# Patient Record
Sex: FEMALE | Race: WHITE | NOT HISPANIC OR LATINO | Employment: OTHER | URBAN - METROPOLITAN AREA
[De-identification: names, ages, dates, MRNs, and addresses within clinical notes are randomized per-mention and may not be internally consistent; named-entity substitution may affect disease eponyms.]

---

## 2017-01-06 ENCOUNTER — HOSPITAL ENCOUNTER (OUTPATIENT)
Dept: RADIOLOGY | Facility: CLINIC | Age: 52
Discharge: HOME | End: 2017-01-06
Payer: COMMERCIAL

## 2017-01-06 ENCOUNTER — ALLSCRIPTS OFFICE VISIT (OUTPATIENT)
Dept: OTHER | Facility: OTHER | Age: 52
End: 2017-01-06

## 2017-01-06 DIAGNOSIS — M25.532 PAIN IN LEFT WRIST: ICD-10-CM

## 2017-01-06 PROCEDURE — 73110 X-RAY EXAM OF WRIST: CPT

## 2017-01-27 ENCOUNTER — ALLSCRIPTS OFFICE VISIT (OUTPATIENT)
Dept: OTHER | Facility: OTHER | Age: 52
End: 2017-01-27

## 2017-01-27 ENCOUNTER — HOSPITAL ENCOUNTER (OUTPATIENT)
Dept: RADIOLOGY | Facility: CLINIC | Age: 52
Discharge: HOME | End: 2017-01-27
Payer: COMMERCIAL

## 2017-01-27 DIAGNOSIS — S82.831D OTHER FRACTURE OF UPPER AND LOWER END OF RIGHT FIBULA, SUBSEQUENT ENCOUNTER FOR CLOSED FRACTURE WITH ROUTINE HEALING: ICD-10-CM

## 2017-01-27 DIAGNOSIS — M25.532 PAIN IN LEFT WRIST: ICD-10-CM

## 2017-01-27 DIAGNOSIS — S52.502A CLOSED FRACTURE OF LOWER END OF LEFT RADIUS: ICD-10-CM

## 2017-01-27 DIAGNOSIS — M79.671 PAIN OF RIGHT FOOT: ICD-10-CM

## 2017-01-27 DIAGNOSIS — M25.561 PAIN IN RIGHT KNEE: ICD-10-CM

## 2017-01-27 PROCEDURE — 73610 X-RAY EXAM OF ANKLE: CPT

## 2017-01-30 ENCOUNTER — ALLSCRIPTS OFFICE VISIT (OUTPATIENT)
Dept: OTHER | Facility: OTHER | Age: 52
End: 2017-01-30

## 2017-01-30 ENCOUNTER — HOSPITAL ENCOUNTER (OUTPATIENT)
Dept: RADIOLOGY | Facility: CLINIC | Age: 52
Discharge: HOME | End: 2017-01-30
Payer: COMMERCIAL

## 2017-01-30 DIAGNOSIS — M25.532 PAIN IN LEFT WRIST: ICD-10-CM

## 2017-01-30 DIAGNOSIS — S52.502A CLOSED FRACTURE OF LOWER END OF LEFT RADIUS: ICD-10-CM

## 2017-01-30 PROCEDURE — 73100 X-RAY EXAM OF WRIST: CPT

## 2017-02-07 ENCOUNTER — ALLSCRIPTS OFFICE VISIT (OUTPATIENT)
Dept: OTHER | Facility: OTHER | Age: 52
End: 2017-02-07

## 2017-02-07 ENCOUNTER — HOSPITAL ENCOUNTER (OUTPATIENT)
Dept: RADIOLOGY | Facility: CLINIC | Age: 52
Discharge: HOME | End: 2017-02-07
Payer: COMMERCIAL

## 2017-02-07 DIAGNOSIS — M79.671 PAIN OF RIGHT FOOT: ICD-10-CM

## 2017-02-07 PROCEDURE — 73630 X-RAY EXAM OF FOOT: CPT

## 2017-02-11 ENCOUNTER — ALLSCRIPTS OFFICE VISIT (OUTPATIENT)
Dept: OTHER | Facility: OTHER | Age: 52
End: 2017-02-11

## 2017-02-24 ENCOUNTER — HOSPITAL ENCOUNTER (OUTPATIENT)
Dept: RADIOLOGY | Facility: CLINIC | Age: 52
Discharge: HOME | End: 2017-02-24
Payer: COMMERCIAL

## 2017-02-24 ENCOUNTER — ALLSCRIPTS OFFICE VISIT (OUTPATIENT)
Dept: OTHER | Facility: OTHER | Age: 52
End: 2017-02-24

## 2017-02-24 DIAGNOSIS — M25.561 PAIN IN RIGHT KNEE: ICD-10-CM

## 2017-02-24 PROCEDURE — 73562 X-RAY EXAM OF KNEE 3: CPT

## 2017-02-27 ENCOUNTER — APPOINTMENT (OUTPATIENT)
Dept: LAB | Facility: CLINIC | Age: 52
End: 2017-02-27
Payer: COMMERCIAL

## 2017-02-27 ENCOUNTER — GENERIC CONVERSION - ENCOUNTER (OUTPATIENT)
Dept: OTHER | Facility: OTHER | Age: 52
End: 2017-02-27

## 2017-02-27 ENCOUNTER — TRANSCRIBE ORDERS (OUTPATIENT)
Dept: LAB | Facility: CLINIC | Age: 52
End: 2017-02-27

## 2017-02-27 DIAGNOSIS — G89.29 CHRONIC PAIN OF RIGHT KNEE: Primary | ICD-10-CM

## 2017-02-27 DIAGNOSIS — M25.561 CHRONIC PAIN OF RIGHT KNEE: Primary | ICD-10-CM

## 2017-02-27 LAB — VENIPUNCTURE: NORMAL

## 2017-02-27 PROCEDURE — 36415 COLL VENOUS BLD VENIPUNCTURE: CPT | Performed by: ORTHOPAEDIC SURGERY

## 2017-02-28 LAB
C-REACT.PROTEIN,QUANT (HISTORICAL): 6.5 MG/L (ref 0–4.9)
ERYTHROCYTE SEDIMENTATION RATE (HISTORICAL): 6 MM/HR (ref 0–40)

## 2017-03-03 ENCOUNTER — ALLSCRIPTS OFFICE VISIT (OUTPATIENT)
Dept: OTHER | Facility: OTHER | Age: 52
End: 2017-03-03

## 2017-05-26 ENCOUNTER — ALLSCRIPTS OFFICE VISIT (OUTPATIENT)
Dept: OTHER | Facility: OTHER | Age: 52
End: 2017-05-26

## 2017-05-26 ENCOUNTER — HOSPITAL ENCOUNTER (OUTPATIENT)
Dept: RADIOLOGY | Facility: CLINIC | Age: 52
Discharge: HOME | End: 2017-05-26
Payer: COMMERCIAL

## 2017-05-26 DIAGNOSIS — Z96.651 PRESENCE OF RIGHT ARTIFICIAL KNEE JOINT: ICD-10-CM

## 2017-05-26 PROCEDURE — 73562 X-RAY EXAM OF KNEE 3: CPT

## 2017-08-08 ENCOUNTER — APPOINTMENT (OUTPATIENT)
Dept: RADIOLOGY | Facility: CLINIC | Age: 52
End: 2017-08-08
Payer: COMMERCIAL

## 2017-08-08 ENCOUNTER — ALLSCRIPTS OFFICE VISIT (OUTPATIENT)
Dept: OTHER | Facility: OTHER | Age: 52
End: 2017-08-08

## 2017-08-08 DIAGNOSIS — M25.361 OTHER INSTABILITY, RIGHT KNEE: ICD-10-CM

## 2017-08-08 DIAGNOSIS — Z96.651 PRESENCE OF RIGHT ARTIFICIAL KNEE JOINT: ICD-10-CM

## 2017-08-08 DIAGNOSIS — M25.561 PAIN IN RIGHT KNEE: ICD-10-CM

## 2017-08-08 DIAGNOSIS — S82.831D OTHER FRACTURE OF UPPER AND LOWER END OF RIGHT FIBULA, SUBSEQUENT ENCOUNTER FOR CLOSED FRACTURE WITH ROUTINE HEALING: ICD-10-CM

## 2017-08-08 PROCEDURE — 73562 X-RAY EXAM OF KNEE 3: CPT

## 2017-08-11 ENCOUNTER — HOSPITAL ENCOUNTER (OUTPATIENT)
Dept: RADIOLOGY | Facility: HOSPITAL | Age: 52
Discharge: HOME | End: 2017-08-11
Attending: ORTHOPAEDIC SURGERY
Payer: COMMERCIAL

## 2017-08-11 DIAGNOSIS — M25.561 PAIN IN RIGHT KNEE: ICD-10-CM

## 2017-08-11 DIAGNOSIS — M25.361 OTHER INSTABILITY, RIGHT KNEE: ICD-10-CM

## 2017-08-11 DIAGNOSIS — Z96.651 PRESENCE OF RIGHT ARTIFICIAL KNEE JOINT: ICD-10-CM

## 2017-08-11 PROCEDURE — 78315 BONE IMAGING 3 PHASE: CPT

## 2017-08-11 PROCEDURE — A9503 TC99M MEDRONATE: HCPCS

## 2017-08-18 ENCOUNTER — GENERIC CONVERSION - ENCOUNTER (OUTPATIENT)
Dept: OTHER | Facility: OTHER | Age: 52
End: 2017-08-18

## 2017-09-06 ENCOUNTER — GENERIC CONVERSION - ENCOUNTER (OUTPATIENT)
Dept: OTHER | Facility: OTHER | Age: 52
End: 2017-09-06

## 2017-09-08 ENCOUNTER — TRANSCRIBE ORDERS (OUTPATIENT)
Dept: ADMINISTRATIVE | Facility: HOSPITAL | Age: 52
End: 2017-09-08

## 2017-09-08 ENCOUNTER — OFFICE VISIT (OUTPATIENT)
Dept: LAB | Facility: HOSPITAL | Age: 52
End: 2017-09-08
Attending: ORTHOPAEDIC SURGERY
Payer: COMMERCIAL

## 2017-09-08 ENCOUNTER — HOSPITAL ENCOUNTER (OUTPATIENT)
Dept: RADIOLOGY | Facility: HOSPITAL | Age: 52
Discharge: HOME | End: 2017-09-08
Attending: ORTHOPAEDIC SURGERY
Payer: COMMERCIAL

## 2017-09-08 ENCOUNTER — APPOINTMENT (OUTPATIENT)
Dept: PREADMISSION TESTING | Facility: HOSPITAL | Age: 52
End: 2017-09-08
Payer: COMMERCIAL

## 2017-09-08 VITALS — HEIGHT: 65 IN | BODY MASS INDEX: 27.99 KG/M2 | WEIGHT: 168 LBS

## 2017-09-08 DIAGNOSIS — Z01.818 PRE-OP TESTING: ICD-10-CM

## 2017-09-08 DIAGNOSIS — Z01.818 PRE-OP TESTING: Primary | ICD-10-CM

## 2017-09-08 LAB
ABO GROUP BLD: NORMAL
BLD GP AB SCN SERPL QL: NEGATIVE
CRP SERPL QL: 6 MG/L
ERYTHROCYTE [SEDIMENTATION RATE] IN BLOOD: 8 MM/HOUR (ref 2–25)
EST. AVERAGE GLUCOSE BLD GHB EST-MCNC: 94 MG/DL
HBA1C MFR BLD: 4.9 % (ref 4.2–6.3)
RH BLD: POSITIVE
SPECIMEN EXPIRATION DATE: NORMAL

## 2017-09-08 PROCEDURE — 71020 HB CHEST X-RAY 2VW FRONTAL&LATL: CPT

## 2017-09-08 PROCEDURE — 87081 CULTURE SCREEN ONLY: CPT

## 2017-09-08 PROCEDURE — 86850 RBC ANTIBODY SCREEN: CPT

## 2017-09-08 PROCEDURE — 83036 HEMOGLOBIN GLYCOSYLATED A1C: CPT

## 2017-09-08 PROCEDURE — 93005 ELECTROCARDIOGRAM TRACING: CPT

## 2017-09-08 PROCEDURE — 36415 COLL VENOUS BLD VENIPUNCTURE: CPT

## 2017-09-08 PROCEDURE — 85652 RBC SED RATE AUTOMATED: CPT

## 2017-09-08 PROCEDURE — 86901 BLOOD TYPING SEROLOGIC RH(D): CPT

## 2017-09-08 PROCEDURE — 86900 BLOOD TYPING SEROLOGIC ABO: CPT

## 2017-09-08 PROCEDURE — 86140 C-REACTIVE PROTEIN: CPT

## 2017-09-09 LAB — MRSA NOSE QL CULT: NORMAL

## 2017-09-11 LAB
ATRIAL RATE: 55 BPM
P AXIS: 27 DEGREES
PR INTERVAL: 120 MS
QRS AXIS: 11 DEGREES
QRSD INTERVAL: 78 MS
QT INTERVAL: 446 MS
QTC INTERVAL: 426 MS
T WAVE AXIS: 32 DEGREES
VENTRICULAR RATE: 55 BPM

## 2017-09-12 ENCOUNTER — GENERIC CONVERSION - ENCOUNTER (OUTPATIENT)
Dept: OTHER | Facility: OTHER | Age: 52
End: 2017-09-12

## 2017-09-12 LAB
A/G RATIO (HISTORICAL): 1.4 (ref 1.2–2.2)
ALBUMIN SERPL BCP-MCNC: 4.4 G/DL (ref 3.5–5.5)
ALP SERPL-CCNC: 83 IU/L (ref 39–117)
ALT SERPL W P-5'-P-CCNC: 23 IU/L (ref 0–32)
APTT PPP: 27 SEC (ref 24–33)
AST SERPL W P-5'-P-CCNC: 34 IU/L (ref 0–40)
BASOPHILS # BLD AUTO: 0 %
BASOPHILS # BLD AUTO: 0 X10E3/UL (ref 0–0.2)
BILIRUB SERPL-MCNC: 0.3 MG/DL (ref 0–1.2)
BUN SERPL-MCNC: 6 MG/DL (ref 6–24)
BUN/CREA RATIO (HISTORICAL): 8 (ref 9–23)
CALCIUM SERPL-MCNC: 9.6 MG/DL (ref 8.7–10.2)
CHLORIDE SERPL-SCNC: 100 MMOL/L (ref 96–106)
CREAT SERPL-MCNC: 0.71 MG/DL (ref 0.57–1)
DEPRECATED RDW RBC AUTO: 14.1 % (ref 12.3–15.4)
EGFR AFRICAN AMERICAN (HISTORICAL): 112 ML/MIN/1.73
EGFR-AMERICAN CALC (HISTORICAL): 97 ML/MIN/1.73
EOSINOPHIL # BLD AUTO: 0.1 X10E3/UL (ref 0–0.4)
EOSINOPHIL # BLD AUTO: 1 %
ERYTHROCYTE SEDIMENTATION RATE (HISTORICAL): 20 MM/HR (ref 0–40)
GLUCOSE SERPL-MCNC: 75 MG/DL (ref 65–99)
HCT VFR BLD AUTO: 39.8 % (ref 34–46.6)
HGB BLD-MCNC: 13.2 G/DL (ref 11.1–15.9)
IMM.GRANULOCYTES (CD4/8) (HISTORICAL): 0 %
IMM.GRANULOCYTES (CD4/8) (HISTORICAL): 0 X10E3/UL (ref 0–0.1)
INR PPP: 1 (ref 0.8–1.2)
LYMPHOCYTES # BLD AUTO: 2.4 X10E3/UL (ref 0.7–3.1)
LYMPHOCYTES # BLD AUTO: 30 %
MCH RBC QN AUTO: 32.8 PG (ref 26.6–33)
MCHC RBC AUTO-ENTMCNC: 33.2 G/DL (ref 31.5–35.7)
MCV RBC AUTO: 99 FL (ref 79–97)
MONOCYTES # BLD AUTO: 0.8 X10E3/UL (ref 0.1–0.9)
MONOCYTES (HISTORICAL): 11 %
NEUTROPHILS # BLD AUTO: 4.5 X10E3/UL (ref 1.4–7)
NEUTROPHILS # BLD AUTO: 58 %
PLATELET # BLD AUTO: 274 X10E3/UL (ref 150–379)
POTASSIUM SERPL-SCNC: 4.4 MMOL/L (ref 3.5–5.2)
PROTHROMBIN TIME: 10.2 SEC (ref 9.1–12)
RBC (HISTORICAL): 4.03 X10E6/UL (ref 3.77–5.28)
SODIUM SERPL-SCNC: 139 MMOL/L (ref 134–144)
TOT. GLOBULIN, SERUM (HISTORICAL): 3.2 G/DL (ref 1.5–4.5)
TOTAL PROTEIN (HISTORICAL): 7.6 G/DL (ref 6–8.5)
WBC # BLD AUTO: 7.8 X10E3/UL (ref 3.4–10.8)

## 2017-09-13 LAB
BACTERIA UR QL AUTO: NORMAL
BILIRUB UR QL STRIP: NEGATIVE
C-REACT.PROTEIN,QUANT (HISTORICAL): 1.7 MG/L (ref 0–4.9)
COLOR UR: YELLOW
COMMENT (HISTORICAL): CLEAR
FECAL OCCULT BLOOD DIAGNOSTIC (HISTORICAL): NEGATIVE
GLUCOSE (HISTORICAL): NEGATIVE
KETONES UR STRIP-MCNC: NEGATIVE MG/DL
LEUKOCYTE ESTERASE UR QL STRIP: NEGATIVE
MICROSCOPIC EXAMINATION (HISTORICAL): NORMAL
MICROSCOPIC EXAMINATION (HISTORICAL): NORMAL
NITRITE UR QL STRIP: NEGATIVE
NON-SQ EPI CELLS URNS QL MICRO: NORMAL /HPF
PH UR STRIP.AUTO: 7 [PH] (ref 5–7.5)
PROT UR STRIP-MCNC: NEGATIVE MG/DL
RBC (HISTORICAL): NORMAL /HPF
SP GR UR STRIP.AUTO: 1.01 (ref 1–1.03)
UROBILINOGEN UR QL STRIP.AUTO: 0.2 EU/DL (ref 0.2–1)
WBC # BLD AUTO: NORMAL /HPF

## 2017-09-15 ENCOUNTER — ALLSCRIPTS OFFICE VISIT (OUTPATIENT)
Dept: OTHER | Facility: OTHER | Age: 52
End: 2017-09-15

## 2017-09-24 RX ORDER — ACETAMINOPHEN 325 MG/1
975 TABLET ORAL ONCE
Status: COMPLETED | OUTPATIENT
Start: 2017-09-25 | End: 2017-09-25

## 2017-09-24 RX ORDER — SCOLOPAMINE TRANSDERMAL SYSTEM 1 MG/1
1 PATCH, EXTENDED RELEASE TRANSDERMAL ONCE
Status: DISCONTINUED | OUTPATIENT
Start: 2017-09-25 | End: 2017-09-25 | Stop reason: HOSPADM

## 2017-09-24 RX ORDER — OXYCODONE HCL 10 MG/1
10 TABLET, FILM COATED, EXTENDED RELEASE ORAL ONCE
Status: COMPLETED | OUTPATIENT
Start: 2017-09-25 | End: 2017-09-25

## 2017-09-25 ENCOUNTER — HOSPITAL ENCOUNTER (OUTPATIENT)
Facility: HOSPITAL | Age: 52
Setting detail: SURGERY ADMIT
Discharge: HOME | End: 2017-09-25
Attending: ORTHOPAEDIC SURGERY | Admitting: ORTHOPAEDIC SURGERY
Payer: COMMERCIAL

## 2017-09-25 VITALS
HEART RATE: 65 BPM | TEMPERATURE: 97.1 F | SYSTOLIC BLOOD PRESSURE: 146 MMHG | RESPIRATION RATE: 18 BRPM | OXYGEN SATURATION: 100 % | DIASTOLIC BLOOD PRESSURE: 82 MMHG

## 2017-09-25 LAB
ABO GROUP BLD: NORMAL
BLD GP AB SCN SERPL QL: NEGATIVE
RH BLD: POSITIVE
SPECIMEN EXPIRATION DATE: NORMAL

## 2017-09-25 PROCEDURE — 86901 BLOOD TYPING SEROLOGIC RH(D): CPT | Performed by: ORTHOPAEDIC SURGERY

## 2017-09-25 PROCEDURE — 86850 RBC ANTIBODY SCREEN: CPT | Performed by: ORTHOPAEDIC SURGERY

## 2017-09-25 PROCEDURE — 86900 BLOOD TYPING SEROLOGIC ABO: CPT | Performed by: ORTHOPAEDIC SURGERY

## 2017-09-25 RX ADMIN — OXYCODONE HYDROCHLORIDE 10 MG: 10 TABLET, FILM COATED, EXTENDED RELEASE ORAL at 06:32

## 2017-09-25 RX ADMIN — ACETAMINOPHEN 975 MG: 325 TABLET, FILM COATED ORAL at 06:31

## 2017-09-25 RX ADMIN — SCOPOLAMINE 1 PATCH: 1 PATCH, EXTENDED RELEASE TRANSDERMAL at 06:32

## 2017-09-25 NOTE — PROGRESS NOTES
Patient's case was canceled  Ropivacaine 0 5% 80mg  Morphine 6mg, Epinephrine 0 2mg, Depo-Medrol 40 mg, Sodium Chloride 0 9% 25ml syringe wasted (whole syringe)   Wasted in [facility]  by Christopher Loyola PharmD and Ravi Lopes PharmD

## 2017-09-25 NOTE — H&P
Patient seen examined in preoperative holding and preparation for revision right total knee arthroplasty  The patient states that there have been no changes in her overall health her medical condition since he was last seen by me in the office other than she sustained a pig bite to her right shin 1 5-2 weeks ago  She states the pig is her neighbors pet, and she states that it was healing well and she had scratched the scab off recently  There is a 1 cm x 1 cm area that is nonhealing over the anterior portion of her right shin over the anterior lateral compartment  There is pink skin around the area of the bite without increased tenderness to palpation erythema or drainage  The wound appears to be relatively benign today however there is some concern that it is not completely healed by 2 weeks in a 38-year-old woman without significant medical comorbidities  Furthermore it is unknown what type of lilia is carried within a pegs oral cavity and how this relates to possibility of wound infection versus delayed onset infection after inoculation  In the setting of revision total knee arthroplasty that already carries a elevated risk of infection and other complication due to the nature of the procedure the case was canceled today secondary to the presence of a nonhealing pig bite to the right anterior lateral compartment  Case will be delayed temporarily until the right lower extremity wound demonstrates aseptic healing to completeness  Patient understands concerns of revision knee arthroplasty in the setting of a nonhealing pig bite to her leg  She demonstrates understanding of the increased risk of infection that this would present if surgical intervention was pursued today  Patient was discharged to follow up in the office in 1 week for outpatient monitoring of healing  Keerthi BENITEZ    Division of Adult Reconstruction  Department of [facility]

## 2017-10-03 ENCOUNTER — GENERIC CONVERSION - ENCOUNTER (OUTPATIENT)
Dept: OTHER | Facility: OTHER | Age: 52
End: 2017-10-03

## 2017-10-12 NOTE — PRE-PROCEDURE INSTRUCTIONS
My Surgical Experience    The following information was developed to assist you to prepare for your operation  What do I need to do before coming to the hospital?   Arrange for a responsible person to drive you to and from the hospital    Arrange care for your children at home  Children are not allowed in the recovery areas of the hospital   Plan to wear clothing that is easy to put on and take off  If you are having shoulder surgery, wear a shirt that buttons or zippers in the front  Bathing  o Shower the evening before and the morning of your surgery with an antibacterial soap  Please refer to the Pre Op Showering Instructions for Surgery Patients Sheet   o Remove nail polish and all body piercing jewelry  o Do not shave any body part for at least 24 hours before surgery-this includes face, arms, legs and upper body  Food  o Nothing to eat or drink after midnight the night before your surgery  This includes candy and chewing gum  o Exception: If your surgery is after 12:00pm (noon), you may have clear liquids such as 7-Up®, ginger ale, apple or cranberry juice, Jell-O®, water, or clear broth until 8:00 am  o Do not drink milk or juice with pulp on the morning before surgery  o Do not drink alcohol 24 hours before surgery  Medicine  o Follow instructions you received from your surgeon about which medicines you may take on the day of surgery  o If instructed to take medicine on the morning of surgery, take pills with just a small sip of water  Call your prescribing doctor for specific infroamtion on what to do if you take insulin    What should I bring to the hospital?    Bring:  Luz Maria Bouquet or a walker, if you have them, for foot or knee surgery   A list of the daily medicines, vitamins, minerals, herbals and nutritional supplements you take   Include the dosages of medicines and the time you take them each day   Glasses, dentures or hearing aids   Minimal clothing; you will be wearing hospital sleepwear   Photo ID; required to verify your identity   If you have a Living Will or Power of , bring a copy of the documents   If you have an ostomy, bring an extra pouch and any supplies you use    Do not bring   Medicines or inhalers   Money, valuables or jewelry    What other information should I know about the day of surgery?  Notify your surgeons if you develop a cold, sore throat, cough, fever, rash or any other illness   Report to the Ambulatory Surgical/Same Day Surgery Unit   You will be instructed to stop at Registration only if you have not been pre-registered   Inform your  fi they do not stay that they will be asked by the staff to leave a phone number where they can be reached   Be available to be reached before surgery  In the event the operating room schedule changes, you may be asked to come in earlier or later than expected    *It is important to tell your doctor and others involved in your health care if you are taking or have been taking any non-prescription drugs, vitamins, minerals, herbals or other nutritional supplements  Any of these may interact with some food or medicines and cause a reaction      Pre-Surgery Instructions:   Medication Instructions    escitalopram (LEXAPRO) 10 mg tablet Instructed patient per Anesthesia Guidelines

## 2017-10-18 ENCOUNTER — GENERIC CONVERSION - ENCOUNTER (OUTPATIENT)
Dept: OTHER | Facility: OTHER | Age: 52
End: 2017-10-18

## 2017-10-20 ENCOUNTER — ALLSCRIPTS OFFICE VISIT (OUTPATIENT)
Dept: OTHER | Facility: OTHER | Age: 52
End: 2017-10-20

## 2017-10-21 ENCOUNTER — ANESTHESIA EVENT (OUTPATIENT)
Dept: PERIOP | Facility: HOSPITAL | Age: 52
DRG: 468 | End: 2017-10-21
Payer: COMMERCIAL

## 2017-10-21 NOTE — PROGRESS NOTES
Assessment  1  Pre-op exam (V72 27) (A85 699)    Discussion/Summary  Surgical Clearance: She is at a LOW risk from a cardiovascular standpoint at this time without any additional cardiac testing  Reevaluation needed, if she should present with symptoms prior to surgery/procedure  Surgical clearance faxed to Dr Pepper Paredes   Possible side effects of new medications were reviewed with the patient/guardian today  The treatment plan was reviewed with the patient/guardian  The patient/guardian understands and agrees with the treatment plan      Chief Complaint  pt here for preop for right knee replacement with Dr Pepper Paredes 10/23  tc/cma      History of Present Illness  Pre-Op Visit (Brief): The patient is being seen for a preoperative visit  The procedure is a(n) Revision right total knee replacement scheduled for 10/23/17 with Dr Pepper Paredes  The indication for surgery is right knee instability  Surgical Risk Assessment:   Prior Anesthesia: She had prior anesthesia-- and-- no prior adverse reaction to general anesthesia  Lifestyle Factors: denies alcohol use, denies tobacco use and denies illegal drug use  Symptoms: no symptoms  Living Situation: home is secure and supportive  Review of Systems    Constitutional: No fever, no chills, feels well, no tiredness, no recent weight gain or weight loss  Cardiovascular: no chest pain,-- no palpitations-- and-- no lower extremity edema  Respiratory: No complaints of shortness of breath, no wheezing, no cough, no SOB on exertion, no orthopnea, no PND  Gastrointestinal: No complaints of abdominal pain, no constipation, no nausea or vomiting, no diarrhea, no bloody stools  Musculoskeletal: arthralgias-- and-- myalgias  Integumentary: pig bite sites rle well healed-no sign infection, but-- no rashes  Neurological: No complaints of headache, no confusion, no convulsions, no numbness, no dizziness or fainting, no tingling, no limb weakness, no difficulty walking  Psychiatric: anxiety  Endocrine: No complaints of proptosis, no hot flashes, no muscle weakness, no deepening of the voice, no feelings of weakness  Active Problems  1  Bitten by pig, subsequent encounter (V58 89,879 8) (W55 41XD)   2  Chronic fatigue (780 79) (R53 82)   3  Depression with anxiety (300 4) (F41 8)   4  Hematuria (599 70) (R31 9)   5  History of alcoholism (V11 3) (F10 21)   6  Knee instability, right (718 86) (M25 361)   7  Left wrist pain (719 43) (M25 532)   8  Murmur (785 2) (R01 1)   9  Need for subacute bacterial endocarditis prophylaxis (V07 8) (Z29 8)   10  Pre-op exam (V72 84) (Z01 818)   11  Reported Hx Of Knee Replacement   12  Right foot pain (729 5) (M79 671)   13  Right knee pain (719 46) (M25 561)   14  S/P total knee replacement using cement, right (V43 65) (Z96 651)   15  Status post open reduction with internal fixation (ORIF) of fracture of ankle    (V45 89,V15 51) (Z96 7,Z87 81)   16  Thyromegaly (240 9) (E04 9)   17   Vitamin B-complex deficiency (266 9) (E53 9)    Past Medical History   · History of _   · History of Acute lower UTI (599 0) (N39 0)   · History of Acute nasopharyngitis (common cold) (460) (J00)   · History of Acute upper respiratory infection (465 9) (J06 9)   · History of Ankle pain, right (719 47) (M25 571)   · History of Benign essential hypertension (401 1) (I10)   · History of Contusion of foot, right (924 20) (S90 31XA)   · History of Contusion Of The Toe(S) With Intact Skin Surface (924 3)   · History of Distal radius fracture, left (813 42) (S52 502A)   · History of Encounter for annual physical exam (V70 0) (Z00 00)   · History of Encounter for screening for malignant neoplasm of colon (V76 51) (Z12 11)   · History of Fever of unknown origin (780 60) (R50 9)   · History of anemia (V12 3) (Z86 2)   · History of fever (V13 89) (U60 849)   · History of gastroesophageal reflux (GERD) (V12 79) (Z87 19)   · History of herpes labialis (V12 09) (Z86 19) · History of screening mammography (V15 89) (Z92 89)   · History of sinus bradycardia (V12 59) (Z86 79)   · History of upper respiratory infection (V12 09) (Z87 09)   · History of Immunology Studies Nonspecific Abnormal Findings (795 79)   · History of Infected Nonvenomous Insect Bite (919 5)   · History of Numbness (782 0) (R20 0)   · History of Other closed fracture of distal end of right fibula with routine healing,  subsequent encounter (V54 16) (N81 376P)   · History of Paronychia of toe (681 11) (L03 039)   · History of Skin rash (782 1) (R21)   · History of Tingling (782 0) (R20 2)   · History of Uncomplicated alcohol abuse (305 00) (F10 10)   · History of Visit for pre-operative examination (V72 84) (Z01 818)   · History of Well adult on routine health check (V70 0) (Z00 00)   · History of Wound infection (958 3) (T14 8XXA,L08 9)   · History of Wound of back (876 0) (S21 209A)    Surgical History   · History of Ankle Surgery   · Reported Hx Of Knee Replacement    Family History  Mother    · Family history of Arthritis   · Family history of Osteoporosis  Father    · Family history of Dementia   · Family history of Spinal stenosis  Family History    · Family history of CREST Syndrome    Social History   ·    · Never A Smoker   · No illicit drug use   · Recovering Alcoholic    Current Meds   1  Escitalopram Oxalate 10 MG Oral Tablet; take 1 tablet by mouth once daily; Therapy: 60Uma8165 to (Last Rx:12Fdp1864)  Requested for: 53Rnk3774 Ordered    Allergies  1   No Known Drug Allergies    Vitals   Recorded: 86OLA0009 10:50AM   Temperature 97 3 F, Temporal   Heart Rate 64, R Radial   Pulse Quality Normal, R Radial   Respiration Quality Difficult   Respiration 16   Systolic 551, RUE, Sitting   Diastolic 82, RUE, Sitting   Height 5 ft 5 in   Weight 174 lb    BMI Calculated 28 96   BSA Calculated 1 86     Physical Exam    Constitutional   General appearance: No acute distress, well appearing and well nourished  Eyes   Conjunctiva and lids: No swelling, erythema or discharge  Ears, Nose, Mouth, and Throat   Oropharynx: Normal with no erythema, edema, exudate or lesions  Neck   Neck: Supple, symmetric, trachea midline, no masses  Thyroid: Normal, no thyromegaly  Pulmonary   Respiratory effort: No increased work of breathing or signs of respiratory distress  Cardiovascular RRR no sig m/r/g  Carotid pulses: 2+ bilaterally  Pedal pulses: 2+ bilaterally  Examination of extremities for edema and/or varicosities: Normal     Abdomen   Abdomen: Non-tender, no masses  Lymphatic   Palpation of lymph nodes in neck: No lymphadenopathy  Skin   Skin and subcutaneous tissue: Normal without rashes or lesions  -- right ant shin and calf lesions well healed--no erythema-no open areas, no streaking  Psychiatric   Orientation to person, place, and time: Normal     Mood and affect: Normal        Results/Data  PHQ-2 Adult Depression Screening 20Oct2017 10:53AM User, s     Test Name Result Flag Reference   PHQ-2 Adult Depression Score 0     Over the last two weeks, how often have you been bothered by any of the following problems? Little interest or pleasure in doing things: Not at all - 0  Feeling down, depressed, or hopeless: Not at all - 0   PHQ-2 Adult Depression Screening Negative         End of Encounter Meds  1  Escitalopram Oxalate 10 MG Oral Tablet (Lexapro); take 1 tablet by mouth once daily;    Therapy: 81Oot1249 to (Last Rx:37Hcn2385)  Requested for: 08OZK9821 Ordered    Future Appointments    Date/Time Provider Specialty Site   10/23/2017 07:30 AM Reynaldo Patel  Orthopedic Surgery [facility] OR   11/07/2017 10:00 AM Reynaldo Patel, [address] [place] [address]     Signatures   Electronically signed by : CINTHYA Vazquez ; Oct 20 2017 11:49AM EST                       (Author)

## 2017-10-21 NOTE — ANESTHESIA PREPROCEDURE EVALUATION
Review of Systems/Medical History  Patient summary reviewed  Chart reviewed  No history of anesthetic complications     Cardiovascular   Pulmonary       GI/Hepatic            Endo/Other  Arthritis     GYN       Hematology   Musculoskeletal       Neurology   Psychology   Depression ,            Physical Exam    Airway    Mallampati score: II  TM Distance: >3 FB  Neck ROM: full     Dental   No notable dental hx     Cardiovascular  Cardiovascular exam normal    Pulmonary  Pulmonary exam normal     Other Findings        Anesthesia Plan  ASA Score- 2       Anesthesia Type- general and regional with ASA Monitors  Additional Monitors:   Airway Plan: ETT  Induction- intravenous  Informed Consent- Anesthetic plan and risks discussed with patient  I personally reviewed this patient with the CRNA  Discussed and agreed on the Anesthesia Plan with the CRNA  Sherre Soulier

## 2017-10-23 ENCOUNTER — GENERIC CONVERSION - ENCOUNTER (OUTPATIENT)
Dept: PERIOP | Facility: HOSPITAL | Age: 52
End: 2017-10-23

## 2017-10-23 ENCOUNTER — HOSPITAL ENCOUNTER (INPATIENT)
Facility: HOSPITAL | Age: 52
LOS: 2 days | Discharge: HOME HEALTH | DRG: 468 | End: 2017-10-25
Attending: ORTHOPAEDIC SURGERY | Admitting: ORTHOPAEDIC SURGERY
Payer: COMMERCIAL

## 2017-10-23 ENCOUNTER — APPOINTMENT (OUTPATIENT)
Dept: RADIOLOGY | Facility: HOSPITAL | Age: 52
DRG: 468 | End: 2017-10-23
Payer: COMMERCIAL

## 2017-10-23 ENCOUNTER — ANESTHESIA (OUTPATIENT)
Dept: PERIOP | Facility: HOSPITAL | Age: 52
DRG: 468 | End: 2017-10-23
Payer: COMMERCIAL

## 2017-10-23 DIAGNOSIS — E01.0 THYROMEGALY: Chronic | ICD-10-CM

## 2017-10-23 DIAGNOSIS — Z96.651 STATUS POST REVISION OF TOTAL REPLACEMENT OF RIGHT KNEE: Primary | ICD-10-CM

## 2017-10-23 DIAGNOSIS — E53.9 VITAMIN B-COMPLEX DEFICIENCY: Chronic | ICD-10-CM

## 2017-10-23 DIAGNOSIS — M25.361 OTHER INSTABILITY, RIGHT KNEE: ICD-10-CM

## 2017-10-23 DIAGNOSIS — F41.8 DEPRESSION WITH ANXIETY: Chronic | ICD-10-CM

## 2017-10-23 DIAGNOSIS — Z96.651 PRESENCE OF RIGHT ARTIFICIAL KNEE JOINT: ICD-10-CM

## 2017-10-23 PROBLEM — R31.9 HEMATURIA: Chronic | Status: ACTIVE | Noted: 2017-10-23

## 2017-10-23 PROBLEM — T84.022A INSTABILITY OF INTERNAL RIGHT KNEE PROSTHESIS (HCC): Status: ACTIVE | Noted: 2017-10-23

## 2017-10-23 PROBLEM — T84.032A MECHANICAL LOOSENING OF INTERNAL RIGHT KNEE PROSTHETIC JOINT (HCC): Status: ACTIVE | Noted: 2017-10-23

## 2017-10-23 PROBLEM — F10.21 HISTORY OF ALCOHOLISM (HCC): Chronic | Status: ACTIVE | Noted: 2017-10-23

## 2017-10-23 PROCEDURE — C1776 JOINT DEVICE (IMPLANTABLE): HCPCS | Performed by: ORTHOPAEDIC SURGERY

## 2017-10-23 PROCEDURE — 0SPC0JZ REMOVAL OF SYNTHETIC SUBSTITUTE FROM RIGHT KNEE JOINT, OPEN APPROACH: ICD-10-PCS | Performed by: ORTHOPAEDIC SURGERY

## 2017-10-23 PROCEDURE — 88311 DECALCIFY TISSUE: CPT | Performed by: ORTHOPAEDIC SURGERY

## 2017-10-23 PROCEDURE — 86901 BLOOD TYPING SEROLOGIC RH(D): CPT | Performed by: ORTHOPAEDIC SURGERY

## 2017-10-23 PROCEDURE — G8988 SELF CARE GOAL STATUS: HCPCS

## 2017-10-23 PROCEDURE — 88307 TISSUE EXAM BY PATHOLOGIST: CPT | Performed by: ORTHOPAEDIC SURGERY

## 2017-10-23 PROCEDURE — 73560 X-RAY EXAM OF KNEE 1 OR 2: CPT

## 2017-10-23 PROCEDURE — 97163 PT EVAL HIGH COMPLEX 45 MIN: CPT

## 2017-10-23 PROCEDURE — G8987 SELF CARE CURRENT STATUS: HCPCS

## 2017-10-23 PROCEDURE — G8979 MOBILITY GOAL STATUS: HCPCS

## 2017-10-23 PROCEDURE — 87075 CULTR BACTERIA EXCEPT BLOOD: CPT | Performed by: ORTHOPAEDIC SURGERY

## 2017-10-23 PROCEDURE — 88305 TISSUE EXAM BY PATHOLOGIST: CPT | Performed by: ORTHOPAEDIC SURGERY

## 2017-10-23 PROCEDURE — 86900 BLOOD TYPING SEROLOGIC ABO: CPT | Performed by: ORTHOPAEDIC SURGERY

## 2017-10-23 PROCEDURE — 87205 SMEAR GRAM STAIN: CPT | Performed by: ORTHOPAEDIC SURGERY

## 2017-10-23 PROCEDURE — G8978 MOBILITY CURRENT STATUS: HCPCS

## 2017-10-23 PROCEDURE — 97167 OT EVAL HIGH COMPLEX 60 MIN: CPT

## 2017-10-23 PROCEDURE — 87081 CULTURE SCREEN ONLY: CPT | Performed by: ORTHOPAEDIC SURGERY

## 2017-10-23 PROCEDURE — 0SRC0J9 REPLACEMENT OF RIGHT KNEE JOINT WITH SYNTHETIC SUBSTITUTE, CEMENTED, OPEN APPROACH: ICD-10-PCS | Performed by: ORTHOPAEDIC SURGERY

## 2017-10-23 PROCEDURE — 86850 RBC ANTIBODY SCREEN: CPT | Performed by: ORTHOPAEDIC SURGERY

## 2017-10-23 PROCEDURE — C1713 ANCHOR/SCREW BN/BN,TIS/BN: HCPCS | Performed by: ORTHOPAEDIC SURGERY

## 2017-10-23 PROCEDURE — 87070 CULTURE OTHR SPECIMN AEROBIC: CPT | Performed by: ORTHOPAEDIC SURGERY

## 2017-10-23 DEVICE — P.F.C. SIGMA DISTAL AUGMENT SIZE 3 4MM RIGHT
Type: IMPLANTABLE DEVICE | Site: KNEE | Status: FUNCTIONAL
Brand: P.F.C. SIGMA

## 2017-10-23 DEVICE — P.F.C. SIGMA FEMORAL ADAPTER BOLT +2/-2
Type: IMPLANTABLE DEVICE | Site: KNEE | Status: FUNCTIONAL
Brand: P.F.C. SIGMA

## 2017-10-23 DEVICE — UNIVERSAL STEM FLUTED 75MM X 14MM: Type: IMPLANTABLE DEVICE | Site: KNEE | Status: FUNCTIONAL

## 2017-10-23 DEVICE — M.B.T. REVISION METAPHYSEAL SLEEVE POROUS 37MM: Type: IMPLANTABLE DEVICE | Site: KNEE | Status: FUNCTIONAL

## 2017-10-23 DEVICE — UNIVERSAL FEMORAL SLEEVE FULL POROUS 40MM: Type: IMPLANTABLE DEVICE | Site: KNEE | Status: FUNCTIONAL

## 2017-10-23 DEVICE — TIBIAL TRAY ROTATING PLATFORM M.B.T. REVISION SIZE 2.5 CEMENTED: Type: IMPLANTABLE DEVICE | Site: KNEE | Status: FUNCTIONAL

## 2017-10-23 DEVICE — IMPLANTABLE DEVICE: Type: IMPLANTABLE DEVICE | Site: KNEE | Status: FUNCTIONAL

## 2017-10-23 DEVICE — UNIVERSAL STEM FLUTED 75MM X 16MM: Type: IMPLANTABLE DEVICE | Site: KNEE | Status: FUNCTIONAL

## 2017-10-23 DEVICE — SIGMA TIBIAL INSERT ROTATING PLATFORM STABILIZED AOX SIZE 3 17.5MM
Type: IMPLANTABLE DEVICE | Site: KNEE | Status: FUNCTIONAL
Brand: SIGMA AOX

## 2017-10-23 DEVICE — P.F.C. SIGMA POSTERIOR AUGMENT COMBO CEMENTED SIZE 3 4MM
Type: IMPLANTABLE DEVICE | Site: KNEE | Status: FUNCTIONAL
Brand: P.F.C. SIGMA

## 2017-10-23 DEVICE — P.F.C. SIGMA FEMORAL ADAPTER 5 DEGREE
Type: IMPLANTABLE DEVICE | Site: KNEE | Status: FUNCTIONAL
Brand: P.F.C. SIGMA

## 2017-10-23 DEVICE — SIGMA FEMORAL TC3 CEMENTED 3 RIGHT
Type: IMPLANTABLE DEVICE | Site: KNEE | Status: FUNCTIONAL
Brand: SIGMA

## 2017-10-23 RX ORDER — DIPHENHYDRAMINE HYDROCHLORIDE 50 MG/ML
25 INJECTION INTRAMUSCULAR; INTRAVENOUS EVERY 6 HOURS PRN
Status: DISCONTINUED | OUTPATIENT
Start: 2017-10-23 | End: 2017-10-25 | Stop reason: HOSPADM

## 2017-10-23 RX ORDER — CELECOXIB 100 MG/1
100 CAPSULE ORAL 2 TIMES DAILY
Status: DISCONTINUED | OUTPATIENT
Start: 2017-10-23 | End: 2017-10-25 | Stop reason: HOSPADM

## 2017-10-23 RX ORDER — ESMOLOL HYDROCHLORIDE 10 MG/ML
INJECTION INTRAVENOUS AS NEEDED
Status: DISCONTINUED | OUTPATIENT
Start: 2017-10-23 | End: 2017-10-23 | Stop reason: SURG

## 2017-10-23 RX ORDER — EPHEDRINE SULFATE 50 MG/ML
INJECTION, SOLUTION INTRAVENOUS AS NEEDED
Status: DISCONTINUED | OUTPATIENT
Start: 2017-10-23 | End: 2017-10-23 | Stop reason: SURG

## 2017-10-23 RX ORDER — OXYCODONE HYDROCHLORIDE 10 MG/1
10 TABLET ORAL EVERY 4 HOURS PRN
Status: DISCONTINUED | OUTPATIENT
Start: 2017-10-23 | End: 2017-10-25 | Stop reason: HOSPADM

## 2017-10-23 RX ORDER — GABAPENTIN 100 MG/1
200 CAPSULE ORAL 2 TIMES DAILY
Status: DISCONTINUED | OUTPATIENT
Start: 2017-10-23 | End: 2017-10-25 | Stop reason: HOSPADM

## 2017-10-23 RX ORDER — FENTANYL CITRATE 50 UG/ML
INJECTION, SOLUTION INTRAMUSCULAR; INTRAVENOUS AS NEEDED
Status: DISCONTINUED | OUTPATIENT
Start: 2017-10-23 | End: 2017-10-23 | Stop reason: SURG

## 2017-10-23 RX ORDER — MAGNESIUM HYDROXIDE 1200 MG/15ML
LIQUID ORAL AS NEEDED
Status: DISCONTINUED | OUTPATIENT
Start: 2017-10-23 | End: 2017-10-23 | Stop reason: HOSPADM

## 2017-10-23 RX ORDER — OXYCODONE HYDROCHLORIDE 5 MG/1
5 TABLET ORAL EVERY 4 HOURS PRN
Status: DISCONTINUED | OUTPATIENT
Start: 2017-10-23 | End: 2017-10-25 | Stop reason: HOSPADM

## 2017-10-23 RX ORDER — HYDROMORPHONE HCL 110MG/55ML
1 PATIENT CONTROLLED ANALGESIA SYRINGE INTRAVENOUS
Status: DISCONTINUED | OUTPATIENT
Start: 2017-10-23 | End: 2017-10-25 | Stop reason: HOSPADM

## 2017-10-23 RX ORDER — DEXAMETHASONE SODIUM PHOSPHATE 10 MG/ML
10 INJECTION, SOLUTION INTRAMUSCULAR; INTRAVENOUS ONCE
Status: COMPLETED | OUTPATIENT
Start: 2017-10-24 | End: 2017-10-24

## 2017-10-23 RX ORDER — ACETAMINOPHEN 325 MG/1
975 TABLET ORAL ONCE
Status: COMPLETED | OUTPATIENT
Start: 2017-10-23 | End: 2017-10-23

## 2017-10-23 RX ORDER — ESCITALOPRAM OXALATE 10 MG/1
10 TABLET ORAL DAILY
Status: DISCONTINUED | OUTPATIENT
Start: 2017-10-24 | End: 2017-10-25 | Stop reason: HOSPADM

## 2017-10-23 RX ORDER — PROPOFOL 10 MG/ML
INJECTION, EMULSION INTRAVENOUS AS NEEDED
Status: DISCONTINUED | OUTPATIENT
Start: 2017-10-23 | End: 2017-10-23 | Stop reason: SURG

## 2017-10-23 RX ORDER — HYDROMORPHONE HCL 110MG/55ML
0.5 PATIENT CONTROLLED ANALGESIA SYRINGE INTRAVENOUS
Status: DISCONTINUED | OUTPATIENT
Start: 2017-10-23 | End: 2017-10-23 | Stop reason: HOSPADM

## 2017-10-23 RX ORDER — HYDROMORPHONE HCL 110MG/55ML
0.5 PATIENT CONTROLLED ANALGESIA SYRINGE INTRAVENOUS
Status: DISCONTINUED | OUTPATIENT
Start: 2017-10-23 | End: 2017-10-25 | Stop reason: HOSPADM

## 2017-10-23 RX ORDER — BUPIVACAINE HYDROCHLORIDE 2.5 MG/ML
INJECTION, SOLUTION EPIDURAL; INFILTRATION; INTRACAUDAL AS NEEDED
Status: DISCONTINUED | OUTPATIENT
Start: 2017-10-23 | End: 2017-10-23 | Stop reason: HOSPADM

## 2017-10-23 RX ORDER — METOCLOPRAMIDE HYDROCHLORIDE 5 MG/ML
INJECTION INTRAMUSCULAR; INTRAVENOUS AS NEEDED
Status: DISCONTINUED | OUTPATIENT
Start: 2017-10-23 | End: 2017-10-23 | Stop reason: SURG

## 2017-10-23 RX ORDER — BISACODYL 10 MG
10 SUPPOSITORY, RECTAL RECTAL DAILY PRN
Status: DISCONTINUED | OUTPATIENT
Start: 2017-10-23 | End: 2017-10-25 | Stop reason: HOSPADM

## 2017-10-23 RX ORDER — ACETAMINOPHEN 325 MG/1
650 TABLET ORAL EVERY 4 HOURS PRN
Status: DISCONTINUED | OUTPATIENT
Start: 2017-10-23 | End: 2017-10-25 | Stop reason: HOSPADM

## 2017-10-23 RX ORDER — KETAMINE HYDROCHLORIDE 50 MG/ML
INJECTION, SOLUTION, CONCENTRATE INTRAMUSCULAR; INTRAVENOUS AS NEEDED
Status: DISCONTINUED | OUTPATIENT
Start: 2017-10-23 | End: 2017-10-23 | Stop reason: SURG

## 2017-10-23 RX ORDER — ACETAMINOPHEN 325 MG/1
650 TABLET ORAL EVERY 6 HOURS PRN
Status: DISCONTINUED | OUTPATIENT
Start: 2017-10-23 | End: 2017-10-25 | Stop reason: HOSPADM

## 2017-10-23 RX ORDER — ONDANSETRON 2 MG/ML
INJECTION INTRAMUSCULAR; INTRAVENOUS AS NEEDED
Status: DISCONTINUED | OUTPATIENT
Start: 2017-10-23 | End: 2017-10-23 | Stop reason: SURG

## 2017-10-23 RX ORDER — ASPIRIN 325 MG
325 TABLET ORAL 2 TIMES DAILY
Status: DISCONTINUED | OUTPATIENT
Start: 2017-10-23 | End: 2017-10-25 | Stop reason: HOSPADM

## 2017-10-23 RX ORDER — MIDAZOLAM HYDROCHLORIDE 1 MG/ML
INJECTION INTRAMUSCULAR; INTRAVENOUS AS NEEDED
Status: DISCONTINUED | OUTPATIENT
Start: 2017-10-23 | End: 2017-10-23 | Stop reason: SURG

## 2017-10-23 RX ORDER — OXYCODONE HCL 10 MG/1
10 TABLET, FILM COATED, EXTENDED RELEASE ORAL ONCE
Status: COMPLETED | OUTPATIENT
Start: 2017-10-23 | End: 2017-10-23

## 2017-10-23 RX ORDER — SODIUM CHLORIDE, SODIUM LACTATE, POTASSIUM CHLORIDE, CALCIUM CHLORIDE 600; 310; 30; 20 MG/100ML; MG/100ML; MG/100ML; MG/100ML
125 INJECTION, SOLUTION INTRAVENOUS CONTINUOUS
Status: DISCONTINUED | OUTPATIENT
Start: 2017-10-23 | End: 2017-10-23

## 2017-10-23 RX ORDER — CEFAZOLIN SODIUM 1 G/3ML
INJECTION, POWDER, FOR SOLUTION INTRAMUSCULAR; INTRAVENOUS AS NEEDED
Status: DISCONTINUED | OUTPATIENT
Start: 2017-10-23 | End: 2017-10-23 | Stop reason: SURG

## 2017-10-23 RX ORDER — PANTOPRAZOLE SODIUM 40 MG/1
40 TABLET, DELAYED RELEASE ORAL DAILY
Status: DISCONTINUED | OUTPATIENT
Start: 2017-10-23 | End: 2017-10-25 | Stop reason: HOSPADM

## 2017-10-23 RX ORDER — FENTANYL CITRATE/PF 50 MCG/ML
50 SYRINGE (ML) INJECTION
Status: DISCONTINUED | OUTPATIENT
Start: 2017-10-23 | End: 2017-10-23 | Stop reason: HOSPADM

## 2017-10-23 RX ORDER — DOCUSATE SODIUM 100 MG/1
100 CAPSULE, LIQUID FILLED ORAL 2 TIMES DAILY
Status: DISCONTINUED | OUTPATIENT
Start: 2017-10-23 | End: 2017-10-25 | Stop reason: HOSPADM

## 2017-10-23 RX ORDER — GLYCOPYRROLATE 0.2 MG/ML
INJECTION INTRAMUSCULAR; INTRAVENOUS AS NEEDED
Status: DISCONTINUED | OUTPATIENT
Start: 2017-10-23 | End: 2017-10-23 | Stop reason: SURG

## 2017-10-23 RX ORDER — ONDANSETRON 2 MG/ML
4 INJECTION INTRAMUSCULAR; INTRAVENOUS EVERY 6 HOURS PRN
Status: DISCONTINUED | OUTPATIENT
Start: 2017-10-23 | End: 2017-10-25 | Stop reason: HOSPADM

## 2017-10-23 RX ORDER — ONDANSETRON 2 MG/ML
4 INJECTION INTRAMUSCULAR; INTRAVENOUS ONCE AS NEEDED
Status: DISCONTINUED | OUTPATIENT
Start: 2017-10-23 | End: 2017-10-23 | Stop reason: HOSPADM

## 2017-10-23 RX ORDER — MAGNESIUM HYDROXIDE/ALUMINUM HYDROXICE/SIMETHICONE 120; 1200; 1200 MG/30ML; MG/30ML; MG/30ML
30 SUSPENSION ORAL EVERY 6 HOURS PRN
Status: DISCONTINUED | OUTPATIENT
Start: 2017-10-23 | End: 2017-10-25 | Stop reason: HOSPADM

## 2017-10-23 RX ORDER — SODIUM CHLORIDE 9 MG/ML
75 INJECTION, SOLUTION INTRAVENOUS CONTINUOUS
Status: DISCONTINUED | OUTPATIENT
Start: 2017-10-23 | End: 2017-10-24

## 2017-10-23 RX ORDER — ROCURONIUM BROMIDE 10 MG/ML
INJECTION, SOLUTION INTRAVENOUS AS NEEDED
Status: DISCONTINUED | OUTPATIENT
Start: 2017-10-23 | End: 2017-10-23 | Stop reason: SURG

## 2017-10-23 RX ORDER — HYDROMORPHONE HYDROCHLORIDE 2 MG/ML
INJECTION, SOLUTION INTRAMUSCULAR; INTRAVENOUS; SUBCUTANEOUS AS NEEDED
Status: DISCONTINUED | OUTPATIENT
Start: 2017-10-23 | End: 2017-10-23 | Stop reason: SURG

## 2017-10-23 RX ORDER — SCOLOPAMINE TRANSDERMAL SYSTEM 1 MG/1
1 PATCH, EXTENDED RELEASE TRANSDERMAL ONCE
Status: COMPLETED | OUTPATIENT
Start: 2017-10-23 | End: 2017-10-23

## 2017-10-23 RX ORDER — LIDOCAINE HYDROCHLORIDE 10 MG/ML
INJECTION, SOLUTION INFILTRATION; PERINEURAL AS NEEDED
Status: DISCONTINUED | OUTPATIENT
Start: 2017-10-23 | End: 2017-10-23 | Stop reason: SURG

## 2017-10-23 RX ADMIN — ACETAMINOPHEN 975 MG: 325 TABLET, FILM COATED ORAL at 06:23

## 2017-10-23 RX ADMIN — MIDAZOLAM HYDROCHLORIDE 2 MG: 1 INJECTION, SOLUTION INTRAMUSCULAR; INTRAVENOUS at 07:43

## 2017-10-23 RX ADMIN — ESMOLOL HYDROCHLORIDE 30 MG: 10 INJECTION, SOLUTION INTRAVENOUS at 12:24

## 2017-10-23 RX ADMIN — FENTANYL CITRATE 50 MCG: 50 INJECTION INTRAMUSCULAR; INTRAVENOUS at 13:55

## 2017-10-23 RX ADMIN — DOCUSATE SODIUM 100 MG: 100 CAPSULE, LIQUID FILLED ORAL at 16:23

## 2017-10-23 RX ADMIN — CEFAZOLIN 1000 MG: 1 INJECTION, POWDER, FOR SOLUTION INTRAVENOUS at 11:52

## 2017-10-23 RX ADMIN — FENTANYL CITRATE 50 MCG: 50 INJECTION, SOLUTION INTRAMUSCULAR; INTRAVENOUS at 11:58

## 2017-10-23 RX ADMIN — ONDANSETRON 4 MG: 2 INJECTION INTRAMUSCULAR; INTRAVENOUS at 10:49

## 2017-10-23 RX ADMIN — SODIUM CHLORIDE 75 ML/HR: 0.9 INJECTION, SOLUTION INTRAVENOUS at 16:17

## 2017-10-23 RX ADMIN — SODIUM CHLORIDE, SODIUM LACTATE, POTASSIUM CHLORIDE, AND CALCIUM CHLORIDE: .6; .31; .03; .02 INJECTION, SOLUTION INTRAVENOUS at 07:43

## 2017-10-23 RX ADMIN — DEXAMETHASONE SODIUM PHOSPHATE 8 MG: 10 INJECTION INTRAMUSCULAR; INTRAVENOUS at 07:52

## 2017-10-23 RX ADMIN — ASPIRIN 325 MG: 325 TABLET ORAL at 17:37

## 2017-10-23 RX ADMIN — PROPOFOL 200 MG: 10 INJECTION, EMULSION INTRAVENOUS at 07:46

## 2017-10-23 RX ADMIN — CEFAZOLIN SODIUM 2000 MG: 2 SOLUTION INTRAVENOUS at 19:57

## 2017-10-23 RX ADMIN — KETAMINE HYDROCHLORIDE 100 MG: 50 INJECTION INTRAMUSCULAR; INTRAVENOUS at 08:25

## 2017-10-23 RX ADMIN — FENTANYL CITRATE 50 MCG: 50 INJECTION, SOLUTION INTRAMUSCULAR; INTRAVENOUS at 08:48

## 2017-10-23 RX ADMIN — FENTANYL CITRATE 25 MCG: 50 INJECTION, SOLUTION INTRAMUSCULAR; INTRAVENOUS at 09:23

## 2017-10-23 RX ADMIN — CELECOXIB 100 MG: 100 CAPSULE ORAL at 16:22

## 2017-10-23 RX ADMIN — OXYCODONE HYDROCHLORIDE 10 MG: 10 TABLET ORAL at 16:23

## 2017-10-23 RX ADMIN — FAMOTIDINE 20 MG: 10 INJECTION, SOLUTION INTRAVENOUS at 08:15

## 2017-10-23 RX ADMIN — HYDROMORPHONE HYDROCHLORIDE 1 MG: 2 INJECTION, SOLUTION INTRAMUSCULAR; INTRAVENOUS; SUBCUTANEOUS at 08:36

## 2017-10-23 RX ADMIN — HYDROMORPHONE HYDROCHLORIDE 0.5 MG: 2 INJECTION, SOLUTION INTRAMUSCULAR; INTRAVENOUS; SUBCUTANEOUS at 08:33

## 2017-10-23 RX ADMIN — FENTANYL CITRATE 25 MCG: 50 INJECTION, SOLUTION INTRAMUSCULAR; INTRAVENOUS at 08:39

## 2017-10-23 RX ADMIN — GABAPENTIN 200 MG: 100 CAPSULE ORAL at 16:23

## 2017-10-23 RX ADMIN — EPHEDRINE SULFATE 5 MG: 50 INJECTION, SOLUTION INTRAMUSCULAR; INTRAVENOUS; SUBCUTANEOUS at 11:01

## 2017-10-23 RX ADMIN — GLYCOPYRROLATE 0.5 MG: 0.2 INJECTION, SOLUTION INTRAMUSCULAR; INTRAVENOUS at 11:57

## 2017-10-23 RX ADMIN — EPHEDRINE SULFATE 5 MG: 50 INJECTION, SOLUTION INTRAMUSCULAR; INTRAVENOUS; SUBCUTANEOUS at 10:59

## 2017-10-23 RX ADMIN — NEOSTIGMINE METHYLSULFATE 3 MG: 1 INJECTION, SOLUTION INTRAMUSCULAR; INTRAVENOUS; SUBCUTANEOUS at 11:57

## 2017-10-23 RX ADMIN — TRANEXAMIC ACID 50 ML: 100 INJECTION, SOLUTION INTRAVENOUS at 08:32

## 2017-10-23 RX ADMIN — FENTANYL CITRATE 100 MCG: 50 INJECTION, SOLUTION INTRAMUSCULAR; INTRAVENOUS at 07:46

## 2017-10-23 RX ADMIN — SCOPOLAMINE 1 PATCH: 1 PATCH, EXTENDED RELEASE TRANSDERMAL at 07:43

## 2017-10-23 RX ADMIN — SODIUM CHLORIDE, SODIUM LACTATE, POTASSIUM CHLORIDE, AND CALCIUM CHLORIDE: .6; .31; .03; .02 INJECTION, SOLUTION INTRAVENOUS at 09:50

## 2017-10-23 RX ADMIN — OXYCODONE HYDROCHLORIDE 10 MG: 10 TABLET, FILM COATED, EXTENDED RELEASE ORAL at 06:22

## 2017-10-23 RX ADMIN — KETAMINE HYDROCHLORIDE 50 MG: 50 INJECTION INTRAMUSCULAR; INTRAVENOUS at 08:59

## 2017-10-23 RX ADMIN — LIDOCAINE HYDROCHLORIDE 50 MG: 10 INJECTION, SOLUTION INFILTRATION; PERINEURAL at 07:46

## 2017-10-23 RX ADMIN — FENTANYL CITRATE 25 MCG: 50 INJECTION, SOLUTION INTRAMUSCULAR; INTRAVENOUS at 10:23

## 2017-10-23 RX ADMIN — CEFAZOLIN SODIUM 2000 MG: 2 SOLUTION INTRAVENOUS at 07:52

## 2017-10-23 RX ADMIN — SCOPOLAMINE 1 PATCH: 1 PATCH, EXTENDED RELEASE TRANSDERMAL at 06:23

## 2017-10-23 RX ADMIN — METOCLOPRAMIDE HYDROCHLORIDE 10 MG: 5 INJECTION INTRAMUSCULAR; INTRAVENOUS at 07:52

## 2017-10-23 RX ADMIN — ROCURONIUM BROMIDE 50 MG: 10 INJECTION, SOLUTION INTRAVENOUS at 07:46

## 2017-10-23 RX ADMIN — FENTANYL CITRATE 25 MCG: 50 INJECTION, SOLUTION INTRAMUSCULAR; INTRAVENOUS at 09:52

## 2017-10-23 RX ADMIN — HYDROMORPHONE HYDROCHLORIDE 0.5 MG: 2 INJECTION, SOLUTION INTRAMUSCULAR; INTRAVENOUS; SUBCUTANEOUS at 08:34

## 2017-10-23 RX ADMIN — PANTOPRAZOLE SODIUM 40 MG: 40 TABLET, DELAYED RELEASE ORAL at 16:22

## 2017-10-23 NOTE — CONSULTS
Hospitalist Medicine Consult Note    Patient ID:  Name: Gay Ramon  MRN: [id number]  : 1965    Reason for Consultation: Co-medical management  Consult requested by: Nuha Tenorio DO  Consult requested date: 10/23/2017  Admission date: 10/23/2017    CHIEF COMPLAINT: right knee surgery    HISTORY OF PRESENT ILLNESS:  The history is provided by the patient  45year old female, with a history of Depression with anxiety, s/p right TKR using cement, who underwent revision right total knee arthroplasty with two component revision on 10/23/2017 (Dr Stanley Feldman)  Medical consult is summoned for medical co-management  I saw and interviewed the patient at bedside  The patient reports post-op right knee pain but denied chest pain, dyspnea, pleuritic pain, dizziness, abd pain, dysuria, cough  Past Medical History:  Past Medical History:   Diagnosis Date    Arthritis     Depression        Past Surgical History:  Past Surgical History:   Procedure Laterality Date    BACK SURGERY      lumbar laminectomy L4-L5    BLADDER SUSPENSION      2017    COLONOSCOPY      JOINT REPLACEMENT      TKR  on right    ORIF TIBIA & FIBULA FRACTURES Right 12/15/2016    Procedure: SURGICAL FIXATION OF RIGHT DISTAL FIBULA FRACTURE;  Surgeon: Arcadio Mims MD;  Location: [facility] MAIN OR;  Service:    [employer] WISDOM TOOTH EXTRACTION          Social History:  Social History     Social History    Marital status: /Civil Union     Spouse name: N/A    Number of children: N/A    Years of education: N/A     Occupational History    Not on file  Social History Main Topics    Smoking status: Never Smoker    Smokeless tobacco: Never Used    Alcohol use No    Drug use: No    Sexual activity: Not on file     Other Topics Concern    Not on file     Social History Narrative    No narrative on file        Family History:  History reviewed  No pertinent family history       REVIEW OF SYSTEMS:  10 point remainder of review of systems negative, see HPI  Constitutional: Negative for fatigue, fever, chills, appetite change and unexpected weight change  HEENT: Negative for hearing loss, ear pain, congestion, rhinorrhea, neck pain, neck stiffness, postnasal drip, sinus pressure and ear discharge  Eyes: Negative for photophobia and visual disturbance  Respiratory: Negative for cough and shortness of breath  Cardiovascular: Negative for chest pain  Gastrointestinal: Negative for nausea, abdominal pain, diarrhea, constipation and blood in stool  Genitourinary: Negative for dysuria and difficulty urinating  Skin: Negative for rash  Neurological: Negative for dizziness, numbness and headaches  Negative for syncope and weakness  Psychiatric/Behavioral: Negative for sleep disturbance  Negative for suicidal ideas, self-injury and dysphoric mood       Allergies:   No Known Allergies    Medications Prior to Admission:  Prescriptions Prior to Admission   Medication Sig Dispense Refill Last Dose    escitalopram (LEXAPRO) 10 mg tablet Take 10 mg by mouth daily   10/23/2017 at [facility]:  Current Facility-Administered Medications   Medication Dose Route Frequency Provider Last Rate Last Dose    acetaminophen (TYLENOL) tablet 650 mg  650 mg Oral Q4H PRN Idelia Muse, DO        acetaminophen (TYLENOL) tablet 650 mg  650 mg Oral Q6H PRN Idelia Muse, DO        aluminum-magnesium hydroxide-simethicone (MYLANTA) 200-200-20 mg/5 mL oral suspension 30 mL  30 mL Oral Q6H PRN Idelia Muse, DO        aspirin tablet 325 mg  325 mg Oral BID Idelia Muse, DO   325 mg at 10/23/17 1737    bisacodyl (DULCOLAX) rectal suppository 10 mg  10 mg Rectal Daily PRN Idelia Muse, DO        ceFAZolin (ANCEF) IVPB (premix) 2,000 mg  2,000 mg Intravenous Q8H Idelia Muse, DO        celecoxib (CeleBREX) capsule 100 mg  100 mg Oral BID Idelia Muse, DO   100 mg at 10/23/17 1622    [START ON 10/24/2017] dexamethasone (PF) (DECADRON) injection 10 mg  10 mg Intravenous Once Artelia Mehul, DO        diphenhydrAMINE (BENADRYL) injection 25 mg  25 mg Intravenous Q6H PRN Artelia Mehul, DO        docusate sodium (COLACE) capsule 100 mg  100 mg Oral BID Artelia Mehul, DO   100 mg at 10/23/17 1623    [START ON 10/24/2017] escitalopram (LEXAPRO) tablet 10 mg  10 mg Oral Daily Artelia Mehul, DO        gabapentin (NEURONTIN) capsule 200 mg  200 mg Oral BID Artelia Mehul, DO   200 mg at 10/23/17 1623    HYDROmorphone (DILAUDID) 2 mg/mL injection 0 5 mg  0 5 mg Intravenous Q3H PRN Artelia Mehul, DO        HYDROmorphone (DILAUDID) 2 mg/mL injection 1 mg  1 mg Intravenous Q3H PRN Artelia Mehul, DO        [START ON 10/24/2017] multivitamin-minerals (CENTRUM) tablet 1 tablet  1 tablet Oral Daily Artelia Mehul, DO        ondansetron [facility]) injection 4 mg  4 mg Intravenous Q6H PRN Artelia Mehul, DO        oxyCODONE (ROXICODONE) IR tablet 10 mg  10 mg Oral Q4H PRN Artelia Mehul, DO   10 mg at 10/23/17 1623    oxyCODONE (ROXICODONE) IR tablet 5 mg  5 mg Oral Q4H PRN Artelia Mehul, DO        pantoprazole (PROTONIX) EC tablet 40 mg  40 mg Oral Daily Artelia Mehul, DO   40 mg at 10/23/17 1622    sodium chloride 0 9 % infusion  75 mL/hr Intravenous Continuous Artelia Mehul, DO 75 mL/hr at 10/23/17 1617 75 mL/hr at 10/23/17 1617       Immunizations: There is no immunization history on file for this patient      PHYSICAL EXAM:  General: NAD  HEENT: EOMI, anicteric, oral moist, no oral thrush or mucosal lesion, neck supple, no mass or JVD  Chest: CTAB, no wheeze/rales  Cardiac: RRR, S1/S2, No murmur  Abd: S/ND/NT/BS+  MSK: Right knee dressing applied, no LE pitting edema, pulses intact  Neuro: AAOx3, moving all extremities  Psychiatric: Mood with normal affect    LABS/IMAGING:  Recent Results (from the past 24 hour(s))   Type and screen    Collection Time: 10/23/17 12:00 AM   Result Value Ref Range    ABO Grouping O     Rh Factor Positive     Antibody Screen Negative     Specimen Expiration Date [id number]    Body fluid culture and Gram stain    Collection Time: 10/23/17  8:38 AM   Result Value Ref Range    Gram Stain Result Rare Polys     Gram Stain Result No bacteria seen    Tissue Exam    Collection Time: 10/23/17  8:49 AM   Result Value Ref Range    Case Report       Surgical Pathology Report                         Case: [id number]                                   Authorizing Provider:  Roxane Espinoza DO          Collected:           10/23/2017 2103              Ordering Location:     [facility] Received:            10/23/2017 1141                                     Operating Room                                                               Pathologist:           Edmundo Severino MD                                                         Intraop:               Edmundo Severino MD                                                         Specimens:   A) - Bone, right tibia                                                                              B) - Bone, right knee femur                                                                         C) - Soft Tissue, Other, right posterior knee soft tissue                                  Intraoperative Consultation       A  FSDx A:  Zero neutrophils per high power field  B  FSDx B:  Zero neturophils per high power field  C  FSDx C:  At most one (1) neutrophil per high power field  FSDx A + FSDx B + FSDx C called by   [facility] to Dr Mary Seo team at 09:31 AM, 10/23/2017  *Electronic SignatureJolanorm Bender  [facility], MD         Xr Knee 1 Or 2 Vw Right    Result Date: 10/23/2017  Narrative: RIGHT KNEE INDICATION:  Right knee pain  post op eval   Include end of stems in XRs  History taken directly from the electronic ordering system  COMPARISON: 8/8/2017 VIEWS:  AP and lateral IMAGES:  4 FINDINGS: Interval revision of total right knee arthroplasty  Replacement hardware appears unremarkable  Normal alignment  Postoperative changes within the soft tissues  No lytic or blastic lesions are seen  Soft tissues are unremarkable  Impression: Unremarkable postop right knee arthroplasty  Workstation performed: [id number]       ASSESSMENT AND PLAN:  Principal Problem:    Status post revision of total replacement of right knee  Active Problems:    Instability of internal right knee prosthesis (HCC)    Mechanical loosening of internal right knee prosthetic joint ([address] )    45year old female, with a history of Depression with anxiety, s/p right TKR using cement, who underwent revision right total knee arthroplasty with two component revision on 10/23/2017 (Dr Cassi Hoffman)  Medical consult is summoned for medical co-management  We'd recommend:  Optimal pain control: dilaudid prn, oxycodone prn, tylenol prn  Constipation prophylaxis: docusate, dulcolax prn  DVT Prophylaxis per Orthopedics service: ASA 325mg bid  Continue home Lexapro  Fall prevention measures  Avoid of volume overload post-op   CBC, lytes, creatinine am   Diet: Regular    I spoke to the patient and his family about the plan  The patient verbalized understanding and expressed appreciation  Medicine will continue to follow the patient as needed  Thank you for the opportunity to participate in the care of Ms Fitzgerald      Signed by:  Farzana Swanson MD  10/23/2017 7:19 PM

## 2017-10-23 NOTE — PHYSICAL THERAPY NOTE
PT EVALUATION     10/23/17 1525   Pain Assessment   Pain Assessment 0-10   Pain Score 4  (R knee with activity, 9/10 prior to moving/walking)   Home Living   Type of [address] Two level;Stairs to enter with rails  (2 stairs to enter)   [employer]; Shower chair   Home Equipment Walker;Cane;Wheelchair-manual;Crutches   Additional Comments patient independent prior to admission without equipment   Prior Function   Level of [place] Independent with ADLs and functional mobility   Lives With Spouse   ADL Assistance Independent   IADLs Independent   Comments independent prior to admission   Restrictions/Precautions   RLE Weight Bearing Per Order WBAT   Other Precautions Chair Alarm; Bed Alarm; Fall Risk   General   Additional Pertinent History chart reviewed,patient admitted for revision of R TKA, s/p surgery on 10-23-17   Family/Caregiver Present Yes  ( present)   Cognition   Overall Cognitive Status WFL   Arousal/Participation Cooperative   Attention Within functional limits   Following Commands Follows all commands and directions without difficulty   RLE Assessment   RLE Assessment (ROM knee 0-90, stength 3+/5)   LLE Assessment   LLE Assessment WFL   Bed Mobility   Supine to Sit 4  Minimal assistance   Additional items Assist x 1   Sit to Supine 5  Supervision   Transfers   Sit to Stand 5  Supervision   Stand to Sit 5  Supervision   Ambulation/Elevation   Gait Assistance 5  Supervision   Additional items Verbal cues   Assistive Device Rolling walker   Distance 80 feet with change in direction   Balance   Static Sitting Good   Dynamic Sitting Fair +   Static Standing Fair   Dynamic Standing Fair   Ambulatory Fair   Activity Tolerance   Activity Tolerance Patient tolerated treatment well   Nurse Made Aware yes   Assessment   Prognosis Good   Problem List Decreased strength;Decreased range of motion;Decreased endurance; Impaired balance;Decreased mobility; Decreased coordination;Pain   Assessment Patient seen for Physical Therapy evaluation  Patient admitted with Status post revision of total replacement of right knee  Comorbidities affecting patient's physical performance include: R TKA, Rknee pain/dysfunction, ORIF R ankle, arthritis, depression  Personal factors affecting patient at time of initial evaluation include: stairs to enter home, inability to navigate community distances, inability to perform dynamic tasks in community, limited home support, inability to perform current job functions, inability to perform physical activity and inability to perform IADLS   Prior to admission, patient was independent with functional mobility without assistive device, independent with ADLS, independent with IADLS, living in a multi-level home, ambulating household distance, ambulating community distances, works part time and home with family assist   Please find objective findings from Physical Therapy assessment regarding body systems outlined above with impairments and limitations including weakness, decreased ROM, impaired balance, decreased endurance, impaired coordination, gait deviations, pain, decreased activity tolerance, decreased functional mobility tolerance and fall risk  The Barthel Index was used as a functional outcome tool presenting with a score of 45 today indicating marked limitations of functional mobility and ADLS  Patient's clinical presentation is currently unstable/unpredictable as seen in patient's presentation of vital sign response, changing level of pain, increased fall risk, new onset of impairment of functional mobility, decreased endurance and new onset of weakness  Pt would benefit from continued Physical Therapy treatment to address deficits as defined above and maximize level of functional mobility  As demonstrated by objective findings, the assigned level of complexity for this evaluation is high     Goals   Patient Goals go home   STG Expiration Date (1 to 7 days)   Short Term Goal #1 transfers and gait with roller walker independently   Short Term Goal #2 gait endurance to 150 feet, strength RLE 3+/4-   LTG Expiration Date (8 to 14 days)   Long Term Goal #1 transfers and gait with crutches independently   Long Term Goal #2 gait endurance to unlimited functional household distances, ROM Rknee 0-100   Plan   Treatment/Interventions ADL retraining;Functional transfer training;LE strengthening/ROM; Elevations; Therapeutic exercise; Endurance training;Patient/family training;Equipment eval/education; Bed mobility;Gait training   PT Frequency Twice a day   Recommendation   Recommendation (home with services)   Barthel Index   Feeding 10   Bathing 0   Grooming Score 5   Dressing Score 5   Bladder Score 0   Bowels Score 10   Toilet Use Score 5   Transfers (Bed/Chair) Score 10   Mobility (Level Surface) Score 0   Stairs Score 0   Barthel Index Score 45

## 2017-10-23 NOTE — ANESTHESIA PROCEDURE NOTES
Peripheral Block    Patient location during procedure: post-op  Start time: 10/23/2017 1:20 PM  Reason for block: at surgeon's request and post-op pain management  Staffing  Anesthesiologist: MYLES Presley  Preanesthetic Checklist  Completed: patient identified, site marked, surgical consent, pre-op evaluation, timeout performed, IV checked, risks and benefits discussed and monitors and equipment checked  Peripheral Block  Patient position: supine  Prep: ChloraPrep  Patient monitoring: continuous pulse ox, heart rate and frequent blood pressure checks  Block type: adductor canal block  Laterality: right  Injection technique: single-shot  Procedures: ultrasound guided  ultrasound permanent image saved    Local infiltration: ropivacaine  Infiltration strength: 0 25 %  Dose: 15 mL  Needle  Needle type: Stimuplex   Needle gauge: 21 G  Needle length: 10 cm  Needle localization: ultrasound guidance  Assessment  Injection assessment: local visualized surrounding nerve on ultrasound, incremental injection, negative aspiration for heme and no paresthesia on injection  Paresthesia pain: none  Heart rate change: no  Slow fractionated injection: no  Post-procedure:  site cleaned  patient tolerated the procedure well with no immediate complications

## 2017-10-23 NOTE — OCCUPATIONAL THERAPY NOTE
OT EVALUATION     10/23/17 1530   Restrictions/Precautions   RLE Weight Bearing Per Order WBAT   Other Precautions Fall Risk; Chair Alarm; Bed Alarm;Pain   Pain Assessment   Pain Assessment 0-10   Pain Score 4   Pain Type Surgical pain   Pain Location Knee  (thigh; was 8/10 to start inproved to 4/10 with activity )   Pain Orientation Right   Home Living   Type of Home House   Home Layout Two level  (2-3 TOBIAS)   Bathroom Equipment Shower chair;Commode  (pt has elderly family who cane provide all equipment )   Home Equipment Walker;Crutches;Cane   Additional Comments spouse present for session    Prior Function   Level of [place] Independent with ADLs and functional mobility   Lives With Spouse   ADL Assistance Independent   IADLs Independent   Comments pt was independent without assistive devices prior to admission,    ADL   Eating Assistance 7  Independent   Grooming Assistance 7  Independent   UB Bathing Assistance 7  Independent   LB Bathing Assistance 4  Minimal Assistance   UB Dressing Assistance 7  Independent   LB Dressing Assistance 5  Supervision/Setup   Toileting Assistance  5  Supervision/Setup   Bed Mobility   Supine to Sit 4  Minimal assistance   Sit to Supine 5  Supervision   Transfers   Sit to Stand 5  Supervision   Stand to Sit 5  Supervision   Functional Mobility   Functional Mobility 5  Supervision   Additional Comments 80 feet   Additional items Rolling walker   Balance   Static Sitting Good   Dynamic Sitting Fair +   Static Standing Fair   Dynamic Standing Fair   Activity Tolerance   Activity Tolerance Patient tolerated treatment well   RUE Assessment   RUE Assessment WNL   LUE Assessment   LUE Assessment WNL   Cognition   Overall Cognitive Status WFL   Arousal/Participation Cooperative   Attention Within functional limits   Orientation Level Oriented X4   Following Commands Follows all commands and directions without difficulty   Assessment   Limitation Decreased ADL status; Decreased endurance;Decreased self-care trans;Decreased high-level ADLs  (decreased balance and mobility )   Prognosis Good   Assessment Patient evaluated by Occupational Therapy  Patient admitted with Status post revision of total replacement of right knee  Patient is POD#0, WBAT  The patients occupational profile, medical and therapy history includes a extensive additional review of physical, cognitive, or psychosocial history related to current functional performance  Comorbidities affecting functional mobility and ADLS include: arthritis, back surgery, depression, R TKA, right distal fibula ORIF  Prior to admission, patient was independent with functional mobility without assistive device, independent with ADLS and independent with IADLS  The evaluation identifies the following performance deficits: orthopedic restrictions, weakness, impaired balance, decreased endurance, increased fall risk, new onset of impairment of functional mobility, decreased ADLS, decreased IADLS, pain, decreased activity tolerance, decreased safety awareness and decreased strength, that result in activity limitations and/or participation restrictions  This evaluation requires clinical decision making of high complexity, because the patient presents with comorbidites that affect occupational performance and required significant modification of tasks or assistance with consideration of multiple treatment options  The Barthel Index was used as a functional outcome tool presenting with a score of 45, indicating marked limitations of functional mobility and ADLS  Patient will benefit from skilled Occupational Therapy services to address above deficits and facilitate a safe return to prior level of function  Goals   Patient Goals go home, no pain    STG Time Frame (1-7days)   Short Term Goal  Patient will increase standing tolerance to 5 minutes during functional activity; Patient will increase bed mobility to supervision;  Patient will increase functional mobility to and from bathroom with rolling walker independently to increase performance with ADLS  LTG Time Frame (8-14 days)   Long Term Goal Patient will increase standing tolerance to 10 minutes during functional activity; pt will increase bed mobility to independent  Functional Transfer Goals   Pt Will Perform All Functional Transfers (STG independent )   ADL Goals   Pt Will Perform Bathing (STG supervision LTG independent )   Pt Will Perform LE Dressing (STG independent )   Pt Will Perform Toileting (STG independent )   Plan   Treatment Interventions ADL retraining;Functional transfer training;UE strengthening/ROM; Endurance training;Patient/family training;Equipment evaluation/education; Activityengagement   OT Frequency 2-3x/wk   Recommendation   OT Discharge Recommendation Home with family support  (home PT)   Barthel Index   Feeding 10   Bathing 0   Grooming Score 5   Dressing Score 5   Bladder Score 0   Bowels Score 10   Toilet Use Score 5   Transfers (Bed/Chair) Score 10   Mobility (Level Surface) Score 0   Stairs Score 0   Barthel Index Score 45

## 2017-10-23 NOTE — H&P
H&P updated in the chart  Since last cancellation the patient's lower extremity's wounds have healed well  No other changes in the patient's health since last eval   Medical clearance re-obtained  Pt with  at bedside  Questions addressed  RLE identified and marked  Consents in chart  Proceed to OR for Rev R TKA

## 2017-10-23 NOTE — PROGRESS NOTES
Progress Note - Orthopedics   Shayna Backmel 45 y o  female MRN: [id number]  Unit/Bed#: [facility] Encounter: [id number]    Assessment:  POD#0 s/p revision right total knee arthroplasty- two component revision    Plan:  Ancef 2g IV x 2 for 24 hours postop  DVT prophylaxis: ASA 325mg PO BID/SCD's/Ambulation  WBAT  PT/OT- WBAT  Analgesia PRN  Follow up AM labs  D/c bagley in AM POD #1 after OOB with PT  Consult to hospitalist - post op medical management  Dressing- monitor for drainage  Discharge planning - d/c planning for home with home PT POD#1 vs POD#2    Weight bearing: WBAT    VTE Pharmacologic Prophylaxis: ASA 325mg PO BID  VTE Mechanical Prophylaxis: foot pump applied    Subjective:  Patient seen examined in PACU  Pain controlled  Patient resting comfortably  Events of surgery discussed with patient and patient's  postoperatively  Vitals: Blood pressure 136/93, pulse (!) 114, temperature 98 8 °F (37 1 °C), resp  rate 16, SpO2 99 %  ,There is no height or weight on file to calculate BMI  Intake/Output Summary (Last 24 hours) at 10/23/17 1259  Last data filed at 10/23/17 1207   Gross per 24 hour   Intake             1700 ml   Output              500 ml   Net             1200 ml       Invasive Devices     Peripheral Intravenous Line            Peripheral IV 09/25/17 Left Wrist 28 days    Peripheral IV 10/23/17 Left Wrist less than 1 day          Drain            Urethral Catheter Latex 14 Fr  less than 1 day                Physical Exam: NAD  Ortho Exam:  RLE: Dsg c/d/i, compartments soft, calf non-tender, +PF/DF/EHL, +DP/SP/Saph/Sural SILT, DP 2+, foot warm      Lab, Imaging and other studies: PO XR R Knee:  Postop x-ray thoroughly reviewed  Appropriate position of revision total knee prosthesis  No fracture no alfie implant lucency    There is a very evident nutrient artery and the proximal 3rd of the tibial diaphysis proximal to the tip of the stem which when compared to the preoperative images is also visible  Impression:  Satisfactory position and alignment of revision right total knee arthroplasty  Aj BENITEZ    Division of Adult Reconstruction  Department of [facility]

## 2017-10-23 NOTE — PLAN OF CARE
DISCHARGE PLANNING     Discharge to home or other facility with appropriate resources Progressing        INFECTION - ADULT     Absence or prevention of progression during hospitalization Progressing        Knowledge Deficit     Patient/family/caregiver demonstrates understanding of disease process, treatment plan, medications, and discharge instructions Progressing        MUSCULOSKELETAL - ADULT     Maintain or return mobility to safest level of function Progressing     Maintain proper alignment of affected body part Progressing        PAIN - ADULT     Verbalizes/displays adequate comfort level or baseline comfort level Progressing        Potential for Falls     Patient will remain free of falls Progressing        Prexisting or High Potential for Compromised Skin Integrity     Skin integrity is maintained or improved Progressing        SAFETY ADULT     Maintain or return to baseline ADL function Progressing     Maintain or return mobility status to optimal level Progressing

## 2017-10-23 NOTE — OP NOTE
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  3) status post right total knee arthroplasty    Post-Op Diagnosis Codes:  1) 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  2) 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  3) Status post right total knee arthroplasty    Procedure(s) (LRB):  REVISION TOTAL KNEE REPLACEMENT WITH FROZEN SECTIONS (Right)    Specimen(s):  Synovial fluid cultures obtained, aerobic and anaerobic /3 stat frozen sections sent for neutrophil count per high-powered field:  Femur-no neutrophils/tibia-no neutrophils/posterior knee-2 neutrophils per high-power field  No acute infection appreciated on intraoperative frozen section analysis  ID Type Source Tests Collected by Time Destination   1 : right tibia  Tissue Bone TISSUE EXAM Bebe Moise,  10/23/2017 2864    2 : right knee femur Tissue Bone TISSUE EXAM Bebe Moise, DO 10/23/2017 8610    3 : right posterior knee soft tissue Tissue Soft Tissue, Other TISSUE EXAM Bebe Moise,  10/23/2017 1214    A :  Body Fluid Joint, Right Knee ANAEROBIC CULTURE AND GRAM STAIN, BODY FLUID CULTURE AND GRAM STAIN Bebe Moise,  10/23/2017 7706        Estimated Blood Loss:   150 mL    Drains:  Urethral Catheter Latex 14 Fr   (Active)   Site Assessment Clean;Skin intact 10/23/2017 12:34 PM   Collection Container Standard drainage bag 10/23/2017 12:34 PM   Securement Method Securing device (Describe) 10/23/2017 12:34 PM   Number of days: 0       Anesthesia Type:   General anesthesia, postoperative right adductor canal block    Antibiotics:  Ancef 2 g x 2 doses    Intravenous fluids:  1700 cc    Urine output: Fam:  350 cc    Implants: Depuy Sigma TC3/MBT revision:  Sigma TC3 size 3 femur with 4 mm distal augments and posterior medial 4 mm posterior augment/2+ adapter bolt/5 degree adapter/40 mm fully porous femoral sleeve/75 mm x 16 mm fluted stem, Rotating platform MBT revision tibial tray size 2 5/MBT metaphyseal porous sleeve size 37 mm/75 mm x 14 mm fluted stem, Sigma rotating PS 17 5 mm polyethylene insert, 2 bags of gentamicin laden cement    Tourniquet time:  120 minutes at 250 mmHg, deflated for greater than 30 minutes, inflated for cementation for 26 minutes at 250 mmHg    Operative Indications:  1) 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  2) 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  Patient is a very pleasant 43-year-old female that underwent primary right total knee arthroplasty for osteoarthritis approximately 3 and half years ago  Patient states that soon after that procedure her pain in her knee started to return and she started to feel complaints of instability associated with daily activities and activities of enjoyment surrounding the knee  She was seen in follow-up at the outside facility and was told there was nothing wrong with her knee and ultimately she sought a 2nd opinion at [facility] and she was ultimately referred to my practice  After thorough history, clinical exam, we diagnostic x-rays, serology, and bone scan evaluation she was found to have global instability of her right total knee prosthesis with underlying aseptic loosening of her tibial component  This was discussed with the patient at length   The patient had already suffered multiple falls some of which resulted in fractures of her other extremities and the patient was advised to undergo revision surgery for both the global instability of her right total knee prosthesis in addition to her underlying aseptic loosening of her tibial component  Multiple surgical options were discussed with the patient including isolated tibial revision with polyethylene change, 2 component revision, staged revision knee surgery for PJI if infection was discovered on intraoperative sampling  The risks and benefits of undergoing revision right total knee arthroplasty were discussed at length with the patient  Her preoperative serology did not yield significant concern for infection however she did have intermittent elevated CRP ease however this was not associated with other criteria to increase concern for periprosthetic joint infection  She was advised that she would undergo intraoperative frozen section sampling for definitive answer if infection was present  Patient was aware of all surgical possibilities  The patient was medically optimized for the intended procedure and her right lower extremity pig bite had adequately healed in time for surgery after multiple follow-up visits  These wounds healed aseptically and was now possible to pursue revision knee arthroplasty  Consents were discussed please see the office notes for full detail, consents were placed into the patient's chart  Operative Findings:  Preoperative range of motion examination revealed approximately 5° of recurvatum to 130° of flexion  At full extension there was 1+ laxity to valgus stress 2+ laxity to varus stress, at 30° of flexion there was 2+ laxity to varus and valgus stress, and at 90° of flexion there was 2+ varus and valgus laxity and the knee could be distracted  After the arthrotomy was made there was evidence of brownish fluid to polyethylene wear that underwent oxidation  This was sent for Gram stain and culture    Polyethylene was removed and demonstrated irregular medial compartment wear with evidence of wear and multiple vectors and the post did seem mildly distorted without evidence of definitive fracture or cast off failure  Three intraoperative soft tissue specimens were obtained and sent for stat frozen section analysis for neutrophils per high-power field  Two out of the 3 returned without evidence of neutrophils per high-power field and 1 out of the 3 returned with 2 neutrophils per high-power field  This combination of results does not signify that periprosthetic joint infection is present and 2 component revision procedure was pursued  The patella was stable and without significant wear on examination  There is no signs of patellar loosening, and the patella was retained for the procedure  Ultimately hybrid construct utilizing porous cones and Press-Fit stems with cemented implant bone interface 2 component revision total knee prosthesis was implanted without complication  Patient demonstrated excellent stability with a PS polyethylene insert  Range of motion was from 0-130 degrees after trialing was complete and also after final implants were cemented  The knee was stable at 0° 30° and 90° of flexion and the post could not be translocated anteriorly  The patella remained in appropriate height and the joint line was not manipulated  After closure of the arthrotomy of the knee demonstrated unchanged range of motion and patellar tracking was midline and flat  Complications:   None    Procedure and Technique:  Position was seen and examined in the preoperative holding area and the right lower extremity was identified marked by the orthopedic staff  The 2 previous areas of prior pig bites have been well healed and healed aseptically without signs of underlying infection  Patient's questions were addressed  H&P was updated and consents were visualized in the chart  The patient was taken back to the operating room where there were administered general anesthesia and positioned on the operating room table   A tourniquet was applied scar was also released to aid in exposure  The polyethylene insert was easily removed and examined  There is evidence of asymmetric wear in the medial compartment of the polyethylene insert  The PS post appear to be undergoing plastic deformation and bending without catastrophic failure  The pattern of wear on the medial compartment of the polyethylene were multiple vectors suggesting mechanical imbalance and increased wear  Next a alfie femoral soft tissue, alfie tibial soft tissue and posterior compartments soft tissue sample was obtained and sent for stat analysis and pathology for frozen section and examination for neutrophils per high-power field  After the staples were removed and feels that pathology laminar debulking of the medial lateral gutters and the posterior compartment were performed to aid in exposure and eliminate the brownish-yellow stained soft tissue from the oxidative wear  Attention was turned to the tibia 1st retractors were placed in the tibia was subluxed anteriorly for its removal   On gross inspection there did appear to be some fluid extravasating from the bone-cement interface which coincides with the bone scan findings of tibial loosening  Thin flexible osteotomes were used to disrupt the bone-cement interface for the tibial implant removal   The tibial implant was removed with little difficulty and without significant bone loss  The cement mantle that remained was carefully debrided removed from the proximal tibia  There was a sizable defect that remained as residual of keel placement and the cement mantle that was removed despite meticulous removal to retain bone stock  This defect would necessitate porous cone placement in the tibia  Next attention was turned to the femur and the thin flexible osteotomes were again used to disrupt the cement prosthesis interface around its perimeter    The femoral component was then removed with little difficulty to revealed that there was some residual bone loss in the lateral femoral condyle that extended into the metaphysis of the femur  Femoral prosthesis was removed without significant bone loss associated with it  On gross inspection of the end of the femur there was a metaphysis heel deficit that would accommodate cone placement to facilitate a Press-Fit prosthesis  The residual cement in the box area and around the condyles was removed carefully to retain as much host bone stock as possible  The patella was examined and debrided from its alfie implant scar tissue  Interface was examined and demonstrated to be well fixed and without signs of loosening or micro motion  It was at this point it was decided that the patellar component would be retained for the final construct  The decision for 2 component revision this based in that a increased tibial implants local from the index procedure could not be overcome unless a flat CCK revision component would be used necessitating the use of a CCK polyethylene insert  This stress on a primary femoral prosthesis without stems to dissipate the force would lead to early mechanical loosening and failure if implanted thus the decision for femoral and tibial component revision was made in order to provide her with a construct that would provide the longevity she needs in the setting of being fairly active for 57-year-old woman  After the implants were removed  Prior to proceeding on with preparation for revision implant the stat frozen section pathology results were available for review  To other the 3 tissue samples sent demonstrated no evidence of neutrophils per high-power field, 1 sample from the posterior portion of the knee demonstrated 2 neutrophils per high-power field  This combination of findings did not signify the presence of periprosthetic joint infection it was decided that a full revision procedure could be pursued    After the residual cement was removed from the femur and the tibia preparation for reaming were made  The tibia and femur were reamed in sequence moving up from a 9 mm Reamer sequentially  Ultimately a 14 mm Reamer provided excellent stability in the tibia and a 16 mm Reamer provided excellent stability in the femur  Reaming was carried out slowly to ensure no fractures were in countered and reaming was carried out without difficulty  Attention was turned 1st to the tibia and a MBT cone starting broach was inserted in the proximal tibia on a 14 mm stem after proximal reaming was complete  Broaching was carried up to a size 37 mm broach which demonstrated excellent rotational stability  With the broach construct in place of a proximal tibia cut was made to freshen up the top part of the proximal tibia  The proximal tibia was sized and ultimately the 2 5 tibial base plate was found to be appropriate and did not produce overhang medially as the size 3 did  The broach and the sizing guide were removed and the trial stem cone base plate construct of a 14 mm stem 37 mm cone and a 2 5 tibial base plate was assembled and then inserted into the proximal tibia without difficulty  This provided excellent approximation upon the proximal tibia  Attention was then turned to preparation of the femur  On close evaluation of the femur there was some lateral femoral condylar bone loss that was contained that led into a mild to moderate-sized metaphyseal area of bone loss that would facilitate use of a ports fitted cone in addition to Press-Fit fluted stem  Broaching for the femoral cones was carried up and sequence carefully to ensure no alfie broach fracture occurred    Finally a 40 mm cone broach provided excellent stability at the appropriate depth The depth the broach was carried to accommodate a TC3 femoral implant as this could accommodate both TC3 polyethylene insert and also a PS polyethylene insert as well as the patient is very active and the least amount of constrained within the prosthesis while obtaining a stable construct would benefit her  The femur was then sized with the broach in place in preparation for distal femoral preparation  Distal femoral cutting I was inserted over the broach and a maury and a 1 mm freshen up cut in 5° of valgus was performed  This did take away a very minimal amount of bone from both the medial lateral condyles  Next the ML and AP dimension of the femur was sized and was found that a size 3 would be an adequate component based on the patient anatomic size in need to balance the flexion gap  The size 3, 4 in 1 cutting block was posteriorized 2 mm to decrease the flexion gap and this provided an appropriate fit on the anterior for cortex of the femur  The external rotation of the distal femoral cutting guide was matched to the epicondylar axis and also to the flexion gap with the tibial trial construct in place  The 4 in 1 cutting block was then pinned in place in this position and the distal femur was prepared in sequence  There is no bone taken from the anterior femur  There was minimal bone obtained from the posterior medial portion of the femoral condyle as well  Next the box cutting guide was then assembled to the broach Jig construct and the box for the TC3 femoral implant was prepared  Next the trial construct of a size 3 TC3 femur 40 mm cone on a 16 mm stem with 2 4 mm distal femoral augments and 1 posterior the medial femoral 4 mm augment was constructed and inserted into the femur dialing in rotation of the cone  Trialing was initiated with a 15 in this found to produce medial lateral laxity and also Recurvatum item in extension  Trialing was increased to 17 5 mm PS insert and this demonstrated excellent range of motion from 0-130° of flexion with good mL stability and balance in the sagittal plane at 0° 30° and 90° of flexion   The PS 17 5 mm trial was removed and a TC3 trial was inserted and the range of motion was unchanged and the sagittal stability was also relatively unchanged  After evaluating this it was decided that the patient would benefit from the least amount of mobile bearing constraint as possible as she is fairly young for revision the so was decided that a PS insert would be used for the final construct  The tourniquet was released after 120 minutes at 250 mm of mercury  The trial implants were removed and hemostasis was achieved  The knee was injected with a periarticular analgesic cocktail  The knee was then copiously irrigated with normal saline solution and all bony and cement debris was removed from the intra-articular space in the subcutaneous tissues with irrigation  The implants were opened and assembled on the back table by me ensuring that the orientation and rotation was matched that of the trial components of both the femur and tibia  After the implants were assembled the knee was then irrigated again in preparation for cementation  After approximately 42 minutes of a deflated tourniquet the leg was then again and exsanguinated and the tourniquet was inflated to 250 mm of mercury  The bone ends were dried and 2 bags of gentamicin laden [place] cement were mixed on the back table in preparation for implantation  Cement was applied to the ends of the distal femur and proximal tibia ensuring not to encounter the areas for the Press-Fit stems and cones  Cement was applied to the undersurface of the tibia and femur and the implants were cemented in sequence starting with the tibia followed by the femur  Both were implanted without complication or difficulty  Both implants seated well in the respective positions  A trial insert was inserted as the cement cured with the knee in in full extension at 0°  Excess cement was removed from the alfie implant area and the knee was again copiously irrigated with normal saline, normal saline with bacitracin, and normal saline   After the cement had cured the knee was again trialed with the 17 5 mm PS polyethylene insert and demonstrated stability that was unchanged and range of motion is unchanged  The polyethylene insert was removed and the knee was inspected in all compartments for overhanging cement or loose cement or bony debris  The knee was free from cement debris or bony debris and the final PS 17 5 mm rotating polyethylene insert was inserted without difficulty  With the final construct in place the knee demonstrated excellent range of motion from 0-130 degrees of flexion with excellent stability in the coronal plane at 0° 30° and 90° of flexion  The joint line was appropriate and was not elevated or depressed during the procedure  The patella tracked midline and flat on the femoral implant and no release was needed  The tourniquet was released after 26 minutes and hemostasis was achieved  The surgical wound was relatively dry after hemostasis was achieved and no drain was needed  The knee was again irrigated in preparation for closure  Prior to conclusion of the procedure a 2nd dose of intraoperative Ancef was administered by anesthesia  Closure began using a #2 barbed bidirectional suture for the arthrotomy  This was oversewn with 4 #2 Ethibond sutures proximally, after closure of the arthrotomy range of motion to gravity was tested and was found to be 0-130° of flexion  Quarter percent Marcaine plain was injected in the subcutaneous soft tissues   The deep subcutaneous tissues were reapproximated with undyed 0 Vicryl, the superficial subcutaneous tissues were reapproximated with undyed 2-0 Vicryl, the skin edges reapproximated with a running 3-0 bidirectional barbed Monocryl suture, and the skin edges were sealed with Histoacryl   After the Histoacryl had dried a silver impregnated Mepilex dressing was placed over the incision   The drapes were removed and GABI stockings were placed on the bilateral lower extremities   Patient was then awakened from anesthesia without difficulty, and transferred to PACU in stable condition     I was present for the entire procedure    Patient Disposition:  PACU     SIGNATURE: Cecelia Ross DO  DATE: October 23, 2017  TIME: 12:59 PM

## 2017-10-24 LAB
ANION GAP SERPL CALCULATED.3IONS-SCNC: 6 MMOL/L (ref 4–13)
BUN SERPL-MCNC: 9 MG/DL (ref 5–25)
CALCIUM SERPL-MCNC: 8.6 MG/DL (ref 8.3–10.1)
CHLORIDE SERPL-SCNC: 105 MMOL/L (ref 100–108)
CO2 SERPL-SCNC: 28 MMOL/L (ref 21–32)
CREAT SERPL-MCNC: 0.91 MG/DL (ref 0.6–1.3)
ERYTHROCYTE [DISTWIDTH] IN BLOOD BY AUTOMATED COUNT: 13.5 % (ref 11.6–15.1)
GFR SERPL CREATININE-BSD FRML MDRD: 72 ML/MIN/1.73SQ M
GLUCOSE SERPL-MCNC: 122 MG/DL (ref 65–140)
HCT VFR BLD AUTO: 28.8 % (ref 37–47)
HGB BLD-MCNC: 9.4 G/DL (ref 12–16)
MCH RBC QN AUTO: 33.5 PG (ref 27–31)
MCHC RBC AUTO-ENTMCNC: 32.6 G/DL (ref 31.4–37.4)
MCV RBC AUTO: 103 FL (ref 82–98)
PLATELET # BLD AUTO: 188 THOUSANDS/UL (ref 130–400)
PMV BLD AUTO: 8.2 FL (ref 8.9–12.7)
POTASSIUM SERPL-SCNC: 4 MMOL/L (ref 3.5–5.3)
RBC # BLD AUTO: 2.8 MILLION/UL (ref 4.2–5.4)
SODIUM SERPL-SCNC: 139 MMOL/L (ref 136–145)
WBC # BLD AUTO: 14.4 THOUSAND/UL (ref 4.8–10.8)

## 2017-10-24 PROCEDURE — 97535 SELF CARE MNGMENT TRAINING: CPT

## 2017-10-24 PROCEDURE — 97110 THERAPEUTIC EXERCISES: CPT

## 2017-10-24 PROCEDURE — 80048 BASIC METABOLIC PNL TOTAL CA: CPT | Performed by: ORTHOPAEDIC SURGERY

## 2017-10-24 PROCEDURE — 85027 COMPLETE CBC AUTOMATED: CPT | Performed by: ORTHOPAEDIC SURGERY

## 2017-10-24 RX ADMIN — DOCUSATE SODIUM 100 MG: 100 CAPSULE, LIQUID FILLED ORAL at 08:01

## 2017-10-24 RX ADMIN — Medication 1 TABLET: at 08:02

## 2017-10-24 RX ADMIN — DOCUSATE SODIUM 100 MG: 100 CAPSULE, LIQUID FILLED ORAL at 18:03

## 2017-10-24 RX ADMIN — SODIUM CHLORIDE 75 ML/HR: 0.9 INJECTION, SOLUTION INTRAVENOUS at 05:45

## 2017-10-24 RX ADMIN — PANTOPRAZOLE SODIUM 40 MG: 40 TABLET, DELAYED RELEASE ORAL at 08:02

## 2017-10-24 RX ADMIN — ESCITALOPRAM OXALATE 10 MG: 10 TABLET ORAL at 08:02

## 2017-10-24 RX ADMIN — ASPIRIN 325 MG: 325 TABLET ORAL at 08:01

## 2017-10-24 RX ADMIN — ASPIRIN 325 MG: 325 TABLET ORAL at 18:03

## 2017-10-24 RX ADMIN — GABAPENTIN 200 MG: 100 CAPSULE ORAL at 08:01

## 2017-10-24 RX ADMIN — DEXAMETHASONE SODIUM PHOSPHATE 10 MG: 10 INJECTION, SOLUTION INTRAMUSCULAR; INTRAVENOUS at 07:57

## 2017-10-24 RX ADMIN — OXYCODONE HYDROCHLORIDE 10 MG: 10 TABLET ORAL at 07:56

## 2017-10-24 RX ADMIN — OXYCODONE HYDROCHLORIDE 10 MG: 10 TABLET ORAL at 18:03

## 2017-10-24 RX ADMIN — CEFAZOLIN SODIUM 2000 MG: 2 SOLUTION INTRAVENOUS at 04:09

## 2017-10-24 RX ADMIN — GABAPENTIN 200 MG: 100 CAPSULE ORAL at 18:03

## 2017-10-24 RX ADMIN — CELECOXIB 100 MG: 100 CAPSULE ORAL at 08:01

## 2017-10-24 RX ADMIN — CELECOXIB 100 MG: 100 CAPSULE ORAL at 18:03

## 2017-10-24 NOTE — OCCUPATIONAL THERAPY NOTE
OT TREATMENT         10/24/17 1100   Restrictions/Precautions   RLE Weight Bearing Per Order WBAT   Other Precautions Fall Risk   Pain Assessment   Pain Assessment 0-10   Pain Score 3   Pain Type Surgical pain   Pain Location Knee   Pain Orientation Right   ADL   Grooming Assistance 5  Supervision/Setup   LB Dressing Assistance 5  Supervision/Setup   Toileting Assistance  5  Supervision/Setup   Bed Mobility   Supine to Sit 7  Independent   Sit to Supine 7  Independent   Transfers   Sit to Stand 5  Supervision   Stand to Sit 5  Supervision   Stand pivot 5  Supervision   Toilet Transfers   Toilet Transfer From Bed   Toilet Transfer Type To and from   Toilet Transfer to Raised toilet seat with rails   Toilet Transfer Technique Ambulating   Toilet Transfers Supervision   Toilet Transfers Comments RW   Cognition   Overall Cognitive Status WFL   Activity Tolerance   Activity Tolerance Patient limited by fatigue;Patient limited by pain   Medical Staff Made Aware yes   Assessment   Assessment Education provided regarding adaptive equipment options that may increase independence and increase safety during ADL and mobility, especially since pt will be home alone at times as  works  Pt understood all information and will pursue obtaining AD at a later time, if necessary  Good to discharge home once medically clear with family support  Plan   Treatment Interventions ADL retraining;Functional transfer training;UE strengthening/ROM; Endurance training;Patient/family training;Equipment evaluation/education; Activityengagement   OT Frequency 2-3x/wk   Recommendation   OT Discharge Recommendation Home with family support  (Home PT)

## 2017-10-24 NOTE — PROGRESS NOTES
Progress Note - Orthopedics   Yojana Stokes 45 y o  female MRN: [id number]  Unit/Bed#: 2 [place] [address] Encounter: [id number]    Assessment:  POD#1 s/p revision right total knee arthroplasty- two component revision    Plan:  Ancef 2g IV x 2 for 24 hours postop  DVT prophylaxis: ASA 325mg PO BID/SCD's/Ambulation  WBAT  PT/OT- WBAT- progressed well  Analgesia PRN  Follow up AM labs- hgb 9 6, pt asymptomatic  Intraop blood loss does not warrant this significant of a drop  Pt with IVF running POD#0 into POD#1 until 10am and may have caused hemodilution effect  Will recheck cbc in AM     Intra op fluid cultures x2:  No growth to date  Consult to hospitalist - post op medical management- appreciate consult  Dressing- monitor for drainage, dressing clean dry and intact  Discharge planning - d/c planning for home with home  POD#2  Pt feeling well and progressed well with PT and could be discharged however will continue inpt admission to follow trend in Hgb as could have been hemodilutional today  Likely d/c home with home PT POD#2    Weight bearing: WBAT    VTE Pharmacologic Prophylaxis: ASA 325mg PO BID  VTE Mechanical Prophylaxis: foot pump applied    Subjective:  Patient seen examined at bedside  Patient's pain well controlled today  Patient progressed well with physical therapy  Patient denies any symptoms of chest pain, trouble breathing, dyspnea on exertion, lightheadedness or dizziness today while working with physical therapy  Patient eager to be discharged to home however agrees with continued inpatient stay to monitor decreased hemoglobin postop  Vitals: Blood pressure 119/64, pulse 79, temperature 99 1 °F (37 3 °C), temperature source Tympanic, resp  rate 18, height 5' 5" (1 651 m), weight 85 1 kg (187 lb 9 8 oz), SpO2 93 %  ,Body mass index is 31 22 kg/m²        Intake/Output Summary (Last 24 hours) at 10/24/17 1858  Last data filed at 10/24/17 1801   Gross per 24 hour   Intake          1982 25 ml Output             4800 ml   Net         -2817 75 ml       Invasive Devices     Peripheral Intravenous Line            Peripheral IV 10/23/17 Left Wrist 1 day                Physical Exam: NAD  Ortho Exam:  RLE: Dsg c/d/i, compartments soft, calf non-tender, +PF/DF/EHL, +DP/SP/Saph/Sural SILT, DP 2+, foot warm      Mena BENITEZ    Division of Adult Reconstruction  Department of [facility]

## 2017-10-24 NOTE — PHYSICAL THERAPY NOTE
PT TREATMENT     10/24/17 7870   Pain Assessment   Pain Assessment 0-10   Pain Score 5   Pain Type Surgical pain   Pain Location Knee   Pain Orientation Right   Restrictions/Precautions   RLE Weight Bearing Per Order WBAT   Other Precautions Fall Risk   General   Chart Reviewed Yes   Family/Caregiver Present No   Cognition   Overall Cognitive Status WFL   Arousal/Participation Cooperative   Following Commands Follows all commands and directions without difficulty   Comments mildly impulsive   Subjective   Subjective Hoping to go home today   Bed Mobility   Supine to Sit 7  Independent   Sit to Supine 7  Independent   Transfers   Sit to Stand 5  Supervision   Stand to Sit 5  Supervision   Additional items Verbal cues  (for use of axillary crutches to stand)   Ambulation/Elevation   Gait pattern (step too pattern with axillary crutches)   Gait Assistance 6  Modified independent   Additional items Verbal cues   Assistive Device Axillary crutches   Distance 150 feet and 200 feet    Stair Management Assistance 6  Modified independent   Additional items Verbal cues   Stair Management Technique One rail R  (and one crutch)   Number of Stairs 4   Balance   Ambulatory Good   Activity Tolerance   Activity Tolerance Patient tolerated treatment well   Exercises   Quad Sets Supine;10 reps;Right   Heelslides Supine;10 reps;Right;Bilateral;AAROM   Knee AROM Short Arc Quad Supine;10 reps;Right   Knee PROM Flexion Supine  (0 - 95 degrees)   Ankle Pumps Supine;20 reps;Bilateral   Assessment   Prognosis Good   Problem List Decreased strength;Decreased range of motion; Impaired balance;Decreased mobility; Decreased coordination   Assessment Pt  tolerated session well  Demonstrated use of axillary crutches with supervision/ modified independence  Pt  is able to navigate stairs with one crutch and one railing    Will benefit from continued PT until d/c and home PT upon discharge   Plan   Treatment/Interventions Functional transfer training;LE strengthening/ROM; Therapeutic exercise; Endurance training;Patient/family training;Equipment eval/education;Gait training;Spoke to case management   PT Frequency Twice a day   Recommendation   Recommendation (home with services as needed)   Equipment Recommended (pt states that she needs a RW for home; has crutches)   Additional Comments Pt  has a RW but it is "Too wide"   Pt in bed with lunch tray in front at end of session  All needs in reach

## 2017-10-24 NOTE — PLAN OF CARE
Problem: DISCHARGE PLANNING - CARE MANAGEMENT  Goal: Discharge to post-acute care or home with appropriate resources  INTERVENTIONS:  - Conduct assessment to determine patient/family and health care team treatment goals, and need for post-acute services based on payer coverage, community resources, and patient preferences, and barriers to discharge  - Address psychosocial, clinical, and financial barriers to discharge as identified in assessment in conjunction with the patient/family and health care team  - Arrange appropriate level of post-acute services according to patient's   needs and preference and payer coverage in collaboration with the physician and health care team  - Communicate with and update the patient/family, physician, and health care team regarding progress on the discharge plan  - Arrange appropriate transportation to post-acute venues  DCP [facility]  Outcome: Progressing

## 2017-10-24 NOTE — PLAN OF CARE
Problem: PHYSICAL THERAPY ADULT  Goal: Performs mobility at highest level of function for planned discharge setting  See evaluation for individualized goals  Outcome: Progressing  Prognosis: Good  Problem List: Decreased strength, Decreased range of motion, Impaired balance, Decreased mobility, Decreased coordination  Assessment: Pt  tolerated session well  Demonstrated use of axillary crutches with supervision/ modified independence  Pt  is able to navigate stairs with one crutch and one railing  Will benefit from continued PT until d/c and home PT upon discharge        Recommendation:  (home with services as needed)          See flowsheet documentation for full assessment

## 2017-10-24 NOTE — SOCIAL WORK
DASH discussion completed  Discussed goals of making sure pt's  needs are met upon discharge, pt's preferences are taken into account, pt understands health condition, medications and symptoms to watch for after returning home and pt is aware of any follow up appointments recommended by hospital physician  PT LIVES INDEPENDENTLY WITH HER SPOUSE, HAS DME, USES [facility]  DCP IS TO HOME WITH HOME [facility]ERAPY [facility]  THEY WILL SEE HER UPON D/C TO HOME

## 2017-10-24 NOTE — CASE MANAGEMENT
Initial Clinical Review    Age/Sex: 45 y o  female    Surgery Date: 10/23/17    Procedure: Procedure(s) (LRB):  REVISION TOTAL KNEE REPLACEMENT WITH FROZEN SECTIONS (Right)    Anesthesia: General anesthesia, postoperative right adductor canal block    Admission Orders: Date/Time/Statement: 10/23/17 @ 1310     Orders Placed This Encounter   Procedures    Inpatient Admission     Standing Status:   Standing     Number of Occurrences:   1     Order Specific Question:   Admitting Physician     Answer:   Melanie Mccann     Order Specific Question:   Level of Care     Answer:   Med Surg [16]     Order Specific Question:   Bed request comments     Answer:   2 [place] 68     Order Specific Question:   Estimated length of stay     Answer:   More than 2 Midnights     Order Specific Question:   Certification     Answer:   I certify that inpatient services are medically necessary for this patient for a duration of greater than two midnights  See H&P and MD Progress Notes for additional information about the patient's course of treatment  Vital Signs: /64   Pulse 79   Temp 99 1 °F (37 3 °C) (Tympanic)   Resp 18   Ht 5' 5" (1 651 m)   Wt 85 1 kg (187 lb 9 8 oz)   SpO2 93%   BMI 31 22 kg/m²     Diet:        Diet Orders            Start     Ordered    10/23/17 1434  Room Service  Once     Question:  Type of Service  Answer:  Room Service-Appropriate    10/23/17 1434    10/23/17 1306  Diet Regular; Regular House  Diet effective now     Question Answer Comment   Diet Type Regular    Regular Regular House    RD to adjust diet per protocol?  Yes        10/23/17 1310          Mobility: PT OT EVAL TX    DVT Prophylaxis:  BID    Pain Control:   OXYCODONE X1  Scheduled Meds:  aspirin 325 mg Oral BID   celecoxib 100 mg Oral BID   docusate sodium 100 mg Oral BID   escitalopram 10 mg Oral Daily   gabapentin 200 mg Oral BID   multivitamin-minerals 1 tablet Oral Daily   pantoprazole 40 mg Oral Daily     Continuous Infusions:  sodium chloride 75 mL/hr Last Rate: 75 mL/hr (10/24/17 0545)     PRN Meds:   acetaminophen    acetaminophen    aluminum-magnesium hydroxide-simethicone    bisacodyl    diphenhydrAMINE    HYDROmorphone    HYDROmorphone    ondansetron    oxyCODONE    oxyCODONE    Pain Medications             escitalopram (LEXAPRO) 10 mg tablet Take 10 mg by mouth daily

## 2017-10-24 NOTE — PROGRESS NOTES
Progress Note - General Surgery   Gay Ramon 45 y o  female MRN: [id number]  Unit/Bed#: 2 [place] [address] Encounter: [id number]    Assessment:  POD #1 s/p revision of right total knee arthroplasty - stable, hgb 9 6, pain well controlled at rest, ambulating with assistance    Plan:  POD #1 s/p revision of right total knee arthroplasty   - Antibiotics:  Perioperative antibiotics completed  - Anticoagulation:   mg p o  b i d , SCDs, ambulation  - Activity:  Weightbearing as tolerated, PT/OT, ambulation with assistance   - AM lab draw:  Nonapplicable, patient is likely to go home today, if patient stays repeat CBC   - Analgesia: Continue p r n  medication  - Dressing: keep clean, dry, and in tact, monitor for drainage, no drainage noted today, mepilex does not need to be removed until postoperative day 7  - Disposition:  Patient from orthopedic perspective is doing very well, likely to be discharged in afternoon pending PT progress and patient confidence    Subjective/Objective   Subjective:  Patient was seen examined at bedside  Patient denies any acute events overnight  Patient reports she was walking yesterday and had minimal pain  Patient reports that last time she took pain medication was yesterday afternoon  Patient has not taken any pain medication since then and reports that her pain is very well controlled at rest   Patient reports she can feel her toes and wiggle her toes as well as lift her leg  Patient denies any numbness, tingling, lack of motor movement, lack of sensation, change in color of distal lower right extremity  Patient also reports that she has a good home with a bedroom on 1st floor, and only 4 steps in order to navigate to kitchen, bathroom, living room  Objective:     Blood pressure 107/62, pulse 75, temperature (!) 97 °F (36 1 °C), temperature source Tympanic, resp  rate 18, height 5' 5" (1 651 m), weight 85 1 kg (187 lb 9 8 oz), SpO2 95 %  ,Body mass index is 31 22 kg/m²  Intake/Output Summary (Last 24 hours) at 10/24/17 0735  Last data filed at 10/24/17 0709   Gross per 24 hour   Intake          3781 25 ml   Output             3550 ml   Net           231 25 ml       Invasive Devices     Peripheral Intravenous Line            Peripheral IV 10/23/17 Left Wrist 1 day                Physical Exam: /64   Pulse 79   Temp 99 1 °F (37 3 °C) (Tympanic)   Resp 18   Ht 5' 5" (1 651 m)   Wt 85 1 kg (187 lb 9 8 oz)   SpO2 93%   BMI 31 22 kg/m²   General appearance: alert, appears stated age and cooperative  Head: Normocephalic, without obvious abnormality, atraumatic  Skin: Skin color, texture, turgor normal  No rashes or lesions  Incision Site:  healing well, no significant drainage, no significant erythema  Ortho: right Lower Extremity: Thigh and calf compartments are soft and nontender to palpation  + L3-S1 SILT  + DF/PF + EHL  No pain with passive stretch/extension of toes  DP 2+  Incision is c/d/i    Lab, Imaging and other studies:  I have personally reviewed pertinent lab results    , CBC:   Lab Results   Component Value Date    WBC 14 40 (H) 10/24/2017    HGB 9 4 (L) 10/24/2017    HCT 28 8 (L) 10/24/2017     (H) 10/24/2017     10/24/2017    MCH 33 5 (H) 10/24/2017    MCHC 32 6 10/24/2017    RDW 13 5 10/24/2017    MPV 8 2 (L) 10/24/2017   , CMP:   Lab Results   Component Value Date     10/24/2017    K 4 0 10/24/2017     10/24/2017    CO2 28 10/24/2017    ANIONGAP 6 10/24/2017    BUN 9 10/24/2017    CREATININE 0 91 10/24/2017    GLUCOSE 122 10/24/2017    CALCIUM 8 6 10/24/2017    EGFR 72 10/24/2017     VTE Pharmacologic Prophylaxis: ASA 325mg PO BID  VTE Mechanical Prophylaxis: sequential compression device

## 2017-10-24 NOTE — PLAN OF CARE
Problem: OCCUPATIONAL THERAPY ADULT  Goal: Performs self-care activities at highest level of function for planned discharge setting  See evaluation for individualized goals  Outcome: Progressing  Limitation: Decreased ADL status, Decreased endurance, Decreased self-care trans, Decreased high-level ADLs (decreased balance and mobility )  Prognosis: Good  Assessment: Education provided regarding adaptive equipment options that may increase independence and increase safety during ADL and mobility, especially since pt will be home alone at times as  works  Pt understood all information and will pursue obtaining AD at a later time, if necessary  Good to discharge home once medically clear with family support       OT Discharge Recommendation: Home with family support (Home PT)

## 2017-10-24 NOTE — PHYSICAL THERAPY NOTE
PT TREATMENT     10/24/17 1025   Pain Assessment   Pain Assessment 0-10   Pain Score 3  (R knee area)   Restrictions/Precautions   RLE Weight Bearing Per Order WBAT   Other Precautions Fall Risk  (patient impulsive at times)   General   Chart Reviewed Yes   Family/Caregiver Present Yes  ( present)   Cognition   Arousal/Participation Cooperative   Subjective   Subjective patient reports feeling good and possibly going home today   Bed Mobility   Supine to Sit 7  Independent   Sit to Supine 7  Independent   Transfers   Sit to Stand 5  Supervision   Stand to Sit 5  Supervision   Additional items Verbal cues  (education in safety and slowing gait speed)   Ambulation/Elevation   Gait Assistance (supervision to independent)   Additional items Verbal cues   Assistive Device Rolling walker   Distance 200 feet with change in direction, discussed possible use of crutches with training in PM session   Stair Management Assistance 5  Supervision   Additional items Verbal cues   Stair Management Technique One rail R;Step to pattern; With cane   Number of Stairs 4   Exercises   Hip Flexion Sitting;15 reps;Bilateral   Knee PROM Flexion Sitting  (0-95 degrees R knee, also completed AAROM knee flexion)   Knee AROM Long Arc Quad Sitting;15 reps;Bilateral   Ankle Pumps Sitting;15 reps;Bilateral   Assessment   Assessment patient tolerating gait well on level and unlevel surfaces  Patient demonstrating good balance and improving ROM of knee  Patient tolerated stair climbing well and will be able funcitionally to climb to second floor of home with  supervising  Patient will benefit from continued PT with progression to independent function   Plan   Treatment/Interventions ADL retraining;Functional transfer training;LE strengthening/ROM; Elevations; Therapeutic exercise; Endurance training;Patient/family training;Equipment eval/education;Gait training   PT Frequency Twice a day   Recommendation   Recommendation (home with services)

## 2017-10-24 NOTE — CASE MANAGEMENT
Continued Stay Review    Date/POD#: 10/24/17 POD 1    Vital Signs: /64   Pulse 79   Temp 99 1 °F (37 3 °C) (Tympanic)   Resp 18   Ht 5' 5" (1 651 m)   Wt 85 1 kg (187 lb 9 8 oz)   SpO2 93%   BMI 31 22 kg/m²     Medication:   Scheduled Meds:   aspirin 325 mg Oral BID   celecoxib 100 mg Oral BID   docusate sodium 100 mg Oral BID   escitalopram 10 mg Oral Daily   gabapentin 200 mg Oral BID   multivitamin-minerals 1 tablet Oral Daily   pantoprazole 40 mg Oral Daily     Continuous Infusions:   sodium chloride 75 mL/hr Last Rate: 75 mL/hr (10/24/17 0545)     PRN Meds:   acetaminophen    acetaminophen    aluminum-magnesium hydroxide-simethicone    bisacodyl    diphenhydrAMINE    HYDROmorphone    HYDROmorphone    ondansetron    oxyCODONE    oxyCODONE    Abnormal Labs/Diagnostic Results: WBC 14 40 H/H 9 4/28 8     Age/Sex: 45 y o  female     PER SURGEON  Assessment:  POD #1 s/p revision of right total knee arthroplasty - stable, hgb 9 6, pain well controlled at rest, ambulating with assistance  Plan:  POD #1 s/p revision of right total knee arthroplasty   - Antibiotics:  Perioperative antibiotics completed  - Anticoagulation:   mg p o  b i d , SCDs, ambulation  - Activity:  Weightbearing as tolerated, PT/OT, ambulation with assistance   - AM lab draw:  Nonapplicable, patient is likely to go home today, if patient stays repeat CBC   - Analgesia: Continue p r n  medication  - Dressing: keep clean, dry, and in tact, monitor for drainage, no drainage noted today, mepilex does not need to be removed until postoperative day 7  - Disposition:

## 2017-10-25 VITALS
BODY MASS INDEX: 31.26 KG/M2 | WEIGHT: 187.61 LBS | HEART RATE: 90 BPM | RESPIRATION RATE: 18 BRPM | HEIGHT: 65 IN | TEMPERATURE: 100 F | DIASTOLIC BLOOD PRESSURE: 57 MMHG | SYSTOLIC BLOOD PRESSURE: 108 MMHG | OXYGEN SATURATION: 97 %

## 2017-10-25 LAB
ANION GAP SERPL CALCULATED.3IONS-SCNC: 8 MMOL/L (ref 4–13)
BUN SERPL-MCNC: 10 MG/DL (ref 5–25)
CALCIUM SERPL-MCNC: 8.6 MG/DL (ref 8.3–10.1)
CHLORIDE SERPL-SCNC: 106 MMOL/L (ref 100–108)
CO2 SERPL-SCNC: 27 MMOL/L (ref 21–32)
CREAT SERPL-MCNC: 0.85 MG/DL (ref 0.6–1.3)
ERYTHROCYTE [DISTWIDTH] IN BLOOD BY AUTOMATED COUNT: 13.8 % (ref 11.6–15.1)
GFR SERPL CREATININE-BSD FRML MDRD: 78 ML/MIN/1.73SQ M
GLUCOSE SERPL-MCNC: 90 MG/DL (ref 65–140)
HCT VFR BLD AUTO: 27 % (ref 37–47)
HGB BLD-MCNC: 8.9 G/DL (ref 12–16)
MCH RBC QN AUTO: 33.5 PG (ref 27–31)
MCHC RBC AUTO-ENTMCNC: 32.8 G/DL (ref 31.4–37.4)
MCV RBC AUTO: 102 FL (ref 82–98)
MRSA NOSE QL CULT: NORMAL
PLATELET # BLD AUTO: 174 THOUSANDS/UL (ref 130–400)
PMV BLD AUTO: 8.5 FL (ref 8.9–12.7)
POTASSIUM SERPL-SCNC: 3.7 MMOL/L (ref 3.5–5.3)
RBC # BLD AUTO: 2.64 MILLION/UL (ref 4.2–5.4)
SODIUM SERPL-SCNC: 141 MMOL/L (ref 136–145)
WBC # BLD AUTO: 12.7 THOUSAND/UL (ref 4.8–10.8)

## 2017-10-25 PROCEDURE — 80048 BASIC METABOLIC PNL TOTAL CA: CPT | Performed by: ORTHOPAEDIC SURGERY

## 2017-10-25 PROCEDURE — 97110 THERAPEUTIC EXERCISES: CPT

## 2017-10-25 PROCEDURE — 85027 COMPLETE CBC AUTOMATED: CPT | Performed by: ORTHOPAEDIC SURGERY

## 2017-10-25 RX ORDER — ASPIRIN 325 MG
325 TABLET, DELAYED RELEASE (ENTERIC COATED) ORAL 2 TIMES DAILY
Qty: 84 TABLET | Refills: 0 | Status: SHIPPED | OUTPATIENT
Start: 2017-10-25 | End: 2018-07-19

## 2017-10-25 RX ORDER — OXYCODONE HYDROCHLORIDE 5 MG/1
TABLET ORAL
Qty: 60 TABLET | Refills: 0 | Status: SHIPPED | OUTPATIENT
Start: 2017-10-25 | End: 2018-07-19

## 2017-10-25 RX ORDER — CELECOXIB 100 MG/1
100 CAPSULE ORAL 2 TIMES DAILY
Qty: 28 CAPSULE | Refills: 0 | Status: SHIPPED | OUTPATIENT
Start: 2017-10-25 | End: 2018-07-19

## 2017-10-25 RX ORDER — DOCUSATE SODIUM 100 MG/1
100 CAPSULE, LIQUID FILLED ORAL 2 TIMES DAILY PRN
Qty: 60 CAPSULE | Refills: 0 | Status: SHIPPED | OUTPATIENT
Start: 2017-10-25 | End: 2018-07-19

## 2017-10-25 RX ADMIN — ASPIRIN 325 MG: 325 TABLET ORAL at 08:02

## 2017-10-25 RX ADMIN — GABAPENTIN 200 MG: 100 CAPSULE ORAL at 08:02

## 2017-10-25 RX ADMIN — OXYCODONE HYDROCHLORIDE 10 MG: 10 TABLET ORAL at 03:10

## 2017-10-25 RX ADMIN — OXYCODONE HYDROCHLORIDE 10 MG: 10 TABLET ORAL at 12:35

## 2017-10-25 RX ADMIN — ESCITALOPRAM OXALATE 10 MG: 10 TABLET ORAL at 08:02

## 2017-10-25 RX ADMIN — CELECOXIB 100 MG: 100 CAPSULE ORAL at 08:02

## 2017-10-25 RX ADMIN — OXYCODONE HYDROCHLORIDE 10 MG: 10 TABLET ORAL at 07:33

## 2017-10-25 RX ADMIN — PANTOPRAZOLE SODIUM 40 MG: 40 TABLET, DELAYED RELEASE ORAL at 08:02

## 2017-10-25 RX ADMIN — DOCUSATE SODIUM 100 MG: 100 CAPSULE, LIQUID FILLED ORAL at 08:02

## 2017-10-25 RX ADMIN — Medication 1 TABLET: at 08:02

## 2017-10-25 NOTE — PROGRESS NOTES
Progress Note - Orthopedics   Shan Beebe 45 y o  female MRN: [id number]  Unit/Bed#: 2 [place] [address] Encounter: [id number]    Assessment:  POD#2 s/p revision right total knee arthroplasty- two component revision    Plan:  Ancef 2g IV x 2 for 24 hours postop-completed  DVT prophylaxis: ASA 325mg PO BID/SCD's/Ambulation  WBAT  PT/OT- WBAT- progressed well  Analgesia PRN  Follow up AM labs- hgb 8 9, pt asymptomatic, and asymptomatic while working with PT  Intra op fluid cultures x2:  No growth to date 10/25/17  Consult to hospitalist - post op medical management- appreciate consult  Dressing- monitor for drainage, dressing clean dry and intact:  Keep dressing in place for 7 days postop and patient may removed and replaced with clean sterile dressing to avoid irritation with clothing wear  Discharge planning - d/c planning for home with home  POD#2  Pt feeling well and progressed well with PT and could be discharged however will continue inpt admission to follow trend in Hgb as could have been hemodilutional today  Likely d/c home with home PT POD#2    Weight bearing: WBAT    VTE Pharmacologic Prophylaxis: ASA 325mg PO BID  VTE Mechanical Prophylaxis: foot pump applied    Subjective:  Patient seen examined at bedside  The patient's pain well controlled today  Patient states that she did well with physical therapy and is ready to go home  Patient denies any shortness of breath, dizzy, lightheadedness, trouble breathing or dyspnea on exertion      Vitals: Blood pressure 163/67, pulse 84, temperature 99 1 °F (37 3 °C), temperature source Oral, resp  rate 18, height 5' 5" (1 651 m), weight 85 1 kg (187 lb 9 8 oz), SpO2 97 %  ,Body mass index is 31 22 kg/m²        Intake/Output Summary (Last 24 hours) at 10/25/17 1212  Last data filed at 10/25/17 1101   Gross per 24 hour   Intake             1380 ml   Output             2400 ml   Net            -1020 ml       Invasive Devices     Peripheral Intravenous Line Peripheral IV 10/23/17 Left Wrist 2 days                Physical Exam: NAD  Ortho Exam:  RLE: Dsg c/d/i, compartments soft, calf non-tender, +PF/DF/EHL, +DP/SP/Saph/Sural SILT, DP 2+, foot warm      Glenna BENITEZ    Division of Adult Reconstruction  Department of [facility]

## 2017-10-25 NOTE — PLAN OF CARE
DISCHARGE PLANNING     Discharge to home or other facility with appropriate resources Progressing        DISCHARGE PLANNING - CARE MANAGEMENT     Discharge to post-acute care or home with appropriate resources Progressing        INFECTION - ADULT     Absence or prevention of progression during hospitalization Progressing        Knowledge Deficit     Patient/family/caregiver demonstrates understanding of disease process, treatment plan, medications, and discharge instructions Progressing        MUSCULOSKELETAL - ADULT     Maintain or return mobility to safest level of function Progressing     Maintain proper alignment of affected body part Progressing        PAIN - ADULT     Verbalizes/displays adequate comfort level or baseline comfort level Progressing        Potential for Falls     Patient will remain free of falls Progressing        Prexisting or High Potential for Compromised Skin Integrity     Skin integrity is maintained or improved Progressing        SAFETY ADULT     Maintain or return to baseline ADL function Progressing     Maintain or return mobility status to optimal level Progressing

## 2017-10-25 NOTE — PHYSICAL THERAPY NOTE
PT TREATMENT     10/25/17 3510   Pain Assessment   Pain Assessment 0-10   Pain Score 6  (R knee area)   General   Chart Reviewed Yes   Family/Caregiver Present No   Cognition   Arousal/Participation Cooperative   Subjective   Subjective patient reports going home today   Bed Mobility   Supine to Sit 7  Independent   Sit to Supine 7  Independent   Transfers   Sit to Stand 7  Independent   Stand to Sit 7  Independent   Ambulation/Elevation   Gait Assistance 7  Independent   Assistive Device Rolling walker   Distance 200 feet with change in direction   Stair Management Assistance 7  Independent   Stair Management Technique One rail R;Step to pattern; With cane   Number of Stairs 4   Exercises   Hip Flexion Sitting;10 reps;Bilateral   Knee AROM Long Arc Quad Sitting;10 reps;Bilateral   Balance training  standing knee flexion stretch on stair, 5 reps with foot on bottom stair   Assessment   Assessment patient cooperative and demonstrating independent gait with roller walker or crutches   Plan   Treatment/Interventions ADL retraining;Functional transfer training;LE strengthening/ROM; Elevations; Therapeutic exercise; Endurance training;Patient/family training;Equipment eval/education;Gait training   PT Frequency Twice a day   Recommendation   Recommendation (home with services)

## 2017-10-25 NOTE — DISCHARGE SUMMARY
Discharge Summary - Alyx Granados 45 y o  female MRN: [id number]    Unit/Bed#: 2 [place] [address] Encounter: [id number]    Admission Date: 10/23/2017     Admitting Diagnosis: Presence of right artificial knee joint [Z96 651]  Other instability, right knee [M25 [id number]]    HPI:  The patient is a very pleasant 17-year-old female that is known to my outpatient orthopedic practice secondary to having a painful unstable right total knee arthroplasty that was performed 3 years ago at an outside institution  This instability in her right knee has resulted in multiple falls and decreasing activity for fear of further self injury and pain and discomfort associated with her instability  After with an extensive workup was performed her tibial component was also found to be showing signs of loosening  She was found to be without signs of periprosthetic joint infection preoperatively  After thorough orthopedic evaluation and workup she was recommended to undergo a revision right total knee arthroplasty for her instability and loosening of her total knee prosthesis from 3 years ago  She was medically optimized by her PCP and the risks and benefits of undergoing revision right total knee arthroplasty were discussed at length  Consents were signed and placed in the chart preparation for upcoming revision knee arthroplasty surgery  Procedures Performed:  Status post revision right total knee arthroplasty    Hospital Course: The patient was admitted on 10/23/2017 for revision right total knee arthroplasty procedure  She underwent an uncomplicated 2 component revision right total knee arthroplasty  She was transferred to the floor for her postoperative course  Patient was up and ambulating on postoperative day 0 in the halls with physical therapy and her pain was well controlled    On postoperative day 1 her laboratory values were monitored and a consult to Internal Medicine service was placed for medical comanagement postoperatively  Her pain was well controlled and she progressed well with physical therapy on postoperative day 1 so much so that she was recommended to be discharged home with home physical therapy services  On postoperative day 1 her hemoglobin returned at 9 4 and due to the size of her revision surgery was recommended that the patient stay overnight 1 more night to monitor her hemoglobin closely to make sure she remains asymptomatic while working with physical therapy the following day  On postoperative day 2 her pain was again well controlled and her hemoglobin stabilized and she was asymptomatic with physical therapy and was deemed stable and medically appropriate to be discharged home with home PT services arranged  Patient was discharged in stable condition on postoperative day 2  Significant Findings, Care, Treatment and Services Provided:  Status post revision right total knee arthroplasty    Complications:  None    Discharge Diagnosis:  Status post revision right total knee arthroplasty    Condition at Discharge: stable     Discharge instructions/Information to patient and family:   See after visit summary for information provided to patient and family  Provisions for Follow-Up Care:  See after visit summary for information related to follow-up care and any pertinent home health orders  Disposition: Home with home PT    Planned Readmission: No    Discharge Statement   I spent 25 minutes discharging the patient  This time was spent on the day of discharge  I had direct contact with the patient on the day of discharge  Additional documentation is required if more than 30 minutes were spent on discharge  Discharge Medications:  See after visit summary for reconciled discharge medications provided to patient and family

## 2017-10-25 NOTE — SOCIAL WORK
PT D/C TO HOME WITH [facility], [place] [facility] [place]

## 2017-10-25 NOTE — CASE MANAGEMENT
Notification of Discharge  This is a Notification of Discharge from our facility [address]  Please be advised that this patient has been discharge from our facility  Below you will find the admission and discharge date and time including the patients disposition  PRESENTATION DATE: 10/23/2017  6:06 AM  IP ADMISSION DATE: 10/23/17 1310  DISCHARGE DATE: 10/25/2017  2:31 PM  DISPOSITION: Home with [address] [place] [address] in the [facility] by Romero Montano for 2017  Network Utilization Review Department  Phone: [phone]; Fax [phone]  ATTENTION: The Network Utilization Review Department is now centralized for our 7 Facilities  Make a note that we have a new phone and fax numbers for our Department  Please call with any questions or concerns to [phone] and carefully follow the prompts so that you are directed to the right person  All voicemails are confidential  Fax any determinations, approvals, denials, and requests for initial or continue stay review clinical to [phone]  Due to HIGH CALL volume, it would be easier if you could please send faxed requests to expedite your requests and in part, help us provide discharge notifications faster

## 2017-10-25 NOTE — PHYSICAL THERAPY NOTE
PT TREATMENT     10/25/17 0540   Pain Assessment   Pain Assessment 0-10   Pain Score 8  (R knee area decreased to 6/10 with gait and movement)   Restrictions/Precautions   RLE Weight Bearing Per Order WBAT   General   Chart Reviewed Yes   Family/Caregiver Present No   Cognition   Arousal/Participation Cooperative   Subjective   Subjective patient reports improving pain with movement (from 9 to 6 after PT session)   Bed Mobility   Supine to Sit 7  Independent   Sit to Supine 7  Independent   Transfers   Sit to Stand 7  Independent   Stand to Sit 7  Independent   Ambulation/Elevation   Gait Assistance (supervision to independent)   Assistive Device Rolling walker   Distance 200 feet with change in direction, cuing for improved gait patterning with heel strike and knee flexion with gait    Stair Management Assistance 7  Independent   Stair Management Technique One rail R;Step to pattern; With cane   Number of Stairs 4   Exercises   Heelslides Sitting;10 reps;Bilateral   Hip Flexion Sitting;10 reps;Bilateral   Knee PROM Flexion Sitting  (ROM 0-90 degrees)   Knee AROM Long Arc Quad Sitting;10 reps;Bilateral   Ankle Pumps Sitting;10 reps;Bilateral   Balance training  standing marching and gait training with walker and cane on unlevel surfaces   Assessment   Assessment patient tolerated gait well with roller walker and has cane/crutches also at home  Patient independent with gait and will benefit from continued PT    Plan   Treatment/Interventions ADL retraining;Functional transfer training;LE strengthening/ROM; Elevations; Therapeutic exercise; Endurance training;Patient/family training;Equipment eval/education;Gait training   PT Frequency Twice a day   Recommendation   Recommendation (home with services/PT)

## 2017-10-25 NOTE — DISCHARGE INSTRUCTIONS
Revision Total Knee Arthroplasty   WHAT YOU NEED TO KNOW:   Revision total joint arthroplasty is surgery to fix or replace an artificial joint  You may need a revision arthroplasty if your artificial joint becomes loose, moves out of place, or breaks  You may need this surgery if the bone around your artificial joint gets weak or damaged over time  You may also need this surgery if you have severe pain or an infection in your joint  DISCHARGE INSTRUCTIONS:   Medicines:   · Pain medicine: You may be given medicine to take away or decrease pain  Do not wait until the pain is severe before you take your medicine  · Antibiotics: This medicine is given to fight or prevent an infection caused by bacteria  Always take your antibiotics exactly as ordered by your healthcare provider  Do not stop taking your medicine unless directed by your healthcare provider  Never save antibiotics or take leftover antibiotics that were given to you for another illness  · Blood thinners:  Blood thinners help prevent blood clots from forming  Clots can cause strokes and heart attacks  Blood thinners make it more likely for you to bleed or bruise  Do the following if you are taking a blood thinner:    ¨ Watch for bleeding from your gums and nose  Watch for blood in your urine and bowel movements  Use a soft washcloth and a soft toothbrush  This will help keep your skin and gums from bleeding  If you shave, use an electric shaver  ¨ Know what medicines you take  Many medicines cannot be used when you take blood thinners  Tell your dentist and other healthcare providers that you take blood-thinning medicine  Wear or carry medical alert information that says you are taking this medicine  ¨ Take this medicine exactly as your healthcare provider tells you  Tell your healthcare provider right away if you forget to take the medicine or if you take too much  You may need to have regular blood tests while you take this medicine  ¨ Talk to your healthcare provider about your diet  This medicine works best when you eat about the same amount of vitamin K every day  Vitamin K is found in green leafy vegetables and other foods, such as peas and kiwifruit  · Take your medicine as directed  Contact your healthcare provider if you think your medicine is not helping or if you have side effects  Tell him or her if you are allergic to any medicine  Keep a list of the medicines, vitamins, and herbs you take  Include the amounts, and when and why you take them  Bring the list or the pill bottles to follow-up visits  Carry your medicine list with you in case of an emergency  Follow up with your healthcare provider or orthopedist as directed: You may need to return to have your wound checked and stitches removed  Write down your questions so you remember to ask them during your visits  Wound care:  Do not let your wound get wet  Cover it when you shower  When you are allowed, carefully wash your wound with soap and water  Dry the area and put on new, clean bandages as directed  Change your bandages when they get wet or dirty  Physical therapy:  A physical therapist teaches you exercises to help improve movement and strength, and to decrease pain  Use crutches, a cane, or a walker as directed: These devices help you move around easier and may prevent a fall  Contact your healthcare provider or orthopedist if:   · You have a fever greater than 101 0 degrees or chills  · You have a cough, or you feel weak and achy  · You have more pain and swelling, even after treatment  · Your skin is itchy and swollen or you have a rash  · You have questions or concerns about your condition or care  Seek care immediately or call 911 if:   · Blood soaks through your bandage  · Your stitches come apart  · Your wound is red, swollen, and draining pus  · Your leg feels warm, tender, and painful   It may look swollen and red     · You suddenly feel lightheaded and short of breath  · You have chest pain when you take a deep breath or cough  You may cough up blood  © 2017 [employer] Information is for End User's use only and may not be sold, redistributed or otherwise used for commercial purposes  All illustrations and images included in CareNotes® are the copyrighted property of A [employer] M [employer]  or Romero Montano  The above information is an  only  It is not intended as medical advice for individual conditions or treatments  Talk to your doctor, nurse or pharmacist before following any medical regimen to see if it is safe and effective for you

## 2017-10-26 ENCOUNTER — GENERIC CONVERSION - ENCOUNTER (OUTPATIENT)
Dept: OTHER | Facility: OTHER | Age: 52
End: 2017-10-26

## 2017-10-26 LAB
BACTERIA SPEC ANAEROBE CULT: NO GROWTH
BACTERIA SPEC BFLD CULT: NO GROWTH
GRAM STN SPEC: NORMAL
GRAM STN SPEC: NORMAL

## 2017-11-07 ENCOUNTER — GENERIC CONVERSION - ENCOUNTER (OUTPATIENT)
Dept: OTHER | Facility: OTHER | Age: 52
End: 2017-11-07

## 2017-11-07 ENCOUNTER — APPOINTMENT (OUTPATIENT)
Dept: RADIOLOGY | Facility: CLINIC | Age: 52
End: 2017-11-07
Payer: COMMERCIAL

## 2017-11-07 DIAGNOSIS — F41.8 OTHER SPECIFIED ANXIETY DISORDERS: ICD-10-CM

## 2017-11-07 DIAGNOSIS — E04.9 NONTOXIC GOITER: ICD-10-CM

## 2017-11-07 DIAGNOSIS — Z00.00 ENCOUNTER FOR GENERAL ADULT MEDICAL EXAMINATION WITHOUT ABNORMAL FINDINGS: ICD-10-CM

## 2017-11-07 DIAGNOSIS — R31.9 HEMATURIA: ICD-10-CM

## 2017-11-07 DIAGNOSIS — Z96.651 PRESENCE OF RIGHT ARTIFICIAL KNEE JOINT: ICD-10-CM

## 2017-11-07 DIAGNOSIS — M25.561 PAIN IN RIGHT KNEE: ICD-10-CM

## 2017-11-07 DIAGNOSIS — E53.9 VITAMIN B DEFICIENCY: ICD-10-CM

## 2017-11-07 DIAGNOSIS — R53.82 CHRONIC FATIGUE: ICD-10-CM

## 2017-11-07 PROCEDURE — 73562 X-RAY EXAM OF KNEE 3: CPT

## 2017-11-13 ENCOUNTER — APPOINTMENT (OUTPATIENT)
Dept: PHYSICAL THERAPY | Facility: CLINIC | Age: 52
End: 2017-11-13
Payer: COMMERCIAL

## 2017-11-13 PROCEDURE — G8979 MOBILITY GOAL STATUS: HCPCS

## 2017-11-13 PROCEDURE — 97161 PT EVAL LOW COMPLEX 20 MIN: CPT

## 2017-11-13 PROCEDURE — G8978 MOBILITY CURRENT STATUS: HCPCS

## 2017-11-16 ENCOUNTER — APPOINTMENT (OUTPATIENT)
Dept: PHYSICAL THERAPY | Facility: CLINIC | Age: 52
End: 2017-11-16
Payer: COMMERCIAL

## 2017-11-16 ENCOUNTER — GENERIC CONVERSION - ENCOUNTER (OUTPATIENT)
Dept: OTHER | Facility: OTHER | Age: 52
End: 2017-11-16

## 2017-11-16 PROCEDURE — 97110 THERAPEUTIC EXERCISES: CPT

## 2017-11-16 PROCEDURE — 97112 NEUROMUSCULAR REEDUCATION: CPT

## 2017-11-17 ENCOUNTER — ALLSCRIPTS OFFICE VISIT (OUTPATIENT)
Dept: OTHER | Facility: OTHER | Age: 52
End: 2017-11-17

## 2017-11-20 ENCOUNTER — APPOINTMENT (OUTPATIENT)
Dept: PHYSICAL THERAPY | Facility: CLINIC | Age: 52
End: 2017-11-20
Payer: COMMERCIAL

## 2017-11-20 PROCEDURE — 97110 THERAPEUTIC EXERCISES: CPT

## 2017-11-20 PROCEDURE — 97140 MANUAL THERAPY 1/> REGIONS: CPT

## 2017-11-21 ENCOUNTER — GENERIC CONVERSION - ENCOUNTER (OUTPATIENT)
Dept: OTHER | Facility: OTHER | Age: 52
End: 2017-11-21

## 2017-11-24 ENCOUNTER — APPOINTMENT (OUTPATIENT)
Dept: PHYSICAL THERAPY | Facility: CLINIC | Age: 52
End: 2017-11-24
Payer: COMMERCIAL

## 2017-11-27 ENCOUNTER — APPOINTMENT (OUTPATIENT)
Dept: PHYSICAL THERAPY | Facility: CLINIC | Age: 52
End: 2017-11-27
Payer: COMMERCIAL

## 2017-11-29 ENCOUNTER — GENERIC CONVERSION - ENCOUNTER (OUTPATIENT)
Dept: OTHER | Facility: OTHER | Age: 52
End: 2017-11-29

## 2017-11-29 LAB
A/G RATIO (HISTORICAL): 1.4 (ref 1.2–2.2)
ALBUMIN SERPL BCP-MCNC: 4.6 G/DL (ref 3.5–5.5)
ALP SERPL-CCNC: 96 IU/L (ref 39–117)
ALT SERPL W P-5'-P-CCNC: 12 IU/L (ref 0–32)
AMYLASE (HISTORICAL): 113 U/L (ref 31–124)
AST SERPL W P-5'-P-CCNC: 24 IU/L (ref 0–40)
BASOPHILS # BLD AUTO: 0 %
BASOPHILS # BLD AUTO: 0 X10E3/UL (ref 0–0.2)
BILIRUB SERPL-MCNC: 0.7 MG/DL (ref 0–1.2)
BUN SERPL-MCNC: 10 MG/DL (ref 6–24)
BUN/CREA RATIO (HISTORICAL): 10 (ref 9–23)
CALCIUM SERPL-MCNC: 10.1 MG/DL (ref 8.7–10.2)
CHLORIDE SERPL-SCNC: 102 MMOL/L (ref 96–106)
CHOLEST SERPL-MCNC: 185 MG/DL (ref 100–199)
CHOLEST/HDLC SERPL: 2.8 RATIO UNITS (ref 0–4.4)
CO2 SERPL-SCNC: 22 MMOL/L (ref 18–29)
CREAT SERPL-MCNC: 0.98 MG/DL (ref 0.57–1)
DEPRECATED RDW RBC AUTO: 13.2 % (ref 12.3–15.4)
EGFR AFRICAN AMERICAN (HISTORICAL): 76 ML/MIN/1.73
EGFR-AMERICAN CALC (HISTORICAL): 66 ML/MIN/1.73
EOSINOPHIL # BLD AUTO: 0.2 X10E3/UL (ref 0–0.4)
EOSINOPHIL # BLD AUTO: 2 %
FOLATE SERPL-MCNC: 17.1 NG/ML
GLUCOSE SERPL-MCNC: 88 MG/DL (ref 65–99)
HCT VFR BLD AUTO: 38.5 % (ref 34–46.6)
HDLC SERPL-MCNC: 65 MG/DL
HGB BLD-MCNC: 12.7 G/DL (ref 11.1–15.9)
IMM.GRANULOCYTES (CD4/8) (HISTORICAL): 0 %
IMM.GRANULOCYTES (CD4/8) (HISTORICAL): 0 X10E3/UL (ref 0–0.1)
LDLC SERPL CALC-MCNC: 101 MG/DL (ref 0–99)
LIPASE SERPL-CCNC: 42 U/L (ref 14–72)
LYMPHOCYTES # BLD AUTO: 2.5 X10E3/UL (ref 0.7–3.1)
LYMPHOCYTES # BLD AUTO: 37 %
MAGNESIUM SERPL-MCNC: 1.8 MG/DL (ref 1.6–2.3)
MCH RBC QN AUTO: 33.2 PG (ref 26.6–33)
MCHC RBC AUTO-ENTMCNC: 33 G/DL (ref 31.5–35.7)
MCV RBC AUTO: 101 FL (ref 79–97)
MONOCYTES # BLD AUTO: 0.8 X10E3/UL (ref 0.1–0.9)
MONOCYTES (HISTORICAL): 11 %
NEUTROPHILS # BLD AUTO: 3.3 X10E3/UL (ref 1.4–7)
NEUTROPHILS # BLD AUTO: 50 %
PLATELET # BLD AUTO: 315 X10E3/UL (ref 150–379)
POTASSIUM SERPL-SCNC: 4.6 MMOL/L (ref 3.5–5.2)
RBC (HISTORICAL): 3.82 X10E6/UL (ref 3.77–5.28)
RETICULOCYTE COUNT (HISTORICAL): 1.4 % (ref 0.6–2.6)
SODIUM SERPL-SCNC: 142 MMOL/L (ref 134–144)
TOT. GLOBULIN, SERUM (HISTORICAL): 3.3 G/DL (ref 1.5–4.5)
TOTAL PROTEIN (HISTORICAL): 7.9 G/DL (ref 6–8.5)
TRIGL SERPL-MCNC: 96 MG/DL (ref 0–149)
VIT B12 SERPL-MCNC: 338 PG/ML (ref 211–946)
VLDLC SERPL CALC-MCNC: 19 MG/DL (ref 5–40)
WBC # BLD AUTO: 6.8 X10E3/UL (ref 3.4–10.8)

## 2017-11-30 ENCOUNTER — APPOINTMENT (OUTPATIENT)
Dept: PHYSICAL THERAPY | Facility: CLINIC | Age: 52
End: 2017-11-30
Payer: COMMERCIAL

## 2017-11-30 LAB
HEPATITIS C ANTIBODY (HISTORICAL): 0.1 S/CO RATIO (ref 0–0.9)
TSH SERPL DL<=0.05 MIU/L-ACNC: 1.71 UIU/ML (ref 0.45–4.5)

## 2017-12-01 LAB
BACTERIA UR QL AUTO: ABNORMAL
BILIRUB UR QL STRIP: NEGATIVE
COLOR UR: YELLOW
COMMENT (HISTORICAL): ABNORMAL
CRYSTAL TYPE (HISTORICAL): ABNORMAL
CRYSTALS URNS QL MICRO: PRESENT
CULTURE RESULT (HISTORICAL): ABNORMAL
FECAL OCCULT BLOOD DIAGNOSTIC (HISTORICAL): NEGATIVE
GLUCOSE (HISTORICAL): NEGATIVE
INTERPRETATION (HISTORICAL): NORMAL
KETONES UR STRIP-MCNC: NEGATIVE MG/DL
LEUKOCYTE ESTERASE UR QL STRIP: ABNORMAL
MICROSCOPIC EXAMINATION (HISTORICAL): ABNORMAL
MISCELLANEOUS LAB TEST RESULT (HISTORICAL): ABNORMAL
NITRITE UR QL STRIP: NEGATIVE
NON-SQ EPI CELLS URNS QL MICRO: ABNORMAL /HPF
PH UR STRIP.AUTO: 7 [PH] (ref 5–7.5)
PROT UR STRIP-MCNC: NEGATIVE MG/DL
RBC (HISTORICAL): ABNORMAL /HPF
SP GR UR STRIP.AUTO: 1.02 (ref 1–1.03)
URINALYSIS (UA) (HISTORICAL): ABNORMAL
UROBILINOGEN UR QL STRIP.AUTO: 0.2 EU/DL (ref 0.2–1)
WBC # BLD AUTO: ABNORMAL /HPF

## 2017-12-03 ENCOUNTER — GENERIC CONVERSION - ENCOUNTER (OUTPATIENT)
Dept: OTHER | Facility: OTHER | Age: 52
End: 2017-12-03

## 2017-12-06 ENCOUNTER — GENERIC CONVERSION - ENCOUNTER (OUTPATIENT)
Dept: OTHER | Facility: OTHER | Age: 52
End: 2017-12-06

## 2017-12-06 ENCOUNTER — GENERIC CONVERSION - ENCOUNTER (OUTPATIENT)
Dept: FAMILY MEDICINE CLINIC | Facility: CLINIC | Age: 52
End: 2017-12-06

## 2017-12-07 ENCOUNTER — GENERIC CONVERSION - ENCOUNTER (OUTPATIENT)
Dept: OTHER | Facility: OTHER | Age: 52
End: 2017-12-07

## 2017-12-28 ENCOUNTER — GENERIC CONVERSION - ENCOUNTER (OUTPATIENT)
Dept: FAMILY MEDICINE CLINIC | Facility: CLINIC | Age: 52
End: 2017-12-28

## 2017-12-29 ENCOUNTER — APPOINTMENT (OUTPATIENT)
Dept: RADIOLOGY | Facility: CLINIC | Age: 52
End: 2017-12-29
Payer: COMMERCIAL

## 2017-12-29 ENCOUNTER — ALLSCRIPTS OFFICE VISIT (OUTPATIENT)
Dept: OTHER | Facility: OTHER | Age: 52
End: 2017-12-29

## 2017-12-29 DIAGNOSIS — Z96.651 PRESENCE OF RIGHT ARTIFICIAL KNEE JOINT: ICD-10-CM

## 2017-12-29 DIAGNOSIS — M25.561 PAIN IN RIGHT KNEE: ICD-10-CM

## 2017-12-29 PROCEDURE — 73562 X-RAY EXAM OF KNEE 3: CPT

## 2017-12-30 NOTE — PROGRESS NOTES
Assessment   1  Status post revision of total replacement of right knee (V43 65) (Z96 651)    Plan   Right knee pain, Status post revision of total replacement of right knee    · *1 - [facility]  *EVAL AND TREAT    S/P REVISION RIGHT TKA    MODALITIES PRN    STRENGTHENING ONLY! 2-3 X PER WEEK FOR 4-6 WEEKS  Status: Active - Retrospective By Protocol    Authorization  Requested for: [id number]  Care Summary provided  : Yes  S/P total knee replacement using cement, right    · * XR KNEE 3 VW RIGHT NON INJURY; Status:Active; Requested for:75Tnv2205;     Michael Salazar is here for continued follow-up regarding her revision right total knee arthroplasty  She is here 9 weeks postop and her concerns were addressed today  She does have pain at the end of the day however after thorough history and evaluation I feel that this is benign in nature and part of her postoperative recovery  She has been doing quite a bit in her daily activities and this in combination with being 9 weeks postop status post large revision surgery I feel this is within normal postoperative recovery and we discussed this  She is regaining her strength in her right lower extremity as physical therapy has been on hold as she was developing some laxity compared to her immediate postoperative exam and this has remained stable since her last evaluation with me  She has been Decompensating on this right lower extremity for approximately 4 years due to gross instability prior to her initiating care with me and I feel that she has some gains in terms of strength to be made  I recommended that she reinitiate physical therapy to work on strengthening only as her range of motion is excellent especially in the setting of a revision surgery  We also readdressed expectations after undergoing revision total knee arthroplasty surgery and being less than 3 months postop  She demonstrates understanding of these expectations   She is aware that these are difficult to except especially in the setting of a younger more active patient  She was reassured today that there is nothing wrong with her knee and that this is continued postop recovery process that she is experiencing  Her x-rays were reviewed and demonstrate a well-positioned well-aligned revision prosthesis  I gave her prescription for physical therapy and I would like to see her back in approximately 6 weeks time to follow-up on her progress with therapy  No new x-rays are needed at that time unless there is a significant change in her complaints  The patient may call the office at anytime if any questions or concerns should arise  The patient has the current Goals: Decreased pain increased function  The treatment plan was reviewed with the patient/guardian  The patient/guardian understands and agrees with the treatment plan      Chief Complaint   1  Knee Pain  9 week s/p revision R TKA      Post-Op   HPI: Larry Son is here for continued follow-up regarding her revision right total knee arthroplasty that she underwent 9 weeks ago  She recently went to this still is game as previously discussed and did so without issue and felt comfortable doing so and had a good time at the game  She states also that her father passed away approximately 2 weeks ago and has been busy with wrapping up life issues associated with that  She reports today with what appears to be somewhat a frustrated tone as she feels that she should be doing better and walking more normal and having less pain 9 weeks after revision surgery  She does admit that she has been doing more than usual lately  Her pain is mostly at the end of the day and is not on a daily basis  Pain is diffuse and nonspecific in the knee  She classifies this as an ache  She denies any instability events  She states that going up and down steps is still hard for her and she has to hold on on both railings   She denies pain with every step that she takes or other specific weight-bearing pain  Review of Systems        Constitutional: No fever, no chills, feels well, no tiredness, no recent weight gain or loss  Eyes: No complaints of eyesight problems, no red eyes  ENT: no loss of hearing, no nosebleeds, no sore throat  Cardiovascular: No complaints of chest pain, no palpitations, no leg claudication or lower extremity edema  Respiratory: no compliants of shortness of breath, no wheezing, no cough  Gastrointestinal: no complaints of abdominal pain, no constipation, no nausea or diarrhea, no vomiting, no bloody stools  Genitourinary: no complaints of dysuria, no incontinence  Musculoskeletal: no complaints of arthralgia, no myalgia, no joint swelling or stiffness, no limb pain or swelling  Integumentary: no complaints of skin rash or lesion, no itching or dry skin, no skin wounds  Neurological: no complaints of headache, no confusion, no numbness or tingling, no dizziness  Endocrine: No complaints of muscle weakness, no feelings of weakness, no frequent urination, no excessive thirst       Psychiatric: depression, but-- not suicidal-- and-- no anxiety  ROS reviewed  Active Problems   1  Adult BMI 28 0-28 9 kg/sq m (V85 24) (Z68 28)   2  Alcohol abuse, episodic (305 02) (F10 10)   3  Bitten by pig, subsequent encounter (V58 89,879 8) (W55 41XD)   4  Borderline hyperlipidemia (272 4) (E78 5)   5  Chronic fatigue (780 79) (R53 82)   6  Depression with anxiety (300 4) (F41 8)   7  Encounter for screening mammogram for malignant neoplasm of breast (V76 12)     (Z12 31)   8  Hematuria (599 70) (R31 9)   9  History of alcoholism (V11 3) (F10 21)   10  Knee instability, right (718 86) (M25 361)   11  Left wrist pain (719 43) (M25 532)   12  Murmur (785 2) (R01 1)   13  Need for subacute bacterial endocarditis prophylaxis (V07 8) (Z29 8)   14  Nontoxic goiter, unspecified (241 9) (E04 9)   15   Pre-op exam (V72 84) (Z01 818)   16  Reported Hx Of Knee Replacement   17  Right foot pain (729 5) (M79 671)   18  Right knee pain (719 46) (M25 561)   19  S/P total knee replacement using cement, right (V43 65) (Z96 651)   20  Status post open reduction with internal fixation (ORIF) of fracture of ankle      (V45 89,V15 51) (Z96 7,Z87 81)   21  Status post revision of total replacement of right knee (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 65) (Z96 651)   22  Vitamin B-complex deficiency (266 9) (E53 9)    Social History    ·    · Never A Smoker   · No illicit drug use   · Recovering Alcoholic  The social history was reviewed and updated today  Current Meds    1  BuPROPion HCl - 75 MG Oral Tablet; TAKE 1 TABLET TWICE DAILY; Therapy: 35VNT8943 to (Evaluate:16Jan2018)  Requested for: 15PZC5739; Last     Rx:17Nov2017 Ordered     The medication list was reviewed and updated today  Allergies   1  No Known Drug Allergies    Vitals    Recorded: 53XAA0259 10:18AM   Heart Rate 753   Systolic 744   Diastolic 99   Height 5 ft 5 in   Weight 173 lb    BMI Calculated 28 79   BSA Calculated 1 86     Physical Exam   General:  Awake alert and oriented x3 no acute distress right knee: Anterior knee incision well healed no erythema  No tenderness palpation over the anterior knee incision, range of motion is from 0 degrees to 130Â° of flexion and range of motion is painless  There is some crepitance in the joint which could represent scar tissue  No effusion Calf soft nontender, minimal tenderness to palpation medial lateral joint line knee is stable to varus valgus anterior posterior stability testing at [facility] and 90Â°, there is very mild 1+ laxity at mid flexion to varus and valgus stress  Constitutional - General appearance: Normal       Musculoskeletal - Gait and station: Abnormal  Gait evaluation demonstrated antalgia on the right-- and-- Swaying gait using a cane  -- Lower extremity compartments: Normal       Cardiovascular - Pulses: Normal -- Examination of extremities for edema and/or varicosities: Normal       Skin - Skin and subcutaneous tissue: Normal       Neurologic - Sensation: Normal -- Lower extremity peripheral neuro exam: Normal       Psychiatric - Orientation to person, place, and time: Normal -- Mood and affect: Normal       Eyes      Conjunctiva and lids: Normal        Pupils and irises: Normal        Results/Data   I personally reviewed the films/images/results in the office today  My interpretation follows  X-ray Review X-rays obtained here in the office today demonstrate stable appearance of a stemmed revision total knee arthroplasty  There has been no interval change of position or appearance of the implant interfaces  No sign of infection swelling osteolytic lesions  Implant is well-positioned well-aligned  Future Appointments      Date/Time Provider Specialty Site   02/09/2018 10:00 AM Loretta Chilel DO Orthopedic Surgery [facility]     Signatures    Electronically signed by :  Matthew Aceves DO; Dec 29 2017 11:36AM EST                       (Author)

## 2018-01-04 ENCOUNTER — APPOINTMENT (OUTPATIENT)
Dept: PHYSICAL THERAPY | Facility: CLINIC | Age: 53
End: 2018-01-04
Payer: COMMERCIAL

## 2018-01-08 ENCOUNTER — APPOINTMENT (OUTPATIENT)
Dept: PHYSICAL THERAPY | Facility: CLINIC | Age: 53
End: 2018-01-08
Payer: COMMERCIAL

## 2018-01-08 PROCEDURE — 97110 THERAPEUTIC EXERCISES: CPT

## 2018-01-09 NOTE — RESULT NOTES
Verified Results  ECHO COMPLETE WITH CONTRAST IF INDICATED, TTE / TRANSTHORACIC [id number] 09:29AM Abhishek Veras     Test Name Result Flag Reference   ECHO COMPLETE WITH CONTRAST IF INDICATED (Report)     [address]   [address] [facility]   [place] [address]   [phone]     Transthoracic Echocardiogram   2D, M-mode, Doppler, and Color Doppler     Study date: 2016     Patient: Fernanda Side   MR number: [id number]   Account number: [de-identified]   : 1965   Age: 46 years   Gender: Female   Status: Routine   Location: Echo lab   Height: 65 in   Weight: 154 7 lb   BP: 120/ 75 mmHg     Indications: Bradycardia     Diagnoses: R00 1 - Bradycardia, unspecified     Sonographer: Robin Schaffer   Primary Physician: Aixa Mahajan MD   Referring Physician: Aixa Mahajan MD   Group: Mayte Velasquez   Interpreting Physician: Charan Blas DO     SUMMARY     LEFT VENTRICLE:   Size was normal    Systolic function was normal  Ejection fraction was estimated in the range of   55 % to 65 %  There were no regional wall motion abnormalities  Wall thickness was normal    Left ventricular diastolic function parameters were normal      MITRAL VALVE:   There was mild regurgitation  TRICUSPID VALVE:   There was mild regurgitation  Pulmonary artery systolic pressure was within the normal range  HISTORY: PRIOR HISTORY: Patient has no history of cardiovascular disease  PROCEDURE: The procedure was performed in the echo lab  This was a routine   study  The transthoracic approach was used  The study included complete 2D   imaging, M-mode, complete spectral Doppler, and color Doppler  The heart rate   was 52 bpm, at the start of the study  Image quality was adequate  LEFT VENTRICLE: Size was normal  Systolic function was normal  Ejection   fraction was estimated in the range of 55 % to 65 %  There were no regional   wall motion abnormalities   Wall thickness was normal  No evidence of apical   thrombus  DOPPLER: Left ventricular diastolic function parameters were normal      RIGHT VENTRICLE: The size was normal  Systolic function was normal  Wall   thickness was normal      LEFT ATRIUM: Size was normal      RIGHT ATRIUM: Size was normal      MITRAL VALVE: Valve structure was normal  There was normal leaflet separation  There was systolic bowing of the anterior leaflet, but without diagnostic   evidence for prolapse  DOPPLER: The transmitral velocity was within the normal   range  There was no evidence for stenosis  There was mild regurgitation  AORTIC VALVE: The valve was trileaflet  Leaflets exhibited normal thickness and   normal cuspal separation  DOPPLER: Transaortic velocity was within the normal   range  There was no evidence for stenosis  There was no significant   regurgitation  TRICUSPID VALVE: The valve structure was normal  There was normal leaflet   separation  DOPPLER: The transtricuspid velocity was within the normal range  There was no evidence for stenosis  There was mild regurgitation  Pulmonary   artery systolic pressure was within the normal range  Estimated peak PA   pressure was 27 mmHg  PULMONIC VALVE: Leaflets exhibited normal thickness, no calcification, and   normal cuspal separation  DOPPLER: The transpulmonic velocity was within the   normal range  There was no significant regurgitation  PERICARDIUM: There was no pericardial effusion  The pericardium was normal in   appearance  AORTA: The root exhibited normal size  SYSTEMIC VEINS: IVC: The inferior vena cava was normal in size       SYSTEM MEASUREMENT TABLES     2D mode   AoR Diam 2D: 2 9 cm   LA Diam (2D): 3 5 cm   LA/Ao (2D): 1 21   FS (2D Teich): 30 4 %   IVSd (2D): 0 71 cm   LVDEV: 95 9 cm³   LVESV: 40 3 cm³   LVIDd(2D): 4 57 cm   LVISd (2D): 3 18 cm   LVPWd (2D): 0 98 cm   SV (Teich): 55 6 cm³     Apical four chamber   LVEF A4C: 61 %     Unspecified Scan Mode   MV Peak A Dejan: 370 mm/s   MV Peak E Dejan   Mean: 672 mm/s   MVA (PHT): 3 73 cm squared   PHT: 59 ms   Max P mm[Hg]   V Max: 2340 mm/s   Vmax: 2400 mm/s   RA Area: 11 8 cm squared   RA Volume: 25 5 cm³   TAPSE: 2 1 cm     [facility] Accredited Echocardiography Laboratory     Prepared and electronically signed by     Jennifer Britt DO   Signed 2016 15:24:35

## 2018-01-11 ENCOUNTER — APPOINTMENT (OUTPATIENT)
Dept: PHYSICAL THERAPY | Facility: CLINIC | Age: 53
End: 2018-01-11
Payer: COMMERCIAL

## 2018-01-11 NOTE — CONSULTS
Chief Complaint  pt here for preop for right knee replacement with Dr Jamal Parsons 10/23  tc/cma      History of Present Illness  Pre-Op Visit (Brief): The patient is being seen for a preoperative visit  The procedure is a(n) Revision right total knee replacement scheduled for 10/23/17 with Dr Jamal Parsons  The indication for surgery is right knee instability  Surgical Risk Assessment:   Prior Anesthesia: She had prior anesthesia and no prior adverse reaction to general anesthesia  Lifestyle Factors: denies alcohol use, denies tobacco use and denies illegal drug use  Symptoms: no symptoms  Living Situation: home is secure and supportive  Review of Systems    Constitutional: No fever, no chills, feels well, no tiredness, no recent weight gain or weight loss  Cardiovascular: no chest pain, no palpitations and no lower extremity edema  Respiratory: No complaints of shortness of breath, no wheezing, no cough, no SOB on exertion, no orthopnea, no PND  Gastrointestinal: No complaints of abdominal pain, no constipation, no nausea or vomiting, no diarrhea, no bloody stools  Musculoskeletal: arthralgias and myalgias  Integumentary: pig bite sites rle well healed-no sign infection, but no rashes  Neurological: No complaints of headache, no confusion, no convulsions, no numbness, no dizziness or fainting, no tingling, no limb weakness, no difficulty walking  Psychiatric: anxiety  Endocrine: No complaints of proptosis, no hot flashes, no muscle weakness, no deepening of the voice, no feelings of weakness  Active Problems    1  Bitten by pig, subsequent encounter (V58 89,879 8) (W55 41XD)   2  Chronic fatigue (780 79) (R53 82)   3  Depression with anxiety (300 4) (F41 8)   4  Hematuria (599 70) (R31 9)   5  History of alcoholism (V11 3) (F10 21)   6  Knee instability, right (718 86) (M25 361)   7  Left wrist pain (719 43) (M25 532)   8  Murmur (785 2) (R01 1)   9   Need for subacute bacterial endocarditis prophylaxis (V07 8) (Z29 8)   10  Pre-op exam (V72 84) (Z01 818)   11  Reported Hx Of Knee Replacement   12  Right foot pain (729 5) (M79 671)   13  Right knee pain (719 46) (M25 561)   14  S/P total knee replacement using cement, right (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 65) (Z96 651)   15  Status post open reduction with internal fixation (ORIF) of fracture of ankle    (V45 89,V15 51) (Z96 7,Z87 81)   16  Thyromegaly (240 9) (E04 9)   17   Vitamin B-complex deficiency (266 9) (E53 9)    Past Medical History    · History of _   · History of Acute lower UTI (599 0) (N39 0)   · History of Acute nasopharyngitis (common cold) (460) (J00)   · History of Acute upper respiratory infection (465 9) (J06 9)   · History of Ankle pain, right (719 47) (M25 571)   · History of Benign essential hypertension (401 1) (I10)   · History of Contusion of foot, right (924 20) (S90 31XA)   · History of Contusion Of The Toe(S) With Intact Skin Surface (924 3)   · History of Distal radius fracture, left (813 42) (S52 502A)   · History of Encounter for annual physical exam (V70 0) (Z00 00)   · History of Encounter for screening for malignant neoplasm of colon (V76 51) (Z12 11)   · History of Fever of unknown origin (780 60) (R50 9)   · History of anemia (V12 3) (Z86 2)   · History of fever (V13 89) (M29 222)   · History of gastroesophageal reflux (GERD) (V12 79) (Z87 19)   · History of herpes labialis (V12 09) (Z86 19)   · History of screening mammography (V15 89) (Z92 89)   · History of sinus bradycardia (V12 59) (Z86 79)   · History of upper respiratory infection (V12 09) (Z87 09)   · History of Immunology Studies Nonspecific Abnormal Findings (795 79)   · History of Infected Nonvenomous Insect Bite (919 5)   · History of Numbness (782 0) (R20 0)   · History of Other closed fracture of distal end of right fibula with routine healing,  subsequent encounter (V54 16) (D46 236F)   · History of Paronychia of toe (681 11) (L03 039)   · History of Skin rash (782 1) (R21)   · History of Tingling (782 0) (R20 2)   · History of Uncomplicated alcohol abuse (305 00) (F10 10)   · History of Visit for pre-operative examination (V72 84) (Z01 818)   · History of Well adult on routine health check (V70 0) (Z00 00)   · History of Wound infection (958 3) (T14 8XXA,L08 9)   · History of Wound of back (876 0) (S21 209A)    Surgical History    · History of Ankle Surgery   · Reported Hx Of Knee Replacement    Family History    · Family history of Arthritis   · Family history of Osteoporosis    · Family history of Dementia   · Family history of Spinal stenosis    · Family history of CREST Syndrome    Social History    ·    · Never A Smoker   · No illicit drug use   · Recovering Alcoholic    Current Meds   1  Escitalopram Oxalate 10 MG Oral Tablet; take 1 tablet by mouth once daily; Therapy: 60Hgd1149 to (Last Rx:46Gef2046)  Requested for: 24Cbb0570 Ordered    Allergies    1  No Known Drug Allergies    Vitals  Signs    Temperature: 97 3 F, Temporal  Heart Rate: 64, R Radial  Pulse Quality: Normal, R Radial  Respiration Quality: Difficult  Respiration: 16  Systolic: 827, RUE, Sitting  Diastolic: 82, RUE, Sitting  Height: 5 ft 5 in  Weight: 174 lb   BMI Calculated: 28 96  BSA Calculated: 1 86    Physical Exam    Constitutional   General appearance: No acute distress, well appearing and well nourished  Eyes   Conjunctiva and lids: No swelling, erythema or discharge  Ears, Nose, Mouth, and Throat   Oropharynx: Normal with no erythema, edema, exudate or lesions  Neck   Neck: Supple, symmetric, trachea midline, no masses  Thyroid: Normal, no thyromegaly  Pulmonary   Respiratory effort: No increased work of breathing or signs of respiratory distress  Cardiovascular RRR no sig m/r/g  Carotid pulses: 2+ bilaterally  Pedal pulses: 2+ bilaterally  Examination of extremities for edema and/or varicosities: Normal     Abdomen   Abdomen: Non-tender, no masses      Lymphatic   Palpation of lymph nodes in neck: No lymphadenopathy  Skin   Skin and subcutaneous tissue: Normal without rashes or lesions  right ant shin and calf lesions well healed--no erythema-no open areas, no streaking  Psychiatric   Orientation to person, place, and time: Normal     Mood and affect: Normal        Results/Data  PHQ-2 Adult Depression Screening 20Oct2017 10:53AM User, Ahs     Test Name Result Flag Reference   PHQ-2 Adult Depression Score 0     Over the last two weeks, how often have you been bothered by any of the following problems? Little interest or pleasure in doing things: Not at all - 0  Feeling down, depressed, or hopeless: Not at all - 0   PHQ-2 Adult Depression Screening Negative         Assessment    1  Pre-op exam (V72 84) (M09 744)    Discussion/Summary  Surgical Clearance: She is at a LOW risk from a cardiovascular standpoint at this time without any additional cardiac testing  Reevaluation needed, if she should present with symptoms prior to surgery/procedure  Surgical clearance faxed to Dr Velma Fragoso   Possible side effects of new medications were reviewed with the patient/guardian today  The treatment plan was reviewed with the patient/guardian  The patient/guardian understands and agrees with the treatment plan      End of Encounter Meds    1  Escitalopram Oxalate 10 MG Oral Tablet (Lexapro); take 1 tablet by mouth once daily;    Therapy: 17Drn4462 to (Last Rx:76Zhl5276)  Requested for: 59PZY8291 Ordered    Signatures   Electronically signed by : CINTHYA Ludwig ; Oct 20 2017 11:49AM EST                       (Author)

## 2018-01-12 ENCOUNTER — APPOINTMENT (OUTPATIENT)
Dept: PHYSICAL THERAPY | Facility: CLINIC | Age: 53
End: 2018-01-12
Payer: COMMERCIAL

## 2018-01-12 VITALS
DIASTOLIC BLOOD PRESSURE: 82 MMHG | HEIGHT: 65 IN | WEIGHT: 174 LBS | BODY MASS INDEX: 28.99 KG/M2 | HEART RATE: 64 BPM | RESPIRATION RATE: 16 BRPM | SYSTOLIC BLOOD PRESSURE: 138 MMHG | TEMPERATURE: 97.3 F

## 2018-01-12 NOTE — MISCELLANEOUS
Julio Lomas was called today regarding the results of her 3-phase bone scan that was done in the workup for her upcoming revision knee surgery  She was notified that there was abnormal and asymmetric uptake surrounding the tibia when compared to the femur suggesting that the tibial component is loosening  We discussed this over the phone she damages understanding of this  With this new information it is unlikely that an isolated polyethylene liner exchange and balancing of the knee will be appropriate and we will likely need to do a tibial revision for loosening  She demonstrates this and where this falls or treatment algorithm for her revision surgery that upcoming at the end of September  She will continue her preoperative testing and clearance process in preparation for her upcoming revision surgery  She will call me with any questions or concerns if they should arise in the meantime  Signatures   Electronically signed by :  Lacy Peguero DO; Aug 18 2017 10:12AM EST                       (Author)

## 2018-01-13 VITALS
TEMPERATURE: 95.9 F | HEIGHT: 65 IN | BODY MASS INDEX: 27.82 KG/M2 | WEIGHT: 167 LBS | HEART RATE: 78 BPM | SYSTOLIC BLOOD PRESSURE: 128 MMHG | RESPIRATION RATE: 16 BRPM | DIASTOLIC BLOOD PRESSURE: 84 MMHG

## 2018-01-13 VITALS
BODY MASS INDEX: 29.24 KG/M2 | WEIGHT: 175.5 LBS | HEIGHT: 65 IN | HEART RATE: 71 BPM | DIASTOLIC BLOOD PRESSURE: 83 MMHG | SYSTOLIC BLOOD PRESSURE: 132 MMHG

## 2018-01-13 VITALS
WEIGHT: 178.25 LBS | HEIGHT: 65 IN | SYSTOLIC BLOOD PRESSURE: 151 MMHG | BODY MASS INDEX: 29.7 KG/M2 | HEART RATE: 79 BPM | DIASTOLIC BLOOD PRESSURE: 87 MMHG

## 2018-01-14 VITALS
HEIGHT: 65 IN | WEIGHT: 168.13 LBS | DIASTOLIC BLOOD PRESSURE: 88 MMHG | BODY MASS INDEX: 28.01 KG/M2 | SYSTOLIC BLOOD PRESSURE: 136 MMHG | HEART RATE: 100 BPM

## 2018-01-14 VITALS
TEMPERATURE: 96.8 F | HEIGHT: 65 IN | HEART RATE: 80 BPM | DIASTOLIC BLOOD PRESSURE: 84 MMHG | RESPIRATION RATE: 18 BRPM | OXYGEN SATURATION: 98 % | SYSTOLIC BLOOD PRESSURE: 124 MMHG | WEIGHT: 174 LBS | BODY MASS INDEX: 28.99 KG/M2

## 2018-01-14 VITALS
TEMPERATURE: 97.5 F | SYSTOLIC BLOOD PRESSURE: 132 MMHG | RESPIRATION RATE: 14 BRPM | DIASTOLIC BLOOD PRESSURE: 90 MMHG | WEIGHT: 173 LBS | BODY MASS INDEX: 28.82 KG/M2 | HEIGHT: 65 IN | HEART RATE: 64 BPM

## 2018-01-14 VITALS
HEART RATE: 65 BPM | WEIGHT: 179.25 LBS | HEIGHT: 65 IN | SYSTOLIC BLOOD PRESSURE: 158 MMHG | DIASTOLIC BLOOD PRESSURE: 97 MMHG | BODY MASS INDEX: 29.87 KG/M2

## 2018-01-14 VITALS
DIASTOLIC BLOOD PRESSURE: 78 MMHG | WEIGHT: 158 LBS | BODY MASS INDEX: 26.98 KG/M2 | SYSTOLIC BLOOD PRESSURE: 120 MMHG | HEIGHT: 64 IN

## 2018-01-14 VITALS — WEIGHT: 160 LBS | BODY MASS INDEX: 26.66 KG/M2 | HEIGHT: 65 IN

## 2018-01-15 ENCOUNTER — APPOINTMENT (OUTPATIENT)
Dept: PHYSICAL THERAPY | Facility: CLINIC | Age: 53
End: 2018-01-15
Payer: COMMERCIAL

## 2018-01-15 PROCEDURE — 97110 THERAPEUTIC EXERCISES: CPT

## 2018-01-15 PROCEDURE — 97112 NEUROMUSCULAR REEDUCATION: CPT

## 2018-01-17 NOTE — MISCELLANEOUS
History of Present Illness  TCM Communication [facility]: The patient is being contacted for follow-up after hospitalization and JUDITH Gordillo LPN 69/47/5639  She was hospitalized at and [facility]  The date of admission: 10/23/17, date of discharge: 10/25/17  Diagnosis: Right Knee Replacement  She was discharged to home  Medications reviewed and updated today  She scheduled a follow up appointment  Symptoms: weakness, dizziness, fatigue, leg pain right side, constipation, swelling and swelling location Rt leg, but no fever, no headache, no cough, no shortness of breath, no chest pain, no back pain on left side, no back pain on right side, no arm pain left side, no arm pain on right side, no leg pain on left side, no upper abdominal pain, no middle abdominal pain, no lower abdominal pain, no rash:, no anorexia, no nausea, no vomiting, no loose stools, no pain with urinating, no incisional pain and no wound drainage  Counseling was provided to the patient  Communication performed and completed by JUDITH Gordillo LPN 43/80/97   HPI: MARIELA appt cx--pt resched to 10/17/17      Active Problems   1  Bitten by pig, subsequent encounter (V58 89,879 8) (W55 41XD)  2  Chronic fatigue (780 79) (R53 82)  3  Depression with anxiety (300 4) (F41 8)  4  Hematuria (599 70) (R31 9)  5  History of alcoholism (V11 3) (F10 21)  6  Knee instability, right (718 86) (M25 361)  7  Left wrist pain (719 43) (M25 532)  8  Murmur (785 2) (R01 1)  9  Need for subacute bacterial endocarditis prophylaxis (V07 8) (Z29 8)  10  Pre-op exam (V72 84) (Z01 818)  11  Reported Hx Of Knee Replacement  12  Right foot pain (729 5) (M79 671)  13  Right knee pain (719 46) (M25 561)  14  S/P total knee replacement using cement, right (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 65) (Z96 651)  15  Status post open reduction with internal fixation (ORIF) of fracture of ankle    (V45 89,V15 51) (Z96 7,Z87 81)  16  Thyromegaly (240 9) (E04 9)  17   Vitamin B-complex deficiency (024 9) (E53 9)    Past Medical History   1  History of _  2  History of Acute lower UTI (599 0) (N39 0)  3  History of Acute nasopharyngitis (common cold) (460) (J00)  4  History of Acute upper respiratory infection (465 9) (J06 9)  5  History of Ankle pain, right (719 47) (M25 571)  6  History of Benign essential hypertension (401 1) (I10)  7  History of Contusion of foot, right (924 20) (S90 31XA)  8  History of Contusion Of The Toe(S) With Intact Skin Surface (924 3)  9  History of Distal radius fracture, left (813 42) (S52 502A)  10  History of Encounter for annual physical exam (V70 0) (Z00 00)  11  History of Encounter for screening for malignant neoplasm of colon (V76 51) (Z12 11)  12  History of Fever of unknown origin (780 60) (R50 9)  13  History of anemia (V12 3) (Z86 2)  14  History of fever (V13 89) (Z87 898)  15  History of gastroesophageal reflux (GERD) (V12 79) (Z87 19)  16  History of herpes labialis (V12 09) (Z86 19)  17  History of screening mammography (V15 89) (Z92 89)  18  History of sinus bradycardia (V12 59) (Z86 79)  19  History of upper respiratory infection (V12 09) (Z87 09)  20  History of Immunology Studies Nonspecific Abnormal Findings (795 79)  21  History of Infected Nonvenomous Insect Bite (919 5)  22  History of Numbness (782 0) (R20 0)  23  History of Other closed fracture of distal end of right fibula with routine healing,    subsequent encounter (V54 16) (S82 831D)  24  History of Paronychia of toe (681 11) (L03 039)  25  History of Skin rash (782 1) (R21)  26  History of Tingling (782 0) (R20 2)  27  History of Uncomplicated alcohol abuse (305 00) (F10 10)  28  History of Visit for pre-operative examination (V72 84) (Z01 818)  29  History of Well adult on routine health check (V70 0) (Z00 00)  30  History of Wound infection (958 3) (T14 8XXA,L08 9)  31  History of Wound of back (876 0) (S21 497A)    Surgical History   1  History of Ankle Surgery  2   Reported Hx Of Knee Replacement    Family History  Mother   1  Family history of Arthritis  2  Family history of Osteoporosis  Father   3  Family history of Dementia  4  Family history of Spinal stenosis  Family History   5  Family history of CREST Syndrome    Social History    ·    · Never A Smoker   · No illicit drug use   · Recovering Alcoholic    Current Meds  1  Escitalopram Oxalate 10 MG Oral Tablet; take 1 tablet by mouth once daily; Therapy: 91Vsu9037 to (Last Rx:37Uil6841)  Requested for: 05Pqb9046 Ordered    Allergies   1  No Known Drug Allergies    Health Management  History of Encounter for screening for malignant neoplasm of colon   COLONOSCOPY; every 5 years; Last 39AVY2905; Next Due: 21Bhs6590; Active  History of screening mammography   * MAMMO SCREENING BILATERAL W CAD; every 1 year; Last 28QUZ7294; Next Due: 33ACU7035; Overdue  Digital Bilateral Screening Mammogram With CAD; every 1 year; Last 40Lnc1254; Next Due:  86PEW7607; Overdue    Future Appointments    Date/Time Provider Specialty Site   11/17/2017 01:30 PM CINTHYA Oshea   [address] [place] [address]   11/21/2017 10:00 AM Ramesh Ramirez DO Orthopedic Surgery [facility]     Signatures   Electronically signed by : CINTHYA Gramajo ; Nov 10 2017  3:11PM EST                       (Author)    Electronically signed by : CINTHYA Gramajo ; Nov 10 2017  3:12PM EST                       (Author)

## 2018-01-19 ENCOUNTER — APPOINTMENT (OUTPATIENT)
Dept: PHYSICAL THERAPY | Facility: CLINIC | Age: 53
End: 2018-01-19
Payer: COMMERCIAL

## 2018-01-19 PROCEDURE — 97110 THERAPEUTIC EXERCISES: CPT

## 2018-01-19 PROCEDURE — 97140 MANUAL THERAPY 1/> REGIONS: CPT

## 2018-01-19 PROCEDURE — 97112 NEUROMUSCULAR REEDUCATION: CPT

## 2018-01-22 VITALS
BODY MASS INDEX: 28.99 KG/M2 | HEIGHT: 65 IN | DIASTOLIC BLOOD PRESSURE: 85 MMHG | HEART RATE: 86 BPM | WEIGHT: 174 LBS | SYSTOLIC BLOOD PRESSURE: 124 MMHG

## 2018-01-22 VITALS
BODY MASS INDEX: 28.82 KG/M2 | HEART RATE: 109 BPM | HEIGHT: 65 IN | DIASTOLIC BLOOD PRESSURE: 99 MMHG | WEIGHT: 173 LBS | SYSTOLIC BLOOD PRESSURE: 152 MMHG

## 2018-01-22 VITALS
BODY MASS INDEX: 28.7 KG/M2 | SYSTOLIC BLOOD PRESSURE: 153 MMHG | WEIGHT: 172.25 LBS | HEIGHT: 65 IN | DIASTOLIC BLOOD PRESSURE: 92 MMHG | HEART RATE: 76 BPM

## 2018-01-22 VITALS
SYSTOLIC BLOOD PRESSURE: 131 MMHG | HEIGHT: 65 IN | WEIGHT: 168.5 LBS | HEART RATE: 94 BPM | DIASTOLIC BLOOD PRESSURE: 84 MMHG | BODY MASS INDEX: 28.07 KG/M2

## 2018-01-22 VITALS
HEIGHT: 65 IN | WEIGHT: 173 LBS | DIASTOLIC BLOOD PRESSURE: 91 MMHG | BODY MASS INDEX: 28.82 KG/M2 | HEART RATE: 84 BPM | SYSTOLIC BLOOD PRESSURE: 136 MMHG

## 2018-01-22 VITALS
HEIGHT: 65 IN | BODY MASS INDEX: 29.38 KG/M2 | WEIGHT: 176.38 LBS | SYSTOLIC BLOOD PRESSURE: 138 MMHG | DIASTOLIC BLOOD PRESSURE: 82 MMHG | HEART RATE: 76 BPM

## 2018-01-23 ENCOUNTER — APPOINTMENT (OUTPATIENT)
Dept: PHYSICAL THERAPY | Facility: CLINIC | Age: 53
End: 2018-01-23
Payer: COMMERCIAL

## 2018-01-23 NOTE — RESULT NOTES
Discussion/Summary   labs overall good-please call with further questions/concerns-Best Regards-Dr Sandra Zhang     Verified Results  (1) AMYLASE 11HYW0698 09:29AM [facility]     Test Name Result Flag Reference   Amylase, Serum 113 U/L       (1) CBC/PLT/DIFF 84OTW0168 09:29AM [facility]     Test Name Result Flag Reference   WBC 6 8 x10E3/uL  3 4-10 8   RBC 3 82 x10E6/uL  3 77-5 28   Hemoglobin 12 7 g/dL  11 1-15 9   **Effective December 4, 2017 the reference interval**                   for Hemoglobin MALES only will be changing to: Males 13-15 years: 12 6 - 17 7                                         Males   >15 years: 13 0 - 17 7   Hematocrit 38 5 %  34 0-46  6    fL H 79-97   MCH 33 2 pg H 26 6-33 0   MCHC 33 0 g/dL  31 5-35 7   RDW 13 2 %  12 3-15 4   Platelets 012 L05P4/LM  150-379   Neutrophils 50 %  Not Estab  Lymphs 37 %  Not Estab  Monocytes 11 %  Not Estab  Eos 2 %  Not Estab  Basos 0 %  Not Estab  Neutrophils (Absolute) 3 3 x10E3/uL  1 4-7 0   Lymphs (Absolute) 2 5 x10E3/uL  0 7-3 1   Monocytes(Absolute) 0 8 x10E3/uL  0 1-0 9   Eos (Absolute) 0 2 x10E3/uL  0 0-0 4   Baso (Absolute) 0 0 x10E3/uL  0 0-0 2   Immature Granulocytes 0 %  Not Estab     Immature Grans (Abs) 0 0 x10E3/uL  0 0-0 1     (1) COMPREHENSIVE METABOLIC PANEL 50EDV0797 52:99MD [facility]     Test Name Result Flag Reference   Glucose, Serum 88 mg/dL  65-99   BUN 10 mg/dL  6-24   Creatinine, Serum 0 98 mg/dL  0 57-1 00   BUN/Creatinine Ratio 10  9-23   Sodium, Serum 142 mmol/L  134-144   Potassium, Serum 4 6 mmol/L  3 5-5 2   Chloride, Serum 102 mmol/L     Carbon Dioxide, Total 22 mmol/L  18-29   Calcium, Serum 10 1 mg/dL  8 7-10 2   Protein, Total, Serum 7 9 g/dL  6 0-8 5   Albumin, Serum 4 6 g/dL  3 5-5 5   Globulin, Total 3 3 g/dL  1 5-4 5   A/G Ratio 1 4  1 2-2 2   Bilirubin, Total 0 7 mg/dL  0 0-1 2   Alkaline Phosphatase, S 96 IU/L     AST (SGOT) 24 IU/L  0-40   ALT (SGPT) 12 IU/L  0-32   eGFR If NonAfricn Am 66 mL/min/1 73  >59   eGFR If Africn Am 76 mL/min/1 73  >59     (1) MAGNESIUM 76FLH7870 09:29AM "[facility]"     Test Name Result Flag Reference   Magnesium, Serum 1 8 mg/dL  1 6-2 3     (1) LIPASE 23AXB8104 09:29AM "[facility]"     Test Name Result Flag Reference   Lipase, Serum 42 U/L  14-72     (1) LIPID PANEL, FASTING 75WYQ9569 09:29AM "[facility]"     Test Name Result Flag Reference   Cholesterol, Total 185 mg/dL  100-199   Triglycerides 96 mg/dL  0-149   HDL Cholesterol 65 mg/dL  >39   VLDL Cholesterol Naif 19 mg/dL  5-40   LDL Cholesterol Calc 101 mg/dL H 0-99   T  Chol/HDL Ratio 2 8 ratio units  0 0-4 4   T  Chol/HDL Ratio                                                             Men  Women                                               1/2 Avg  Risk  3 4    3 3                                                   Avg Risk  5 0    4 4                                                2X Avg  Risk  9 6    7 1                                                3X Avg  Risk 23 4   11 0     (LC) Vitamin B12 and Folate 70BKB8829 09:29AM "[facility]"     Test Name Result Flag Reference   Vitamin B12 338 pg/mL  211-946   **Effective December 4, 2017 the reference interval**                   for Vitamin B12 will be changing to: 232-1245 pg/mL  Folate (Folic Acid), Serum 42 6 ng/mL  >3 0   A serum folate concentration of less than 3 1 ng/mL is  considered to represent clinical deficiency       (1) RETICULOCYTE COUNT 67QFI9300 09:29AM "[facility]"     Test Name Result Flag Reference   Reticulocyte Count 1 4 %  0 6-2 6     (LC) HCV Antibody 63ZMP6024 09:29AM "[facility]"     Test Name Result Flag Reference   Hep C Virus Ab 0 1 s/co ratio  0 0-0 9   Negative:     < 0 8                                              Indeterminate: 0 8 - 0 9                                                   Positive:     > 0 9                  The CDC recommends that a positive HCV antibody result                  be followed up with a HCV Nucleic Acid Amplification                  test (130720)  () TSH Rfx on Abnormal to Free T4 [id number] 09:29AM Key[facility]     Test Name Result Flag Reference   TSH 1 710 uIU/mL  0 450-4 500     [facility]) UA/M w/rflx Culture, Routine 07MJB3086 09:29AM Keyanna Elliott     Test Name Result Flag Reference   Specific Gravity 1 019  1 005-1 030   pH 7 0  5 0-7 5   Urine-Color Yellow  Yellow   Appearance Cloudy A Clear   WBC Esterase 1+ A Negative   Protein Negative  Negative/Trace   Glucose Negative  Negative   Ketones Negative  Negative   Occult Blood Negative  Negative   Bilirubin Negative  Negative   Urobilinogen,Semi-Qn 0 2 EU/dL  0 2-1 0   Nitrite, Urine Negative  Negative   Microscopic Examination See below:     Urinalysis Reflex      This specimen has reflexed to a Urine Culture  This specimen has reflexed to a Urine Culture  WBC 0-5 /hpf  0 -  5   RBC 0-2 /hpf  0 -  2   Epithelial Cells (non renal) 0-10 /hpf  0 - 10   Crystals Present A N/A   Crystal Type Amorphous Sediment  N/A   Bacteria Few  None seen/Few   Urine Culture, Routine Final report     Result 1      Culture shows less than 10,000 colony forming units of bacteria per  milliliter of urine  This colony count is not generally considered  to be clinically significant  Culture shows less than 10,000 colony forming units of bacteria per  milliliter of urine  This colony count is not generally considered  to be clinically significant

## 2018-01-23 NOTE — MISCELLANEOUS
Provider Comments  Provider Comments:   CALLED PT FOR NOT SHOW, L/M TO RESCHEDULED          Signatures   Electronically signed by : CINTHYA Mathew ; Dec  7 2017  2:44PM EST                       (Author)

## 2018-01-25 ENCOUNTER — OFFICE VISIT (OUTPATIENT)
Dept: PHYSICAL THERAPY | Facility: CLINIC | Age: 53
End: 2018-01-25
Payer: COMMERCIAL

## 2018-01-25 DIAGNOSIS — Z96.651 S/P TKR (TOTAL KNEE REPLACEMENT), RIGHT: Primary | ICD-10-CM

## 2018-01-25 PROCEDURE — 97110 THERAPEUTIC EXERCISES: CPT

## 2018-01-25 PROCEDURE — 97112 NEUROMUSCULAR REEDUCATION: CPT

## 2018-01-25 PROCEDURE — 97140 MANUAL THERAPY 1/> REGIONS: CPT

## 2018-01-25 NOTE — PROGRESS NOTES
Daily Note     Today's date: 2018  Patient name: Georgiana Olivarez  : 1965  MRN: [id number]  Referring provider: Tanesha Zhang DO  Dx: No diagnosis found  Subjective: Experienced sharp R shin area pain yesterday  I get a lot of pain when I step on uneven ground, ie: door wedge  Objective: See treatment diary below  Precautions As per Dr Ricky Luna ? Loose prosthesis ( R TKR)    Specialty Daily Treatment Diary     Manual         PROM R knee        STM  HS/ quad/ gastroc                                   Exercise Diary         nustep 10 min L1       Leg press  4 plates 3 x 10       4 way walk outs  2 plates 4 x 10        BIODEX static  LOS, Random & Maze Control       SLR 2# AK 3 x 10       Hip abd 2# AK 3 x 10                                                                                                                            Modalities                                          Assessment: Tolerated treatment fair  Patient could benefit from continued PT      Plan: Progress treatment as tolerated

## 2018-01-26 ENCOUNTER — OFFICE VISIT (OUTPATIENT)
Dept: PHYSICAL THERAPY | Facility: CLINIC | Age: 53
End: 2018-01-26
Payer: COMMERCIAL

## 2018-01-26 DIAGNOSIS — Z96.651 PRESENCE OF RIGHT ARTIFICIAL KNEE JOINT: Primary | ICD-10-CM

## 2018-01-26 PROCEDURE — 97112 NEUROMUSCULAR REEDUCATION: CPT

## 2018-01-26 PROCEDURE — 97110 THERAPEUTIC EXERCISES: CPT

## 2018-01-29 ENCOUNTER — OFFICE VISIT (OUTPATIENT)
Dept: PHYSICAL THERAPY | Facility: CLINIC | Age: 53
End: 2018-01-29
Payer: COMMERCIAL

## 2018-01-29 DIAGNOSIS — Z96.651 PRESENCE OF RIGHT ARTIFICIAL KNEE JOINT: Primary | ICD-10-CM

## 2018-01-29 PROCEDURE — 97110 THERAPEUTIC EXERCISES: CPT

## 2018-01-29 PROCEDURE — 97112 NEUROMUSCULAR REEDUCATION: CPT

## 2018-01-29 NOTE — PROGRESS NOTES
Daily Note     Today's date: 2018  Patient name: María Jiang  : 1965  MRN: [id number]  Referring provider: Cheo Bullock DO  Dx: No diagnosis found  Subjective: No real changes  Objective: See treatment diary below  Precautions As per Dr Nannette Vaz ? Loose prosthesis ( R TKR)     Specialty Daily Treatment Diary      Manual         PROM R knee     R knee       STM  HS/ quad/ gastroc    HS/quad/gastroc                                                       Exercise Diary         nustep 10 min L1  *  10 min L3       Leg press  4 plates 3 x 10  *  4 plates x 30       4 way walk outs  2 plates 4 x 10   *  2 plates 4 x 10       BIODEX static  LOS, Random & Maze Control  *  performed       SLR 2# AK 3 x 10  3# AK 3 x 10  3#       Hip abd 2# AK 3 x 10   3# AK 3 x 10  3#            bridging 3 x 10   bridging x 30              clamshells 3# x 30 L S/L                                                                                                                                                                                     Modalities                                                                Assessment: Tolerated treatment fair  Patient could benefit from continued PT      Plan: Progress treatment as tolerated

## 2018-02-01 ENCOUNTER — APPOINTMENT (OUTPATIENT)
Dept: PHYSICAL THERAPY | Facility: CLINIC | Age: 53
End: 2018-02-01
Payer: COMMERCIAL

## 2018-02-05 ENCOUNTER — APPOINTMENT (OUTPATIENT)
Dept: PHYSICAL THERAPY | Facility: CLINIC | Age: 53
End: 2018-02-05
Payer: COMMERCIAL

## 2018-02-08 ENCOUNTER — OFFICE VISIT (OUTPATIENT)
Dept: PHYSICAL THERAPY | Facility: CLINIC | Age: 53
End: 2018-02-08
Payer: COMMERCIAL

## 2018-02-08 DIAGNOSIS — Z96.651 PRESENCE OF RIGHT ARTIFICIAL KNEE JOINT: Primary | ICD-10-CM

## 2018-02-08 PROCEDURE — 97110 THERAPEUTIC EXERCISES: CPT

## 2018-02-08 PROCEDURE — 97112 NEUROMUSCULAR REEDUCATION: CPT

## 2018-02-08 PROCEDURE — G8980 MOBILITY D/C STATUS: HCPCS | Performed by: PHYSICAL THERAPIST

## 2018-02-08 PROCEDURE — G8979 MOBILITY GOAL STATUS: HCPCS | Performed by: PHYSICAL THERAPIST

## 2018-02-08 NOTE — PROGRESS NOTES
Daily Note     Today's date: 2018  Patient name: Breanna Flowers  : 1965  MRN: [id number]  Referring provider: Artur Lilly DO  Dx: No diagnosis found  Subjective: On my feet already for 7 hours today  Objective: See treatment diary below  Precautions As per Dr Mendoza Esters ? Loose prosthesis ( R TKR)     Specialty Daily Treatment Diary      Manual    2     PROM R knee     R knee  R knee     STM  HS/ quad/ gastroc    HS/quad/gastroc  done                                                     Exercise Diary       nustep 10 min L1  *  10 min L3  10min L3     Leg press  4 plates 3 x 10  *  4 plates x 30  4WYMPVI x 30     4 way walk outs  2 plates 4 x 10   *  2 plates 4 x 10  --     BIODEX static  LOS, Random & Maze Control  *  performed  performed     SLR 2# AK 3 x 10  3# AK 3 x 10  3#  3# 3x10     Hip abd 2# AK 3 x 10   3# AK 3 x 10  3#  3# 3x10          bridging 3 x 10   bridging x 30  bridging x 30            clamshells 3# x 30 L S/L  clamshells 3# x 30                                                                                                                                                                                   Modalities                                               Assessment: Tolerated treatment well  Patient would benefit from continued PT      Plan: D/C with [facility] &  follow up

## 2018-02-08 NOTE — PROGRESS NOTES
PT Discharge    Today's date: 2018  Patient name: Tavo Fowler  : 1965  MRN: [id number]  Referring provider: Rubia Voss DO  Dx:   Encounter Diagnosis   Name Primary?  Presence of right artificial knee joint Yes       Start Time: 1450  Stop Time: 1530  Total time in clinic (min): 40 minutes    Assessment    Assessment details: Patient has met PT goals and is independent with a HEP  Plan  Plan details: Discontinue PT        Subjective Evaluation    History of Present Illness  Mechanism of injury: My knee feels better and I joined a gym      Recurrent probem  Quality of life: good    Pain  Current pain ratin  At best pain ratin  At worst pain ratin    Treatments  Current treatment: physical therapy        Objective     Strength/Myotome Testing     Right Knee   Flexion: 4+  Extension: 4      Precautions: None    Daily Treatment Diary

## 2018-02-09 ENCOUNTER — OFFICE VISIT (OUTPATIENT)
Dept: OBGYN CLINIC | Facility: CLINIC | Age: 53
End: 2018-02-09
Payer: COMMERCIAL

## 2018-02-09 ENCOUNTER — TELEPHONE (OUTPATIENT)
Dept: FAMILY MEDICINE CLINIC | Facility: CLINIC | Age: 53
End: 2018-02-09

## 2018-02-09 VITALS
WEIGHT: 167.4 LBS | HEIGHT: 65 IN | HEART RATE: 76 BPM | BODY MASS INDEX: 27.89 KG/M2 | SYSTOLIC BLOOD PRESSURE: 148 MMHG | DIASTOLIC BLOOD PRESSURE: 96 MMHG

## 2018-02-09 DIAGNOSIS — Z96.651 STATUS POST REVISION OF TOTAL REPLACEMENT OF RIGHT KNEE: Primary | ICD-10-CM

## 2018-02-09 DIAGNOSIS — F34.1 DYSTHYMIA: Primary | ICD-10-CM

## 2018-02-09 PROCEDURE — 99213 OFFICE O/P EST LOW 20 MIN: CPT | Performed by: ORTHOPAEDIC SURGERY

## 2018-02-09 NOTE — PROGRESS NOTES
Assessment/Plan:  1  Status post revision of total replacement of right knee       Analisa Amato is here for her 15 week follow-up status post revision right total knee arthroplasty  She states overall she is markedly improved since her last evaluation with me  She denies daily pain and states that she only has intermittent self-limiting pain on occasion  Overall she seems pleased with her progress thus far feels that she continues to make improvements  The initial laxity appreciated postoperatively is not appreciated here today and her knee feels extremely stable  We discussed appropriate expectations of activity and pain status post revision knee arthroplasty  She may continue her activities to her tolerance  I advised her to keep up with a home exercise program   She was instructed to take antibiotics prior to any dental or colonoscopy procedure and she demonstrates understanding of this  She is doing extremely well postoperatively that look for to seeing her back in approximately 9 months time at which point it will be the anniversary of her revision knee surgery  We will get new x-rays of her right knee at that time  The patient may call the office at anytime if any questions or concerns should arise  Subjective: 15 week follow up status post revision right total knee arthroplasty  Patient ID: Nir Boyer is a 45 y o  female  HPI  Analisa Amato is here for follow-up regarding her right revision total knee arthroplasty that was performed approximately 15 weeks ago  She denies any daily activity related pain or weight-bearing pain  She states she has intermittent self-limiting mild pain occasionally at the end of a long day of activity however nothing is constant or progressive  She denies any swelling or pain  She states she feels good getting back to her activities and work  She is pleased with the progress of her postoperative outcome          Review of Systems   Constitutional: Negative for chills, fever and unexpected weight change  HENT: Negative for hearing loss, nosebleeds and sore throat  Eyes: Negative for pain, redness and visual disturbance  Respiratory: Negative for cough, shortness of breath and wheezing  Cardiovascular: Negative for chest pain, palpitations and leg swelling  Gastrointestinal: Negative for abdominal distention, nausea and vomiting  Endocrine: Negative for polydipsia and polyuria  Genitourinary: Negative for dysuria and hematuria  Musculoskeletal: Negative  Skin: Negative for rash and wound  Allergic/Immunologic: Negative  Neurological: Negative for dizziness, numbness and headaches  Psychiatric/Behavioral: Negative for decreased concentration and suicidal ideas  The patient is not nervous/anxious  Past Medical History:   Diagnosis Date    Arthritis     Depression        Past Surgical History:   Procedure Laterality Date    BACK SURGERY      lumbar laminectomy L4-L5    BLADDER SUSPENSION      7/2017    COLONOSCOPY      JOINT REPLACEMENT      TKR  on right    ORIF TIBIA & FIBULA FRACTURES Right 12/15/2016    Procedure: SURGICAL FIXATION OF RIGHT DISTAL FIBULA FRACTURE;  Surgeon: Kaye Galindo MD;  Location[facility] MAIN OR;  Service:     [place] REVISE KNEE JOINT REPLACE,ALL PARTS Right 10/23/2017    Procedure: REVISION TOTAL KNEE REPLACEMENT WITH FROZEN SECTIONS;  Surgeon: Shabnam Moulton DO;  Location: [facility] MAIN OR;  Service: Orthopedics    WISDOM TOOTH EXTRACTION         No family history on file  Social History     Occupational History    Not on file       Social History Main Topics    Smoking status: Never Smoker    Smokeless tobacco: Never Used    Alcohol use No    Drug use: No    Sexual activity: Not on file         Current Outpatient Prescriptions:     aspirin (ECOTRIN) 325 mg EC tablet, Take 1 tablet by mouth 2 (two) times a day, Disp: 84 tablet, Rfl: 0    celecoxib (CeleBREX) 100 mg capsule, Take 1 capsule by mouth 2 (two) times a day, Disp: 28 capsule, Rfl: 0    docusate sodium (COLACE) 100 mg capsule, Take 1 capsule by mouth 2 (two) times a day as needed for constipation, Disp: 60 capsule, Rfl: 0    escitalopram (LEXAPRO) 10 mg tablet, Take 10 mg by mouth daily, Disp: , Rfl:     oxyCODONE (ROXICODONE) 5 mg immediate release tablet, 1-2 tab by mouth every four to six hours as needed for pain, Disp: 60 tablet, Rfl: 0    No Known Allergies    Objective:  Vitals:    02/09/18 1009   BP: 148/96   Pulse: 76       Body mass index is 27 86 kg/m²  Right Knee Exam     Tenderness   The patient is experiencing no tenderness  Range of Motion   Extension: 0   Flexion: 130     Tests   Jasiel:  Medial - negative Lateral - negative  Drawer:       Anterior - negative      Varus: negative  Valgus: negative  Patellar Apprehension: negative    Other   Scars: present  Sensation: normal  Pulse: present  Swelling: none  Other tests: no effusion present    Comments:  Anterior knee revision incision well healed without signs of delayed closure or tenderness palpation  No effusion erythema or warmth associated knee  Knee is stable today on ligamentous exam at 0° 30° 90°  Prior laxity in mid flexion not appreciated today  Compartments soft, neurovascular intact, range of motion as above  Patella stable through active and passive range of motion  No tenderness to palpation around the knee  Muscle strength 5/5            Physical Exam   Constitutional: She is oriented to person, place, and time  She appears well-developed  HENT:   Head: Atraumatic  Eyes: EOM are normal    Neck: Neck supple  Cardiovascular: Normal rate  Pulmonary/Chest: Effort normal    Musculoskeletal:        Right knee: She exhibits no effusion  See orthopedic exam   Neurological: She is alert and oriented to person, place, and time  Skin: Skin is warm and dry  Psychiatric: She has a normal mood and affect  Nursing note and vitals reviewed        Hamida Bunch D O   Division of Adult Reconstruction  Department of [facility]

## 2018-02-09 NOTE — TELEPHONE ENCOUNTER
Dr Doe Clay pt would like to talk to you about the med wellbutrin pt has had high and lows , heart palp   Pt would like to go back on the med effxoer [phone]

## 2018-02-10 RX ORDER — VENLAFAXINE 75 MG/1
75 TABLET ORAL 2 TIMES DAILY
Qty: 60 TABLET | Refills: 3 | Status: SHIPPED | OUTPATIENT
Start: 2018-02-10 | End: 2018-04-18 | Stop reason: DRUGHIGH

## 2018-02-10 NOTE — TELEPHONE ENCOUNTER
New rx sent -pt off tapered off other-pt will call back if any further concerns/questions once on the Effexor for 2-3 wks-sooner prn

## 2018-02-12 ENCOUNTER — TELEPHONE (OUTPATIENT)
Dept: OBGYN CLINIC | Facility: CLINIC | Age: 53
End: 2018-02-12

## 2018-02-12 DIAGNOSIS — Z96.651 STATUS POST REVISION OF TOTAL REPLACEMENT OF RIGHT KNEE: Primary | ICD-10-CM

## 2018-02-12 RX ORDER — AMOXICILLIN 500 MG/1
500 TABLET, FILM COATED ORAL ONCE
Qty: 4 TABLET | Refills: 0 | Status: SHIPPED | OUTPATIENT
Start: 2018-02-12 | End: 2018-02-12

## 2018-02-12 NOTE — TELEPHONE ENCOUNTER
Patient called asking if she would be able to get her ear's re-pierced or if this is not advisable do to her TKA back in October  Patient is also requesting an anti-biotic be sent to her pharmacy as she has a dental appt coming up in the next two weeks

## 2018-02-12 NOTE — TELEPHONE ENCOUNTER
She may get her ears pierced without concern  2 grams of amoxicillin was sent to her pharmacy for dental prophylaxis  She should take it 1 hour prior to the procedure

## 2018-02-15 ENCOUNTER — OFFICE VISIT (OUTPATIENT)
Dept: PHYSICAL THERAPY | Facility: CLINIC | Age: 53
End: 2018-02-15
Payer: COMMERCIAL

## 2018-03-23 ENCOUNTER — TELEPHONE (OUTPATIENT)
Dept: FAMILY MEDICINE CLINIC | Facility: CLINIC | Age: 53
End: 2018-03-23

## 2018-03-24 NOTE — TELEPHONE ENCOUNTER
Pt has not been seen here since 11/2017  Appt w PT in 2/2018 cx     Sent rx for Effexor XR for one month supply to pharmacy  Please try pt again to inform and to sched follow-up (30 min slot)--thanks

## 2018-04-09 NOTE — TELEPHONE ENCOUNTER
Pt inform but pt would like to speak to you    Pt does not want to make appt pt want a phone call instead to discuss the meds

## 2018-04-18 ENCOUNTER — TELEPHONE (OUTPATIENT)
Dept: FAMILY MEDICINE CLINIC | Facility: CLINIC | Age: 53
End: 2018-04-18

## 2018-04-18 DIAGNOSIS — F32.A ANXIETY AND DEPRESSION: Primary | ICD-10-CM

## 2018-04-18 DIAGNOSIS — F41.9 ANXIETY AND DEPRESSION: Primary | ICD-10-CM

## 2018-04-18 RX ORDER — VENLAFAXINE HYDROCHLORIDE 150 MG/1
150 CAPSULE, EXTENDED RELEASE ORAL DAILY
Qty: 30 CAPSULE | Refills: 2 | Status: SHIPPED | OUTPATIENT
Start: 2018-04-18 | End: 2018-07-19

## 2018-04-18 NOTE — TELEPHONE ENCOUNTER
Please inform that I sent the once daily ext release to pharm--ask her to see how this works and call back w update in 2-3 wks--we can increase dose if needed at that time-thanks

## 2018-04-18 NOTE — TELEPHONE ENCOUNTER
Dr Kin Martin    Patient says you had switched her from effexor 1 tab 2 x daily to effexor 1 tab 1 x daily, but new med was never sent to [facility], so kanwal davies has karlee continuing with  2 x daily  Please advise

## 2018-07-19 ENCOUNTER — OFFICE VISIT (OUTPATIENT)
Dept: FAMILY MEDICINE CLINIC | Facility: CLINIC | Age: 53
End: 2018-07-19
Payer: COMMERCIAL

## 2018-07-19 VITALS
HEIGHT: 65 IN | WEIGHT: 174.6 LBS | RESPIRATION RATE: 16 BRPM | TEMPERATURE: 97.8 F | SYSTOLIC BLOOD PRESSURE: 148 MMHG | BODY MASS INDEX: 29.09 KG/M2 | HEART RATE: 72 BPM | DIASTOLIC BLOOD PRESSURE: 88 MMHG

## 2018-07-19 DIAGNOSIS — R10.13 DYSPEPSIA: ICD-10-CM

## 2018-07-19 DIAGNOSIS — I10 ESSENTIAL HYPERTENSION: Primary | ICD-10-CM

## 2018-07-19 DIAGNOSIS — R63.5 WEIGHT GAIN: ICD-10-CM

## 2018-07-19 DIAGNOSIS — Z82.49 FAMILY HISTORY OF CARDIAC DISORDER: ICD-10-CM

## 2018-07-19 DIAGNOSIS — Z83.438 FAMILY HISTORY OF HYPERLIPIDEMIA: ICD-10-CM

## 2018-07-19 DIAGNOSIS — R06.00 DYSPNEA ON EXERTION: ICD-10-CM

## 2018-07-19 PROCEDURE — 99214 OFFICE O/P EST MOD 30 MIN: CPT | Performed by: NURSE PRACTITIONER

## 2018-07-19 RX ORDER — BUPROPION HYDROCHLORIDE 150 MG/1
150 TABLET ORAL DAILY
COMMUNITY
End: 2018-08-02

## 2018-07-19 RX ORDER — IRBESARTAN AND HYDROCHLOROTHIAZIDE 150; 12.5 MG/1; MG/1
1 TABLET, FILM COATED ORAL DAILY
COMMUNITY
End: 2018-08-29 | Stop reason: SDUPTHER

## 2018-07-19 NOTE — PROGRESS NOTES
Assessment/Plan:  1  Essential hypertension  Continue avalide  Monitor bp  Will check bp in office in one week  - Ambulatory referral to Cardiology; Future  - CBC and differential; Future  - Comprehensive metabolic panel; Future    2  Dyspepsia  - Ambulatory referral to Cardiology; Future    3  Dyspnea on exertion  - Ambulatory referral to Cardiology; Future  - CBC and differential; Future  - Comprehensive metabolic panel; Future  - TSH, 3rd generation; Future               Subjective:      Patient ID: Nicol Dong is a 52 y o  female who presents to discuss bp    April, bp was elevated at gyn  Was told to lose weight and diet  No change in weight  One month ago, gyn (Dr Starr Schaffer) started Avapro 150mg  [facility]  No symptoms at the time  No chest pain, no sob, no headache, no dizziness  But noticed that she is sob with any exertion  Avapro changed yesterday by gyn to Avalide 150/12 5  Also notes that she has frequent heartburn  Family history of cardiac disease  The following portions of the patient's history were reviewed and updated as appropriate: allergies, current medications, past family history, past medical history, past social history, past surgical history and problem list     Review of Systems   Constitutional: Negative for fatigue  Respiratory: Positive for shortness of breath  Negative for cough, chest tightness and wheezing  Cardiovascular: Negative for chest pain, palpitations and leg swelling  Gastrointestinal: Negative for diarrhea, nausea and vomiting  Heartburn intermittently   Musculoskeletal: Negative for back pain  Skin: Negative for pallor and rash  Neurological: Negative for dizziness and headaches           Objective:      /88 (BP Location: Left arm, Patient Position: Sitting, Cuff Size: Standard)   Pulse 72   Temp 97 8 °F (36 6 °C)   Resp 16   Ht 5' 5" (1 651 m)   Wt 79 2 kg (174 lb 9 6 oz)   BMI 29 05 kg/m²          Physical Exam   Constitutional: She appears well-developed and well-nourished  No distress  Neck: No JVD present  No audible carotid bruit   Cardiovascular: Normal rate, regular rhythm and normal heart sounds  No murmur heard  Pulmonary/Chest: Effort normal and breath sounds normal  No respiratory distress  She has no wheezes  She has no rales  Lymphadenopathy:     She has no cervical adenopathy  Skin: Skin is warm and dry  No rash noted  No erythema  Psychiatric: She has a normal mood and affect  Her behavior is normal  Judgment and thought content normal    Vitals reviewed

## 2018-07-22 ENCOUNTER — APPOINTMENT (EMERGENCY)
Dept: RADIOLOGY | Facility: HOSPITAL | Age: 53
End: 2018-07-22
Payer: COMMERCIAL

## 2018-07-22 ENCOUNTER — HOSPITAL ENCOUNTER (OUTPATIENT)
Facility: HOSPITAL | Age: 53
Setting detail: OBSERVATION
Discharge: HOME | End: 2018-07-23
Attending: EMERGENCY MEDICINE | Admitting: FAMILY MEDICINE
Payer: COMMERCIAL

## 2018-07-22 DIAGNOSIS — R07.9 CHEST PAIN, UNSPECIFIED TYPE: Primary | ICD-10-CM

## 2018-07-22 DIAGNOSIS — E83.52 HYPERCALCEMIA: ICD-10-CM

## 2018-07-22 DIAGNOSIS — E87.1 HYPONATREMIA: ICD-10-CM

## 2018-07-22 DIAGNOSIS — R07.9 CHEST PAIN: ICD-10-CM

## 2018-07-22 DIAGNOSIS — K21.9 GERD (GASTROESOPHAGEAL REFLUX DISEASE): ICD-10-CM

## 2018-07-22 PROBLEM — R17 SERUM TOTAL BILIRUBIN ELEVATED: Status: ACTIVE | Noted: 2018-07-22

## 2018-07-22 LAB
ALBUMIN SERPL BCP-MCNC: 4.4 G/DL (ref 3.5–5)
ALP SERPL-CCNC: 116 U/L (ref 46–116)
ALT SERPL W P-5'-P-CCNC: 18 U/L (ref 12–78)
ANION GAP SERPL CALCULATED.3IONS-SCNC: 11 MMOL/L (ref 4–13)
APTT PPP: 27 SECONDS (ref 24–33)
AST SERPL W P-5'-P-CCNC: 34 U/L (ref 5–45)
ATRIAL RATE: 70 BPM
BASOPHILS # BLD AUTO: 0.03 THOUSANDS/ΜL (ref 0–0.1)
BASOPHILS NFR BLD AUTO: 0 % (ref 0–1)
BILIRUB SERPL-MCNC: 1.3 MG/DL (ref 0.2–1)
BILIRUB UR QL STRIP: NEGATIVE
BUN SERPL-MCNC: 8 MG/DL (ref 5–25)
CALCIUM SERPL-MCNC: 10.6 MG/DL (ref 8.3–10.1)
CHLORIDE SERPL-SCNC: 89 MMOL/L (ref 100–108)
CLARITY UR: CLEAR
CO2 SERPL-SCNC: 30 MMOL/L (ref 21–32)
COLOR UR: YELLOW
CREAT SERPL-MCNC: 0.97 MG/DL (ref 0.6–1.3)
DEPRECATED D DIMER PPP: 260 NG/ML (FEU) (ref 190–520)
EOSINOPHIL # BLD AUTO: 0.11 THOUSAND/ΜL (ref 0–0.61)
EOSINOPHIL NFR BLD AUTO: 1 % (ref 0–6)
ERYTHROCYTE [DISTWIDTH] IN BLOOD BY AUTOMATED COUNT: 11.7 % (ref 11.6–15.1)
GFR SERPL CREATININE-BSD FRML MDRD: 66 ML/MIN/1.73SQ M
GLUCOSE SERPL-MCNC: 97 MG/DL (ref 65–140)
GLUCOSE UR STRIP-MCNC: NEGATIVE MG/DL
HCT VFR BLD AUTO: 42.7 % (ref 34.8–46.1)
HGB BLD-MCNC: 14.5 G/DL (ref 11.5–15.4)
HGB UR QL STRIP.AUTO: NEGATIVE
IMM GRANULOCYTES # BLD AUTO: 0.06 THOUSAND/UL (ref 0–0.2)
IMM GRANULOCYTES NFR BLD AUTO: 1 % (ref 0–2)
INR PPP: 0.96 (ref 0.86–1.16)
KETONES UR STRIP-MCNC: NEGATIVE MG/DL
LEUKOCYTE ESTERASE UR QL STRIP: NEGATIVE
LYMPHOCYTES # BLD AUTO: 2.31 THOUSANDS/ΜL (ref 0.6–4.47)
LYMPHOCYTES NFR BLD AUTO: 24 % (ref 14–44)
MCH RBC QN AUTO: 32.7 PG (ref 26.8–34.3)
MCHC RBC AUTO-ENTMCNC: 34 G/DL (ref 31.4–37.4)
MCV RBC AUTO: 96 FL (ref 82–98)
MONOCYTES # BLD AUTO: 1.29 THOUSAND/ΜL (ref 0.17–1.22)
MONOCYTES NFR BLD AUTO: 14 % (ref 4–12)
NEUTROPHILS # BLD AUTO: 5.76 THOUSANDS/ΜL (ref 1.85–7.62)
NEUTS SEG NFR BLD AUTO: 60 % (ref 43–75)
NITRITE UR QL STRIP: NEGATIVE
NRBC BLD AUTO-RTO: 0 /100 WBCS
NT-PROBNP SERPL-MCNC: 42 PG/ML
P AXIS: 48 DEGREES
PH UR STRIP.AUTO: 7 [PH] (ref 5–9)
PLATELET # BLD AUTO: 324 THOUSANDS/UL (ref 149–390)
PMV BLD AUTO: 10 FL (ref 8.9–12.7)
POTASSIUM SERPL-SCNC: 4.3 MMOL/L (ref 3.5–5.3)
PR INTERVAL: 130 MS
PROT SERPL-MCNC: 9.2 G/DL (ref 6.4–8.2)
PROT UR STRIP-MCNC: NEGATIVE MG/DL
PROTHROMBIN TIME: 10.1 SECONDS (ref 9.4–11.7)
QRS AXIS: 11 DEGREES
QRSD INTERVAL: 92 MS
QT INTERVAL: 418 MS
QTC INTERVAL: 451 MS
RBC # BLD AUTO: 4.43 MILLION/UL (ref 3.81–5.12)
SODIUM SERPL-SCNC: 130 MMOL/L (ref 136–145)
SP GR UR STRIP.AUTO: <=1.005 (ref 1–1.03)
T WAVE AXIS: 29 DEGREES
TROPONIN I SERPL-MCNC: <0.02 NG/ML
UROBILINOGEN UR QL STRIP.AUTO: 0.2 E.U./DL
VENTRICULAR RATE: 70 BPM
WBC # BLD AUTO: 9.56 THOUSAND/UL (ref 4.31–10.16)

## 2018-07-22 PROCEDURE — 84484 ASSAY OF TROPONIN QUANT: CPT | Performed by: EMERGENCY MEDICINE

## 2018-07-22 PROCEDURE — 96360 HYDRATION IV INFUSION INIT: CPT

## 2018-07-22 PROCEDURE — 84484 ASSAY OF TROPONIN QUANT: CPT | Performed by: FAMILY MEDICINE

## 2018-07-22 PROCEDURE — 36415 COLL VENOUS BLD VENIPUNCTURE: CPT | Performed by: EMERGENCY MEDICINE

## 2018-07-22 PROCEDURE — 71045 X-RAY EXAM CHEST 1 VIEW: CPT

## 2018-07-22 PROCEDURE — 81003 URINALYSIS AUTO W/O SCOPE: CPT | Performed by: EMERGENCY MEDICINE

## 2018-07-22 PROCEDURE — 85730 THROMBOPLASTIN TIME PARTIAL: CPT | Performed by: EMERGENCY MEDICINE

## 2018-07-22 PROCEDURE — 85379 FIBRIN DEGRADATION QUANT: CPT | Performed by: EMERGENCY MEDICINE

## 2018-07-22 PROCEDURE — 93010 ELECTROCARDIOGRAM REPORT: CPT | Performed by: INTERNAL MEDICINE

## 2018-07-22 PROCEDURE — 85025 COMPLETE CBC W/AUTO DIFF WBC: CPT | Performed by: EMERGENCY MEDICINE

## 2018-07-22 PROCEDURE — 93005 ELECTROCARDIOGRAM TRACING: CPT

## 2018-07-22 PROCEDURE — 80053 COMPREHEN METABOLIC PANEL: CPT | Performed by: EMERGENCY MEDICINE

## 2018-07-22 PROCEDURE — 99220 PR INITIAL OBSERVATION CARE/DAY 70 MINUTES: CPT | Performed by: FAMILY MEDICINE

## 2018-07-22 PROCEDURE — 87081 CULTURE SCREEN ONLY: CPT | Performed by: FAMILY MEDICINE

## 2018-07-22 PROCEDURE — 83880 ASSAY OF NATRIURETIC PEPTIDE: CPT | Performed by: EMERGENCY MEDICINE

## 2018-07-22 PROCEDURE — 99285 EMERGENCY DEPT VISIT HI MDM: CPT

## 2018-07-22 PROCEDURE — 85610 PROTHROMBIN TIME: CPT | Performed by: EMERGENCY MEDICINE

## 2018-07-22 RX ORDER — LOSARTAN POTASSIUM 50 MG/1
50 TABLET ORAL DAILY
Status: DISCONTINUED | OUTPATIENT
Start: 2018-07-23 | End: 2018-07-23 | Stop reason: HOSPADM

## 2018-07-22 RX ORDER — NITROGLYCERIN 0.4 MG/1
0.4 TABLET SUBLINGUAL ONCE
Status: COMPLETED | OUTPATIENT
Start: 2018-07-22 | End: 2018-07-22

## 2018-07-22 RX ORDER — BUPROPION HYDROCHLORIDE 150 MG/1
150 TABLET ORAL DAILY
Status: DISCONTINUED | OUTPATIENT
Start: 2018-07-23 | End: 2018-07-23 | Stop reason: HOSPADM

## 2018-07-22 RX ORDER — MAGNESIUM HYDROXIDE/ALUMINUM HYDROXICE/SIMETHICONE 120; 1200; 1200 MG/30ML; MG/30ML; MG/30ML
20 SUSPENSION ORAL ONCE
Status: COMPLETED | OUTPATIENT
Start: 2018-07-22 | End: 2018-07-22

## 2018-07-22 RX ORDER — SUCRALFATE ORAL 1 G/10ML
1000 SUSPENSION ORAL ONCE
Status: COMPLETED | OUTPATIENT
Start: 2018-07-22 | End: 2018-07-22

## 2018-07-22 RX ORDER — MAGNESIUM HYDROXIDE/ALUMINUM HYDROXICE/SIMETHICONE 120; 1200; 1200 MG/30ML; MG/30ML; MG/30ML
15 SUSPENSION ORAL EVERY 4 HOURS PRN
Status: DISCONTINUED | OUTPATIENT
Start: 2018-07-22 | End: 2018-07-23 | Stop reason: HOSPADM

## 2018-07-22 RX ORDER — ATORVASTATIN CALCIUM 40 MG/1
40 TABLET, FILM COATED ORAL EVERY EVENING
Status: DISCONTINUED | OUTPATIENT
Start: 2018-07-22 | End: 2018-07-23 | Stop reason: HOSPADM

## 2018-07-22 RX ORDER — SODIUM CHLORIDE 9 MG/ML
75 INJECTION, SOLUTION INTRAVENOUS CONTINUOUS
Status: DISCONTINUED | OUTPATIENT
Start: 2018-07-22 | End: 2018-07-23 | Stop reason: HOSPADM

## 2018-07-22 RX ORDER — ONDANSETRON 2 MG/ML
4 INJECTION INTRAMUSCULAR; INTRAVENOUS EVERY 6 HOURS PRN
Status: DISCONTINUED | OUTPATIENT
Start: 2018-07-22 | End: 2018-07-23 | Stop reason: HOSPADM

## 2018-07-22 RX ORDER — ACETAMINOPHEN 325 MG/1
650 TABLET ORAL EVERY 4 HOURS PRN
Status: DISCONTINUED | OUTPATIENT
Start: 2018-07-22 | End: 2018-07-23 | Stop reason: HOSPADM

## 2018-07-22 RX ORDER — ASPIRIN 81 MG/1
81 TABLET, CHEWABLE ORAL DAILY
Status: DISCONTINUED | OUTPATIENT
Start: 2018-07-23 | End: 2018-07-23 | Stop reason: HOSPADM

## 2018-07-22 RX ORDER — ASPIRIN 325 MG
325 TABLET ORAL ONCE
Status: COMPLETED | OUTPATIENT
Start: 2018-07-22 | End: 2018-07-22

## 2018-07-22 RX ORDER — PANTOPRAZOLE SODIUM 40 MG/1
40 TABLET, DELAYED RELEASE ORAL
Status: DISCONTINUED | OUTPATIENT
Start: 2018-07-22 | End: 2018-07-23 | Stop reason: HOSPADM

## 2018-07-22 RX ADMIN — NITROGLYCERIN 0.4 MG: 0.4 TABLET SUBLINGUAL at 12:33

## 2018-07-22 RX ADMIN — SODIUM CHLORIDE 500 ML: 0.9 INJECTION, SOLUTION INTRAVENOUS at 10:57

## 2018-07-22 RX ADMIN — ATORVASTATIN CALCIUM 40 MG: 40 TABLET, FILM COATED ORAL at 17:37

## 2018-07-22 RX ADMIN — SODIUM CHLORIDE 75 ML/HR: 0.9 INJECTION, SOLUTION INTRAVENOUS at 17:38

## 2018-07-22 RX ADMIN — PANTOPRAZOLE SODIUM 40 MG: 40 TABLET, DELAYED RELEASE ORAL at 17:37

## 2018-07-22 RX ADMIN — ALUMINUM HYDROXIDE, MAGNESIUM HYDROXIDE, AND SIMETHICONE 15 ML: 200; 200; 20 SUSPENSION ORAL at 21:32

## 2018-07-22 RX ADMIN — SUCRALFATE 1000 MG: 1 SUSPENSION ORAL at 12:58

## 2018-07-22 RX ADMIN — ASPIRIN 325 MG: 325 TABLET ORAL at 17:36

## 2018-07-22 RX ADMIN — NITROGLYCERIN 0.4 MG: 0.4 TABLET SUBLINGUAL at 12:53

## 2018-07-22 RX ADMIN — ALUMINUM HYDROXIDE, MAGNESIUM HYDROXIDE, AND SIMETHICONE 20 ML: 200; 200; 20 SUSPENSION ORAL at 10:54

## 2018-07-22 NOTE — H&P
History and Physical - [address] [place] [address] Internal Medicine    Patient Information: Belkys Nguyen 52 y o  female MRN: [id number]  Unit/Bed#: [address] [address] Encounter: [id number]  Admitting Physician: Khris Ma DO  PCP: Carlota Clemons MD  Date of Admission:  07/22/18        Hospital Problem List:     Principal Problem:    Chest pain  Active Problems:    Hyponatremia    Hypercalcemia    Serum total bilirubin elevated    Depression with anxiety    Essential hypertension      Assessment/Plan:    * Chest pain   Assessment & Plan    Patient reports more for burning sensation  Admit patient for further management  Sounds atypical in nature  Troponin x2 has been negative in the ER  Obtain troponin x1 more  Consult Cardiology  Will do stress test and echocardiogram for further evaluation as patient also reports dyspnea with minimal exertion  Advised patient that if cardiac testing is negative, she needs to follow up with her gastroenterologist after discharge for further evaluation of her symptoms  Place patient on Protonix while here  Check lipid panel, TSH  Place patient on aspirin, statin for now        Hyponatremia   Assessment & Plan    Likely hypovolemic hyponatremia aS patient has had nausea, vomiting  Place patient on IV fluids and get repeat level in the a m  Serum total bilirubin elevated   Assessment & Plan    Likely nonspecific in nature  Abdominal tenderness noted on exam  Repeat lab work in the a m  rest of the LFTs are within normal limits        Hypercalcemia   Assessment & Plan    Place patient on IV fluids and get repeat level in the a m    Check ionized calcium level        Essential hypertension   Assessment & Plan    Will hold off on hydrochlorothiazide and patient will be placed on Cozaar and monitor blood pressures        Depression with anxiety   Assessment & Plan    Continue Wellbutrin XL                VTE Prophylaxis: Enoxaparin (Lovenox)  / sequential compression device   Code Status: Level 1 - Full Code    Anticipated Length of Stay:  Patient will be admitted on an Observation basis with an anticipated length of stay of 1 midnights  Justification for Hospital Stay: Chest pain/burning    Total Time for Visit, including Counseling / Coordination of Care: 45 minutes  Greater than 50% of this total time spent on direct patient counseling and coordination of care  Chief Complaint:     Shortness of Breath (pt c/o SOB, chest pain, severe heartburn & nausea x 5-6 days  states also started new BP meds, & started taking welbutrin & aleve all around same time  )    of Present Illness:    Harrison Mccauley is a 52 y o  female who presents with complaints of severe burning in her chest, shortness of breath  Patient states that her symptoms have been on going for the last 5-6 days  She reports shortness of breath with minimal exertion but states that this has been going on for a few months  She also reports nausea and vomiting  She had 1 episode of nonbilious, nonbloody vomitus last night  She reports palpitations but states that this has been going on for long time  she saw her primary care doctor who set her up with an appointment with Cardiology later this week  Patient states that over the last 4-5 days she was started on Wellbutrin XL, Avalide and has been taking 2 alleve at night for knee pain  she also reports diaphoresis  She states that the burning is substernal and epigastric in nature  Patient reports being more stressed out  Patient denies any change in her diet but states that she does eat mostly junk food  patient states that she had similar symptoms after her father  and she had a EGD done at [facility] which was normal as per her  She was told that her symptoms were stress related  Patient reports family history of heart disease and her dad had stents placed at about 54years of age    In the ER patient received carafate, nitro, Mylanta with no relief of symptoms    Review of Systems:    Review of Systems   Constitutional: Negative for appetite change, chills and fever  HENT: Negative for congestion, sore throat and trouble swallowing  Eyes: Negative for photophobia and visual disturbance  Respiratory: Positive for shortness of breath  Negative for cough  Cardiovascular: Positive for chest pain and palpitations  Negative for leg swelling  Gastrointestinal: Positive for nausea and vomiting  Negative for abdominal pain, blood in stool, constipation and diarrhea  Genitourinary: Negative for dysuria, frequency and hematuria  Musculoskeletal: Negative for back pain  Skin: Negative for wound  Neurological: Negative for syncope, speech difficulty, light-headedness and headaches  Hematological: Does not bruise/bleed easily  Psychiatric/Behavioral: Negative for agitation         Past Medical and Surgical History:     Past Medical History:   Diagnosis Date    Abnormal immunology findings 06/09/2011    Anemia 05/23/2011    Arthritis     Closed fracture of distal end of fibula     resolved 09/15/17    Depression     Distal radius fracture, left     last assessed 01/30/17    GERD (gastroesophageal reflux disease)     last assessed 05/06/13    Herpes labialis     last assessed 07/23/15    Hypertension     essential ; last assessed 08/07/13    Sinus bradycardia     last assessed 05/24/16       Past Surgical History:   Procedure Laterality Date    ANKLE SURGERY      last assessed 09/15/17    BACK SURGERY      lumbar laminectomy L4-L5    BLADDER SUSPENSION      7/2017    COLONOSCOPY      JOINT REPLACEMENT      TKR  on right    ORIF TIBIA & FIBULA FRACTURES Right 12/15/2016    Procedure: SURGICAL FIXATION OF RIGHT DISTAL FIBULA FRACTURE;  Surgeon: Shasha Quevedo MD;  [facility]: [facility] OR;  Service:     [place] REVISE KNEE JOINT REPLACE,ALL PARTS Right 10/23/2017    Procedure: REVISION TOTAL KNEE REPLACEMENT WITH FROZEN SECTIONS;  Surgeon: Edward Ybarra DO; Location: [address] [place] [address];  Service: Orthopedics    TOTAL KNEE ARTHROPLASTY      last assessed; 11/21/17    WISDOM TOOTH EXTRACTION         Meds/Allergies:    PTA meds:   Prior to Admission Medications   Prescriptions Last Dose Informant Patient Reported? Taking? Naproxen Sodium (ALEVE PO)   Yes Yes   Sig: Take by mouth   buPROPion (WELLBUTRIN XL) 150 mg 24 hr tablet   Yes Yes   Sig: Take 150 mg by mouth daily   irbesartan-hydrochlorothiazide (AVALIDE) 150-12 5 MG per tablet  Self Yes Yes   Sig: Take 1 tablet by mouth daily      Facility-Administered Medications: None       Allergies: No Known Allergies  History:     Marital Status: /Civil Union     Substance Use History:   History   Alcohol Use    Yes     Comment: moderately     History   Smoking Status    Never Smoker   Smokeless Tobacco    Never Used     History   Drug Use No       Family History:    Family History   Problem Relation Age of Onset    Arthritis Mother     Osteoporosis Mother     Dementia Father     Other Father         spinal stenosis    Other Family         CREST       Physical Exam:     Vitals:   Blood Pressure: 134/80 (07/22/18 1700)  Pulse: 73 (07/22/18 1700)  Temperature: 98 9 °F (37 2 °C) (07/22/18 1700)  Temp Source: Oral (07/22/18 1700)  Respirations: 18 (07/22/18 1700)  Height: 5' 3" (160 cm) (07/22/18 1700)  Weight - Scale: 79 4 kg (175 lb 1 6 oz) (07/22/18 1700)  SpO2: 100 % (07/22/18 1700)    Physical Exam   Constitutional: She is oriented to person, place, and time  She appears well-developed and well-nourished  No distress  HENT:   Head: Normocephalic and atraumatic  Mouth/Throat: Oropharynx is clear and moist    Eyes: EOM are normal  Right eye exhibits no discharge  Left eye exhibits no discharge  No scleral icterus  Neck: Neck supple  No tracheal deviation present  Cardiovascular: Normal rate and regular rhythm  Pulmonary/Chest: Effort normal and breath sounds normal  No respiratory distress   She has no wheezes  She has no rales  Abdominal: Soft  Bowel sounds are normal  She exhibits no distension  There is no tenderness  Musculoskeletal: She exhibits no edema  Neurological: She is alert and oriented to person, place, and time  No cranial nerve deficit  Skin: Skin is dry  She is not diaphoretic  Psychiatric: Her mood appears anxious  Lab Results: I have personally reviewed pertinent reports  Results from last 7 days  Lab Units 07/22/18  1055   WBC Thousand/uL 9 56   HEMOGLOBIN g/dL 14 5   HEMATOCRIT % 42 7   PLATELETS Thousands/uL 324   NEUTROS PCT % 60   LYMPHS PCT % 24   MONOS PCT % 14*   EOS PCT % 1       Results from last 7 days  Lab Units 07/22/18  1055   SODIUM mmol/L 130*   POTASSIUM mmol/L 4 3   CHLORIDE mmol/L 89*   CO2 mmol/L 30   BUN mg/dL 8   CREATININE mg/dL 0 97   CALCIUM mg/dL 10 6*   TOTAL PROTEIN g/dL 9 2*   BILIRUBIN TOTAL mg/dL 1 30*   ALK PHOS U/L 116   ALT U/L 18   AST U/L 34   GLUCOSE RANDOM mg/dL 97       Results from last 7 days  Lab Units 07/22/18  1055   INR  0 96       Imaging: I have personally reviewed pertinent reports  Xr Chest 1 View Portable    Result Date: 7/22/2018  Narrative: CHEST INDICATION:   cp   Shortness of breath  COMPARISON:  September 8, 2017 EXAM PERFORMED/VIEWS:  XR CHEST PORTABLE FINDINGS: Cardiomediastinal silhouette appears unremarkable  The lungs are clear  No pneumothorax or pleural effusion  Osseous structures appear within normal limits for patient age  Impression: No acute cardiopulmonary disease  Workstation performed: [id number]       XR chest 1 view portable   Final Result      No acute cardiopulmonary disease              Workstation performed: [id number]             EKG, Pathology, and Other Studies Reviewed on Admission:   · EKG showed sinus rhythm with no acute ST elevation    Epic Records Reviewed: Yes     ** Please Note: Dragon 360 Dictation voice to text software may have been used in the creation of this document   **

## 2018-07-22 NOTE — ASSESSMENT & PLAN NOTE
Likely hypovolemic hyponatremia aS patient has had nausea, vomiting and thiazide use  Sodium level did improve with IV fluids   Get repeat level after discharge

## 2018-07-22 NOTE — ASSESSMENT & PLAN NOTE
Likely nonspecific in nature  No Abdominal tenderness noted on exam  Rest of the LFTs were within normal limits    resolved on repeat lab work today

## 2018-07-22 NOTE — ED PROVIDER NOTES
History  Chief Complaint   Patient presents with    Shortness of Breath     pt c/o SOB, chest pain, severe heartburn & nausea x 5-6 days  states also started new BP meds, & started taking welbutrin & aleve all around same time  57-year-old female presents with burning in her chest stating it severe heartburn  No fevers no chills no other complaints patient is awake and alert has been seeing her PCP is scheduled to have cardiac workup later this week  Patient also states that she has shortness of breath when she starts doing anything as simple as making her bed in the morning  Patient states history of hypertension and history of family heart disease with her dad receiving stents at an early age  History provided by:  Patient and spouse   used: No        Prior to Admission Medications   Prescriptions Last Dose Informant Patient Reported? Taking?    Naproxen Sodium (ALEVE PO)   Yes Yes   Sig: Take by mouth   buPROPion (WELLBUTRIN XL) 150 mg 24 hr tablet   Yes Yes   Sig: Take 150 mg by mouth daily   irbesartan-hydrochlorothiazide (AVALIDE) 150-12 5 MG per tablet  Self Yes Yes   Sig: Take 1 tablet by mouth daily      Facility-Administered Medications: None       Past Medical History:   Diagnosis Date    Abnormal immunology findings 06/09/2011    Anemia 05/23/2011    Arthritis     Closed fracture of distal end of fibula     resolved 09/15/17    Depression     Distal radius fracture, left     last assessed 01/30/17    GERD (gastroesophageal reflux disease)     last assessed 05/06/13    Herpes labialis     last assessed 07/23/15    Hypertension     essential ; last assessed 08/07/13    Sinus bradycardia     last assessed 05/24/16       Past Surgical History:   Procedure Laterality Date    ANKLE SURGERY      last assessed 09/15/17    BACK SURGERY      lumbar laminectomy L4-L5    BLADDER SUSPENSION      7/2017    COLONOSCOPY      JOINT REPLACEMENT      TKR  on right    ORIF TIBIA & FIBULA FRACTURES Right 12/15/2016    Procedure: SURGICAL FIXATION OF RIGHT DISTAL FIBULA FRACTURE;  Surgeon: Jennyfer Enriquez MD;  Location: [facility] MAIN OR;  Service:     DC REVISE KNEE JOINT REPLACE,ALL PARTS Right 10/23/2017    Procedure: REVISION TOTAL KNEE REPLACEMENT WITH FROZEN SECTIONS;  Surgeon: Cheng Wilson DO;  Location: [facility];  Service: Orthopedics    TOTAL KNEE ARTHROPLASTY      last assessed; 11/21/17    WISDOM TOOTH EXTRACTION         Family History   Problem Relation Age of Onset    Arthritis Mother     Osteoporosis Mother     Dementia Father     Other Father         spinal stenosis    Other Family         CREST     I have reviewed and agree with the history as documented  Social History   Substance Use Topics    Smoking status: Never Smoker    Smokeless tobacco: Never Used    Alcohol use Yes      Comment: moderately        Review of Systems   Constitutional: Negative for activity change, chills, diaphoresis and fever  HENT: Negative for congestion, ear pain, nosebleeds, sore throat, trouble swallowing and voice change  Eyes: Negative for pain, discharge and redness  Respiratory: Negative for apnea, cough, choking, shortness of breath, wheezing and stridor  Cardiovascular: Positive for chest pain  Negative for palpitations  Gastrointestinal: Negative for abdominal distention, abdominal pain, constipation, diarrhea, nausea and vomiting  Endocrine: Negative for polydipsia  Genitourinary: Negative for difficulty urinating, dysuria, flank pain, frequency, hematuria and urgency  Musculoskeletal: Negative for back pain, gait problem, joint swelling, myalgias, neck pain and neck stiffness  Skin: Negative for pallor and rash  Neurological: Negative for dizziness, tremors, syncope, speech difficulty, weakness, numbness and headaches  Hematological: Negative for adenopathy     Psychiatric/Behavioral: Negative for confusion, hallucinations, self-injury and suicidal ideas  The patient is not nervous/anxious  Physical Exam  Physical Exam   Constitutional: She is oriented to person, place, and time  Vital signs are normal  She appears well-developed and well-nourished  HENT:   Head: Normocephalic and atraumatic  Right Ear: External ear normal    Left Ear: External ear normal    Nose: Nose normal    Mouth/Throat: Oropharynx is clear and moist    Eyes: Conjunctivae and EOM are normal  Pupils are equal, round, and reactive to light  Neck: Normal range of motion  Neck supple  Cardiovascular: Normal rate, regular rhythm, normal heart sounds and intact distal pulses  Pulmonary/Chest: Effort normal and breath sounds normal    Abdominal: Soft  Bowel sounds are normal    Musculoskeletal: Normal range of motion  Neurological: She is alert and oriented to person, place, and time  Skin: Skin is warm  Psychiatric: She has a normal mood and affect  Nursing note and vitals reviewed        Vital Signs  ED Triage Vitals   Temp Pulse Respirations Blood Pressure SpO2   -- 07/22/18 1032 07/22/18 1032 07/22/18 1032 07/22/18 1032    71 18 (!) 152/108 100 %      Temp Source Heart Rate Source Patient Position - Orthostatic VS BP Location FiO2 (%)   07/22/18 1400 07/22/18 1032 07/22/18 1032 07/22/18 1032 --   Tympanic Monitor Lying Left arm       Pain Score       07/22/18 1032       5           Vitals:    07/22/18 1400 07/22/18 1415 07/22/18 1430 07/22/18 1445   BP: 123/94      Pulse: 74 72 76 74   Patient Position - Orthostatic VS: Lying          Visual Acuity      ED Medications  Medications   sodium chloride 0 9 % bolus 500 mL (0 mL Intravenous Stopped 7/22/18 1159)   aluminum-magnesium hydroxide-simethicone (MYLANTA) 200-200-20 mg/5 mL oral suspension 20 mL (20 mL Oral Given 7/22/18 1054)   nitroglycerin (NITROSTAT) SL tablet 0 4 mg (0 4 mg Sublingual Given 7/22/18 1233)   nitroglycerin (NITROSTAT) SL tablet 0 4 mg (0 4 mg Sublingual Given 7/22/18 1253) sucralfate (CARAFATE) oral suspension 1,000 mg (1,000 mg Oral Given 7/22/18 1258)       Diagnostic Studies  Results Reviewed     Procedure Component Value Units Date/Time    Troponin I [[id number]]  (Normal) Collected:  07/22/18 1418    Lab Status:  Final result Specimen:  Blood from Arm, Right Updated:  07/22/18 1449     Troponin I <0 02 ng/mL     B-type natriuretic peptide [[id number]]  (Normal) Collected:  07/22/18 1055    Lab Status:  Final result Specimen:  Blood from Arm, Right Updated:  07/22/18 1133     NT-proBNP 42 pg/mL     Troponin I [[id number]]  (Normal) Collected:  07/22/18 1055    Lab Status:  Final result Specimen:  Blood from Arm, Right Updated:  07/22/18 1130     Troponin I <0 02 ng/mL     D-Dimer [[id number]]  (Normal) Collected:  07/22/18 1055    Lab Status:  Final result Specimen:  Blood from Arm, Right Updated:  07/22/18 1129     D-Dimer, Quant 260 ng/ml (FEU)     Protime-INR [[id number]]  (Normal) Collected:  07/22/18 1055    Lab Status:  Final result Specimen:  Blood from Arm, Right Updated:  07/22/18 1129     Protime 10 1 seconds      INR 0 96    APTT [[id number]]  (Normal) Collected:  07/22/18 1055    Lab Status:  Final result Specimen:  Blood from Arm, Right Updated:  07/22/18 1129     PTT 27 seconds     Comprehensive metabolic panel [[id number]]  (Abnormal) Collected:  07/22/18 1055    Lab Status:  Final result Specimen:  Blood from Arm, Right Updated:  07/22/18 1126     Sodium 130 (L) mmol/L      Potassium 4 3 mmol/L      Chloride 89 (L) mmol/L      CO2 30 mmol/L      Anion Gap 11 mmol/L      BUN 8 mg/dL      Creatinine 0 97 mg/dL      Glucose 97 mg/dL      Calcium 10 6 (H) mg/dL      AST 34 U/L      ALT 18 U/L      Alkaline Phosphatase 116 U/L      Total Protein 9 2 (H) g/dL      Albumin 4 4 g/dL      Total Bilirubin 1 30 (H) mg/dL      eGFR 66 ml/min/1 73sq m     Narrative:         National Kidney Disease Education Program recommendations are as follows:  GFR calculation is accurate only with a steady state creatinine  Chronic Kidney disease less than 60 ml/min/1 73 sq  meters  Kidney failure less than 15 ml/min/1 73 sq  meters  CBC and differential [[id number]]  (Abnormal) Collected:  07/22/18 1055    Lab Status:  Final result Specimen:  Blood from Arm, Right Updated:  07/22/18 1103     WBC 9 56 Thousand/uL      RBC 4 43 Million/uL      Hemoglobin 14 5 g/dL      Hematocrit 42 7 %      MCV 96 fL      MCH 32 7 pg      MCHC 34 0 g/dL      RDW 11 7 %      MPV 10 0 fL      Platelets 955 Thousands/uL      nRBC 0 /100 WBCs      Neutrophils Relative 60 %      Immat GRANS % 1 %      Lymphocytes Relative 24 %      Monocytes Relative 14 (H) %      Eosinophils Relative 1 %      Basophils Relative 0 %      Neutrophils Absolute 5 76 Thousands/µL      Immature Grans Absolute 0 06 Thousand/uL      Lymphocytes Absolute 2 31 Thousands/µL      Monocytes Absolute 1 29 (H) Thousand/µL      Eosinophils Absolute 0 11 Thousand/µL      Basophils Absolute 0 03 Thousands/µL     UA w Reflex to Microscopic [[id number]]     Lab Status:  No result Specimen:  Urine                  XR chest 1 view portable   Final Result by Edilia Jin MD (07/22 1208)      No acute cardiopulmonary disease              Workstation performed: [id number]                    Procedures  Procedures       Phone Contacts  ED Phone Contact    ED Course         HEART Risk Score      Most Recent Value   History  1 Filed at: 07/22/2018 1230   ECG  0 Filed at: 07/22/2018 1230   Age  1 Filed at: 07/22/2018 1230   Risk Factors  2 Filed at: 07/22/2018 1230   Troponin  0 Filed at: 07/22/2018 1230   Heart Score Risk Calculator   History  1 Filed at: 07/22/2018 1230   ECG  0 Filed at: 07/22/2018 1230   Age  1 Filed at: 07/22/2018 1230   Risk Factors  2 Filed at: 07/22/2018 1230   Troponin  0 Filed at: 07/22/2018 1230   HEART Score  4 Filed at: 07/22/2018 1230   HEART Score  4 Filed at: 07/22/2018 1230                            MDM  Number of Diagnoses or Management Options  Diagnosis management comments: Patient states she really does not want to stay in the hospital so would like to get a 2nd troponin and try some Carafate see if that relieves the discomfort  Advise I would try that but there are no guarantees that this is not her heart so she is discussing this with her  at this time  CritCare Time    Disposition  Final diagnoses:   Chest pain, unspecified type     Time reflects when diagnosis was documented in both MDM as applicable and the Disposition within this note     Time User Action Codes Description Comment    7/22/2018  3:02 PM Abel Galloway Add [R07 9] Chest pain, unspecified type       ED Disposition     ED Disposition Condition Comment    Admit  Case was discussed with  Kimberley and the patient's admission status was agreed to be Admission Status: observation status to the service of Dr Sita Parks   Follow-up Information    None         Patient's Medications   Discharge Prescriptions    No medications on file     No discharge procedures on file      ED Provider  Electronically Signed by           Sol Monroe DO  07/22/18 8882

## 2018-07-22 NOTE — ASSESSMENT & PLAN NOTE
Patient reports more of a burning sensation    Likely GI etiology as stress test was normal and presenting complaint sound more like heartburn  Troponins negative  Advised patient that since cardiac testing is negative, she needs to follow up with her gastroenterologist after discharge for further evaluation of her symptoms  Continue Protonix as patient does reports improvement in her symptoms today  TSH within normal limits

## 2018-07-22 NOTE — PLAN OF CARE
CARDIOVASCULAR - ADULT     Maintains optimal cardiac output and hemodynamic stability Progressing     Absence of cardiac dysrhythmias or at baseline rhythm Progressing        DISCHARGE PLANNING     Discharge to home or other facility with appropriate resources Progressing        GASTROINTESTINAL - ADULT     Minimal or absence of nausea and/or vomiting Progressing     Maintains or returns to baseline bowel function Progressing     Maintains adequate nutritional intake Progressing     Establish and maintain optimal ostomy function Progressing        Knowledge Deficit     Patient/family/caregiver demonstrates understanding of disease process, treatment plan, medications, and discharge instructions Progressing        PAIN - ADULT     Verbalizes/displays adequate comfort level or baseline comfort level Progressing

## 2018-07-23 ENCOUNTER — APPOINTMENT (OUTPATIENT)
Dept: RADIOLOGY | Facility: HOSPITAL | Age: 53
End: 2018-07-23
Payer: COMMERCIAL

## 2018-07-23 ENCOUNTER — APPOINTMENT (OUTPATIENT)
Dept: NON INVASIVE DIAGNOSTICS | Facility: HOSPITAL | Age: 53
End: 2018-07-23
Payer: COMMERCIAL

## 2018-07-23 VITALS
OXYGEN SATURATION: 99 % | HEART RATE: 81 BPM | SYSTOLIC BLOOD PRESSURE: 107 MMHG | DIASTOLIC BLOOD PRESSURE: 71 MMHG | BODY MASS INDEX: 31.02 KG/M2 | WEIGHT: 175.1 LBS | HEIGHT: 63 IN | TEMPERATURE: 98.9 F | RESPIRATION RATE: 18 BRPM

## 2018-07-23 PROBLEM — E83.52 HYPERCALCEMIA: Status: RESOLVED | Noted: 2018-07-22 | Resolved: 2018-07-23

## 2018-07-23 PROBLEM — R17 SERUM TOTAL BILIRUBIN ELEVATED: Status: RESOLVED | Noted: 2018-07-22 | Resolved: 2018-07-23

## 2018-07-23 LAB
ALBUMIN SERPL BCP-MCNC: 3.4 G/DL (ref 3.5–5)
ALP SERPL-CCNC: 85 U/L (ref 46–116)
ALT SERPL W P-5'-P-CCNC: 21 U/L (ref 12–78)
ANION GAP SERPL CALCULATED.3IONS-SCNC: 6 MMOL/L (ref 4–13)
AST SERPL W P-5'-P-CCNC: 20 U/L (ref 5–45)
BILIRUB SERPL-MCNC: 1 MG/DL (ref 0.2–1)
BUN SERPL-MCNC: 10 MG/DL (ref 5–25)
CALCIUM SERPL-MCNC: 8.8 MG/DL (ref 8.3–10.1)
CHLORIDE SERPL-SCNC: 100 MMOL/L (ref 100–108)
CHOLEST SERPL-MCNC: 154 MG/DL (ref 50–200)
CO2 SERPL-SCNC: 26 MMOL/L (ref 21–32)
CREAT SERPL-MCNC: 0.93 MG/DL (ref 0.6–1.3)
GFR SERPL CREATININE-BSD FRML MDRD: 69 ML/MIN/1.73SQ M
GLUCOSE SERPL-MCNC: 96 MG/DL (ref 65–140)
HDLC SERPL-MCNC: 55 MG/DL (ref 40–60)
LDLC SERPL CALC-MCNC: 87 MG/DL (ref 0–100)
MAGNESIUM SERPL-MCNC: 1.6 MG/DL (ref 1.6–2.6)
NONHDLC SERPL-MCNC: 99 MG/DL
POTASSIUM SERPL-SCNC: 3.8 MMOL/L (ref 3.5–5.3)
PROT SERPL-MCNC: 7.2 G/DL (ref 6.4–8.2)
SODIUM SERPL-SCNC: 132 MMOL/L (ref 136–145)
TRIGL SERPL-MCNC: 61 MG/DL
TSH SERPL DL<=0.05 MIU/L-ACNC: 2.4 UIU/ML (ref 0.36–3.74)

## 2018-07-23 PROCEDURE — 93017 CV STRESS TEST TRACING ONLY: CPT

## 2018-07-23 PROCEDURE — 80053 COMPREHEN METABOLIC PANEL: CPT | Performed by: FAMILY MEDICINE

## 2018-07-23 PROCEDURE — 78452 HT MUSCLE IMAGE SPECT MULT: CPT

## 2018-07-23 PROCEDURE — 93306 TTE W/DOPPLER COMPLETE: CPT

## 2018-07-23 PROCEDURE — 99244 OFF/OP CNSLTJ NEW/EST MOD 40: CPT | Performed by: INTERNAL MEDICINE

## 2018-07-23 PROCEDURE — 93306 TTE W/DOPPLER COMPLETE: CPT | Performed by: INTERNAL MEDICINE

## 2018-07-23 PROCEDURE — 80061 LIPID PANEL: CPT | Performed by: FAMILY MEDICINE

## 2018-07-23 PROCEDURE — 99217 PR OBSERVATION CARE DISCHARGE MANAGEMENT: CPT | Performed by: FAMILY MEDICINE

## 2018-07-23 PROCEDURE — 84443 ASSAY THYROID STIM HORMONE: CPT | Performed by: FAMILY MEDICINE

## 2018-07-23 PROCEDURE — A9502 TC99M TETROFOSMIN: HCPCS

## 2018-07-23 PROCEDURE — 83735 ASSAY OF MAGNESIUM: CPT | Performed by: FAMILY MEDICINE

## 2018-07-23 RX ORDER — ATORVASTATIN CALCIUM 10 MG/1
10 TABLET, FILM COATED ORAL EVERY EVENING
Qty: 30 TABLET | Refills: 0 | Status: SHIPPED | OUTPATIENT
Start: 2018-07-23 | End: 2018-08-02

## 2018-07-23 RX ORDER — MAGNESIUM SULFATE HEPTAHYDRATE 40 MG/ML
2 INJECTION, SOLUTION INTRAVENOUS ONCE
Status: COMPLETED | OUTPATIENT
Start: 2018-07-23 | End: 2018-07-23

## 2018-07-23 RX ORDER — PANTOPRAZOLE SODIUM 40 MG/1
40 TABLET, DELAYED RELEASE ORAL
Qty: 30 TABLET | Refills: 0 | Status: SHIPPED | OUTPATIENT
Start: 2018-07-24 | End: 2018-08-02

## 2018-07-23 RX ORDER — ASPIRIN 81 MG/1
81 TABLET ORAL DAILY
Qty: 30 TABLET | Refills: 0 | Status: SHIPPED | OUTPATIENT
Start: 2018-07-23 | End: 2018-10-23

## 2018-07-23 RX ADMIN — ASPIRIN 81 MG 81 MG: 81 TABLET ORAL at 11:39

## 2018-07-23 RX ADMIN — MAGNESIUM SULFATE HEPTAHYDRATE 2 G: 40 INJECTION, SOLUTION INTRAVENOUS at 11:42

## 2018-07-23 RX ADMIN — LOSARTAN POTASSIUM 50 MG: 50 TABLET, FILM COATED ORAL at 11:39

## 2018-07-23 RX ADMIN — PANTOPRAZOLE SODIUM 40 MG: 40 TABLET, DELAYED RELEASE ORAL at 05:25

## 2018-07-23 RX ADMIN — ENOXAPARIN SODIUM 40 MG: 40 INJECTION SUBCUTANEOUS at 11:39

## 2018-07-23 RX ADMIN — ATORVASTATIN CALCIUM 40 MG: 40 TABLET, FILM COATED ORAL at 17:51

## 2018-07-23 RX ADMIN — BUPROPION HYDROCHLORIDE 150 MG: 150 TABLET, FILM COATED, EXTENDED RELEASE ORAL at 11:39

## 2018-07-23 RX ADMIN — SODIUM CHLORIDE 75 ML/HR: 0.9 INJECTION, SOLUTION INTRAVENOUS at 05:25

## 2018-07-23 RX ADMIN — REGADENOSON 0.4 MG: 0.08 INJECTION, SOLUTION INTRAVENOUS at 09:41

## 2018-07-23 NOTE — SOCIAL WORK
DASH discussion completed  Discussed goals of making sure pt's needs are met upon discharge, pt's preferences are taken into account, pt understands her health condition, medications and symptoms to watch for after returning home and pt is aware of any follow up appointments recommended by hospital physician  Spoke with the pt at the bedside  Pt lives with her  and is generally independent with her own care  Pt denies any DME or HHC at this time  Pt is employed and drives independently    Pt uses the [facility] in [place], [place]

## 2018-07-23 NOTE — CONSULTS
Consultation - Cardiology   [facility]     Tone Zepeda 52 y o  female MRN: [id number]  : 1965  Unit/Bed#: [address] [place] [address] Encounter: [id number]      Assessment & Plan   1  Atypical chest pain, ACS ruled out, rule out gastritis  2  Essential hypertension  Blood pressure is pretty well controlled with current therapy  3  Anxiety and depression  4   Hyponatremia  Now improved  5  Palpitations and fluttering  Most likely premature atrial contraction due to anxiety  Patient has normal EF in the past   She is already scheduled to have echo Doppler  6   Severe DJD with history of back surgery and right knee surgery and ankle surgery with patient taking pain medication      Summary of Recommendations:        Monitor on tele    Aspirin  Patient already scheduled for nuclear stress test  Cholesterol profile reviewed  Will decrease patient's Lipitor dose on discharge if nuclear is normal  Advised to avoid taking with too much over-the-counter pain medications as they can cause gastritis  Continue Protonix  Will review echo  Further plan as also of these tests become available  Discussed with medical team     Physician Requesting Consult: Jack Dalton DO    Reason for Consult / Principal Problem:  Chest pain    Inpatient consult to Cardiology  Consult performed by: [facility]  Consult ordered by: David Olvera          HPI: Tone Zepeda is a 52y o  year old female who presents with chest pain and severe heartburn  Patient has past medical history significant for severe DJD status post knee replacement surgery twice on the right knee, ankle surgery, anxiety, essential hypertension who was having episodes of severe heartburn and nausea for the last for 5 days  Patient also recently started taking blood pressure medication  She says she is under lot of stress and has been short of breath for the last few weeks with minimal exertion    She also reports some nausea and vomiting  As she was having severe knee pain and was not able to walk much she start taking Aleve 2 tablet every night, so that she can walk better  She noted she was getting epigastric pain with severe burning in her chest area  She got very anxious and came to the hospital   She is also feeling stressed out  She had a similar complaint few months ago about her stomach and had a EGD done which was negative as per patient  She is not active  She mostly junk food  And recently had seen a doctor regarding her essential hypertension  She denies any smoking  But there is a history of heart problem in the family with father having stents at the same age  Occasionally she feels fluttering in her chest when she is very anxious  She did have any episodes since she is here  No fever no chills no no fever  No history of stents in the past   No history of stroke  Does not know her cholesterol  Review of Systems   Constitutional: Negative for activity change, chills, diaphoresis, fever and unexpected weight change  HENT: Negative for congestion  Eyes: Negative for discharge and redness  Respiratory: Positive for shortness of breath  Negative for cough, chest tightness and wheezing  Cardiovascular: Positive for chest pain and palpitations  Negative for leg swelling  Gastrointestinal: Positive for abdominal pain and nausea  Negative for diarrhea  Endocrine: Negative  Genitourinary: Negative for decreased urine volume and urgency  Musculoskeletal: Positive for back pain and gait problem  Negative for arthralgias  Skin: Negative for rash and wound  Allergic/Immunologic: Negative  Neurological: Negative for dizziness, seizures, syncope, weakness, light-headedness and headaches  Hematological: Negative  Psychiatric/Behavioral: Negative for agitation and confusion  The patient is nervous/anxious          Historical Information   Past Medical History:   Diagnosis Date    Abnormal immunology findings 06/09/2011    Anemia 05/23/2011    Arthritis     Closed fracture of distal end of fibula     resolved 09/15/17    Depression     Distal radius fracture, left     last assessed 01/30/17    GERD (gastroesophageal reflux disease)     last assessed 05/06/13    Herpes labialis     last assessed 07/23/15    Hypertension     essential ; last assessed 08/07/13    Sinus bradycardia     last assessed 05/24/16     Past Surgical History:   Procedure Laterality Date    ANKLE SURGERY      last assessed 09/15/17    BACK SURGERY      lumbar laminectomy L4-L5    BLADDER SUSPENSION      7/2017    COLONOSCOPY      JOINT REPLACEMENT      TKR  on right    ORIF TIBIA & FIBULA FRACTURES Right 12/15/2016    Procedure: SURGICAL FIXATION OF RIGHT DISTAL FIBULA FRACTURE;  Surgeon: Perry Carrillo MD;  [facility] MAIN OR;  Service:     LA REVISE KNEE JOINT REPLACE,ALL PARTS Right 10/23/2017    Procedure: REVISION TOTAL KNEE REPLACEMENT WITH FROZEN SECTIONS;  Surgeon: Joseph Morrison DO;  Location: [address] [place] [address];  Service: Orthopedics    TOTAL KNEE ARTHROPLASTY      last assessed; 11/21/17    WISDOM TOOTH EXTRACTION       History   Alcohol Use    Yes     Comment: moderately     History   Drug Use No     History   Smoking Status    Never Smoker   Smokeless Tobacco    Never Used     Family History:   Family History   Problem Relation Age of Onset    Arthritis Mother     Osteoporosis Mother     Dementia Father     Other Father         spinal stenosis    Other Family         [facility]       Meds/Allergies    PTA meds:    Prescriptions Prior to Admission   Medication    buPROPion (WELLBUTRIN XL) 150 mg 24 hr tablet    irbesartan-hydrochlorothiazide (AVALIDE) 150-12 5 MG per tablet    Naproxen Sodium (ALEVE PO)      No Known Allergies    Current Facility-Administered Medications:     acetaminophen (TYLENOL) tablet 650 mg, 650 mg, Oral, Q4H PRN, Mary Chu DO    aluminum-magnesium hydroxide-simethicone (MYLANTA) 200-200-20 mg/5 mL oral suspension 15 mL, 15 mL, Oral, Q4H PRN, Mary Revankar, DO, 15 mL at 07/22/18 2132    aspirin chewable tablet 81 mg, 81 mg, Oral, Daily, Mary Revankar, DO    atorvastatin (LIPITOR) tablet 40 mg, 40 mg, Oral, QPM, Mary Revankar, DO, 40 mg at 07/22/18 1737    buPROPion (WELLBUTRIN XL) 24 hr tablet 150 mg, 150 mg, Oral, Daily, Mary Revankar, DO    enoxaparin (LOVENOX) subcutaneous injection 40 mg, 40 mg, Subcutaneous, Daily, Mary Revankar, DO    losartan (COZAAR) tablet 50 mg, 50 mg, Oral, Daily, Mary Revankar, DO    ondansetron (ZOFRAN) injection 4 mg, 4 mg, Intravenous, Q6H PRN, Mary Revankar, DO    pantoprazole (PROTONIX) EC tablet 40 mg, 40 mg, Oral, Early Morning, Mary Revankar, DO, 40 mg at 07/23/18 0525    sodium chloride 0 9 % infusion, 75 mL/hr, Intravenous, Continuous, Mary Revankar, DO, Last Rate: 75 mL/hr at 07/23/18 0525, 75 mL/hr at 07/23/18 0525    VTE Pharmacologic Prophylaxis:   Lovenox    Objective:   Vitals: Blood pressure 107/69, pulse 69, temperature 98 4 °F (36 9 °C), temperature source Oral, resp  rate 18, height 5' 3" (1 6 m), weight 79 4 kg (175 lb 1 6 oz), SpO2 97 %, not currently breastfeeding  Body mass index is 31 02 kg/m²    BP Readings from Last 3 Encounters:   07/23/18 107/69   07/19/18 148/88   02/09/18 148/96     Orthostatic Blood Pressures      Most Recent Value   Blood Pressure  107/69 filed at 07/23/2018 2823   Patient Position - Orthostatic VS  Lying filed at 07/23/2018 0306          Intake/Output Summary (Last 24 hours) at 07/23/18 0851  Last data filed at 07/22/18 1159   Gross per 24 hour   Intake              500 ml   Output                0 ml   Net              500 ml       Invasive Devices     Peripheral Intravenous Line            Peripheral IV 07/22/18 Right Antecubital less than 1 day                  Physical Exam:   Physical Exam    Neurologic:  Alert & oriented x 3, no new focal deficits, Not in any acute distress,  Constitutional:  Well developed, well nourished, non-toxic appearance   Eyes:  Pupil equal and reacting to light, conjunctiva normal   HENT:  Atraumatic, oropharynx moist, Neck- normal range of motion, no tenderness, supple   Respiratory:  Bilateral air entry, mostly clear to auscultation  Cardiovascular: S1-S2 regular with a 2/6 ejection systolic murmur   GI:  Soft, nondistended, normal bowel sounds, nontender, no hepatosplenomegaly appreciated  Musculoskeletal:  No edema, no tenderness, no deformities     Skin:  Well hydrated, no rash   Lymphatic:  No lymphadenopathy noted   Extremities:  No edema and distal pulses are present    Labs:   Troponins:   Results from last 7 days  Lab Units 07/22/18  1859 07/22/18  1418 07/22/18  1055   TROPONIN I ng/mL <0 02 <0 02 <0 02       CBC with diff:   Results from last 7 days  Lab Units 07/22/18  1055   WBC Thousand/uL 9 56   HEMOGLOBIN g/dL 14 5   HEMATOCRIT % 42 7   MCV fL 96   PLATELETS Thousands/uL 324   MCH pg 32 7   MCHC g/dL 34 0   RDW % 11 7   MPV fL 10 0   NRBC AUTO /100 WBCs 0       CMP:   Results from last 7 days  Lab Units 07/23/18  0604 07/22/18  1055   SODIUM mmol/L 132* 130*   POTASSIUM mmol/L 3 8 4 3   CHLORIDE mmol/L 100 89*   CO2 mmol/L 26 30   ANION GAP mmol/L 6 11   BUN mg/dL 10 8   CREATININE mg/dL 0 93 0 97   GLUCOSE RANDOM mg/dL 96 97   CALCIUM mg/dL 8 8 10 6*   AST U/L 20 34   ALT U/L 21 18   ALK PHOS U/L 85 116   TOTAL PROTEIN g/dL 7 2 9 2*   BILIRUBIN TOTAL mg/dL 1 00 1 30*   EGFR ml/min/1 73sq m 69 66       Magnesium:   Results from last 7 days  Lab Units 07/23/18  0604   MAGNESIUM mg/dL 1 6     Coags:   Results from last 7 days  Lab Units 07/22/18  1055   PTT seconds 27   INR  0 96     TSH:    Results from last 7 days  Lab Units 07/23/18  0604   TSH 3RD GENERATON uIU/mL 2 402     Lipid Profile:   Results from last 7 days  Lab Units 07/23/18  0604   CHOLESTEROL mg/dL 154   TRIGLYCERIDES mg/dL 61   HDL mg/dL 55   LDL CALC mg/dL 87     NT-proBNP:   Recent Labs      18   1055   NTBNP  42        Imaging & Testing   Cardiac testing:   Results for orders placed during the hospital encounter of 16   Echo complete with contrast if indicated    Narrative [address] [place] [address], [place]   [phone]    Transthoracic Echocardiogram  2D, M-mode, Doppler, and Color Doppler    Study date:  2016    Patient: Rachid Galaviz  MR number: [id number]  Account number: [de-identified]  : 1965  Age: 46 years  Gender: Female  Status: Routine  Location: Echo lab  Height: 65 in  Weight: 154 7 lb  BP: 120/ 75 mmHg    Indications: Bradycardia    Diagnoses: R00 1 - Bradycardia, unspecified    Sonographer:  Robin Rubio  Primary Physician:  Henry Valverde MD  Referring Physician:  Henry Valverde MD  Group:  Tiny Hurtado  Interpreting Physician:  Vangie Jernigan DO    SUMMARY    LEFT VENTRICLE:  Size was normal   Systolic function was normal  Ejection fraction was estimated in the range of  55 % to 65 %  There were no regional wall motion abnormalities  Wall thickness was normal   Left ventricular diastolic function parameters were normal     MITRAL VALVE:  There was mild regurgitation  TRICUSPID VALVE:  There was mild regurgitation  Pulmonary artery systolic pressure was within the normal range  HISTORY: PRIOR HISTORY: Patient has no history of cardiovascular disease  PROCEDURE: The procedure was performed in the echo lab  This was a routine  study  The transthoracic approach was used  The study included complete 2D  imaging, M-mode, complete spectral Doppler, and color Doppler  The heart rate  was 52 bpm, at the start of the study  Image quality was adequate  LEFT VENTRICLE: Size was normal  Systolic function was normal  Ejection  fraction was estimated in the range of 55 % to 65 %  There were no regional  wall motion abnormalities   Wall thickness was normal  No evidence of apical  thrombus  DOPPLER: Left ventricular diastolic function parameters were normal     RIGHT VENTRICLE: The size was normal  Systolic function was normal  Wall  thickness was normal     LEFT ATRIUM: Size was normal     RIGHT ATRIUM: Size was normal     MITRAL VALVE: Valve structure was normal  There was normal leaflet separation  There was systolic bowing of the anterior leaflet, but without diagnostic  evidence for prolapse  DOPPLER: The transmitral velocity was within the normal  range  There was no evidence for stenosis  There was mild regurgitation  AORTIC VALVE: The valve was trileaflet  Leaflets exhibited normal thickness and  normal cuspal separation  DOPPLER: Transaortic velocity was within the normal  range  There was no evidence for stenosis  There was no significant  regurgitation  TRICUSPID VALVE: The valve structure was normal  There was normal leaflet  separation  DOPPLER: The transtricuspid velocity was within the normal range  There was no evidence for stenosis  There was mild regurgitation  Pulmonary  artery systolic pressure was within the normal range  Estimated peak PA  pressure was 27 mmHg  PULMONIC VALVE: Leaflets exhibited normal thickness, no calcification, and  normal cuspal separation  DOPPLER: The transpulmonic velocity was within the  normal range  There was no significant regurgitation  PERICARDIUM: There was no pericardial effusion  The pericardium was normal in  appearance  AORTA: The root exhibited normal size  SYSTEMIC VEINS: IVC: The inferior vena cava was normal in size  SYSTEM MEASUREMENT TABLES    2D mode  AoR Diam 2D: 2 9 cm  LA Diam (2D): 3 5 cm  LA/Ao (2D): 1 21  FS (2D Teich): 30 4 %  IVSd (2D): 0 71 cm  LVDEV: 95 9 cm³  LVESV: 40 3 cm³  LVIDd(2D): 4 57 cm  LVISd (2D): 3 18 cm  LVPWd (2D): 0 98 cm  SV (Teich): 55 6 cm³    Apical four chamber  LVEF A4C: 61 %    Unspecified Scan Mode  MV Peak A Dejan: 370 mm/s  MV Peak E Dejan   Mean: 672 mm/s  MVA (PHT): 3 73 cm squared  PHT: 59 ms  Max P mm[Hg]  V Max: 2340 mm/s  Vmax: 2400 mm/s  RA Area: 11 8 cm squared  RA Volume: 25 5 cm³  TAPSE: 2 1 cm    [facility] Accredited Echocardiography Laboratory    Prepared and electronically signed by    Cosme Vallecillo DO  Signed 2016 15:24:35           Imaging: I have personally reviewed pertinent reports  Xr Chest 1 View Portable    Result Date: 2018  Narrative: CHEST INDICATION:   cp   Shortness of breath  COMPARISON:  2017 EXAM PERFORMED/VIEWS:  XR CHEST PORTABLE FINDINGS: Cardiomediastinal silhouette appears unremarkable  The lungs are clear  No pneumothorax or pleural effusion  Osseous structures appear within normal limits for patient age  Impression: No acute cardiopulmonary disease  Workstation performed: [id number]     EKG/ Monitor: Personally reviewed  Normal sinus rhythm heart rate around 65 beats per minute  No other significant ST changes  Code Status: Level 1 - Full Code  Advance Directive and Living Will:      POLST:       Dr Lana Fabian MD [facility]      "This note has been constructed using a voice recognition system  Therefore there may be syntax, spelling, and/or grammatical errors   Please call if you have any questions  "

## 2018-07-23 NOTE — DISCHARGE SUMMARY
Discharge Summary - [address] [facility]    Patient Information: Shayna Haider 52 y o  female MRN: [id number]  Unit/Bed#: [address] [address] Encounter: [id number]    Discharging Physician / Practitioner: Lily Crow DO  PCP: Casi Gamez MD  Admission Date: 7/22/2018  Discharge Date: 07/23/18    Reason for Admission: Shortness of Breath (pt c/o SOB, chest pain, severe heartburn & nausea x 5-6 days  states also started new BP meds, & started taking welbutrin & aleve all around same time  )      Discharge Diagnoses:     Principal Problem:    Chest pain  Active Problems:    Hyponatremia    Depression with anxiety    Essential hypertension  Resolved Problems:    Hypercalcemia    Serum total bilirubin elevated        * Chest pain   Assessment & Plan    Patient reports more of a burning sensation  Likely GI etiology as stress test was normal and presenting complaint sound more like heartburn  Troponins negative  Advised patient that since cardiac testing is negative, she needs to follow up with her gastroenterologist after discharge for further evaluation of her symptoms  Continue Protonix as patient does reports improvement in her symptoms today  TSH within normal limits        Hyponatremia   Assessment & Plan    Likely hypovolemic hyponatremia aS patient has had nausea, vomiting and thiazide use  Sodium level did improve with IV fluids   Get repeat level after discharge        Serum total bilirubin elevatedresolved as of 7/23/2018   Assessment & Plan    Likely nonspecific in nature  No Abdominal tenderness noted on exam  Rest of the LFTs were within normal limits  resolved on repeat lab work today        Hypercalcemiaresolved as of 7/23/2018   Assessment & Plan    Resolved with IV fluid        Essential hypertension   Assessment & Plan    Continue home medication        Depression with anxiety   Assessment & Plan    Continue Wellbutrin XL              Consultations During Hospital Stay:  [place] [place] TO CARDIOLOGY    Procedures Performed:     · Stress test  · Echo    Significant Findings:     · See hospital course and above*    Imaging while in hospital:    Xr Chest 1 View Portable    Result Date: 2018  Narrative: CHEST INDICATION:   cp   Shortness of breath  COMPARISON:  2017 EXAM PERFORMED/VIEWS:  XR CHEST PORTABLE FINDINGS: Cardiomediastinal silhouette appears unremarkable  The lungs are clear  No pneumothorax or pleural effusion  Osseous structures appear within normal limits for patient age  Impression: No acute cardiopulmonary disease  Workstation performed: [id number]       Incidental Findings:   · none    Test Results Pending at Discharge (will require follow up):   · As per After Visit Summary     Outpatient Tests Requested:  · BMP    Complications:  See hospital course and above    Hospital Course:     Belkys Nguyen is a 52 y o  female patient who originally presented to the hospital on 2018 due to severe burning in her chest, shortness of breath  She reported shortness of breath with minimal exertion but stated that this has been going on for a few months  She also reported nausea and 1 episode of nonbilious, nonbloody vomitus  She reported palpitations but stated that this has been going on for long time  she also reported diaphoresis  She stated that the burning is substernal and epigastric in nature  Patient reported being more stressed out  Patient denied any change in her diet but stated that she does eat mostly junk food  patient states that she had similar symptoms after her father  and she had a EGD done at [facility] which was normal as per her  She was told that her symptoms were stress related  Patient reported family history of heart disease and her dad had stents placed at about 54years of age  In the ER patient received carafate, nitro, Mylanta with no relief of symptoms  She was admitted to telemetry and seen by Cardiology    She underwent stress test which was normal   Echo showed EF of 60%  Patient started on baby aspirin and low-dose Lipitor on discharge  Continue Protonix for now  Patient advised to follow up with PCP and her Gastroenterologist after discharge for further evaluation of her symptoms  She was cleared by Cardiology prior to discharge      Please see above list of diagnoses and related plan for additional information  Condition at Discharge: stable     Discharge Day Visit / Exam:     Subjective:  Reports only minimal burning in her chest now    Vitals: Blood Pressure: 107/71 (07/23/18 1525)  Pulse: 81 (07/23/18 1525)  Temperature: 98 9 °F (37 2 °C) (07/23/18 1525)  Temp Source: Oral (07/23/18 1525)  Respirations: 18 (07/23/18 1525)  Height: 5' 3" (160 cm) (07/22/18 1700)  Weight - Scale: 79 4 kg (175 lb 1 6 oz) (07/22/18 1700)  SpO2: 99 % (07/23/18 1525)  Exam:   Physical Exam   Constitutional: She is oriented to person, place, and time  She appears well-developed and well-nourished  No distress  HENT:   Head: Normocephalic and atraumatic  Mouth/Throat: Oropharynx is clear and moist    Eyes: EOM are normal  Right eye exhibits no discharge  Left eye exhibits no discharge  No scleral icterus  Neck: Neck supple  No tracheal deviation present  Cardiovascular: Normal rate and regular rhythm  Pulmonary/Chest: Effort normal and breath sounds normal  No respiratory distress  She has no wheezes  She has no rales  Abdominal: Soft  Bowel sounds are normal  She exhibits no distension  There is no tenderness  Musculoskeletal: She exhibits no edema  Neurological: She is alert and oriented to person, place, and time  No cranial nerve deficit  Skin: Skin is dry  She is not diaphoretic  Psychiatric: Her mood appears anxious  Discharge instructions/Information to patient and family:(Discharge Medications and Follow up):   See after visit summary for information provided to patient and family        Provisions for Follow-Up Care:  See after visit summary for information related to follow-up care and any pertinent home health orders  Disposition: Home    Planned Readmission:  No     Discharge Statement:  I spent 30 minutes discharging the patient  This time was spent on the day of discharge  I had direct contact with the patient on the day of discharge  Greater than 50% of the total time was spent examining patient, answering all patient questions, arranging and discussing plan of care with patient as well as directly providing post-discharge instructions  Additional time then spent on discharge activities  Discharge Medications:  See after visit summary for reconciled discharge medications provided to patient and family  ** Please Note:  Dictation voice to text software may have been used in the creation of this document   **

## 2018-07-23 NOTE — CASE MANAGEMENT
Initial Clinical Review    Admission: Date/Time/Statement: 7/22/18 1504    Orders Placed This Encounter   Procedures    Place in Observation (expected length of stay for this patient is less than two midnights)     Standing Status:   Standing     Number of Occurrences:   1     Order Specific Question:   Admitting Physician     Answer:   Naila Ching [[id number]]     Order Specific Question:   Level of Care     Answer:   Med Surg [16]         ED: Date/Time/Mode of Arrival:   ED Arrival Information     Expected Arrival Acuity Means of Arrival Escorted By Service Admission Type    - 7/22/2018 10:22 Urgent Walk-In Family Member General Medicine Urgent    Arrival Complaint    chest pain           Chief Complaint:   Chief Complaint   Patient presents with    Shortness of Breath     pt c/o SOB, chest pain, severe heartburn & nausea x 5-6 days  states also started new BP meds, & started taking welbutrin & aleve all around same time  History of Illness: Andrei Tierney is a 52 y o  female who presents with complaints of severe burning in her chest, shortness of breath  Patient states that her symptoms have been on going for the last 5-6 days  She reports shortness of breath with minimal exertion but states that this has been going on for a few months  She also reports nausea and vomiting  She had 1 episode of nonbilious, nonbloody vomitus last night  She reports palpitations but states that this has been going on for long time  she saw her primary care doctor who set her up with an appointment with Cardiology later this week  Patient states that over the last 4-5 days she was started on Wellbutrin XL, Avalide and has been taking 2 alleve at night for knee pain  she also reports diaphoresis  She states that the burning is substernal and epigastric in nature  Patient reports being more stressed out  Patient denies any change in her diet but states that she does eat mostly junk food    patient states that she had similar symptoms after her father  and she had a EGD done at [facility] which was normal as per her  She was told that her symptoms were stress related  Patient reports family history of heart disease and her dad had stents placed at about 54years of age  In the ER patient received carafate, nitro, Mylanta with no relief of symptoms    ED Vital Signs:   ED Triage Vitals   Temperature Pulse Respirations Blood Pressure SpO2   18 1519 18 1032 18 1032 18 1032 18 1032   98 1 °F (36 7 °C) 71 18 (!) 152/108 100 %      Temp Source Heart Rate Source Patient Position - Orthostatic VS BP Location FiO2 (%)   18 1400 18 1032 18 1032 18 1032 --   Tympanic Monitor Lying Left arm       Pain Score       18 1032       5        Wt Readings from Last 1 Encounters:   18 79 4 kg (175 lb 1 6 oz)       Abnormal Labs/Diagnostic Test Results: >132 ALB 3 4 TROPONIN <0 02 X3   CXR  No acute cardiopulmonary disease  ED Treatment:   Medication Administration from 2018 1022 to 2018 1653       Date/Time Order Dose Route Action Action by Comments     2018 1159 sodium chloride 0 9 % bolus 500 mL 0 mL Intravenous Stopped Jacqui Muhammad RN      2018 1057 sodium chloride 0 9 % bolus 500 mL 500 mL Intravenous Win 37 Jacqui Muhammad RN      2018 1054 aluminum-magnesium hydroxide-simethicone (MYLANTA) 200-200-20 mg/5 mL oral suspension 20 mL 20 mL Oral Given Jacqui Muhammad RN      2018 1233 nitroglycerin (NITROSTAT) SL tablet 0 4 mg 0 4 mg Sublingual Given Jacqui Muhammad RN 8/10     2018 1253 nitroglycerin (NITROSTAT) SL tablet 0 4 mg 0 4 mg Sublingual Given Silvia Quigley RN      2018 1258 sucralfate (CARAFATE) oral suspension 1,000 mg 1,000 mg Oral Given Silvia Quigley RN           Past Medical/Surgical History:    Active Ambulatory Problems     Diagnosis Date Noted    Instability of internal right knee prosthesis ([facility] ) 10/23/2017    Mechanical loosening of internal right knee prosthetic joint ([facility] ) 10/23/2017    Status post revision of total replacement of right knee 10/23/2017    Depression with anxiety 10/23/2017    Hematuria 10/23/2017    History of alcoholism ([facility] ) 10/23/2017    Thyromegaly 10/23/2017    Vitamin B-complex deficiency 10/23/2017    Essential hypertension 07/19/2018     Resolved Ambulatory Problems     Diagnosis Date Noted    No Resolved Ambulatory Problems     Past Medical History:   Diagnosis Date    Abnormal immunology findings 06/09/2011    Anemia 05/23/2011    Arthritis     Closed fracture of distal end of fibula     Depression     Distal radius fracture, left     GERD (gastroesophageal reflux disease)     Herpes labialis     Hypertension     Sinus bradycardia        Admitting Diagnosis: Shortness of breath [R06 02]  Chest pain, unspecified type [R07 9]    Age/Sex: 52 y o  female    Assessment/Plan:   Chest pain   Assessment & Plan     Patient reports more for burning sensation  Admit patient for further management  Sounds atypical in nature  Troponin x2 has been negative in the ER  Obtain troponin x1 more  Consult Cardiology  Will do stress test and echocardiogram for further evaluation as patient also reports dyspnea with minimal exertion  Advised patient that if cardiac testing is negative, she needs to follow up with her gastroenterologist after discharge for further evaluation of her symptoms  Place patient on Protonix while here  Check lipid panel, TSH  Place patient on aspirin, statin for now   Hyponatremia   Assessment & Plan     Likely hypovolemic hyponatremia aS patient has had nausea, vomiting  Place patient on IV fluids and get repeat level in the a m  Serum total bilirubin elevated   Assessment & Plan     Likely nonspecific in nature    Abdominal tenderness noted on exam  Repeat lab work in the a m  rest of the LFTs are within normal limits   Hypercalcemia Assessment & Plan     Place patient on IV fluids and get repeat level in the a m  Check ionized calcium level   Essential hypertension   Assessment & Plan     Will hold off on hydrochlorothiazide and patient will be placed on Cozaar and monitor blood pressures   Depression with anxiety   Assessment & Plan     Continue Wellbutrin XL     VTE Prophylaxis: Enoxaparin (Lovenox)  / sequential compression device   Code Status: Level 1 - Full Code  Anticipated Length of Stay:  Patient will be admitted on an Observation basis with an anticipated length of stay of 1 midnights     Justification for Hospital Stay: Chest pain/burning    Admission Orders:  OBSERVATION  TELE MON  STRESS TEST  ECHO  CONSULT CARDIO    Scheduled Meds:   Current Facility-Administered Medications:  acetaminophen 650 mg Oral Q4H PRN Mary Revankar, DO    aluminum-magnesium hydroxide-simethicone 15 mL Oral Q4H PRN Mary Revankar, DO    aspirin 81 mg Oral Daily Mary Revankar, DO    atorvastatin 40 mg Oral QPM Mary Revankar, DO    buPROPion 150 mg Oral Daily Mary Revankar, DO    enoxaparin 40 mg Subcutaneous Daily Mayr Revankar, DO    losartan 50 mg Oral Daily Mary Revankar, DO    ondansetron 4 mg Intravenous Q6H PRN Mary Revankar, DO    pantoprazole 40 mg Oral Early Morning Mary Revankar, DO    sodium chloride 75 mL/hr Intravenous Continuous Mary Revankar, DO Last Rate: 75 mL/hr (07/23/18 0525)     Continuous Infusions:   sodium chloride 75 mL/hr Last Rate: 75 mL/hr (07/23/18 0525)     PRN Meds:   acetaminophen    aluminum-magnesium hydroxide-simethicone    ondansetron

## 2018-07-24 ENCOUNTER — TRANSITIONAL CARE MANAGEMENT (OUTPATIENT)
Dept: FAMILY MEDICINE CLINIC | Facility: CLINIC | Age: 53
End: 2018-07-24

## 2018-07-24 LAB
CHEST PAIN STATEMENT: NORMAL
MAX DIASTOLIC BP: 76 MMHG
MAX HEART RATE: 107 BPM
MAX PREDICTED HEART RATE: 165 BPM
MAX. SYSTOLIC BP: 132 MMHG
MRSA NOSE QL CULT: NORMAL
PROTOCOL NAME: NORMAL
REASON FOR TERMINATION: NORMAL
TARGET HR FORMULA: NORMAL
TEST INDICATION: NORMAL
TIME IN EXERCISE PHASE: NORMAL

## 2018-08-02 ENCOUNTER — OFFICE VISIT (OUTPATIENT)
Dept: FAMILY MEDICINE CLINIC | Facility: CLINIC | Age: 53
End: 2018-08-02
Payer: COMMERCIAL

## 2018-08-02 VITALS
SYSTOLIC BLOOD PRESSURE: 120 MMHG | TEMPERATURE: 97.6 F | RESPIRATION RATE: 16 BRPM | WEIGHT: 170 LBS | HEIGHT: 63 IN | BODY MASS INDEX: 30.12 KG/M2 | HEART RATE: 80 BPM | DIASTOLIC BLOOD PRESSURE: 80 MMHG

## 2018-08-02 DIAGNOSIS — R10.13 DYSPEPSIA: ICD-10-CM

## 2018-08-02 DIAGNOSIS — I10 ESSENTIAL HYPERTENSION: Primary | ICD-10-CM

## 2018-08-02 DIAGNOSIS — F41.8 DEPRESSION WITH ANXIETY: Chronic | ICD-10-CM

## 2018-08-02 PROCEDURE — 99214 OFFICE O/P EST MOD 30 MIN: CPT | Performed by: NURSE PRACTITIONER

## 2018-08-02 PROCEDURE — 1111F DSCHRG MED/CURRENT MED MERGE: CPT | Performed by: NURSE PRACTITIONER

## 2018-08-02 RX ORDER — PANTOPRAZOLE SODIUM 40 MG/1
40 TABLET, DELAYED RELEASE ORAL DAILY
Qty: 30 TABLET | Refills: 1 | Status: SHIPPED | OUTPATIENT
Start: 2018-08-02 | End: 2018-09-26 | Stop reason: SDUPTHER

## 2018-08-02 NOTE — PROGRESS NOTES
Assessment/Plan:  1  Essential hypertension  Stable  Continue current meds  Monitor bp  Follow up with cardio as scheduled  2  Dyspepsia  Trial of protonix  Had recent negative EGD  Monitor and rto if symptoms persist or worsen  - pantoprazole (PROTONIX) 40 mg tablet; Take 1 tablet (40 mg total) by mouth daily  Dispense: 30 tablet; Refill: 1    3  Depression with anxiety  Stable  Has stopped wellbutrin  Would like to not be on meds  Discussed coping strategies/mechanism  Anticipatory guidance  rto if issues develop  Subjective:      Patient ID: Junie Hayes is a 52 y o  female who presents for bp check    Here for bp check only  Went to ED 7/22/18 for chest pain  Testing negative  Indigestion  Had EGD in January was was normal    Taking otc prilosec, not helping  Tried pepcid, not helping  Stopped taking wellbutrin, "cold turkey"  Does not want to restart any anxiety meds right now  The following portions of the patient's history were reviewed and updated as appropriate: allergies, current medications, past family history, past medical history, past social history, past surgical history and problem list     Review of Systems   Respiratory: Negative for chest tightness and shortness of breath  Cardiovascular: Negative for chest pain, palpitations and leg swelling  Gastrointestinal: Negative for abdominal pain, diarrhea, nausea and vomiting  Heartburn   Neurological: Negative for dizziness, numbness and headaches           Objective:    Recent Results (from the past 672 hour(s))   ECG 12 lead    Collection Time: 07/22/18 10:28 AM   Result Value Ref Range    Ventricular Rate 70 BPM    Atrial Rate 70 BPM    NC Interval 130 ms    QRSD Interval 92 ms    QT Interval 418 ms    QTC Interval 451 ms    P Axis 48 degrees    QRS Axis 11 degrees    T Wave Axis 29 degrees   CBC and differential    Collection Time: 07/22/18 10:55 AM   Result Value Ref Range    WBC 9 56 4 31 - 10 16 Thousand/uL    RBC 4 43 3 81 - 5 12 Million/uL    Hemoglobin 14 5 11 5 - 15 4 g/dL    Hematocrit 42 7 34 8 - 46 1 %    MCV 96 82 - 98 fL    MCH 32 7 26 8 - 34 3 pg    MCHC 34 0 31 4 - 37 4 g/dL    RDW 11 7 11 6 - 15 1 %    MPV 10 0 8 9 - 12 7 fL    Platelets 329 673 - 283 Thousands/uL    nRBC 0 /100 WBCs    Neutrophils Relative 60 43 - 75 %    Immat GRANS % 1 0 - 2 %    Lymphocytes Relative 24 14 - 44 %    Monocytes Relative 14 (H) 4 - 12 %    Eosinophils Relative 1 0 - 6 %    Basophils Relative 0 0 - 1 %    Neutrophils Absolute 5 76 1 85 - 7 62 Thousands/µL    Immature Grans Absolute 0 06 0 00 - 0 20 Thousand/uL    Lymphocytes Absolute 2 31 0 60 - 4 47 Thousands/µL    Monocytes Absolute 1 29 (H) 0 17 - 1 22 Thousand/µL    Eosinophils Absolute 0 11 0 00 - 0 61 Thousand/µL    Basophils Absolute 0 03 0 00 - 0 10 Thousands/µL   Protime-INR    Collection Time: 07/22/18 10:55 AM   Result Value Ref Range    Protime 10 1 9 4 - 11 7 seconds    INR 0 96 0 86 - 1 16   APTT    Collection Time: 07/22/18 10:55 AM   Result Value Ref Range    PTT 27 24 - 33 seconds   Comprehensive metabolic panel    Collection Time: 07/22/18 10:55 AM   Result Value Ref Range    Sodium 130 (L) 136 - 145 mmol/L    Potassium 4 3 3 5 - 5 3 mmol/L    Chloride 89 (L) 100 - 108 mmol/L    CO2 30 21 - 32 mmol/L    Anion Gap 11 4 - 13 mmol/L    BUN 8 5 - 25 mg/dL    Creatinine 0 97 0 60 - 1 30 mg/dL    Glucose 97 65 - 140 mg/dL    Calcium 10 6 (H) 8 3 - 10 1 mg/dL    AST 34 5 - 45 U/L    ALT 18 12 - 78 U/L    Alkaline Phosphatase 116 46 - 116 U/L    Total Protein 9 2 (H) 6 4 - 8 2 g/dL    Albumin 4 4 3 5 - 5 0 g/dL    Total Bilirubin 1 30 (H) 0 20 - 1 00 mg/dL    eGFR 66 ml/min/1 73sq m   D-Dimer    Collection Time: 07/22/18 10:55 AM   Result Value Ref Range    D-Dimer, Quant 260 190 - 520 ng/ml (FEU)   Troponin I    Collection Time: 07/22/18 10:55 AM   Result Value Ref Range    Troponin I <0 02 <=0 04 ng/mL   B-type natriuretic peptide    Collection Time: 07/22/18 10:55 AM   Result Value Ref Range    NT-proBNP 42 <125 pg/mL   Troponin I    Collection Time: 07/22/18  2:18 PM   Result Value Ref Range    Troponin I <0 02 <=0 04 ng/mL   MRSA culture    Collection Time: 07/22/18  6:00 PM   Result Value Ref Range    MRSA Culture Only       No Methicillin Resistant Staphlyococcus aureus (MRSA) isolated   Troponin I    Collection Time: 07/22/18  6:59 PM   Result Value Ref Range    Troponin I <0 02 <=0 04 ng/mL   UA w Reflex to Microscopic    Collection Time: 07/22/18 11:42 PM   Result Value Ref Range    Color, UA Yellow     Clarity, UA Clear     Specific Gravity, UA <=1 005 1 000 - 1 030    pH, UA 7 0 5 0 - 9 0    Leukocytes, UA Negative Negative    Nitrite, UA Negative Negative    Protein, UA Negative Negative mg/dl    Glucose, UA Negative Negative mg/dl    Ketones, UA Negative Negative mg/dl    Urobilinogen, UA 0 2 0 2, 1 0 E U /dl E U /dl    Bilirubin, UA Negative Negative    Blood, UA Negative Negative   Magnesium    Collection Time: 07/23/18  6:04 AM   Result Value Ref Range    Magnesium 1 6 1 6 - 2 6 mg/dL   Lipid panel    Collection Time: 07/23/18  6:04 AM   Result Value Ref Range    Cholesterol 154 50 - 200 mg/dL    Triglycerides 61 <=150 mg/dL    HDL, Direct 55 40 - 60 mg/dL    LDL Calculated 87 0 - 100 mg/dL    Non-HDL-Chol (CHOL-HDL) 99 mg/dl   TSH, 3rd generation    Collection Time: 07/23/18  6:04 AM   Result Value Ref Range    TSH 3RD GENERATON 2 402 0 358 - 3 740 uIU/mL   Comprehensive metabolic panel    Collection Time: 07/23/18  6:04 AM   Result Value Ref Range    Sodium 132 (L) 136 - 145 mmol/L    Potassium 3 8 3 5 - 5 3 mmol/L    Chloride 100 100 - 108 mmol/L    CO2 26 21 - 32 mmol/L    Anion Gap 6 4 - 13 mmol/L    BUN 10 5 - 25 mg/dL    Creatinine 0 93 0 60 - 1 30 mg/dL    Glucose 96 65 - 140 mg/dL    Calcium 8 8 8 3 - 10 1 mg/dL    AST 20 5 - 45 U/L    ALT 21 12 - 78 U/L    Alkaline Phosphatase 85 46 - 116 U/L    Total Protein 7 2 6 4 - 8 2 g/dL Albumin 3 4 (L) 3 5 - 5 0 g/dL    Total Bilirubin 1 00 0 20 - 1 00 mg/dL    eGFR 69 ml/min/1 73sq m   Stress strip    Collection Time: 07/23/18  9:30 AM   Result Value Ref Range    Protocol Name [address]     Time In Exercise Phase 00:01:00     MAX  SYSTOLIC  mmHg    Max Diastolic Bp 76 mmHg    Max Heart Rate 107 BPM    Max Predicted Heart Rate 165 BPM    Reason for Termination PROTOCOL COMPLETED     Test Indication chest pain, SOB, palpitation     Target Hr Formular (220 - Age)*100%     Arrhy During Ex      ECG Interp Before Ex      ECG Interp during Ex      Ex Summary Comment      Chest Pain Statement none     Overall Hr Response To Exercise      Overall BP Response To Exercise         /80 (BP Location: Left arm, Patient Position: Sitting, Cuff Size: Standard)   Pulse 80   Temp 97 6 °F (36 4 °C)   Resp 16   Ht 5' 3" (1 6 m)   Wt 77 1 kg (170 lb)   BMI 30 11 kg/m²          Physical Exam   Constitutional: She is oriented to person, place, and time  She appears well-developed and well-nourished  No distress  Neck: No JVD present  No audible carotid bruit   Cardiovascular: Normal rate, regular rhythm and normal heart sounds  No murmur heard  Pulmonary/Chest: Effort normal and breath sounds normal  No respiratory distress  She has no wheezes  Neurological: She is alert and oriented to person, place, and time  Skin: Skin is warm and dry  No rash noted  No erythema  Psychiatric: She has a normal mood and affect  Her behavior is normal  Judgment and thought content normal    Vitals reviewed

## 2018-08-29 ENCOUNTER — OFFICE VISIT (OUTPATIENT)
Dept: FAMILY MEDICINE CLINIC | Facility: CLINIC | Age: 53
End: 2018-08-29
Payer: COMMERCIAL

## 2018-08-29 VITALS
HEART RATE: 80 BPM | BODY MASS INDEX: 29.73 KG/M2 | SYSTOLIC BLOOD PRESSURE: 118 MMHG | DIASTOLIC BLOOD PRESSURE: 82 MMHG | HEIGHT: 63 IN | RESPIRATION RATE: 16 BRPM | WEIGHT: 167.8 LBS | TEMPERATURE: 96.7 F

## 2018-08-29 DIAGNOSIS — R79.89 ABNORMAL CBC MEASUREMENT: ICD-10-CM

## 2018-08-29 DIAGNOSIS — F32.A ANXIETY AND DEPRESSION: ICD-10-CM

## 2018-08-29 DIAGNOSIS — I10 ESSENTIAL HYPERTENSION: Primary | ICD-10-CM

## 2018-08-29 DIAGNOSIS — K21.9 GASTROESOPHAGEAL REFLUX DISEASE, ESOPHAGITIS PRESENCE NOT SPECIFIED: ICD-10-CM

## 2018-08-29 DIAGNOSIS — F41.9 ANXIETY AND DEPRESSION: ICD-10-CM

## 2018-08-29 DIAGNOSIS — E87.1 HYPONATREMIA: ICD-10-CM

## 2018-08-29 PROBLEM — F10.10 NONDEPENDENT ALCOHOL ABUSE, EPISODIC DRINKING BEHAVIOR: Status: ACTIVE | Noted: 2017-11-21

## 2018-08-29 PROBLEM — E78.5 BORDERLINE HYPERLIPIDEMIA: Status: ACTIVE | Noted: 2017-11-17

## 2018-08-29 PROBLEM — Z96.659 H/O TOTAL KNEE REPLACEMENT: Status: ACTIVE | Noted: 2017-10-23

## 2018-08-29 LAB
AMBIG ABBREV DEFAULT: NORMAL
BASOPHILS # BLD AUTO: 0 X10E3/UL (ref 0–0.2)
BASOPHILS NFR BLD AUTO: 0 %
BUN SERPL-MCNC: 7 MG/DL (ref 6–24)
BUN/CREAT SERPL: 8 (ref 9–23)
CALCIUM SERPL-MCNC: 9.9 MG/DL (ref 8.7–10.2)
CHLORIDE SERPL-SCNC: 84 MMOL/L (ref 96–106)
CO2 SERPL-SCNC: 23 MMOL/L (ref 20–29)
CREAT SERPL-MCNC: 0.92 MG/DL (ref 0.57–1)
EOSINOPHIL # BLD AUTO: 0 X10E3/UL (ref 0–0.4)
EOSINOPHIL NFR BLD AUTO: 0 %
ERYTHROCYTE [DISTWIDTH] IN BLOOD BY AUTOMATED COUNT: 13.2 % (ref 12.3–15.4)
GLUCOSE SERPL-MCNC: 89 MG/DL (ref 65–99)
HCT VFR BLD AUTO: 35.8 % (ref 34–46.6)
HGB BLD-MCNC: 12.4 G/DL (ref 11.1–15.9)
IMM GRANULOCYTES # BLD: 0 X10E3/UL (ref 0–0.1)
IMM GRANULOCYTES NFR BLD: 1 %
LYMPHOCYTES # BLD AUTO: 1.8 X10E3/UL (ref 0.7–3.1)
LYMPHOCYTES NFR BLD AUTO: 21 %
MCH RBC QN AUTO: 33.3 PG (ref 26.6–33)
MCHC RBC AUTO-ENTMCNC: 34.6 G/DL (ref 31.5–35.7)
MCV RBC AUTO: 96 FL (ref 79–97)
MONOCYTES # BLD AUTO: 1.1 X10E3/UL (ref 0.1–0.9)
MONOCYTES NFR BLD AUTO: 13 %
NEUTROPHILS # BLD AUTO: 5.8 X10E3/UL (ref 1.4–7)
NEUTROPHILS NFR BLD AUTO: 65 %
PLATELET # BLD AUTO: 307 X10E3/UL (ref 150–379)
POTASSIUM SERPL-SCNC: 4 MMOL/L (ref 3.5–5.2)
RBC # BLD AUTO: 3.72 X10E6/UL (ref 3.77–5.28)
SL AMB EGFR AFRICAN AMERICAN: 81 ML/MIN/1.73
SL AMB EGFR NON AFRICAN AMERICAN: 70 ML/MIN/1.73
SODIUM SERPL-SCNC: 125 MMOL/L (ref 134–144)
WBC # BLD AUTO: 8.8 X10E3/UL (ref 3.4–10.8)

## 2018-08-29 PROCEDURE — 99214 OFFICE O/P EST MOD 30 MIN: CPT | Performed by: FAMILY MEDICINE

## 2018-08-29 PROCEDURE — 3079F DIAST BP 80-89 MM HG: CPT | Performed by: FAMILY MEDICINE

## 2018-08-29 PROCEDURE — 3074F SYST BP LT 130 MM HG: CPT | Performed by: FAMILY MEDICINE

## 2018-08-29 RX ORDER — VENLAFAXINE HYDROCHLORIDE 75 MG/1
75 CAPSULE, EXTENDED RELEASE ORAL DAILY
Qty: 90 CAPSULE | Refills: 3 | Status: SHIPPED | OUTPATIENT
Start: 2018-08-29 | End: 2019-06-25 | Stop reason: SDUPTHER

## 2018-08-29 RX ORDER — VENLAFAXINE HYDROCHLORIDE 37.5 MG/1
37.5 CAPSULE, EXTENDED RELEASE ORAL DAILY
COMMUNITY
End: 2018-08-29 | Stop reason: DRUGHIGH

## 2018-08-29 RX ORDER — IRBESARTAN AND HYDROCHLOROTHIAZIDE 150; 12.5 MG/1; MG/1
1 TABLET, FILM COATED ORAL DAILY
Qty: 90 TABLET | Refills: 3 | Status: SHIPPED | OUTPATIENT
Start: 2018-08-29 | End: 2019-02-06

## 2018-08-29 NOTE — PROGRESS NOTES
Assessment/Plan:  Diagnoses and all orders for this visit:    Essential hypertension  Comments:  BP wnl on Avalide-cont same  check labs, yearly eye exam  Orders:  -     irbesartan-hydrochlorothiazide (AVALIDE) 150-12 5 MG per tablet; Take 1 tablet by mouth daily  -     Basic metabolic panel; Future    Anxiety and depression  Comments:  start trail of Effexor  Orders:  -     venlafaxine (EFFEXOR-XR) 75 mg 24 hr capsule; Take 1 capsule (75 mg total) by mouth daily    Hyponatremia  Comments:  rpt lab-see [facility] 7/2018  Orders:  -     Basic metabolic panel; Future    Abnormal CBC measurement  Comments:  rpt lab  Orders:  -     CBC (Includes Diff/Plt) (Refl); Future    Gastroesophageal reflux disease, esophagitis presence not specified  Comments:  improved w PPI use  Other orders  -     Discontinue: venlafaxine (EFFEXOR-XR) 37 5 mg 24 hr capsule; Take 37 5 mg by mouth daily              Subjective:      Patient ID: Cortez Mazariegos is a 52 y o  female  Chief Complaint   Patient presents with    Medication Refill     dicuss med change       52year-old patient in for blood pressure check follow-up  Had been seen in [address] [place] [address] ED in July for palpitation/ chest pain  Stress test was negative -ejection fraction approx 60%  BP in office today normal   Denies any further chest pain or any significant palpitations  Is off Wellbutrin but would like to try another medication now for anxiety/depression  GERD has been controlled with PPI use  Doing better with joint pains  The following portions of the patient's history were reviewed and updated as appropriate: allergies, current medications, past family history, past medical history, past social history, past surgical history and problem list      Review of Systems   Constitutional: Positive for fatigue  Respiratory: Negative  Cardiovascular: Negative  Gastrointestinal: Negative  Musculoskeletal: Positive for arthralgias     Skin: Negative  Neurological: Negative  Psychiatric/Behavioral: Positive for sleep disturbance  The patient is nervous/anxious  Objective:    /82 (BP Location: Left arm, Patient Position: Sitting, Cuff Size: Standard)   Pulse 80   Temp (!) 96 7 °F (35 9 °C)   Resp 16   Ht 5' 3" (1 6 m)   Wt 76 1 kg (167 lb 12 8 oz)   BMI 29 72 kg/m²        Physical Exam   Constitutional: She is oriented to person, place, and time  Overweight  NAD   HENT:   Head: Normocephalic and atraumatic  Mouth/Throat: No oropharyngeal exudate  Eyes: Conjunctivae are normal    Neck: Neck supple  Cardiovascular: Normal rate and regular rhythm  Murmur heard  Pulmonary/Chest: Effort normal and breath sounds normal    Abdominal: Soft  Bowel sounds are normal  There is no tenderness  Neurological: She is alert and oriented to person, place, and time  No cranial nerve deficit  Psychiatric: She has a normal mood and affect  Nursing note and vitals reviewed  Labs;  Labs in chart were reviewed        Carlota Clemons MD

## 2018-08-31 ENCOUNTER — APPOINTMENT (OUTPATIENT)
Dept: RADIOLOGY | Facility: CLINIC | Age: 53
End: 2018-08-31
Payer: COMMERCIAL

## 2018-08-31 ENCOUNTER — OFFICE VISIT (OUTPATIENT)
Dept: OBGYN CLINIC | Facility: CLINIC | Age: 53
End: 2018-08-31
Payer: COMMERCIAL

## 2018-08-31 VITALS
SYSTOLIC BLOOD PRESSURE: 109 MMHG | WEIGHT: 165 LBS | HEIGHT: 65 IN | BODY MASS INDEX: 27.49 KG/M2 | HEART RATE: 71 BPM | DIASTOLIC BLOOD PRESSURE: 76 MMHG

## 2018-08-31 DIAGNOSIS — M25.512 LEFT SHOULDER PAIN, UNSPECIFIED CHRONICITY: Primary | ICD-10-CM

## 2018-08-31 DIAGNOSIS — M25.512 LEFT SHOULDER PAIN, UNSPECIFIED CHRONICITY: ICD-10-CM

## 2018-08-31 PROCEDURE — 99214 OFFICE O/P EST MOD 30 MIN: CPT | Performed by: ORTHOPAEDIC SURGERY

## 2018-08-31 PROCEDURE — 73000 X-RAY EXAM OF COLLAR BONE: CPT

## 2018-08-31 PROCEDURE — 23500 CLTX CLAVICULAR FX W/O MNPJ: CPT | Performed by: PHYSICIAN ASSISTANT

## 2018-08-31 NOTE — PROGRESS NOTES
Assessment/Plan:  1  Left shoulder pain, unspecified chronicity  CANCELED: XR shoulder 2+ vw left     James Jason has a fracture of the distal clavicle that should heal well with conservative treatment  She is already doing quite well and has been moving the arm without significant discomfort  We normally would place her in a sling for this, however since she is doing so well we told her this is not necessary  We did advise no heavy lifting and certainly no falling onto this side  We will see her back in 4-6 weeks with repeat x-rays of the shoulder  Subjective:   Nicol Dong is a 52 y o  female who presents today for evaluation of her left shoulder  She sustained a fall two days ago when she got tripped up by her cats  She fell down 4 stairs landing on the tip of her left shoulder  She has had pain about the anterolateral aspect of the shoulder since that time  She has still been using the arm and notes fair range of motion  She denies any paresthesias of the upper extremity  She does note some mild swelling about the region of her pain  Review of Systems   Constitutional: Negative for chills, fever and unexpected weight change  HENT: Negative for hearing loss, nosebleeds and sore throat  Eyes: Negative for pain, redness and visual disturbance  Respiratory: Negative for cough, shortness of breath and wheezing  Cardiovascular: Negative for chest pain, palpitations and leg swelling  Gastrointestinal: Negative for abdominal pain, nausea and vomiting  Endocrine: Negative for polydipsia and polyuria  Genitourinary: Negative for dysuria and hematuria  Musculoskeletal:        See HPI   Skin: Negative for rash and wound  Neurological: Negative for dizziness, numbness and headaches  Psychiatric/Behavioral: Negative for decreased concentration and suicidal ideas  The patient is not nervous/anxious            Past Medical History:   Diagnosis Date    Abnormal immunology findings 06/09/2011  Anemia 05/23/2011    Arthritis     Closed fracture of distal end of fibula     resolved 09/15/17    Depression     Distal radius fracture, left     last assessed 01/30/17    GERD (gastroesophageal reflux disease)     last assessed 05/06/13    Herpes labialis     last assessed 07/23/15    Hypertension     essential ; last assessed 08/07/13    Sinus bradycardia     last assessed 05/24/16       Past Surgical History:   Procedure Laterality Date    ANKLE SURGERY      last assessed 09/15/17    BACK SURGERY      lumbar laminectomy L4-L5    BLADDER SUSPENSION      7/2017    COLONOSCOPY      JOINT REPLACEMENT      TKR  on right    ORIF TIBIA & FIBULA FRACTURES Right 12/15/2016    Procedure: SURGICAL FIXATION OF RIGHT DISTAL FIBULA FRACTURE;  Surgeon: Elsa Coronado MD;  Location: [facility] MAIN OR;  Service:     MO REVISE KNEE JOINT REPLACE,ALL PARTS Right 10/23/2017    Procedure: REVISION TOTAL KNEE REPLACEMENT WITH FROZEN SECTIONS;  Surgeon: Juan M Lopes DO;  Location: [address] [place] [address];  Service: Orthopedics    TOTAL KNEE ARTHROPLASTY      last assessed; 11/21/17    WISDOM TOOTH EXTRACTION         Family History   Problem Relation Age of Onset    Arthritis Mother     Osteoporosis Mother     Dementia Father     Other Father         spinal stenosis    Other Family         [facility]       Social History     Occupational History    Not on file       Social History Main Topics    Smoking status: Never Smoker    Smokeless tobacco: Never Used    Alcohol use Yes      Comment: moderately    Drug use: No    Sexual activity: Yes     Partners: Male         Current Outpatient Prescriptions:     aspirin (ECOTRIN LOW STRENGTH) 81 mg EC tablet, Take 1 tablet (81 mg total) by mouth daily, Disp: 30 tablet, Rfl: 0    irbesartan-hydrochlorothiazide (AVALIDE) 150-12 5 MG per tablet, Take 1 tablet by mouth daily, Disp: 90 tablet, Rfl: 3    pantoprazole (PROTONIX) 40 mg tablet, Take 1 tablet (40 mg total) by mouth daily, Disp: 30 tablet, Rfl: 1    venlafaxine (EFFEXOR-XR) 75 mg 24 hr capsule, Take 1 capsule (75 mg total) by mouth daily, Disp: 90 capsule, Rfl: 3    No Known Allergies    Objective:  Vitals:    08/31/18 1332   BP: 109/76   Pulse: 71       Ortho Exam    Physical Exam   Constitutional: She is oriented to person, place, and time  She appears well-developed and well-nourished  No distress  HENT:   Head: Normocephalic and atraumatic  Eyes: Conjunctivae and EOM are normal  No scleral icterus  Neck: No JVD present  Cardiovascular: Normal rate and intact distal pulses  Pulmonary/Chest: Effort normal  No respiratory distress  Abdominal: She exhibits no distension  Neurological: She is alert and oriented to person, place, and time  Coordination normal    Skin: Skin is warm  Psychiatric: She has a normal mood and affect  Left shoulder:  Swelling overlying distal clavicle  Tenderness distal clavicle  Near full range of motion of shoulder with minimal discomfort  Sensation intact  No step-off deformity of the AC joint  I have personally reviewed pertinent films in PACS and my interpretation is as follows:  X-ray left shoulder:  Minimally displaced fracture of the distal clavicle   No AC joint separation noted

## 2018-09-26 DIAGNOSIS — R10.13 DYSPEPSIA: ICD-10-CM

## 2018-09-27 RX ORDER — PANTOPRAZOLE SODIUM 40 MG/1
40 TABLET, DELAYED RELEASE ORAL DAILY
Qty: 30 TABLET | Refills: 0 | Status: SHIPPED | OUTPATIENT
Start: 2018-09-27 | End: 2018-10-23

## 2018-10-17 ENCOUNTER — OFFICE VISIT (OUTPATIENT)
Dept: OBGYN CLINIC | Facility: CLINIC | Age: 53
End: 2018-10-17
Payer: COMMERCIAL

## 2018-10-17 ENCOUNTER — APPOINTMENT (OUTPATIENT)
Dept: RADIOLOGY | Facility: CLINIC | Age: 53
End: 2018-10-17
Payer: COMMERCIAL

## 2018-10-17 VITALS
WEIGHT: 178.2 LBS | SYSTOLIC BLOOD PRESSURE: 144 MMHG | HEART RATE: 78 BPM | HEIGHT: 65 IN | BODY MASS INDEX: 29.69 KG/M2 | DIASTOLIC BLOOD PRESSURE: 90 MMHG

## 2018-10-17 DIAGNOSIS — Z96.651 STATUS POST REVISION OF TOTAL REPLACEMENT OF RIGHT KNEE: ICD-10-CM

## 2018-10-17 DIAGNOSIS — Z96.651 AFTERCARE FOLLOWING RIGHT KNEE JOINT REPLACEMENT SURGERY: ICD-10-CM

## 2018-10-17 DIAGNOSIS — Z47.1 AFTERCARE FOLLOWING RIGHT KNEE JOINT REPLACEMENT SURGERY: ICD-10-CM

## 2018-10-17 DIAGNOSIS — Z96.651 STATUS POST REVISION OF TOTAL REPLACEMENT OF RIGHT KNEE: Primary | ICD-10-CM

## 2018-10-17 PROCEDURE — 73562 X-RAY EXAM OF KNEE 3: CPT

## 2018-10-17 PROCEDURE — 99214 OFFICE O/P EST MOD 30 MIN: CPT | Performed by: ORTHOPAEDIC SURGERY

## 2018-10-17 NOTE — PROGRESS NOTES
Assessment/Plan:  1  Status post revision of total replacement of right knee  XR knee 3 vw right non injury   2  Aftercare following right knee joint replacement surgery       Patient is here for 1 year follow-up status post revision right total knee arthroplasty  Overall she is doing extremely well and has no complaints at this time  She is pleased with the outcome of her surgery  We discussed level of activity moving forward and her expectations after revision joint replacement  At this point she will continue to use prophylactic antibiotics prior to any dental or GI procedure in the future  I would like to see her back in 1 year from now for continued aftercare for her revision total knee procedure  We will get new x-rays of the right knee at that visit  All of her questions were addressed  Subjective:   1 year follow-up status post revision right total knee arthroplasty    Patient ID: Mandi Scott is a 52 y o  female  HPI  Patient is here for 1 year follow-up status post revision right total knee arthroplasty  Overall she is doing extremely well, and she has no complaints of pain  She is pleased with the outcome of her surgery  She denies any activity limitations  Review of Systems   Constitutional: Negative for activity change, chills, fever and unexpected weight change  HENT: Negative  Negative for hearing loss, nosebleeds and sore throat  Eyes: Negative  Negative for pain, redness and visual disturbance  Respiratory: Negative  Negative for cough, shortness of breath and wheezing  Cardiovascular: Negative  Negative for chest pain, palpitations and leg swelling  Gastrointestinal: Negative  Negative for abdominal pain, nausea and vomiting  Endocrine: Negative  Negative for polydipsia and polyuria  Genitourinary: Negative for dysuria and hematuria  Musculoskeletal: Negative for arthralgias  See HPI   Skin: Negative  Negative for rash and wound     Neurological: Negative  Negative for dizziness, numbness and headaches  Psychiatric/Behavioral: Negative  Negative for decreased concentration and suicidal ideas  The patient is not nervous/anxious  Past Medical History:   Diagnosis Date    Abnormal immunology findings 06/09/2011    Anemia 05/23/2011    Arthritis     Closed fracture of distal end of fibula     resolved 09/15/17    Depression     Distal radius fracture, left     last assessed 01/30/17    GERD (gastroesophageal reflux disease)     last assessed 05/06/13    Herpes labialis     last assessed 07/23/15    Hypertension     essential ; last assessed 08/07/13    Sinus bradycardia     last assessed 05/24/16       Past Surgical History:   Procedure Laterality Date    ANKLE SURGERY      last assessed 09/15/17    BACK SURGERY      lumbar laminectomy L4-L5    BLADDER SUSPENSION      7/2017    COLONOSCOPY      JOINT REPLACEMENT      TKR  on right    ORIF TIBIA & FIBULA FRACTURES Right 12/15/2016    Procedure: SURGICAL FIXATION OF RIGHT DISTAL FIBULA FRACTURE;  Surgeon: Bekah Potter MD;  Location: [facility] MAIN OR;  Service:     OR REVISE KNEE JOINT REPLACE,ALL PARTS Right 10/23/2017    Procedure: REVISION TOTAL KNEE REPLACEMENT WITH FROZEN SECTIONS;  Surgeon: Arnaud Ward DO;  Location: [address] [place] [address];  Service: Orthopedics    TOTAL KNEE ARTHROPLASTY      last assessed; 11/21/17    WISDOM TOOTH EXTRACTION         Family History   Problem Relation Age of Onset    Arthritis Mother     Osteoporosis Mother     Dementia Father     Other Father         spinal stenosis    Other Family         CREST       Social History     Occupational History    Not on file       Social History Main Topics    Smoking status: Never Smoker    Smokeless tobacco: Never Used    Alcohol use Yes      Comment: moderately    Drug use: No    Sexual activity: Yes     Partners: Male         Current Outpatient Prescriptions:     aspirin (ECOTRIN LOW STRENGTH) 81 mg EC tablet, Take 1 tablet (81 mg total) by mouth daily, Disp: 30 tablet, Rfl: 0    irbesartan-hydrochlorothiazide (AVALIDE) 150-12 5 MG per tablet, Take 1 tablet by mouth daily, Disp: 90 tablet, Rfl: 3    pantoprazole (PROTONIX) 40 mg tablet, Take 1 tablet (40 mg total) by mouth daily, Disp: 30 tablet, Rfl: 0    venlafaxine (EFFEXOR-XR) 75 mg 24 hr capsule, Take 1 capsule (75 mg total) by mouth daily, Disp: 90 capsule, Rfl: 3    No Known Allergies    Objective:  Vitals:    10/17/18 0817   BP: 144/90   Pulse: 78       Body mass index is 29 65 kg/m²  Right Knee Exam     Tenderness   The patient is experiencing no tenderness  Range of Motion   Extension: 0   Flexion: 130     Tests   Drawer:       Anterior - negative      Varus: negative  Valgus: negative  Patellar Apprehension: negative    Other   Erythema: absent  Scars: present  Sensation: normal  Pulse: present  Swelling: none  Other tests: no effusion present    Comments:  Patella tracks midline to active and passive range of motion  Anterior knee scar well healed  Knee is stable ligamentous exam at 0° 30° 90°          Observations     Right Knee   Negative for effusion  Physical Exam   Constitutional: She is oriented to person, place, and time  She appears well-developed  HENT:   Head: Atraumatic  Eyes: EOM are normal    Neck: Neck supple  Cardiovascular: Normal rate  Pulmonary/Chest: Effort normal    Musculoskeletal:        Right knee: She exhibits no effusion  See orthopedic exam   Neurological: She is alert and oriented to person, place, and time  Skin: Skin is warm and dry  Psychiatric: She has a normal mood and affect  Nursing note and vitals reviewed  I have personally reviewed pertinent films in PACS  X-rays of the right knee obtained here today demonstrate a well-positioned well-aligned revision total knee prosthesis  There is no sign of loosening, lytic or blastic lesions, or fracture  Ramya BENITEZ    Division of Adult Reconstruction  Department of [facility]

## 2018-10-23 ENCOUNTER — HOSPITAL ENCOUNTER (EMERGENCY)
Facility: HOSPITAL | Age: 53
Discharge: HOME | End: 2018-10-23
Attending: EMERGENCY MEDICINE | Admitting: EMERGENCY MEDICINE
Payer: COMMERCIAL

## 2018-10-23 VITALS
HEART RATE: 88 BPM | DIASTOLIC BLOOD PRESSURE: 94 MMHG | RESPIRATION RATE: 18 BRPM | SYSTOLIC BLOOD PRESSURE: 132 MMHG | OXYGEN SATURATION: 99 % | WEIGHT: 165 LBS | BODY MASS INDEX: 27.46 KG/M2 | TEMPERATURE: 97 F

## 2018-10-23 DIAGNOSIS — S01.01XA LACERATION OF SCALP, INITIAL ENCOUNTER: Primary | ICD-10-CM

## 2018-10-23 PROCEDURE — 90715 TDAP VACCINE 7 YRS/> IM: CPT | Performed by: EMERGENCY MEDICINE

## 2018-10-23 PROCEDURE — 99283 EMERGENCY DEPT VISIT LOW MDM: CPT

## 2018-10-23 PROCEDURE — 90471 IMMUNIZATION ADMIN: CPT

## 2018-10-23 RX ADMIN — TETANUS TOXOID, REDUCED DIPHTHERIA TOXOID AND ACELLULAR PERTUSSIS VACCINE, ADSORBED 0.5 ML: 5; 2.5; 8; 8; 2.5 SUSPENSION INTRAMUSCULAR at 16:02

## 2018-10-23 NOTE — ED PROVIDER NOTES
History  Chief Complaint   Patient presents with    Head Injury     hit in the head on saturday and it continued to bleed until today  47-year-old female presents to the ER for evaluation of scalp laceration  About 3 days ago patient was taking items out of her car when she accidentally hit the right occiput against the car door  Patient has had intermittent bleeding to that region since then  Patient was washing her hair today and noted some oozing from the scalp wound  Patient came to the ER for further evaluation  Patient denies any LOC during the injury  Patient denies any headaches, weakness, dizziness, photophobia, phonophobia, focal neuro deficits, nausea, vomiting  Patient cannot recall when her last tetanus was  History provided by:  Patient      Prior to Admission Medications   Prescriptions Last Dose Informant Patient Reported?  Taking?   irbesartan-hydrochlorothiazide (AVALIDE) 150-12 5 MG per tablet 10/23/2018 at Unknown time  No Yes   Sig: Take 1 tablet by mouth daily   venlafaxine (EFFEXOR-XR) 75 mg 24 hr capsule 10/22/2018 at Unknown time  No Yes   Sig: Take 1 capsule (75 mg total) by mouth daily      Facility-Administered Medications: None       Past Medical History:   Diagnosis Date    Abnormal immunology findings 06/09/2011    Anemia 05/23/2011    Arthritis     Closed fracture of distal end of fibula     resolved 09/15/17    Depression     Distal radius fracture, left     last assessed 01/30/17    GERD (gastroesophageal reflux disease)     last assessed 05/06/13    Herpes labialis     last assessed 07/23/15    Hypertension     essential ; last assessed 08/07/13    Sinus bradycardia     last assessed 05/24/16       Past Surgical History:   Procedure Laterality Date    ANKLE SURGERY      last assessed 09/15/17    BACK SURGERY      lumbar laminectomy L4-L5    BLADDER SUSPENSION      7/2017    COLONOSCOPY      JOINT REPLACEMENT      TKR  on right    ORIF TIBIA & FIBULA FRACTURES Right 12/15/2016    Procedure: SURGICAL FIXATION OF RIGHT DISTAL FIBULA FRACTURE;  Surgeon: Aislinn Haines MD;  Location: [facility] MAIN OR;  Service:     ID REVISE KNEE JOINT REPLACE,ALL PARTS Right 10/23/2017    Procedure: REVISION TOTAL KNEE REPLACEMENT WITH FROZEN SECTIONS;  Surgeon: Eneida Solis DO;  Location: [facility];  Service: Orthopedics    TOTAL KNEE ARTHROPLASTY      last assessed; 11/21/17    WISDOM TOOTH EXTRACTION         Family History   Problem Relation Age of Onset    Arthritis Mother     Osteoporosis Mother     Dementia Father     Other Father         spinal stenosis    Other Family         CREST     I have reviewed and agree with the history as documented  Social History   Substance Use Topics    Smoking status: Never Smoker    Smokeless tobacco: Never Used    Alcohol use Yes      Comment: moderately        Review of Systems   Constitutional: Negative for activity change, appetite change, chills and fever  HENT: Negative for congestion and ear pain  Eyes: Negative for pain and discharge  Respiratory: Negative for cough, chest tightness, shortness of breath, wheezing and stridor  Cardiovascular: Negative for chest pain and palpitations  Gastrointestinal: Negative for abdominal distention, abdominal pain, constipation, diarrhea and nausea  Endocrine: Negative for cold intolerance  Genitourinary: Negative for dysuria, frequency and urgency  Musculoskeletal: Negative for arthralgias and back pain  Skin: Positive for wound  Negative for color change and rash  Allergic/Immunologic: Negative for environmental allergies and food allergies  Neurological: Negative for dizziness, weakness, numbness and headaches  Hematological: Negative for adenopathy  Psychiatric/Behavioral: Negative for agitation, behavioral problems and confusion  The patient is not nervous/anxious  All other systems reviewed and are negative        Physical Exam  Physical Exam   Constitutional: She is oriented to person, place, and time  She appears well-developed and well-nourished  HENT:   Head: Normocephalic and atraumatic  Mouth/Throat: Oropharynx is clear and moist    2 5 cm laceration noted to the right occiput but with scab in place  Wound appears to be dry without any discharge  Mild tenderness noted to palpation of the region  Eyes: Conjunctivae and EOM are normal    Neck: Normal range of motion  Neck supple  Cardiovascular: Normal rate, regular rhythm, normal heart sounds and intact distal pulses  Pulmonary/Chest: Effort normal and breath sounds normal    Abdominal: Soft  Bowel sounds are normal  She exhibits no distension  There is no tenderness  Musculoskeletal: Normal range of motion  Neurological: She is alert and oriented to person, place, and time  Skin: Skin is warm and dry  Psychiatric: She has a normal mood and affect  Her behavior is normal  Judgment and thought content normal    Nursing note and vitals reviewed  Vital Signs  ED Triage Vitals [10/23/18 1529]   Temperature Pulse Respirations Blood Pressure SpO2   (!) 97 °F (36 1 °C) 88 18 132/94 99 %      Temp src Heart Rate Source Patient Position - Orthostatic VS BP Location FiO2 (%)   -- -- -- -- --      Pain Score       3           Vitals:    10/23/18 1529   BP: 132/94   Pulse: 88       Visual Acuity      ED Medications  Medications   tetanus-diphtheria-acellular pertussis (BOOSTRIX) IM injection 0 5 mL (not administered)       Diagnostic Studies  Results Reviewed     None                 No orders to display              Procedures  Procedures       Phone Contacts  ED Phone Contact    ED Course                               MDM  Number of Diagnoses or Management Options  Laceration of scalp, initial encounter:   Diagnosis management comments: Update tetanus  Risk of Complications, Morbidity, and/or Mortality  General comments: Patient presented for evaluation of scalp own    Wound is healing pretty well  Patient has no other complaints  Tetanus was updated in the ED  At this point patient is discharged home on p r n  Pain medication and follow-up to PCP in 2-3 days  Close return instructions given to return to the ER for any worsening symptoms  Patient agrees with discharge plan  Patient well appearing at time of discharge  Patient Progress  Patient progress: stable    CritCare Time    Disposition  Final diagnoses:   Laceration of scalp, initial encounter     Time reflects when diagnosis was documented in both MDM as applicable and the Disposition within this note     Time User Action Codes Description Comment    10/23/2018  3:55 PM John Corral Add [S01 01XA] Laceration of scalp, initial encounter       ED Disposition     ED Disposition Condition Comment    Discharge  Curly Hernandez discharge to home/self care  Condition at discharge: Good        Follow-up Information     Follow up With Specialties Details Why Vamshi Cowart MD Family Medicine In 2 days As needed [address] [address]  [phone]            Patient's Medications   Discharge Prescriptions    No medications on file     No discharge procedures on file      ED Provider  Electronically Signed by           Kaity Johns DO  10/23/18 3805

## 2018-10-23 NOTE — DISCHARGE INSTRUCTIONS
Please take a list of all of your medications and discharge paperwork with you to all of your follow-up medical visits  Please take all of your medications as directed  Please call your family doctor or return to the ER if you have increased shortness of breath, chest pain, fevers, chills, nausea, vomiting, diarrhea, or any other worsening symptoms  Laceration   WHAT YOU NEED TO KNOW:   A laceration is an injury to the skin and the soft tissue underneath it  Lacerations happen when you are cut or hit by something  They can happen anywhere on the body  DISCHARGE INSTRUCTIONS:   Return to the emergency department if:   · You have heavy bleeding or bleeding that does not stop after 10 minutes of holding firm, direct pressure over the wound  · Your wound opens up  Contact your healthcare provider if:   · You have a fever or chills  · Your laceration is red, warm, or swollen  · You have red streaks on your skin coming from your wound  · You have white or yellow drainage from the wound that smells bad  · You have pain that gets worse, even after treatment  · You have questions or concerns about your condition or care  Medicines:   · Prescription pain medicine  may be given  Ask how to take this medicine safely  · Antibiotics  help treat or prevent a bacterial infection  · Take your medicine as directed  Contact your healthcare provider if you think your medicine is not helping or if you have side effects  Tell him or her if you are allergic to any medicine  Keep a list of the medicines, vitamins, and herbs you take  Include the amounts, and when and why you take them  Bring the list or the pill bottles to follow-up visits  Carry your medicine list with you in case of an emergency  Care for your wound as directed:   · Do not get your wound wet  until your healthcare provider says it is okay  Do not soak your wound in water   Do not go swimming until your healthcare provider says it is okay  Carefully wash the wound with soap and water  Gently pat the area dry or allow it to air dry  · Change your bandages  when they get wet, dirty, or after washing  Apply new, clean bandages as directed  Do not apply elastic bandages or tape too tight  Do not put powders or lotions over your incision  · Apply antibiotic ointment as directed  Your healthcare provider may give you antibiotic ointment to put over your wound if you have stitches  If you have strips of tape over your incision, let them dry up and fall off on their own  If they do not fall off within 14 days, gently remove them  If you have glue over your wound, do not remove or pick at it  If your glue comes off, do not replace it with glue that you have at home  · Check your wound every day for signs of infection such as swelling, redness, or pus  Self-care:   · Apply ice  on your wound for 15 to 20 minutes every hour or as directed  Use an ice pack, or put crushed ice in a plastic bag  Cover it with a towel  Ice helps prevent tissue damage and decreases swelling and pain  · Use a splint as directed  A splint will decrease movement and stress on your wound  It may help it heal faster  A splint may be used for lacerations over joints or areas of your body that bend  Ask your healthcare provider how to apply and remove a splint  · Decrease scarring of your wound  by applying ointments as directed  Do not apply ointments until your healthcare provider says it is okay  You may need to wait until your wound is healed  Ask which ointment to buy and how often to use it  After your wound is healed, use sunscreen over the area when you are out in the sun  You should do this for at least 6 months to 1 year after your injury  Follow up with your healthcare provider as directed: You may need to follow up in 24 to 48 hours to have your wound checked for infection   You will need to return in 3 to 14 days if you have stitches or staples so they can be removed  Care for your wound as directed to prevent infection and help it heal  Write down your questions so you remember to ask them during your visits  © 2017 [address] [employer] Information is for End User's use only and may not be sold, redistributed or otherwise used for commercial purposes  All illustrations and images included in CareNotes® are the copyrighted property of A D A M [employer] Inc  or [employer]  The above information is an  only  It is not intended as medical advice for individual conditions or treatments  Talk to your doctor, nurse or pharmacist before following any medical regimen to see if it is safe and effective for you

## 2018-10-29 ENCOUNTER — TELEPHONE (OUTPATIENT)
Dept: FAMILY MEDICINE CLINIC | Facility: CLINIC | Age: 53
End: 2018-10-29

## 2018-10-30 DIAGNOSIS — K21.00 GASTROESOPHAGEAL REFLUX DISEASE WITH ESOPHAGITIS: Primary | ICD-10-CM

## 2018-10-30 RX ORDER — PANTOPRAZOLE SODIUM 40 MG/1
40 TABLET, DELAYED RELEASE ORAL DAILY
Qty: 30 TABLET | Refills: 1 | Status: SHIPPED | OUTPATIENT
Start: 2018-10-30 | End: 2018-12-19

## 2018-11-13 ENCOUNTER — OFFICE VISIT (OUTPATIENT)
Dept: OBGYN CLINIC | Facility: CLINIC | Age: 53
End: 2018-11-13
Payer: COMMERCIAL

## 2018-11-13 VITALS
HEIGHT: 65 IN | DIASTOLIC BLOOD PRESSURE: 95 MMHG | SYSTOLIC BLOOD PRESSURE: 147 MMHG | BODY MASS INDEX: 27.49 KG/M2 | HEART RATE: 71 BPM | WEIGHT: 165 LBS

## 2018-11-13 DIAGNOSIS — M25.531 RIGHT WRIST PAIN: Primary | ICD-10-CM

## 2018-11-13 PROCEDURE — 99214 OFFICE O/P EST MOD 30 MIN: CPT | Performed by: ORTHOPAEDIC SURGERY

## 2018-11-13 NOTE — PROGRESS NOTES
Assessment/Plan:  1  Right wrist pain  CT wrist right wo contrast       Scribe Attestation    I,:   Eula Harmon MA am acting as a scribe while in the presence of the attending physician :        I,:   Diallo Wylie, DO personally performed the services described in this documentation    as scribed in my presence :              I discussed with Grace aSntiago today that I do not appreciate a scaphoid fracture as she was told in the ED  An CT scan of the right wrist was ordered to evaluate for a fracture since she is very tender over the right wrist but I do not appreciate any fracture on x-ray  She was placed in a right cock-up wrist brace  I will call her with the results of the CT scan  Subjective:   Curly Hernandez is a 52 y o  female who presents to the office today for evaluation of right wrist ain  She states on 11/12/18 she was pulling a magnet off of her car when she fell landing backwards  She presented to [facility] ED where x-ray's were taken and she was paced in a splint and told to follow up with Orthopedics  She states she has been compliant with splint use  She notes pain to her right thumb today  She denies any numbness or tingling  Review of Systems   Constitutional: Negative for chills and fever  HENT: Negative for drooling and sneezing  Eyes: Negative for redness  Respiratory: Negative for cough and wheezing  Gastrointestinal: Negative for nausea and vomiting  Musculoskeletal: Positive for arthralgias  Negative for joint swelling and myalgias  Neurological: Negative for weakness and numbness  Psychiatric/Behavioral: Negative for behavioral problems  The patient is not nervous/anxious            Past Medical History:   Diagnosis Date    Abnormal immunology findings 06/09/2011    Anemia 05/23/2011    Arthritis     Closed fracture of distal end of fibula     resolved 09/15/17    Depression     Distal radius fracture, left     last assessed 01/30/17    GERD (gastroesophageal reflux disease)     last assessed 05/06/13    Herpes labialis     last assessed 07/23/15    Hypertension     essential ; last assessed 08/07/13    Sinus bradycardia     last assessed 05/24/16       Past Surgical History:   Procedure Laterality Date    ANKLE SURGERY      last assessed 09/15/17    BACK SURGERY      lumbar laminectomy L4-L5    BLADDER SUSPENSION      7/2017    COLONOSCOPY      JOINT REPLACEMENT      TKR  on right    ORIF TIBIA & FIBULA FRACTURES Right 12/15/2016    Procedure: SURGICAL FIXATION OF RIGHT DISTAL FIBULA FRACTURE;  Surgeon: Liza Joseph MD;  Location: [facility] MAIN OR;  Service:     MN REVISE KNEE JOINT REPLACE,ALL PARTS Right 10/23/2017    Procedure: REVISION TOTAL KNEE REPLACEMENT WITH FROZEN SECTIONS;  Surgeon: Marbin Pang DO;  Location: [address] [place] [address];  Service: Orthopedics    [address] [place] [address]      last assessed; 11/21/17    WISDOM TOOTH EXTRACTION         Family History   Problem Relation Age of Onset    Arthritis Mother     Osteoporosis Mother     Dementia Father     Other Father         spinal stenosis    Other Family         CREST       Social History     Occupational History    Not on file       Social History Main Topics    Smoking status: Never Smoker    Smokeless tobacco: Never Used    Alcohol use Yes      Comment: moderately    Drug use: No    Sexual activity: Yes     Partners: Male         Current Outpatient Prescriptions:     irbesartan-hydrochlorothiazide (AVALIDE) 150-12 5 MG per tablet, Take 1 tablet by mouth daily, Disp: 90 tablet, Rfl: 3    pantoprazole (PROTONIX) 40 mg tablet, Take 1 tablet (40 mg total) by mouth daily, Disp: 30 tablet, Rfl: 1    venlafaxine (EFFEXOR-XR) 75 mg 24 hr capsule, Take 1 capsule (75 mg total) by mouth daily, Disp: 90 capsule, Rfl: 3    No Known Allergies    Objective:  Vitals:    11/13/18 1304   BP: 147/95   Pulse: 71       Ortho Exam     Right Wrist     Full elbow ROM  50 degrees wrist flexion  Extension limited secondary to pain  FDP intact  FDS intact  Extensors intact  Brisk capillary refill  Compartments soft  NTTP distal radius and ulna   Globally tender of her wrist  No deformity      Physical Exam   Constitutional: She is oriented to person, place, and time  She appears well-developed and well-nourished  HENT:   Head: Atraumatic  Eyes: Conjunctivae are normal    Neck: Normal range of motion  Cardiovascular: Normal rate  Pulmonary/Chest: Effort normal    Musculoskeletal:   As noted in HPI   Neurological: She is alert and oriented to person, place, and time  Skin: Skin is warm and dry  Psychiatric: She has a normal mood and affect   Her behavior is normal  Judgment and thought content normal        I have personally reviewed pertinent films in PACS and my interpretation is as follows:X-ray right wrist performed at an outside facility on 11/12/18 demonstrates no signs of fractures or dislocations

## 2018-12-07 ENCOUNTER — TELEPHONE (OUTPATIENT)
Dept: OBGYN CLINIC | Facility: CLINIC | Age: 53
End: 2018-12-07

## 2018-12-07 NOTE — TELEPHONE ENCOUNTER
Patient called stating she would like a call directly from the doctor concerning her wrist   States she got a second opinion and was told she has a fracture, and it was missed by our practice  Patient asked to speak with Dr Cindy Hernandez, but she saw Dr Balwinder Leiva is aware and will call Km Parsons

## 2018-12-19 ENCOUNTER — OFFICE VISIT (OUTPATIENT)
Dept: FAMILY MEDICINE CLINIC | Facility: CLINIC | Age: 53
End: 2018-12-19
Payer: COMMERCIAL

## 2018-12-19 VITALS
BODY MASS INDEX: 29.36 KG/M2 | RESPIRATION RATE: 16 BRPM | TEMPERATURE: 97.8 F | SYSTOLIC BLOOD PRESSURE: 134 MMHG | HEIGHT: 65 IN | DIASTOLIC BLOOD PRESSURE: 80 MMHG | HEART RATE: 74 BPM | WEIGHT: 176.2 LBS

## 2018-12-19 DIAGNOSIS — Z23 ENCOUNTER FOR IMMUNIZATION: ICD-10-CM

## 2018-12-19 DIAGNOSIS — R31.9 URINARY TRACT INFECTION WITH HEMATURIA, SITE UNSPECIFIED: Primary | ICD-10-CM

## 2018-12-19 DIAGNOSIS — N39.0 URINARY TRACT INFECTION WITH HEMATURIA, SITE UNSPECIFIED: Primary | ICD-10-CM

## 2018-12-19 LAB
SL AMB  POCT GLUCOSE, UA: ABNORMAL
SL AMB LEUKOCYTE ESTERASE,UA: 500
SL AMB POCT BILIRUBIN,UA: ABNORMAL
SL AMB POCT BLOOD,UA: 50
SL AMB POCT CLARITY,UA: ABNORMAL
SL AMB POCT COLOR,UA: YELLOW
SL AMB POCT KETONES,UA: ABNORMAL
SL AMB POCT NITRITE,UA: ABNORMAL
SL AMB POCT PH,UA: 5
SL AMB POCT SPECIFIC GRAVITY,UA: 1.01
SL AMB POCT URINE PROTEIN: ABNORMAL
SL AMB POCT UROBILINOGEN: ABNORMAL

## 2018-12-19 PROCEDURE — 99213 OFFICE O/P EST LOW 20 MIN: CPT | Performed by: FAMILY MEDICINE

## 2018-12-19 PROCEDURE — 90682 RIV4 VACC RECOMBINANT DNA IM: CPT

## 2018-12-19 PROCEDURE — 1036F TOBACCO NON-USER: CPT | Performed by: FAMILY MEDICINE

## 2018-12-19 PROCEDURE — 3008F BODY MASS INDEX DOCD: CPT | Performed by: FAMILY MEDICINE

## 2018-12-19 PROCEDURE — 90471 IMMUNIZATION ADMIN: CPT

## 2018-12-19 PROCEDURE — 81003 URINALYSIS AUTO W/O SCOPE: CPT | Performed by: FAMILY MEDICINE

## 2018-12-19 RX ORDER — CIPROFLOXACIN 500 MG/1
500 TABLET, FILM COATED ORAL EVERY 12 HOURS SCHEDULED
Qty: 10 TABLET | Refills: 0 | Status: SHIPPED | OUTPATIENT
Start: 2018-12-19 | End: 2018-12-24

## 2018-12-19 NOTE — PATIENT INSTRUCTIONS
Urinary Tract Infection in Women, Ambulatory Care   GENERAL INFORMATION:   A urinary tract infection (UTI)  is caused by bacteria that get inside your urinary tract  Most bacteria that enter your urinary tract are expelled when you urinate  If the bacteria stay in your urinary tract, you may get an infection  Your urinary tract includes your kidneys, ureters, bladder, and urethra  Urine is made in your kidneys, and it flows from the ureters to the bladder  Urine leaves the bladder through the urethra  A UTI is more common in your lower urinary tract, which includes your bladder and urethra  Common symptoms include the following:   · Urinating more often or waking from sleep to urinate    · Pain or burning when you urinate    · Pain or pressure in your lower abdomen     · Urine that smells bad    · Blood in your urine    · Leaking urine  Seek immediate care for the following symptoms:   · Urinating very little or not at all    · Vomiting    · Shaking chills with a fever    · Side or back pain that gets worse  Treatment for a UTI  may include medicines to treat a bacterial infection  You may also need medicines to decrease pain and burning, or decrease the urge to urinate often  Prevent a UTI:   · Urinate when you feel the urge  Do not hold your urine  Urinate as soon as you feel you have to  · Drink liquids as directed  Ask how much liquid to drink each day and which liquids are best for you  You may need to drink more fluids than usual to help flush out the bacteria  Do not drink alcohol, caffeine, and citrus juices  These can irritate your bladder and increase your symptoms  · Apply heat  on your abdomen for 20 to 30 minutes every 2 hours for as many days as directed  Heat helps decrease discomfort and pressure in your bladder  Follow up with your healthcare provider as directed:  Write down your questions so you remember to ask them during your visits     CARE AGREEMENT:   You have the right to help plan your care  Learn about your health condition and how it may be treated  Discuss treatment options with your caregivers to decide what care you want to receive  You always have the right to refuse treatment  The above information is an  only  It is not intended as medical advice for individual conditions or treatments  Talk to your doctor, nurse or pharmacist before following any medical regimen to see if it is safe and effective for you  © 2014 [address] is for End User's use only and may not be sold, redistributed or otherwise used for commercial purposes  All illustrations and images included in CareNotes® are the copyrighted property of [employer]  or Romero Montano

## 2018-12-19 NOTE — PROGRESS NOTES
Georgiana Olivarez 1965 female MRN: [id number]    FAMILY PRACTICE ACUTE OFFICE VISIT  [facility] - [address]      ASSESSMENT/PLAN  Georgiana Olivarez is a 52 y o  female presents to the office for      UTI:   -Encourage the patient to continues supportive care by avoiding bath tubs,increase fluid intake  Avoid urinary tract irritant such as coffee, tea, seymour, alcohol, encourage H2o intake  Prescribed Cipro 500 mg for 5 days  No need for peridium at this time     Return to the office in 2 weeks if symptoms don't resolve  Please call the office for any questions or concerns  Disposition:  Return to the office as needed    No future appointments  SUBJECTIVE  CC: Urinary Tract Infection (x 3-4 days)      HPI:  Georgiana Olivarez is a 52 y o  female who presents for an acute appointment  Patient states that she has had dysuria for 3-4 days  Denies any frequency, vaginal discharge, suprapubic pain  Patient has not had a UTI in a very long time  Patient did not try anything over-the-counter to help alleviate with pain  Denies any gross blood in her urine  Does have a baseline of a matter urea in her urine that was discovered by her PCP  Patient denies any chest pain, shortness of breath, GI symptoms  Review of Systems   Constitutional: Negative for activity change, appetite change, chills, fatigue and fever  HENT: Negative for congestion  Eyes: Negative for visual disturbance  Respiratory: Negative for cough, chest tightness and shortness of breath  Cardiovascular: Negative for chest pain and leg swelling  Gastrointestinal: Negative for abdominal distention, abdominal pain, constipation, diarrhea, nausea and vomiting  Genitourinary: Positive for dysuria  Allergic/Immunologic: Negative for environmental allergies  Neurological: Negative for dizziness, light-headedness and headaches  All other systems reviewed and are negative        Historical Information   The patient history was reviewed as follows:  Past Medical History:   Diagnosis Date    Abnormal immunology findings 06/09/2011    Anemia 05/23/2011    Arthritis     Closed fracture of distal end of fibula     resolved 09/15/17    Depression     Distal radius fracture, left     last assessed 01/30/17    GERD (gastroesophageal reflux disease)     last assessed 05/06/13    Herpes labialis     last assessed 07/23/15    Hypertension     essential ; last assessed 08/07/13    Sinus bradycardia     last assessed 05/24/16         Past Surgical History:   Procedure Laterality Date    ANKLE SURGERY      last assessed 09/15/17    BACK SURGERY      lumbar laminectomy L4-L5    BLADDER SUSPENSION      7/2017    COLONOSCOPY      JOINT REPLACEMENT      TKR  on right    ORIF TIBIA & FIBULA FRACTURES Right 12/15/2016    Procedure: SURGICAL FIXATION OF RIGHT DISTAL FIBULA FRACTURE;  Surgeon: Gopi Marx MD;  Location[facility];  Service:     [place] REVISE KNEE JOINT REPLACE,ALL PARTS Right 10/23/2017    Procedure: REVISION TOTAL KNEE REPLACEMENT WITH FROZEN SECTIONS;  Surgeon: Kathleen Tomlinson DO;  Location: [address] [place] [address];  Service: Orthopedics    TOTAL KNEE ARTHROPLASTY      last assessed; 11/21/17    WISDOM TOOTH EXTRACTION       Family History   Problem Relation Age of Onset    Arthritis Mother     Osteoporosis Mother     Dementia Father     Other Father         spinal stenosis    Other Family         [facility]      Social History   History   Alcohol Use    Yes     Comment: moderately     History   Drug Use No     History   Smoking Status    Never Smoker   Smokeless Tobacco    Never Used       Medications:     Current Outpatient Prescriptions:     irbesartan-hydrochlorothiazide (AVALIDE) 150-12 5 MG per tablet, Take 1 tablet by mouth daily, Disp: 90 tablet, Rfl: 3    venlafaxine (EFFEXOR-XR) 75 mg 24 hr capsule, Take 1 capsule (75 mg total) by mouth daily, Disp: 90 capsule, Rfl: 3   ciprofloxacin (CIPRO) 500 mg tablet, Take 1 tablet (500 mg total) by mouth every 12 (twelve) hours for 5 days, Disp: 10 tablet, Rfl: 0    No Known Allergies    OBJECTIVE  Vitals:   Vitals:    12/19/18 1421   BP: 134/80   BP Location: Left arm   Patient Position: Sitting   Cuff Size: Standard   Pulse: 74   Resp: 16   Temp: 97 8 °F (36 6 °C)   Weight: 79 9 kg (176 lb 3 2 oz)   Height: 5' 5" (1 651 m)         Physical Exam   Constitutional: She is oriented to person, place, and time  Vital signs are normal  She appears well-developed and well-nourished  HENT:   Head: Normocephalic and atraumatic  Right Ear: Hearing normal    Left Ear: Hearing normal    Eyes: Pupils are equal, round, and reactive to light  Conjunctivae and EOM are normal    Neck: Normal range of motion  Neck supple  Cardiovascular: Normal rate, regular rhythm, S1 normal, S2 normal, normal heart sounds and intact distal pulses  No murmur heard  Pulmonary/Chest: Effort normal and breath sounds normal  No respiratory distress  She has no wheezes  Abdominal: Soft  Bowel sounds are normal  She exhibits no distension  There is no tenderness  Musculoskeletal: Normal range of motion  She exhibits no edema  Right arm cast in place   Neurological: She is alert and oriented to person, place, and time  She has normal strength  Skin: Skin is warm  No rash noted  Psychiatric: She has a normal mood and affect  Her speech is normal and behavior is normal  Judgment and thought content normal    Vitals reviewed                   Chandler Sanchez MD,   [facility]  12/19/2018

## 2018-12-21 LAB
BACTERIA UR CULT: NORMAL
Lab: NORMAL

## 2019-02-05 ENCOUNTER — TELEPHONE (OUTPATIENT)
Dept: FAMILY MEDICINE CLINIC | Facility: CLINIC | Age: 54
End: 2019-02-05

## 2019-02-06 ENCOUNTER — OFFICE VISIT (OUTPATIENT)
Dept: FAMILY MEDICINE CLINIC | Facility: CLINIC | Age: 54
End: 2019-02-06
Payer: COMMERCIAL

## 2019-02-06 VITALS
BODY MASS INDEX: 30.19 KG/M2 | DIASTOLIC BLOOD PRESSURE: 78 MMHG | TEMPERATURE: 97.8 F | HEIGHT: 65 IN | RESPIRATION RATE: 16 BRPM | SYSTOLIC BLOOD PRESSURE: 130 MMHG | WEIGHT: 181.2 LBS | HEART RATE: 92 BPM

## 2019-02-06 DIAGNOSIS — I10 ESSENTIAL HYPERTENSION: Primary | ICD-10-CM

## 2019-02-06 PROCEDURE — 99213 OFFICE O/P EST LOW 20 MIN: CPT | Performed by: FAMILY MEDICINE

## 2019-02-06 PROCEDURE — 3075F SYST BP GE 130 - 139MM HG: CPT | Performed by: FAMILY MEDICINE

## 2019-02-06 PROCEDURE — 3078F DIAST BP <80 MM HG: CPT | Performed by: FAMILY MEDICINE

## 2019-02-06 RX ORDER — HYDROCHLOROTHIAZIDE 25 MG/1
25 TABLET ORAL DAILY
Qty: 30 TABLET | Refills: 3 | Status: SHIPPED | OUTPATIENT
Start: 2019-02-06 | End: 2019-06-20

## 2019-02-06 NOTE — PATIENT INSTRUCTIONS
DASH Eating Plan   WHAT YOU NEED TO KNOW:   The DASH (Dietary Approaches to Stop Hypertension) Eating Plan is designed to help prevent or lower high blood pressure  It can also help to lower LDL (bad) cholesterol and decrease your risk of heart disease  The plan is low in sodium, sugar, unhealthy fats, and total fat  It is high in potassium, calcium, magnesium, and fiber  These nutrients are added when you eat more fruits, vegetables, and whole grains  DISCHARGE INSTRUCTIONS:   Your sodium limit each day: Your dietitian will tell you how much sodium is safe for you to have each day  People with high blood pressure should have no more than 1,500 to 2,300 mg of sodium in a day  A teaspoon (tsp) of salt has 2,300 mg of sodium  This may seem like a difficult goal, but small changes to the foods you eat can make a big difference  Your healthcare provider or dietitian can help you create a meal plan that follows your sodium limit  How to limit sodium:   · Read food labels  Food labels can help you choose foods that are low in sodium  The amount of sodium is listed in milligrams (mg)  The % Daily Value (DV) column tells you how much of your daily needs are met by 1 serving of the food for each nutrient listed  Choose foods that have less than 5% of the DV of sodium  These foods are considered low in sodium  Foods that have 20% or more of the DV of sodium are considered high in sodium  Avoid foods that have more than 300 mg of sodium in each serving  Choose foods that say low-sodium, reduced-sodium, or no salt added on the food label  · Avoid salt  Do not salt food at the table, and add very little salt to foods during cooking  Use herbs and spices, such as onions, garlic, and salt-free seasonings to add flavor to foods  Try lemon or lime juice or vinegar to give foods a tart flavor  Use hot peppers or a small amount of hot pepper sauce to add a spicy flavor to foods  · Ask about salt substitutes    Ask your healthcare provider if you may use salt substitutes  Some salt substitutes have ingredients that can be harmful if you have certain health conditions  · Choose foods carefully at restaurants  Meals from restaurants, especially fast food restaurants, are often high in sodium  Some restaurants have nutrition information that tells you the amount of sodium in their foods  Ask to have your food prepared with less, or no salt  What you need to know about fats:   · Include healthy fats  Examples are unsaturated fats and omega-3 fatty acids  Unsaturated fats are found in soybean, canola, olive, or sunflower oil, and liquid and soft tub margarines  Omega-3 fatty acids are found in fatty fish, such as salmon, tuna, mackerel, and sardines  It is also found in flaxseed oil and ground flaxseed  · Avoid unhealthy fats  Do not eat unhealthy fats, such as saturated fats and trans fats  Saturated fats are found in foods that contain fat from animals  Examples are fatty meats, whole milk, butter, cream, and other dairy foods  It is also found in shortening, stick margarine, palm oil, and coconut oil  Trans fats are found in fried foods, crackers, chips, and baked goods made with margarine or shortening  Foods to include: With the DASH eating plan, you need to eat a certain number of servings from each food group  This will help you get enough of certain nutrients and limit others  The amount of servings you should eat depends on how many calories you need  Your dietitian can tell you how many calories you need  The number of servings listed next to the food groups below are for people who need about 2,000 calories each day    · Grains:  6 to 8 servings (3 of these servings should be whole-grain foods)    ¨ 1 slice of whole-grain bread     ¨ 1 ounce of dry cereal    ¨ ½ cup of cooked cereal, pasta, or brown rice    · Vegetables and fruits:  4 to 5 servings of fruits and 4 to 5 servings of vegetables    ¨ 1 medium fruit    ¨ ½ cup of frozen, canned (no added salt), or chopped fresh vegetables     ¨ ½ cup of fresh, frozen, dried, or canned fruit (canned in light syrup or fruit juice)    ¨ ½ cup of vegetable or fruit juice    · Dairy:  2 to 3 servings    ¨ 1 cup of nonfat (skim) or 1% milk    ¨ 1½ ounces of fat-free or low-fat cheese    ¨ 6 ounces of nonfat or low-fat yogurt    · Lean meat, poultry, and fish:  6 ounces or less    [employer] (chicken, turkey) with no skin    ¨ Fish (especially fatty fish, such as salmon, fresh tuna, or mackerel)    ¨ Lean beef and pork (loin, round, extra lean hamburger)    ¨ Egg whites and egg substitutes    · Nuts, seeds, and legumes:  4 to 5 servings each week    ¨ ½ cup of cooked beans and peas    ¨ 1½ ounces of unsalted nuts    ¨ 2 tablespoons of peanut butter or seeds    · Sweets and added sugars:  5 or less each week    ¨ 1 tablespoon of sugar, jelly, or jam    ¨ ½ cup of sorbet or gelatin    ¨ 1 cup of lemonade    · Fats:  2 to 3 servings each week    ¨ 1 teaspoon of soft margarine or vegetable oil    ¨ 1 tablespoon of mayonnaise    ¨ 2 tablespoons of salad dressing  Foods to avoid:   · Grains:      [employer], such as doughnuts, pastries, cookies, and biscuits (high in fat and sugar)    ¨ Mixes for cornbread and biscuits, packaged foods, such as bread stuffing, rice and pasta mixes, macaroni and cheese, and instant cereals (high in sodium)    · Fruits and vegetables:      ¨ Regular, canned vegetables (high in sodium)    ¨ Sauerkraut, pickled vegetables, and other foods prepared in brine (high in sodium)    ¨ Fried vegetables or vegetables in butter or high-fat sauces    ¨ Fruit in cream or butter sauce (high in fat)    · Dairy:      ¨ Whole milk, 2% milk, and cream (high in fat)    ¨ Regular cheese and processed cheese (high in fat and sodium)    · Meats and protein foods:      ¨ Smoked or cured meat, such as corned beef, avery, ham, hot dogs, and sausage (high in fat and sodium)    ¨ Canned beans and canned meats or spreads, such as potted meats, sardines, anchovies, and imitation seafood (high in sodium)    ¨ Deli or lunch meats, such as bologna, ham, turkey, and roast beef (high in sodium)    ¨ High-fat meat (T-bone steak, regular hamburger, and ribs)    ¨ Whole eggs and egg yolks (high in fat)    · Other:      ¨ Seasonings made with salt, such as garlic salt, celery salt, onion salt, seasoned salt, meat tenderizers, and monosodium glutamate (MSG)    ¨ Miso soup and canned or dried soup mixes (high in sodium)    ¨ Regular soy sauce, barbecue sauce, teriyaki sauce, steak sauce, Worcestershire sauce, and most flavored vinegars (high in sodium)    ¨ Regular condiments, such as mustard, ketchup, and salad dressings (high in sodium)    ¨ Gravy and sauces, such as [place] or cheese sauces (high in sodium and fat)    ¨ Drinks high in sugar, such as soda or fruit drinks    [employer] foods, such as salted chips, popcorn, pretzels, pork rinds, salted crackers, and salted nuts    ¨ Frozen foods, such as dinners, entrees, vegetables with sauces, and breaded meats (high in sodium)  Other guidelines to follow:   · Maintain a healthy weight  Your risk for heart disease is higher if you are overweight  Your healthcare provider may suggest that you lose weight if you are overweight  You can lose weight by eating fewer calories and foods that have added sugars and fat  The DASH meal plan can help you do this  Decrease calories by eating smaller portions at each meal and fewer snacks  Ask your healthcare provider for more information about how to lose weight  · Exercise regularly  Regular exercise can help you reach or maintain a healthy weight  Regular exercise can also help decrease your blood pressure and improve your cholesterol levels  Get 30 minutes or more of moderate exercise each day of the week  To lose weight, get at least 60 minutes of exercise   Talk to your healthcare provider about the best exercise program for you  · Limit alcohol  Women should limit alcohol to 1 drink a day  Men should limit alcohol to 2 drinks a day  A drink of alcohol is 12 ounces of beer, 5 ounces of wine, or 1½ ounces of liquor  © 2017 [address] Information is for End User's use only and may not be sold, redistributed or otherwise used for commercial purposes  All illustrations and images included in CareNotes® are the copyrighted property of A D A M [employer] Inc  or Romero Montano  The above information is an  only  It is not intended as medical advice for individual conditions or treatments  Talk to your doctor, nurse or pharmacist before following any medical regimen to see if it is safe and effective for you

## 2019-02-07 NOTE — PROGRESS NOTES
Subjective:   Dionna Fried is a 52 y o  female with hypertension  Presenting to the office before her medication irbesartan is on recall, and was told to come switch by her pharmacist  Patient doesn't want to be on medication any more and would like to diet and exercise  She knows that its impossible but would like to attempt  Would like help with weight loss in the future  Hypertension ROS: taking medications as instructed, no medication side effects noted, no TIA's, no chest pain on exertion, no dyspnea on exertion, no swelling of ankles and no palpitations  New concerns: None just would like to d/c med and try natural route  Objective:   /78 (BP Location: Left arm, Patient Position: Sitting, Cuff Size: Standard)   Pulse 92   Temp 97 8 °F (36 6 °C)   Resp 16   Ht 5' 5" (1 651 m)   Wt 82 2 kg (181 lb 3 2 oz)   BMI 30 15 kg/m²    Appearance alert, well appearing, and in no distress  General exam BP noted to be well controlled today in office, S1, S2 normal, no gallop, no murmur, chest clear, no JVD, no HSM, no edema  Lab review: orders written for new lab studies as appropriate; see orders  Assessment:    Hypertension well controlled  Plan: Will attempt to increase HCTZ to 25mg given that recent K was wnl  1 month patient will keep log at home   Will repeat labs in 1 month, if not successful will add on extra medication

## 2019-03-13 ENCOUNTER — OFFICE VISIT (OUTPATIENT)
Dept: FAMILY MEDICINE CLINIC | Facility: CLINIC | Age: 54
End: 2019-03-13
Payer: COMMERCIAL

## 2019-03-13 VITALS
DIASTOLIC BLOOD PRESSURE: 88 MMHG | HEART RATE: 96 BPM | WEIGHT: 180 LBS | RESPIRATION RATE: 16 BRPM | TEMPERATURE: 98.6 F | SYSTOLIC BLOOD PRESSURE: 148 MMHG | HEIGHT: 65 IN | BODY MASS INDEX: 29.99 KG/M2

## 2019-03-13 DIAGNOSIS — I10 ESSENTIAL HYPERTENSION: Primary | ICD-10-CM

## 2019-03-13 PROCEDURE — 3008F BODY MASS INDEX DOCD: CPT | Performed by: FAMILY MEDICINE

## 2019-03-13 PROCEDURE — 1036F TOBACCO NON-USER: CPT | Performed by: FAMILY MEDICINE

## 2019-03-13 PROCEDURE — 99213 OFFICE O/P EST LOW 20 MIN: CPT | Performed by: FAMILY MEDICINE

## 2019-03-13 RX ORDER — LISINOPRIL 5 MG/1
5 TABLET ORAL DAILY
Qty: 30 TABLET | Refills: 3 | Status: SHIPPED | OUTPATIENT
Start: 2019-03-13 | End: 2019-04-15

## 2019-03-13 NOTE — PATIENT INSTRUCTIONS

## 2019-03-13 NOTE — PROGRESS NOTES
Rachel Francois 1965 female MRN: [id number]    [address] [place] [address]      ASSESSMENT/PLAN  Rachel Francois is a 52 y o  female presents to the office for    1  Essential hypertension  - started on lisinopril 5mg  Continue HCTZ 25mg  hx of Zaid(recalled) attempted to try off    - lisinopril (ZESTRIL) 5 mg tablet; Take 1 tablet (5 mg total) by mouth daily  Dispense: 30 tablet; Refill: 3     Disposition: Return to the office 1 month for BP check    No future appointments  SUBJECTIVE  CC: Follow-up (blood pressure check)      HPI:  Rachel Francois is a 52 y o  female who presents for a follow up on BP check  Hypertension: Patient currently taking medications as prescribed without any s/e or concerns  Does take BP at home  -150/80  Currently denies any Chest pain, Shortness of breath on exertion, dizziness, or light headiness  Recently came back from cruise  Review of Systems   Constitutional: Negative for activity change, appetite change, chills, fatigue and fever  HENT: Negative for congestion  Respiratory: Negative for cough, chest tightness and shortness of breath  Cardiovascular: Negative for chest pain and leg swelling  Gastrointestinal: Negative for abdominal distention, abdominal pain, constipation, diarrhea, nausea and vomiting  All other systems reviewed and are negative        Historical Information   The patient history was reviewed as follows:  Past Medical History:   Diagnosis Date    Abnormal immunology findings 06/09/2011    Anemia 05/23/2011    Arthritis     Closed fracture of distal end of fibula     resolved 09/15/17    Depression     Distal radius fracture, left     last assessed 01/30/17    GERD (gastroesophageal reflux disease)     last assessed 05/06/13    Herpes labialis     last assessed 07/23/15    Hypertension     essential ; last assessed 08/07/13    Sinus bradycardia     last assessed 05/24/16         Medications:     Current Outpatient Medications:     hydrochlorothiazide (HYDRODIURIL) 25 mg tablet, Take 1 tablet (25 mg total) by mouth daily, Disp: 30 tablet, Rfl: 3    venlafaxine (EFFEXOR-XR) 75 mg 24 hr capsule, Take 1 capsule (75 mg total) by mouth daily, Disp: 90 capsule, Rfl: 3    No Known Allergies    OBJECTIVE  Vitals:   Vitals:    03/13/19 1006   BP: 148/88   BP Location: Left arm   Patient Position: Sitting   Cuff Size: Standard   Pulse: 96   Resp: 16   Temp: 98 6 °F (37 °C)   Weight: 81 6 kg (180 lb)   Height: 5' 5" (1 651 m)         Physical Exam   Constitutional: She is oriented to person, place, and time  Vital signs are normal  She appears well-developed and well-nourished  HENT:   Head: Normocephalic and atraumatic  Right Ear: Tympanic membrane, external ear and ear canal normal    Left Ear: Tympanic membrane, external ear and ear canal normal    Nose: Nose normal    Mouth/Throat: Oropharynx is clear and moist    Eyes: Pupils are equal, round, and reactive to light  Conjunctivae and EOM are normal    Neck: Normal range of motion  Neck supple  Cardiovascular: Normal rate, regular rhythm, S1 normal, S2 normal, normal heart sounds and intact distal pulses  No murmur heard  Pulmonary/Chest: Effort normal and breath sounds normal  No respiratory distress  She has no wheezes  Musculoskeletal: Normal range of motion  She exhibits no edema  Neurological: She is alert and oriented to person, place, and time  She has normal strength  Skin: Skin is warm  Psychiatric: She has a normal mood and affect  Her speech is normal and behavior is normal  Judgment and thought content normal    Vitals reviewed                   Dashawn Gómez MD,   [facility]  3/13/2019

## 2019-04-15 ENCOUNTER — OFFICE VISIT (OUTPATIENT)
Dept: FAMILY MEDICINE CLINIC | Facility: CLINIC | Age: 54
End: 2019-04-15
Payer: COMMERCIAL

## 2019-04-15 ENCOUNTER — TELEPHONE (OUTPATIENT)
Dept: FAMILY MEDICINE CLINIC | Facility: CLINIC | Age: 54
End: 2019-04-15

## 2019-04-15 VITALS
TEMPERATURE: 98.1 F | SYSTOLIC BLOOD PRESSURE: 118 MMHG | BODY MASS INDEX: 28.66 KG/M2 | RESPIRATION RATE: 16 BRPM | WEIGHT: 172 LBS | DIASTOLIC BLOOD PRESSURE: 84 MMHG | HEIGHT: 65 IN | HEART RATE: 80 BPM

## 2019-04-15 DIAGNOSIS — G47.00 INSOMNIA, UNSPECIFIED TYPE: ICD-10-CM

## 2019-04-15 DIAGNOSIS — F41.8 DEPRESSION WITH ANXIETY: Chronic | ICD-10-CM

## 2019-04-15 DIAGNOSIS — I10 ESSENTIAL HYPERTENSION: Primary | ICD-10-CM

## 2019-04-15 PROCEDURE — 93000 ELECTROCARDIOGRAM COMPLETE: CPT | Performed by: FAMILY MEDICINE

## 2019-04-15 PROCEDURE — 99213 OFFICE O/P EST LOW 20 MIN: CPT | Performed by: FAMILY MEDICINE

## 2019-04-15 RX ORDER — AMLODIPINE BESYLATE 5 MG/1
5 TABLET ORAL DAILY
Qty: 30 TABLET | Refills: 3 | Status: SHIPPED | OUTPATIENT
Start: 2019-04-15 | End: 2019-10-25 | Stop reason: ALTCHOICE

## 2019-04-15 RX ORDER — HYDROXYZINE PAMOATE 25 MG/1
25 CAPSULE ORAL 2 TIMES DAILY PRN
Qty: 30 CAPSULE | Refills: 0 | Status: SHIPPED | OUTPATIENT
Start: 2019-04-15 | End: 2019-05-12 | Stop reason: SDUPTHER

## 2019-05-12 DIAGNOSIS — F41.8 DEPRESSION WITH ANXIETY: Chronic | ICD-10-CM

## 2019-05-13 RX ORDER — HYDROXYZINE HYDROCHLORIDE 25 MG/1
TABLET, FILM COATED ORAL
Qty: 30 TABLET | Refills: 0 | Status: SHIPPED | OUTPATIENT
Start: 2019-05-13 | End: 2019-05-14 | Stop reason: SDUPTHER

## 2019-05-14 ENCOUNTER — TELEPHONE (OUTPATIENT)
Dept: FAMILY MEDICINE CLINIC | Facility: CLINIC | Age: 54
End: 2019-05-14

## 2019-05-14 DIAGNOSIS — I10 ESSENTIAL HYPERTENSION: ICD-10-CM

## 2019-05-14 DIAGNOSIS — F41.8 DEPRESSION WITH ANXIETY: Chronic | ICD-10-CM

## 2019-05-14 RX ORDER — HYDROXYZINE HYDROCHLORIDE 25 MG/1
25 TABLET, FILM COATED ORAL 2 TIMES DAILY PRN
Qty: 30 TABLET | Refills: 1 | Status: SHIPPED | OUTPATIENT
Start: 2019-05-14 | End: 2020-01-28

## 2019-05-17 ENCOUNTER — OFFICE VISIT (OUTPATIENT)
Dept: FAMILY MEDICINE CLINIC | Facility: CLINIC | Age: 54
End: 2019-05-17
Payer: COMMERCIAL

## 2019-05-17 VITALS
HEIGHT: 65 IN | RESPIRATION RATE: 16 BRPM | WEIGHT: 179 LBS | TEMPERATURE: 98.2 F | HEART RATE: 88 BPM | DIASTOLIC BLOOD PRESSURE: 110 MMHG | BODY MASS INDEX: 29.82 KG/M2 | SYSTOLIC BLOOD PRESSURE: 156 MMHG

## 2019-05-17 DIAGNOSIS — S61.412A LACERATION OF LEFT HAND WITHOUT FOREIGN BODY, INITIAL ENCOUNTER: Primary | ICD-10-CM

## 2019-05-17 PROCEDURE — 99213 OFFICE O/P EST LOW 20 MIN: CPT | Performed by: FAMILY MEDICINE

## 2019-05-24 ENCOUNTER — OFFICE VISIT (OUTPATIENT)
Dept: FAMILY MEDICINE CLINIC | Facility: CLINIC | Age: 54
End: 2019-05-24
Payer: COMMERCIAL

## 2019-05-24 VITALS — SYSTOLIC BLOOD PRESSURE: 136 MMHG | HEART RATE: 92 BPM | TEMPERATURE: 98.1 F | DIASTOLIC BLOOD PRESSURE: 90 MMHG

## 2019-05-24 DIAGNOSIS — Z48.02 VISIT FOR SUTURE REMOVAL: Primary | ICD-10-CM

## 2019-05-24 PROCEDURE — 99212 OFFICE O/P EST SF 10 MIN: CPT | Performed by: FAMILY MEDICINE

## 2019-06-20 ENCOUNTER — OFFICE VISIT (OUTPATIENT)
Dept: FAMILY MEDICINE CLINIC | Facility: CLINIC | Age: 54
End: 2019-06-20
Payer: COMMERCIAL

## 2019-06-20 VITALS
HEIGHT: 65 IN | TEMPERATURE: 98.4 F | DIASTOLIC BLOOD PRESSURE: 80 MMHG | WEIGHT: 177.6 LBS | HEART RATE: 88 BPM | BODY MASS INDEX: 29.59 KG/M2 | RESPIRATION RATE: 16 BRPM | SYSTOLIC BLOOD PRESSURE: 122 MMHG

## 2019-06-20 DIAGNOSIS — F41.8 DEPRESSION WITH ANXIETY: Chronic | ICD-10-CM

## 2019-06-20 DIAGNOSIS — H00.015 HORDEOLUM EXTERNUM OF LEFT LOWER EYELID: ICD-10-CM

## 2019-06-20 DIAGNOSIS — I10 ESSENTIAL HYPERTENSION: Primary | ICD-10-CM

## 2019-06-20 PROCEDURE — 1036F TOBACCO NON-USER: CPT | Performed by: FAMILY MEDICINE

## 2019-06-20 PROCEDURE — 3079F DIAST BP 80-89 MM HG: CPT | Performed by: FAMILY MEDICINE

## 2019-06-20 PROCEDURE — 99213 OFFICE O/P EST LOW 20 MIN: CPT | Performed by: FAMILY MEDICINE

## 2019-06-20 PROCEDURE — 3008F BODY MASS INDEX DOCD: CPT | Performed by: FAMILY MEDICINE

## 2019-06-20 PROCEDURE — 3074F SYST BP LT 130 MM HG: CPT | Performed by: FAMILY MEDICINE

## 2019-06-25 DIAGNOSIS — F32.A ANXIETY AND DEPRESSION: ICD-10-CM

## 2019-06-25 DIAGNOSIS — F41.9 ANXIETY AND DEPRESSION: ICD-10-CM

## 2019-06-25 RX ORDER — VENLAFAXINE HYDROCHLORIDE 75 MG/1
75 CAPSULE, EXTENDED RELEASE ORAL DAILY
Qty: 90 CAPSULE | Refills: 3 | Status: SHIPPED | OUTPATIENT
Start: 2019-06-25 | End: 2020-01-24

## 2019-08-15 ENCOUNTER — OFFICE VISIT (OUTPATIENT)
Dept: FAMILY MEDICINE CLINIC | Facility: CLINIC | Age: 54
End: 2019-08-15
Payer: COMMERCIAL

## 2019-08-15 VITALS
HEART RATE: 86 BPM | HEIGHT: 65 IN | RESPIRATION RATE: 16 BRPM | BODY MASS INDEX: 29.46 KG/M2 | WEIGHT: 176.8 LBS | TEMPERATURE: 98.6 F | SYSTOLIC BLOOD PRESSURE: 128 MMHG | DIASTOLIC BLOOD PRESSURE: 84 MMHG

## 2019-08-15 DIAGNOSIS — I10 ESSENTIAL HYPERTENSION: Primary | ICD-10-CM

## 2019-08-15 DIAGNOSIS — R60.0 BILATERAL LOWER EXTREMITY EDEMA: ICD-10-CM

## 2019-08-15 PROCEDURE — 99213 OFFICE O/P EST LOW 20 MIN: CPT | Performed by: FAMILY MEDICINE

## 2019-08-15 PROCEDURE — 3074F SYST BP LT 130 MM HG: CPT | Performed by: FAMILY MEDICINE

## 2019-08-16 LAB
ALBUMIN SERPL-MCNC: 4.2 G/DL (ref 3.5–5.5)
ALBUMIN/GLOB SERPL: 1.2 {RATIO} (ref 1.2–2.2)
ALP SERPL-CCNC: 80 IU/L (ref 39–117)
ALT SERPL-CCNC: 47 IU/L (ref 0–32)
AST SERPL-CCNC: 65 IU/L (ref 0–40)
BASOPHILS # BLD AUTO: 0 X10E3/UL (ref 0–0.2)
BASOPHILS NFR BLD AUTO: 0 %
BILIRUB SERPL-MCNC: 0.4 MG/DL (ref 0–1.2)
BUN SERPL-MCNC: 7 MG/DL (ref 6–24)
BUN/CREAT SERPL: 9 (ref 9–23)
CALCIUM SERPL-MCNC: 9.2 MG/DL (ref 8.7–10.2)
CHLORIDE SERPL-SCNC: 100 MMOL/L (ref 96–106)
CO2 SERPL-SCNC: 23 MMOL/L (ref 20–29)
CREAT SERPL-MCNC: 0.82 MG/DL (ref 0.57–1)
EOSINOPHIL # BLD AUTO: 0.1 X10E3/UL (ref 0–0.4)
EOSINOPHIL NFR BLD AUTO: 1 %
ERYTHROCYTE [DISTWIDTH] IN BLOOD BY AUTOMATED COUNT: 13.3 % (ref 12.3–15.4)
GLOBULIN SER-MCNC: 3.5 G/DL (ref 1.5–4.5)
GLUCOSE SERPL-MCNC: 87 MG/DL (ref 65–99)
HCT VFR BLD AUTO: 40.7 % (ref 34–46.6)
HGB BLD-MCNC: 13.6 G/DL (ref 11.1–15.9)
IMM GRANULOCYTES # BLD: 0 X10E3/UL (ref 0–0.1)
IMM GRANULOCYTES NFR BLD: 1 %
LYMPHOCYTES # BLD AUTO: 2 X10E3/UL (ref 0.7–3.1)
LYMPHOCYTES NFR BLD AUTO: 29 %
MCH RBC QN AUTO: 33.9 PG (ref 26.6–33)
MCHC RBC AUTO-ENTMCNC: 33.4 G/DL (ref 31.5–35.7)
MCV RBC AUTO: 102 FL (ref 79–97)
MONOCYTES # BLD AUTO: 0.8 X10E3/UL (ref 0.1–0.9)
MONOCYTES NFR BLD AUTO: 12 %
NEUTROPHILS # BLD AUTO: 3.9 X10E3/UL (ref 1.4–7)
NEUTROPHILS NFR BLD AUTO: 57 %
PLATELET # BLD AUTO: 230 X10E3/UL (ref 150–450)
POTASSIUM SERPL-SCNC: 4.2 MMOL/L (ref 3.5–5.2)
PROT SERPL-MCNC: 7.7 G/DL (ref 6–8.5)
RBC # BLD AUTO: 4.01 X10E6/UL (ref 3.77–5.28)
SL AMB EGFR AFRICAN AMERICAN: 93 ML/MIN/1.73
SL AMB EGFR NON AFRICAN AMERICAN: 80 ML/MIN/1.73
SODIUM SERPL-SCNC: 139 MMOL/L (ref 134–144)
WBC # BLD AUTO: 7 X10E3/UL (ref 3.4–10.8)

## 2019-08-16 NOTE — PROGRESS NOTES
Assessment/Plan:       Diagnoses and all orders for this visit:    Essential hypertension  Bilateral lower extremity edema      At this time, advised patient to try elevating legs when at home and at night when sleeping  Patient also has script to get cmp and cbc done to monitor for any changes for BP  Should monitor BP at home  Modify diet and low sodium  Bring log in  If no improvement will consider if amlodipine is a reason as side effect is edema  Subjective:      Patient ID: Junie Hayes is a 62 y o  female  63 y/o female presents with ankle swelling for the past 1 month  Patient states she has not changed her diet to notice if the swelling is worsening  She has not tried elevating the legs  No recent exercise or excessive walking  Hasnt taken the amlodipine in the last two days due to the swelling  She denies any chest pain or shortness of breath  The following portions of the patient's history were reviewed and updated as appropriate: allergies, current medications, past family history, past medical history, past social history, past surgical history and problem list     Review of Systems   Constitutional: Negative for appetite change and fever  HENT: Negative for ear pain and sore throat  Eyes: Negative for visual disturbance  Respiratory: Negative for shortness of breath  Cardiovascular: Positive for leg swelling  Negative for chest pain  Gastrointestinal: Negative for abdominal pain, diarrhea, nausea and vomiting  Musculoskeletal: Negative for arthralgias  Skin: Negative for color change  Neurological: Negative for dizziness, tremors, light-headedness and headaches  Psychiatric/Behavioral: Negative for agitation and behavioral problems           Objective:      /84 (BP Location: Left arm, Patient Position: Sitting, Cuff Size: Standard)   Pulse 86   Temp 98 6 °F (37 °C)   Resp 16   Ht 5' 5" (1 651 m)   Wt 80 2 kg (176 lb 12 8 oz)   BMI 29 42 kg/m² Physical Exam   Constitutional: She is oriented to person, place, and time  She appears well-developed and well-nourished  No distress  HENT:   Head: Normocephalic and atraumatic  Right Ear: External ear normal    Left Ear: External ear normal    Nose: Nose normal    Mouth/Throat: Oropharynx is clear and moist  No oropharyngeal exudate  Eyes: EOM are normal  Right eye exhibits no discharge  Left eye exhibits no discharge  Cardiovascular: Normal rate, regular rhythm, normal heart sounds and intact distal pulses  No murmur heard  Pulmonary/Chest: Effort normal and breath sounds normal  No respiratory distress  Abdominal: Soft  Bowel sounds are normal  She exhibits no distension  There is no tenderness  Musculoskeletal: Normal range of motion  She exhibits edema  Trace pitting edema bilateral below knee to feet  Neurological: She is alert and oriented to person, place, and time  Skin: Skin is warm  Psychiatric: She has a normal mood and affect   Her behavior is normal

## 2019-08-20 ENCOUNTER — TELEPHONE (OUTPATIENT)
Dept: FAMILY MEDICINE CLINIC | Facility: CLINIC | Age: 54
End: 2019-08-20

## 2019-08-21 ENCOUNTER — OFFICE VISIT (OUTPATIENT)
Dept: FAMILY MEDICINE CLINIC | Facility: CLINIC | Age: 54
End: 2019-08-21
Payer: COMMERCIAL

## 2019-08-21 VITALS
BODY MASS INDEX: 29.32 KG/M2 | SYSTOLIC BLOOD PRESSURE: 138 MMHG | RESPIRATION RATE: 16 BRPM | HEIGHT: 65 IN | WEIGHT: 176 LBS | DIASTOLIC BLOOD PRESSURE: 84 MMHG | TEMPERATURE: 98.4 F | HEART RATE: 82 BPM

## 2019-08-21 DIAGNOSIS — R74.01 ELEVATED TRANSAMINASE LEVEL: Primary | ICD-10-CM

## 2019-08-21 PROCEDURE — 3008F BODY MASS INDEX DOCD: CPT | Performed by: FAMILY MEDICINE

## 2019-08-21 PROCEDURE — 99213 OFFICE O/P EST LOW 20 MIN: CPT | Performed by: FAMILY MEDICINE

## 2019-08-21 PROCEDURE — 1036F TOBACCO NON-USER: CPT | Performed by: FAMILY MEDICINE

## 2019-08-21 NOTE — PROGRESS NOTES
Assessment/Plan:       Diagnoses and all orders for this visit:    Elevated transaminase level likely secondary from alcohol abuse  -     Hepatitis C antibody; Future  -     Gamma GT; Future  -     US right upper quadrant with liver dopplers; Future  -     Comprehensive metabolic panel; Future    At this time, give patient's diet and alcohol abuse will work up patient with hepatitis C, GGT, and RUQ US  Advised no drinking and cut back and discussed long term consequences  Patient agrees with plan and will repeat cmp in 1 month  Subjective:      Patient ID: Shayna Haider is a 62 y o  female  61 y/o female presents for follow up for blood work  Patient noted to have elevated AST/ALT: 65/47  Patient states she has not previously had an issue  Patient states she has been drinking more than usual as she drinks bear claw and beer  She has been drinking about 4X week and doesn't realize since bear claw tastes good that she will drink 4-5  Patient does have a history of alcoholism and did stop drinking at one point but starting again but not as much as she did this summer  Patient also complains of poor diet  She denies any right upper quadrant pain, blood in stool, or change in stool  No pain radiating to back  The following portions of the patient's history were reviewed and updated as appropriate: allergies, current medications, past family history, past medical history, past social history, past surgical history and problem list     Review of Systems   Constitutional: Negative for appetite change and fever  HENT: Negative for ear pain and sore throat  Eyes: Negative for visual disturbance  Respiratory: Negative for shortness of breath  Cardiovascular: Negative for chest pain and leg swelling  Gastrointestinal: Negative for abdominal pain, diarrhea, nausea and vomiting  Musculoskeletal: Negative for arthralgias  Skin: Negative for color change     Neurological: Negative for dizziness, tremors, light-headedness and headaches  Psychiatric/Behavioral: Negative for agitation and behavioral problems  Objective:      /84 (BP Location: Left arm, Patient Position: Sitting, Cuff Size: Standard)   Pulse 82   Temp 98 4 °F (36 9 °C)   Resp 16   Ht 5' 5" (1 651 m)   Wt 79 8 kg (176 lb)   BMI 29 29 kg/m²          Physical Exam   Constitutional: She is oriented to person, place, and time  She appears well-developed and well-nourished  No distress  HENT:   Head: Normocephalic and atraumatic  Right Ear: External ear normal    Left Ear: External ear normal    Nose: Nose normal    Mouth/Throat: Oropharynx is clear and moist  No oropharyngeal exudate  Eyes: EOM are normal  Right eye exhibits no discharge  Left eye exhibits no discharge  Neck: Normal range of motion  Neck supple  Cardiovascular: Normal rate, regular rhythm, normal heart sounds and intact distal pulses  No murmur heard  Pulmonary/Chest: Effort normal and breath sounds normal  No respiratory distress  Abdominal: Soft  Bowel sounds are normal  She exhibits no distension  There is no hepatomegaly  There is no tenderness  There is negative Mirza's sign  Musculoskeletal: Normal range of motion  She exhibits no edema  Neurological: She is alert and oriented to person, place, and time  Skin: Skin is warm  Psychiatric: She has a normal mood and affect   Her behavior is normal

## 2019-08-27 ENCOUNTER — TRANSCRIBE ORDERS (OUTPATIENT)
Dept: ADMINISTRATIVE | Facility: HOSPITAL | Age: 54
End: 2019-08-27

## 2019-08-27 ENCOUNTER — HOSPITAL ENCOUNTER (OUTPATIENT)
Dept: RADIOLOGY | Facility: HOSPITAL | Age: 54
Discharge: HOME | End: 2019-08-27
Attending: FAMILY MEDICINE
Payer: COMMERCIAL

## 2019-08-27 ENCOUNTER — APPOINTMENT (OUTPATIENT)
Dept: LAB | Facility: HOSPITAL | Age: 54
End: 2019-08-27
Attending: FAMILY MEDICINE
Payer: COMMERCIAL

## 2019-08-27 DIAGNOSIS — R74.01 ELEVATED TRANSAMINASE LEVEL: ICD-10-CM

## 2019-08-27 LAB
GGT SERPL-CCNC: 90 U/L (ref 5–85)
HCV AB SER QL: NORMAL

## 2019-08-27 PROCEDURE — 76705 ECHO EXAM OF ABDOMEN: CPT

## 2019-08-27 PROCEDURE — 86803 HEPATITIS C AB TEST: CPT

## 2019-08-27 PROCEDURE — 82977 ASSAY OF GGT: CPT

## 2019-08-27 PROCEDURE — 36415 COLL VENOUS BLD VENIPUNCTURE: CPT

## 2019-10-10 ENCOUNTER — OFFICE VISIT (OUTPATIENT)
Dept: OBGYN CLINIC | Facility: CLINIC | Age: 54
End: 2019-10-10
Payer: COMMERCIAL

## 2019-10-10 ENCOUNTER — APPOINTMENT (OUTPATIENT)
Dept: RADIOLOGY | Facility: CLINIC | Age: 54
End: 2019-10-10
Payer: COMMERCIAL

## 2019-10-10 VITALS
HEART RATE: 77 BPM | DIASTOLIC BLOOD PRESSURE: 88 MMHG | HEIGHT: 65 IN | WEIGHT: 176 LBS | BODY MASS INDEX: 29.32 KG/M2 | SYSTOLIC BLOOD PRESSURE: 149 MMHG

## 2019-10-10 DIAGNOSIS — Z96.651 STATUS POST REVISION OF TOTAL REPLACEMENT OF RIGHT KNEE: ICD-10-CM

## 2019-10-10 DIAGNOSIS — Z47.1 AFTERCARE FOLLOWING RIGHT KNEE JOINT REPLACEMENT SURGERY: ICD-10-CM

## 2019-10-10 DIAGNOSIS — Z96.651 AFTERCARE FOLLOWING RIGHT KNEE JOINT REPLACEMENT SURGERY: ICD-10-CM

## 2019-10-10 DIAGNOSIS — Z96.651 STATUS POST REVISION OF TOTAL REPLACEMENT OF RIGHT KNEE: Primary | ICD-10-CM

## 2019-10-10 PROCEDURE — 73562 X-RAY EXAM OF KNEE 3: CPT

## 2019-10-10 PROCEDURE — 99214 OFFICE O/P EST MOD 30 MIN: CPT | Performed by: ORTHOPAEDIC SURGERY

## 2019-10-10 RX ORDER — AMOXICILLIN 500 MG/1
CAPSULE ORAL
Refills: 0 | COMMUNITY
Start: 2019-08-27 | End: 2019-10-25 | Stop reason: ALTCHOICE

## 2019-10-10 NOTE — PROGRESS NOTES
Assessment/Plan:  1  Status post revision of total replacement of right knee  XR knee 3 vw right non injury   2  Aftercare following right knee joint replacement surgery  XR knee 3 vw right non injury     Scribe Attestation    I,:   Taylor Pina MA am acting as a scribe while in the presence of the attending physician :        I,:   Pepe Mike,  personally performed the services described in this documentation    as scribed in my presence :          I am please see that Eleanor Lester is doing well 2 years postoperatively  I did advise her she needs to continue with antibiotic prophylaxis prior to any dental or GI procedure  She may continue with all activities as tolerated  I would like to see her back in the office in 3 years for her 5 year anniversary of her revision surgery  Subjective:  2 year follow-up status post revision right total knee arthroplasty    Patient ID: Clara Thayer is a 62 y o  female  HPI  Patient is here for her 2 year follow-up status post revision right total knee arthroplasty  She states she is overall doing well  She denies any significant pain  She is able to perform all her activities of daily living without any restrictions  She is overall very happy with the outcomes of surgery  She denies any new injury  She denies any distal paresthesias  She states her incision continues to remain intact  Review of Systems   Constitutional: Negative for activity change, chills, fever and unexpected weight change  HENT: Negative for hearing loss, nosebleeds and sore throat  Eyes: Negative for pain, redness and visual disturbance  Respiratory: Negative for cough, shortness of breath and wheezing  Cardiovascular: Negative for chest pain, palpitations and leg swelling  Gastrointestinal: Negative for abdominal pain, nausea and vomiting  Endocrine: Negative for polydipsia and polyuria  Genitourinary: Negative for dyspareunia and hematuria     Musculoskeletal: Negative for arthralgias, joint swelling and myalgias  Skin: Negative for rash and wound  Neurological: Negative for dizziness, numbness and headaches  Psychiatric/Behavioral: Negative for decreased concentration and suicidal ideas  The patient is not nervous/anxious            Past Medical History:   Diagnosis Date    Abnormal immunology findings 06/09/2011    Anemia 05/23/2011    Arthritis     Closed fracture of distal end of fibula     resolved 09/15/17    Depression     Distal radius fracture, left     last assessed 01/30/17    GERD (gastroesophageal reflux disease)     last assessed 05/06/13    Herpes labialis     last assessed 07/23/15    Hypertension     essential ; last assessed 08/07/13    Sinus bradycardia     last assessed 05/24/16       Past Surgical History:   Procedure Laterality Date    ANKLE SURGERY      last assessed 09/15/17    BACK SURGERY      lumbar laminectomy L4-L5    BLADDER SUSPENSION      7/2017    COLONOSCOPY      JOINT REPLACEMENT      TKR  on right    ORIF TIBIA & FIBULA FRACTURES Right 12/15/2016    Procedure: SURGICAL FIXATION OF RIGHT DISTAL FIBULA FRACTURE;  Surgeon: Elesa Homans, MD;  Location[facility] MAIN OR;  Service:     OK REVISE KNEE JOINT REPLACE,ALL PARTS Right 10/23/2017    Procedure: REVISION TOTAL KNEE REPLACEMENT WITH FROZEN SECTIONS;  Surgeon: Korin Hua DO;  Location: [address] [place] [address];  Service: Orthopedics    TOTAL KNEE ARTHROPLASTY      last assessed; 11/21/17    WISDOM TOOTH EXTRACTION         Family History   Problem Relation Age of Onset    Arthritis Mother     Osteoporosis Mother     Dementia Father     Other Father         spinal stenosis    Other Family         [facility]       Social History     Occupational History    Not on file   Tobacco Use    Smoking status: Never Smoker    Smokeless tobacco: Never Used   Substance and Sexual Activity    Alcohol use: Yes     Comment: moderately    Drug use: No    Sexual activity: Yes     Partners: Male         Current Outpatient Medications:     amoxicillin (AMOXIL) 500 mg capsule, take 4 capsule by mouth  1 HOUR PRIOR TO APPOINTMENT, Disp: , Rfl: 0    venlafaxine (EFFEXOR-XR) 75 mg 24 hr capsule, Take 1 capsule (75 mg total) by mouth daily, Disp: 90 capsule, Rfl: 3    amLODIPine (NORVASC) 5 mg tablet, Take 1 tablet (5 mg total) by mouth daily (Patient not taking: Reported on 10/10/2019), Disp: 30 tablet, Rfl: 3    hydrOXYzine HCL (ATARAX) 25 mg tablet, Take 1 tablet (25 mg total) by mouth 2 (two) times a day as needed for anxiety (Patient not taking: Reported on 10/10/2019), Disp: 30 tablet, Rfl: 1    Allergies   Allergen Reactions    Lisinopril      COUGH       Objective:  Vitals:    10/10/19 1408   BP: 149/88   Pulse: 77       Body mass index is 29 29 kg/m²  Right Knee Exam     Muscle Strength   The patient has normal right knee strength  Tenderness   The patient is experiencing no tenderness  Range of Motion   Extension: 0   Right knee flexion: 135  Tests   Varus: negative Valgus: negative  Patellar apprehension: negative    Other   Erythema: absent  Scars: present  Sensation: normal  Pulse: present  Swelling: none  Effusion: no effusion present    Comments:  Neurovascular intact  Knee stable on exam          Observations     Right Knee   Negative for effusion  Physical Exam   Constitutional: She is oriented to person, place, and time  She appears well-developed and well-nourished  HENT:   Head: Normocephalic and atraumatic  Eyes: Pupils are equal, round, and reactive to light  Conjunctivae and EOM are normal    Neck: Normal range of motion  Neck supple  Cardiovascular: Normal rate and intact distal pulses  Pulmonary/Chest: Effort normal  No respiratory distress  Musculoskeletal: She exhibits no tenderness  Right knee: She exhibits no effusion  As noted in HPI   Neurological: She is alert and oriented to person, place, and time  Skin: Skin is warm and dry  Psychiatric: She has a normal mood and affect  Her behavior is normal    Nursing note and vitals reviewed  I have personally reviewed pertinent films in PACS  X-rays of the right knee obtained on 10/10/2019 demonstrate well-aligned well-positioned revision right total knee prosthesis  There is no sign of failure

## 2019-10-25 ENCOUNTER — OFFICE VISIT (OUTPATIENT)
Dept: FAMILY MEDICINE CLINIC | Facility: CLINIC | Age: 54
End: 2019-10-25
Payer: COMMERCIAL

## 2019-10-25 VITALS
DIASTOLIC BLOOD PRESSURE: 88 MMHG | HEIGHT: 65 IN | HEART RATE: 78 BPM | BODY MASS INDEX: 28.92 KG/M2 | SYSTOLIC BLOOD PRESSURE: 154 MMHG | TEMPERATURE: 97.3 F | RESPIRATION RATE: 16 BRPM | WEIGHT: 173.6 LBS

## 2019-10-25 DIAGNOSIS — I10 ESSENTIAL HYPERTENSION: ICD-10-CM

## 2019-10-25 DIAGNOSIS — R79.89 ABNORMAL LFTS: Primary | ICD-10-CM

## 2019-10-25 PROCEDURE — 3008F BODY MASS INDEX DOCD: CPT | Performed by: FAMILY MEDICINE

## 2019-10-25 PROCEDURE — 99214 OFFICE O/P EST MOD 30 MIN: CPT | Performed by: FAMILY MEDICINE

## 2019-10-25 RX ORDER — VALSARTAN 80 MG/1
80 TABLET ORAL DAILY
Qty: 30 TABLET | Refills: 0 | Status: SHIPPED | OUTPATIENT
Start: 2019-10-25 | End: 2019-11-12

## 2019-10-25 RX ORDER — AMLODIPINE BESYLATE 5 MG/1
5 TABLET ORAL DAILY
Qty: 30 TABLET | Refills: 3 | Status: CANCELLED | OUTPATIENT
Start: 2019-10-25

## 2019-10-28 ENCOUNTER — TELEPHONE (OUTPATIENT)
Dept: PAIN MEDICINE | Facility: CLINIC | Age: 54
End: 2019-10-28

## 2019-10-28 NOTE — PROGRESS NOTES
Assessment/Plan:       Diagnoses and all orders for this visit:    Essential hypertension  -     valsartan (DIOVAN) 80 mg tablet; Take 1 tablet (80 mg total) by mouth daily    Will start patient on valsartan given multiple side effects with different medication  Patient advised side effects of valsartan  Discussed keeping log twice a day and bring into office in 2 weeks  Precautions reviewed  Patient's blood pressure is not controlled  Cardiovascular risk factors include: hypertension and obesity (BMI >= 30 kg/m2)  Current plan of care: add valsartan to their medication regimen, dietary sodium restriction, regular aerobic exercise, weight loss and reduce stress  Reviewed risks of hypertension and principles of treatment  Blood pressure goal <140/90  Abnormal LFTS  -advised to get MRI done and discontinue drinking  Subjective:      Patient ID: Padmini Lane is a 62 y o  female  63 y/o female presents due to her blood pressure  Patient states she has been worked up because her 's coworker stopped taking his BP medication and had a stroke and she got very anxious yesterday and threw up  No further episodes but did have nausea  She has not been taking her amlodipine BP medication for the past 1 month and go worried  Her BP was elevated at home yesterday 160/85  Patient got worked and returned to office  She was previously on lisinopril and hctz and had a persistent cough and was taken off  She states she doesn't take her amlodipine as it causes ankle swelling  She denies any visual disturbance, headaches, dizziness, or chest pain  Patient has elevated LFTs for which she follows up with GI who recommended an MRI which she has to get done  Cut back on drinking and drinks on the weekend but 2-3 white claws instead of 5-6         The following portions of the patient's history were reviewed and updated as appropriate: allergies, current medications, past family history, past medical history, past social history, past surgical history and problem list     Review of Systems   Constitutional: Negative for appetite change, chills, fatigue and fever  HENT: Negative for ear pain and sore throat  Eyes: Negative for photophobia and visual disturbance  Respiratory: Negative for shortness of breath and wheezing  Cardiovascular: Negative for chest pain and leg swelling  Gastrointestinal: Negative for abdominal pain, diarrhea, nausea and vomiting  Genitourinary: Negative for difficulty urinating and dysuria  Musculoskeletal: Negative for arthralgias and back pain  Skin: Negative for color change  Neurological: Negative for dizziness, tremors, light-headedness and headaches  Psychiatric/Behavioral: Negative for agitation and behavioral problems  Objective:      /88 (BP Location: Left arm, Patient Position: Sitting, Cuff Size: Large)   Pulse 78   Temp (!) 97 3 °F (36 3 °C)   Resp 16   Ht 5' 5" (1 651 m)   Wt 78 7 kg (173 lb 9 6 oz)   BMI 28 89 kg/m²          Physical Exam   Constitutional: She is oriented to person, place, and time  She appears well-developed and well-nourished  No distress  HENT:   Head: Normocephalic and atraumatic  Right Ear: External ear normal    Left Ear: External ear normal    Nose: Nose normal    Mouth/Throat: Oropharynx is clear and moist  No oropharyngeal exudate  Eyes: Pupils are equal, round, and reactive to light  Conjunctivae and EOM are normal  Right eye exhibits no discharge  Left eye exhibits no discharge  Neck: Normal range of motion  Neck supple  Cardiovascular: Normal rate, regular rhythm, normal heart sounds and intact distal pulses  No murmur heard  Pulmonary/Chest: Effort normal and breath sounds normal  No respiratory distress  Abdominal: Soft  Bowel sounds are normal  She exhibits no distension  There is no tenderness  Musculoskeletal: Normal range of motion  She exhibits no edema     Neurological: She is alert and oriented to person, place, and time  Skin: Skin is warm  No rash noted  Psychiatric: She has a normal mood and affect   Her behavior is normal

## 2019-10-28 NOTE — TELEPHONE ENCOUNTER
Patient started an exercise program that she is walking 2 1/2 to 3 miles  Wanted to make sure this is okay? She started and walked two miles the other day and states her knee feels great  She was a prior replacement  Please advise

## 2019-11-12 ENCOUNTER — OFFICE VISIT (OUTPATIENT)
Dept: FAMILY MEDICINE CLINIC | Facility: CLINIC | Age: 54
End: 2019-11-12
Payer: COMMERCIAL

## 2019-11-12 VITALS
DIASTOLIC BLOOD PRESSURE: 98 MMHG | WEIGHT: 178 LBS | RESPIRATION RATE: 16 BRPM | BODY MASS INDEX: 29.66 KG/M2 | TEMPERATURE: 98.3 F | HEART RATE: 72 BPM | SYSTOLIC BLOOD PRESSURE: 160 MMHG | HEIGHT: 65 IN

## 2019-11-12 DIAGNOSIS — Z23 ENCOUNTER FOR IMMUNIZATION: ICD-10-CM

## 2019-11-12 DIAGNOSIS — I10 ESSENTIAL HYPERTENSION: Primary | ICD-10-CM

## 2019-11-12 PROCEDURE — 99213 OFFICE O/P EST LOW 20 MIN: CPT | Performed by: FAMILY MEDICINE

## 2019-11-12 PROCEDURE — 90471 IMMUNIZATION ADMIN: CPT | Performed by: FAMILY MEDICINE

## 2019-11-12 PROCEDURE — 90682 RIV4 VACC RECOMBINANT DNA IM: CPT | Performed by: FAMILY MEDICINE

## 2019-11-12 RX ORDER — VALSARTAN 160 MG/1
160 TABLET ORAL DAILY
Qty: 30 TABLET | Refills: 1 | Status: SHIPPED | OUTPATIENT
Start: 2019-11-12 | End: 2019-11-26

## 2019-11-12 NOTE — PROGRESS NOTES
Georges Malcolm 1965 female MRN: [id number]    [address] [place] [address]      ASSESSMENT/PLAN  Georges Malcolm is a 62 y o  female presents to the office for     1  Essential hypertension  Continues to be significantly elevated  Will increase from  mg daily  Keep a BP log x2 weeks  If the blood pressures are elevated over 140/90 to please contact our office  Encouraged to came a low-sodium diet  - valsartan (DIOVAN) 160 mg tablet; Take 1 tablet (160 mg total) by mouth daily  Dispense: 30 tablet; Refill: 1    2  Encounter for immunization  - FLUBLOK: influenza vaccine, quadrivalent, recombinant, PF, 0 5 mL   Hypertension:    Disposition: Return to the office in 1 week if symptoms worsen  No future appointments  SUBJECTIVE  CC: Follow-up (Bp )      HPI:  Georges Malcolm is a 62 y o  female who presents for a blood pressure check  Hypertension: Patient currently taking medications as prescribed without any s/e or concerns  Does take BP at home has food elevated SBP is from 130-180 and SBP 90-98  Currently denies any dizziness, headache, visual changes, weakness, dyspnea, chest pain, palpitations, confusion  Patient is worried given that her blood pressures continue to be elevated and not controlled  She was recently on amlodipine and discontinued given bilateral leg swelling  Was seen by cardiologist a year ago and had stress test in all was within normal limits    Denies any other complaints or concerns at this time    Review of Systems   Constitutional: Negative for activity change, appetite change, chills, fatigue and fever  HENT: Negative for congestion  Respiratory: Negative for cough, chest tightness and shortness of breath  Cardiovascular: Negative for chest pain and leg swelling  Gastrointestinal: Negative for abdominal distention, abdominal pain, constipation, diarrhea, nausea and vomiting     All other systems reviewed and are negative  Historical Information   The patient history was reviewed as follows:  Past Medical History:   Diagnosis Date    Abnormal immunology findings 06/09/2011    Anemia 05/23/2011    Arthritis     Closed fracture of distal end of fibula     resolved 09/15/17    Depression     Distal radius fracture, left     last assessed 01/30/17    GERD (gastroesophageal reflux disease)     last assessed 05/06/13    Herpes labialis     last assessed 07/23/15    Hypertension     essential ; last assessed 08/07/13    Sinus bradycardia     last assessed 05/24/16         Medications:     Current Outpatient Medications:     hydrOXYzine HCL (ATARAX) 25 mg tablet, Take 1 tablet (25 mg total) by mouth 2 (two) times a day as needed for anxiety, Disp: 30 tablet, Rfl: 1    venlafaxine (EFFEXOR-XR) 75 mg 24 hr capsule, Take 1 capsule (75 mg total) by mouth daily, Disp: 90 capsule, Rfl: 3    valsartan (DIOVAN) 160 mg tablet, Take 1 tablet (160 mg total) by mouth daily, Disp: 30 tablet, Rfl: 1    Allergies   Allergen Reactions    Lisinopril      COUGH       OBJECTIVE  Vitals:   Vitals:    11/12/19 0923   BP: 160/98   BP Location: Left arm   Patient Position: Sitting   Cuff Size: Standard   Pulse: 72   Resp: 16   Temp: 98 3 °F (36 8 °C)   Weight: 80 7 kg (178 lb)   Height: 5' 5" (1 651 m)         Physical Exam   Constitutional: She is oriented to person, place, and time  Vital signs are normal  She appears well-developed and well-nourished  HENT:   Head: Normocephalic and atraumatic  Eyes: Pupils are equal, round, and reactive to light  Conjunctivae and EOM are normal    Neck: Normal range of motion  Neck supple  Cardiovascular: Normal rate, regular rhythm, S1 normal, S2 normal, normal heart sounds and intact distal pulses  No murmur heard  Pulmonary/Chest: Effort normal and breath sounds normal  No respiratory distress  She has no wheezes  Abdominal: Soft   Bowel sounds are normal  Musculoskeletal: Normal range of motion  She exhibits no edema  Neurological: She is alert and oriented to person, place, and time  She has normal strength  Skin: Skin is warm  Psychiatric: She has a normal mood and affect  Her speech is normal and behavior is normal  Judgment and thought content normal    Vitals reviewed                   Sean Padron MD,   [facility]  11/12/2019

## 2019-11-19 ENCOUNTER — TELEPHONE (OUTPATIENT)
Dept: FAMILY MEDICINE CLINIC | Facility: CLINIC | Age: 54
End: 2019-11-19

## 2019-11-19 DIAGNOSIS — I10 ESSENTIAL HYPERTENSION: Primary | ICD-10-CM

## 2019-11-19 RX ORDER — HYDROCHLOROTHIAZIDE 25 MG/1
25 TABLET ORAL DAILY
Qty: 30 TABLET | Refills: 1 | Status: SHIPPED | OUTPATIENT
Start: 2019-11-19 | End: 2019-11-26

## 2019-11-19 NOTE — TELEPHONE ENCOUNTER
Dr Trevin Solorio:    Patient's BP is still running high  As of today's reading it is 150/93  She stated that you wanted her to call you if it was still running high since she started her new medication  Please contact her to discuss further

## 2019-11-19 NOTE — TELEPHONE ENCOUNTER
Thank you! Tell her then I want to add on HCtz 25mg; she is to take both BP medications together, and then I will see her next week as scheduled   Be sure to continue writing down BP

## 2019-11-19 NOTE — TELEPHONE ENCOUNTER
CALLED PT ADVISED HER TO TAKE HCTZ THAT WAS SENT TO HER PHARMACY W/ HER BP MED DAILY AND KEEP A BP LOG AND BRING W/ HER NEXT WEEK AT HER F/U APPT

## 2019-11-26 ENCOUNTER — OFFICE VISIT (OUTPATIENT)
Dept: FAMILY MEDICINE CLINIC | Facility: CLINIC | Age: 54
End: 2019-11-26
Payer: COMMERCIAL

## 2019-11-26 VITALS
RESPIRATION RATE: 16 BRPM | HEIGHT: 65 IN | SYSTOLIC BLOOD PRESSURE: 130 MMHG | TEMPERATURE: 98.3 F | WEIGHT: 178 LBS | HEART RATE: 72 BPM | DIASTOLIC BLOOD PRESSURE: 80 MMHG | BODY MASS INDEX: 29.66 KG/M2

## 2019-11-26 DIAGNOSIS — F10.21 HISTORY OF ALCOHOLISM (HCC): Chronic | ICD-10-CM

## 2019-11-26 DIAGNOSIS — E78.5 BORDERLINE HYPERLIPIDEMIA: ICD-10-CM

## 2019-11-26 DIAGNOSIS — E53.9 VITAMIN B-COMPLEX DEFICIENCY: Chronic | ICD-10-CM

## 2019-11-26 DIAGNOSIS — I10 ESSENTIAL HYPERTENSION: Primary | ICD-10-CM

## 2019-11-26 PROBLEM — T84.032A MECHANICAL LOOSENING OF INTERNAL RIGHT KNEE PROSTHETIC JOINT (HCC): Status: RESOLVED | Noted: 2017-10-23 | Resolved: 2019-11-26

## 2019-11-26 PROBLEM — F10.10 NONDEPENDENT ALCOHOL ABUSE, EPISODIC DRINKING BEHAVIOR: Status: RESOLVED | Noted: 2017-11-21 | Resolved: 2019-11-26

## 2019-11-26 PROBLEM — R07.9 CHEST PAIN: Status: RESOLVED | Noted: 2018-07-22 | Resolved: 2019-11-26

## 2019-11-26 PROBLEM — T84.022A INSTABILITY OF INTERNAL RIGHT KNEE PROSTHESIS (HCC): Status: RESOLVED | Noted: 2017-10-23 | Resolved: 2019-11-26

## 2019-11-26 PROBLEM — Z47.1 AFTERCARE FOLLOWING RIGHT KNEE JOINT REPLACEMENT SURGERY: Status: RESOLVED | Noted: 2018-10-17 | Resolved: 2019-11-26

## 2019-11-26 PROBLEM — Z96.651 AFTERCARE FOLLOWING RIGHT KNEE JOINT REPLACEMENT SURGERY: Status: RESOLVED | Noted: 2018-10-17 | Resolved: 2019-11-26

## 2019-11-26 PROBLEM — Z96.651 STATUS POST REVISION OF TOTAL REPLACEMENT OF RIGHT KNEE: Status: RESOLVED | Noted: 2018-10-17 | Resolved: 2019-11-26

## 2019-11-26 PROCEDURE — 1036F TOBACCO NON-USER: CPT | Performed by: FAMILY MEDICINE

## 2019-11-26 PROCEDURE — 99214 OFFICE O/P EST MOD 30 MIN: CPT | Performed by: FAMILY MEDICINE

## 2019-11-26 RX ORDER — VALSARTAN AND HYDROCHLOROTHIAZIDE 160; 25 MG/1; MG/1
1 TABLET ORAL DAILY
Qty: 90 TABLET | Refills: 2 | Status: SHIPPED | OUTPATIENT
Start: 2019-11-26 | End: 2020-01-24

## 2019-11-26 NOTE — PROGRESS NOTES
Say Romero 1965 female MRN: [id number]    [facility]  Follow-up Visit    ASSESSMENT/PLAN  Say Romero is a 62 y o  female presents to the office for     1  Essential hypertension  Improved from previous visit  Bp log reviewed  Continue on medications as prescribed will combine the pills  Continue low NA diet   BP take 1 time a week   - valsartan-hydrochlorothiazide (DIOVAN-HCT) 160-25 MG per tablet; Take 1 tablet by mouth daily  Dispense: 90 tablet; Refill: 2  - Basic metabolic panel; Future    2  Vitamin B-complex deficiency  Repeat levels; currently not on supplements  - Vitamin B12; Future    3  Borderline hyperlipidemia  Repeat Levels, currently not on medications  ASCVD 3 6%  - Lipid panel; Future    4  History of alcohol abuse currently sober for 6 years  No concerns at this time for relapse      Disposition: Return to the office in 3 months          No future appointments  SUBJECTIVE  CC: Follow-up      HPI:  Say Romero is a 62 y o  female  presenting to the office for a follow up on hypertension  Patient states that she has been taking a blood pressure log at home  States that her BP is have been doing phenomenal ranging 120-140; sometimes ; DBP 70-80  Patient states that she does get anxious when taking her blood pressure given that she wanted to be normal already  She has seen a significant improvement and has not had any symptoms with this new medication  Patient states that she has been tolerating well and taking the medication appropriately  Denies any chest pain shortness of breath or dizziness upon sitting    Does have a history of alcohol use has been currently 6 years clean  B complex history does not take any supplements at this time  Hyperlipidemia currently not taking any medications attempting diet and exercise at this time    Review of Systems   Constitutional: Negative for activity change, appetite change, chills, fatigue and fever  HENT: Negative for congestion  Eyes: Negative for visual disturbance  Respiratory: Negative for cough, chest tightness and shortness of breath  Cardiovascular: Negative for chest pain and leg swelling  Gastrointestinal: Negative for abdominal distention, abdominal pain, constipation, diarrhea, nausea and vomiting  Musculoskeletal: Negative for arthralgias, back pain and gait problem  Skin: Negative for rash  Allergic/Immunologic: Negative for environmental allergies  Neurological: Negative for dizziness and headaches  All other systems reviewed and are negative        Historical Information   The patient history was reviewed as follows:    Past Medical History:   Diagnosis Date    Abnormal immunology findings 06/09/2011    Anemia 05/23/2011    Arthritis     Closed fracture of distal end of fibula     resolved 09/15/17    Depression     Distal radius fracture, left     last assessed 01/30/17    GERD (gastroesophageal reflux disease)     last assessed 05/06/13    Herpes labialis     last assessed 07/23/15    Hypertension     essential ; last assessed 08/07/13    Sinus bradycardia     last assessed 05/24/16     Past Surgical History:   Procedure Laterality Date    ANKLE SURGERY      last assessed 09/15/17    BACK SURGERY      lumbar laminectomy L4-L5    BLADDER SUSPENSION      7/2017    COLONOSCOPY      JOINT REPLACEMENT      TKR  on right    ORIF TIBIA & FIBULA FRACTURES Right 12/15/2016    Procedure: SURGICAL FIXATION OF RIGHT DISTAL FIBULA FRACTURE;  Surgeon: Elesa Homans, MD;  Location: [facility] MA[facility] OR;  Service:     SC REVISE KNEE JOINT REPLACE,ALL PARTS Right 10/23/2017    Procedure: REVISION TOTAL KNEE REPLACEMENT WITH FROZEN SECTIONS;  Surgeon: Korin Hua DO;  Location: [address] [place] [address];  Service: Orthopedics    TOTAL KNEE ARTHROPLASTY      last assessed; 11/21/17    WISDOM TOOTH EXTRACTION       Family History   Problem Relation Age of Onset  Arthritis Mother     Osteoporosis Mother     Dementia Father     Other Father         spinal stenosis    Other Family         [facility]      Social History   Social History     Substance and Sexual Activity   Alcohol Use Yes    Comment: moderately     Social History     Substance and Sexual Activity   Drug Use No     Social History     Tobacco Use   Smoking Status Never Smoker   Smokeless Tobacco Never Used       Medications:     Current Outpatient Medications:     hydrOXYzine HCL (ATARAX) 25 mg tablet, Take 1 tablet (25 mg total) by mouth 2 (two) times a day as needed for anxiety, Disp: 30 tablet, Rfl: 1    venlafaxine (EFFEXOR-XR) 75 mg 24 hr capsule, Take 1 capsule (75 mg total) by mouth daily, Disp: 90 capsule, Rfl: 3    valsartan-hydrochlorothiazide (DIOVAN-HCT) 160-25 MG per tablet, Take 1 tablet by mouth daily, Disp: 90 tablet, Rfl: 2  Allergies   Allergen Reactions    Lisinopril      COUGH       OBJECTIVE    Vitals:   Vitals:    11/26/19 0914   BP: 130/80   BP Location: Left arm   Patient Position: Sitting   Cuff Size: Standard   Pulse: 72   Resp: 16   Temp: 98 3 °F (36 8 °C)   Weight: 80 7 kg (178 lb)   Height: 5' 5" (1 651 m)           Physical Exam   Constitutional: She is oriented to person, place, and time  Vital signs are normal  She appears well-developed and well-nourished  HENT:   Head: Normocephalic and atraumatic  Eyes: Pupils are equal, round, and reactive to light  Conjunctivae and EOM are normal    Neck: Normal range of motion  Neck supple  Cardiovascular: Normal rate, regular rhythm, S1 normal, S2 normal, normal heart sounds and intact distal pulses  No murmur heard  Pulmonary/Chest: Effort normal and breath sounds normal  No respiratory distress  She has no wheezes  Musculoskeletal: Normal range of motion  She exhibits no edema  Neurological: She is alert and oriented to person, place, and time  She has normal strength  Skin: Skin is warm     Psychiatric: She has a normal mood and affect  Her speech is normal and behavior is normal  Judgment and thought content normal    Vitals reviewed           Micheal Goltz, MD  [address] [place] [id number]

## 2019-12-02 ENCOUNTER — TELEPHONE (OUTPATIENT)
Dept: FAMILY MEDICINE CLINIC | Facility: CLINIC | Age: 54
End: 2019-12-02

## 2019-12-02 NOTE — TELEPHONE ENCOUNTER
Dr Aj Dennis; Patient called asking if you could recommend a neurologist for her to see? Please advise

## 2019-12-03 DIAGNOSIS — R55 SYNCOPE, UNSPECIFIED SYNCOPE TYPE: Primary | ICD-10-CM

## 2019-12-03 NOTE — TELEPHONE ENCOUNTER
3:30 am in the morning, went down the stairs, and had syncopal event  LOC however was oriented when she woke up  < 3 seconds  Concern for BP, would like her to check at ebdtime and let us know the readings   Would like a neuro consult, names given to patient today

## 2020-01-23 ENCOUNTER — TRANSITIONAL CARE MANAGEMENT (OUTPATIENT)
Dept: FAMILY MEDICINE CLINIC | Facility: CLINIC | Age: 55
End: 2020-01-23

## 2020-01-23 ENCOUNTER — TELEPHONE (OUTPATIENT)
Dept: FAMILY MEDICINE CLINIC | Facility: CLINIC | Age: 55
End: 2020-01-23

## 2020-01-23 RX ORDER — PANTOPRAZOLE SODIUM 40 MG/1
40 TABLET, DELAYED RELEASE ORAL DAILY
COMMUNITY
End: 2020-02-27

## 2020-01-23 RX ORDER — FOLIC ACID 1 MG/1
1 TABLET ORAL DAILY
COMMUNITY
Start: 2020-01-23 | End: 2020-02-22 | Stop reason: ALTCHOICE

## 2020-01-23 RX ORDER — FUROSEMIDE 20 MG/1
20 TABLET ORAL DAILY
COMMUNITY
Start: 2020-01-22 | End: 2020-02-03 | Stop reason: SDUPTHER

## 2020-01-23 RX ORDER — THIAMINE MONONITRATE (VIT B1) 100 MG
100 TABLET ORAL DAILY
COMMUNITY
Start: 2020-01-23 | End: 2021-08-09

## 2020-01-23 RX ORDER — AMLODIPINE BESYLATE 10 MG/1
10 TABLET ORAL DAILY
COMMUNITY
End: 2020-01-24 | Stop reason: SDUPTHER

## 2020-01-23 RX ORDER — ACETAMINOPHEN 325 MG/1
650 TABLET ORAL
COMMUNITY
Start: 2020-01-22 | End: 2020-01-28 | Stop reason: SDUPTHER

## 2020-01-23 RX ORDER — OXYCODONE HYDROCHLORIDE AND ACETAMINOPHEN 5; 325 MG/1; MG/1
1 TABLET ORAL
COMMUNITY
Start: 2020-01-22 | End: 2020-01-28

## 2020-01-23 NOTE — TELEPHONE ENCOUNTER
Dr Shanon Marquez:    Patient was in hospital for 4 days because her sodium level dropped below normal  She was told that they think that the cause of this was because of her BP medication and the anti-depressant medication she is currently taking  I scheduled a MARIELA with you on 1/24

## 2020-01-24 ENCOUNTER — OFFICE VISIT (OUTPATIENT)
Dept: FAMILY MEDICINE CLINIC | Facility: CLINIC | Age: 55
End: 2020-01-24
Payer: COMMERCIAL

## 2020-01-24 ENCOUNTER — OFFICE VISIT (OUTPATIENT)
Dept: OBGYN CLINIC | Facility: CLINIC | Age: 55
End: 2020-01-24
Payer: COMMERCIAL

## 2020-01-24 ENCOUNTER — TELEPHONE (OUTPATIENT)
Dept: FAMILY MEDICINE CLINIC | Facility: CLINIC | Age: 55
End: 2020-01-24

## 2020-01-24 ENCOUNTER — TELEPHONE (OUTPATIENT)
Dept: OBGYN CLINIC | Facility: HOSPITAL | Age: 55
End: 2020-01-24

## 2020-01-24 ENCOUNTER — APPOINTMENT (OUTPATIENT)
Dept: RADIOLOGY | Facility: CLINIC | Age: 55
End: 2020-01-24
Payer: COMMERCIAL

## 2020-01-24 VITALS
TEMPERATURE: 98.3 F | DIASTOLIC BLOOD PRESSURE: 82 MMHG | BODY MASS INDEX: 29.32 KG/M2 | HEIGHT: 65 IN | HEART RATE: 96 BPM | WEIGHT: 176 LBS | RESPIRATION RATE: 16 BRPM | SYSTOLIC BLOOD PRESSURE: 134 MMHG

## 2020-01-24 VITALS
SYSTOLIC BLOOD PRESSURE: 129 MMHG | HEART RATE: 98 BPM | DIASTOLIC BLOOD PRESSURE: 84 MMHG | BODY MASS INDEX: 29.36 KG/M2 | WEIGHT: 176.2 LBS | HEIGHT: 65 IN

## 2020-01-24 DIAGNOSIS — F10.10 ETOH ABUSE: ICD-10-CM

## 2020-01-24 DIAGNOSIS — S42.202S CLOSED FRACTURE OF PROXIMAL END OF LEFT HUMERUS, UNSPECIFIED FRACTURE MORPHOLOGY, SEQUELA: ICD-10-CM

## 2020-01-24 DIAGNOSIS — I10 ESSENTIAL HYPERTENSION: Primary | ICD-10-CM

## 2020-01-24 DIAGNOSIS — M25.512 ACUTE PAIN OF LEFT SHOULDER: Primary | ICD-10-CM

## 2020-01-24 DIAGNOSIS — M25.512 ACUTE PAIN OF LEFT SHOULDER: ICD-10-CM

## 2020-01-24 DIAGNOSIS — S42.202A CLOSED FRACTURE OF PROXIMAL END OF LEFT HUMERUS, UNSPECIFIED FRACTURE MORPHOLOGY, INITIAL ENCOUNTER: ICD-10-CM

## 2020-01-24 DIAGNOSIS — E87.1 HYPONATREMIA: ICD-10-CM

## 2020-01-24 DIAGNOSIS — R30.0 DYSURIA: ICD-10-CM

## 2020-01-24 DIAGNOSIS — F41.9 ANXIETY: ICD-10-CM

## 2020-01-24 LAB
SL AMB  POCT GLUCOSE, UA: ABNORMAL
SL AMB LEUKOCYTE ESTERASE,UA: 500
SL AMB POCT BILIRUBIN,UA: ABNORMAL
SL AMB POCT BLOOD,UA: 50
SL AMB POCT CLARITY,UA: ABNORMAL
SL AMB POCT COLOR,UA: YELLOW
SL AMB POCT KETONES,UA: ABNORMAL
SL AMB POCT NITRITE,UA: ABNORMAL
SL AMB POCT PH,UA: 9
SL AMB POCT SPECIFIC GRAVITY,UA: 1.02
SL AMB POCT URINE PROTEIN: 30
SL AMB POCT UROBILINOGEN: ABNORMAL

## 2020-01-24 PROCEDURE — 73030 X-RAY EXAM OF SHOULDER: CPT

## 2020-01-24 PROCEDURE — 99214 OFFICE O/P EST MOD 30 MIN: CPT | Performed by: ORTHOPAEDIC SURGERY

## 2020-01-24 PROCEDURE — 99495 TRANSJ CARE MGMT MOD F2F 14D: CPT | Performed by: FAMILY MEDICINE

## 2020-01-24 PROCEDURE — 81003 URINALYSIS AUTO W/O SCOPE: CPT | Performed by: FAMILY MEDICINE

## 2020-01-24 RX ORDER — AMLODIPINE BESYLATE 10 MG/1
10 TABLET ORAL DAILY
Qty: 90 TABLET | Refills: 2 | Status: SHIPPED | OUTPATIENT
Start: 2020-01-24 | End: 2020-02-03

## 2020-01-24 RX ORDER — CIPROFLOXACIN 500 MG/1
500 TABLET, FILM COATED ORAL EVERY 12 HOURS SCHEDULED
Qty: 10 TABLET | Refills: 0 | Status: SHIPPED | OUTPATIENT
Start: 2020-01-24 | End: 2020-01-29

## 2020-01-24 NOTE — TELEPHONE ENCOUNTER
Patient sees Dr Chase Pinon from PCP office is calling in asking to have the records that the patient provided to the Dr today to be scanned into the patients chart so the PCP can see these as well

## 2020-01-24 NOTE — PROGRESS NOTES
John Edwards 1965 female MRN: [id number]    [address] [place] [address]      ASSESSMENT/PLAN  John Edwards is a 62 y o  female presents to the office for    {Assess/Plan SmartLinks:77819}            No future appointments         SUBJECTIVE  CC: Transition of Care Management      HPI:  John Edwards is a 62 y o  female who presents for   Review of Systems    Historical Information   The patient history was reviewed as follows:  Past Medical History:   Diagnosis Date    Abnormal immunology findings 06/09/2011    Anemia 05/23/2011    Arthritis     Closed fracture of distal end of fibula     resolved 09/15/17    Depression     Distal radius fracture, left     last assessed 01/30/17    GERD (gastroesophageal reflux disease)     last assessed 05/06/13    Herpes labialis     last assessed 07/23/15    Hypertension     essential ; last assessed 08/07/13    Sinus bradycardia     last assessed 05/24/16         Medications:     Current Outpatient Medications:     acetaminophen (TYLENOL) 325 mg tablet, Take 650 mg by mouth, Disp: , Rfl:     folic acid (FOLVITE) 1 mg tablet, Take 1 mg by mouth daily, Disp: , Rfl:     furosemide (LASIX) 20 mg tablet, Take 20 mg by mouth daily, Disp: , Rfl:     oxyCODONE-acetaminophen (PERCOCET) 5-325 mg per tablet, Take 1 tablet by mouth, Disp: , Rfl:     thiamine (VITAMIN B1) 100 mg tablet, Take 100 mg by mouth daily, Disp: , Rfl:     amLODIPine (NORVASC) 10 mg tablet, Take 1 tablet (10 mg total) by mouth daily, Disp: 90 tablet, Rfl: 2    hydrOXYzine HCL (ATARAX) 25 mg tablet, Take 1 tablet (25 mg total) by mouth 2 (two) times a day as needed for anxiety (Patient not taking: Reported on 1/23/2020), Disp: 30 tablet, Rfl: 1    pantoprazole (PROTONIX) 40 mg tablet, Take 40 mg by mouth daily, Disp: , Rfl:     Allergies   Allergen Reactions    Lisinopril      COUGH       OBJECTIVE  Vitals:   Vitals:    01/24/20 1120   BP: 134/82   BP Location: Left arm   Patient Position: Sitting   Cuff Size: Standard   Pulse: 96   Resp: 16   Temp: 98 3 °F (36 8 °C)   TempSrc: Tympanic   Weight: 79 8 kg (176 lb)   Height: 5' 5" (1 651 m)         Physical Exam               Tommy Velez MD,   [facility]  1/24/2020

## 2020-01-24 NOTE — PROGRESS NOTES
Assessment/Plan:  1  Acute pain of left shoulder  XR shoulder 2+ vw left    XR humerus left   2  Closed fracture of proximal end of left humerus, unspecified fracture morphology, initial encounter         Scribe Attestation    I,:   Gladys Fragoso Hunter am acting as a scribe while in the presence of the attending physician :        I,:   Nisa De Santiago MD personally performed the services described in this documentation    as scribed in my presence :              X-rays of the left shoulder today demonstrate a comminuted proximal humerus fracture with a malaligned glenohumeral joint  Unfortunately, we do not have the CT scan of the left shoulder to view today and the report was very limited in detail  I asked that Feliz Mcwilliams obtain the CT images on a disc and bring it to our office so I can review  The images will dictate if an open reduction internal fixation is necessary  We discussed the fact that this is likely a surgical case and dependent upon the images I will leave the reduce the fracture with plate and screws or she may require a shoulder replacement  She is visiting her general practitioner today to review recent blood work and to correct her electrolyte imbalance  I asked that she obtain clearance from her physician for surgery should be necessary  There is the option that this is treated conservatively simply with a sling if the comminuted pieces are close in proximity to allow healing  The fracture could certainly be addressed at a later date should a conservative choice be made initially and she find that her function is not quite what she would like it to be  I will phone her this evening after reviewing the images to discuss our final options  Subjective:   Bobo Monsivais is a 62 y o  female who presents to the office today for evaluation of a left proximal humerus fracture suffered on January 19, 2020 following a fall due to a syncopal episode    Feliz Mcwilliams was admitted into the hospital following her fall due to electrolyte imbalance  She states she did have a low sodium level which her managing physician attributed to her syncope  She did have x-rays and a CT scan of the left shoulder and has provided reports unfortunately a disc was not provided to view the images  Cynthia Mirza states that her chief complaint is that of a persistent moderate and global ache about the left shoulder that radiates into the left upper arm and at times into the left trapezius region  She did have cervical x-rays which were negative at [facility]   CT scan of the head was also negative  Her pain does feel better at rest in the sling provided by the hospital   She denies distal paresthesias  Review of Systems   Constitutional: Positive for activity change  Negative for chills, fever and unexpected weight change  HENT: Negative for hearing loss, nosebleeds and sore throat  Eyes: Negative for pain, redness and visual disturbance  Respiratory: Negative for cough, shortness of breath and wheezing  Cardiovascular: Negative for chest pain, palpitations and leg swelling  Gastrointestinal: Negative for abdominal pain, nausea and vomiting  Endocrine: Negative for polydipsia and polyuria  Genitourinary: Negative for dysuria and hematuria  Musculoskeletal:        See HPI   Skin: Negative for rash and wound  Neurological: Negative for dizziness, numbness and headaches  Psychiatric/Behavioral: Negative for decreased concentration and suicidal ideas  The patient is not nervous/anxious            Past Medical History:   Diagnosis Date    Abnormal immunology findings 06/09/2011    Anemia 05/23/2011    Arthritis     Closed fracture of distal end of fibula     resolved 09/15/17    Depression     Distal radius fracture, left     last assessed 01/30/17    GERD (gastroesophageal reflux disease)     last assessed 05/06/13    Herpes labialis     last assessed 07/23/15    Hypertension     essential ; last assessed 08/07/13    Sinus bradycardia     last assessed 05/24/16       Past Surgical History:   Procedure Laterality Date    ANKLE SURGERY      last assessed 09/15/17    BACK SURGERY      lumbar laminectomy L4-L5    BLADDER SUSPENSION      7/2017    COLONOSCOPY      JOINT REPLACEMENT      TKR  on right    ORIF TIBIA & FIBULA FRACTURES Right 12/15/2016    Procedure: SURGICAL FIXATION OF RIGHT DISTAL FIBULA FRACTURE;  Surgeon: Randi Richards MD;  [facility];  Service:     MS REVISE KNEE JOINT REPLACE,ALL PARTS Right 10/23/2017    Procedure: REVISION TOTAL KNEE REPLACEMENT WITH FROZEN SECTIONS;  Surgeon: Lake Cobian DO;  Location: [address] [place] [address];  Service: Orthopedics    TOTAL KNEE ARTHROPLASTY      last assessed; 11/21/17    WISDOM TOOTH EXTRACTION         Family History   Problem Relation Age of Onset    Arthritis Mother     Osteoporosis Mother     Dementia Father     Other Father         spinal stenosis    Other Family         [facility]       Social History     Occupational History    Not on file   Tobacco Use    Smoking status: Never Smoker    Smokeless tobacco: Never Used   Substance and Sexual Activity    Alcohol use: Yes     Comment: moderately    Drug use: No    Sexual activity: Yes     Partners: Male         Current Outpatient Medications:     acetaminophen (TYLENOL) 325 mg tablet, Take 650 mg by mouth, Disp: , Rfl:     amLODIPine (NORVASC) 10 mg tablet, Take 10 mg by mouth daily, Disp: , Rfl:     folic acid (FOLVITE) 1 mg tablet, Take 1 mg by mouth daily, Disp: , Rfl:     furosemide (LASIX) 20 mg tablet, Take 20 mg by mouth daily, Disp: , Rfl:     hydrOXYzine HCL (ATARAX) 25 mg tablet, Take 1 tablet (25 mg total) by mouth 2 (two) times a day as needed for anxiety (Patient not taking: Reported on 1/23/2020), Disp: 30 tablet, Rfl: 1    oxyCODONE-acetaminophen (PERCOCET) 5-325 mg per tablet, Take 1 tablet by mouth, Disp: , Rfl:     pantoprazole (PROTONIX) 40 mg tablet, Take 40 mg by mouth daily, Disp: , Rfl:     thiamine (VITAMIN B1) 100 mg tablet, Take 100 mg by mouth daily, Disp: , Rfl:     valsartan-hydrochlorothiazide (DIOVAN-HCT) 160-25 MG per tablet, Take 1 tablet by mouth daily (Patient not taking: Reported on 1/23/2020), Disp: 90 tablet, Rfl: 2    venlafaxine (EFFEXOR-XR) 75 mg 24 hr capsule, Take 1 capsule (75 mg total) by mouth daily (Patient not taking: Reported on 1/23/2020), Disp: 90 capsule, Rfl: 3    Allergies   Allergen Reactions    Lisinopril      COUGH       Objective:  Vitals:    01/24/20 0812   BP: 129/84   Pulse: 98       Ortho Exam  Inspection of the left shoulder found the skin to be of normal color and temperature  The left upper extremity is neurovascularly intact with normal sensations to light touch and a 2+ radial pulse  She has normal strength and function in the hand intrinsics  There is ecchymosis located about the lateral anterior aspect of the left upper extremity  Range of motion and strength testing were deferred today  Physical Exam   Constitutional: She is oriented to person, place, and time  She appears well-developed and well-nourished  HENT:   Head: Normocephalic and atraumatic  Eyes: Conjunctivae are normal  Right eye exhibits no discharge  Left eye exhibits no discharge  Neck: Normal range of motion  Neck supple  Cardiovascular: Regular rhythm, normal heart sounds and intact distal pulses  Pulmonary/Chest: Effort normal and breath sounds normal  No stridor  No respiratory distress  Neurological: She is alert and oriented to person, place, and time  Skin: Skin is warm and dry  Psychiatric: She has a normal mood and affect  Her behavior is normal    Vitals reviewed  I have personally reviewed pertinent films in PACS and my interpretation is as follows:  X-rays of the left shoulder demonstrate a comminuted fracture of the left proximal humerus

## 2020-01-24 NOTE — PROGRESS NOTES
TRANSITION OF CARE OFFICE VISIT  [facility] - [facility]    NAME: Claire Ricketts  AGE: 62 y o  SEX: female  : 1965     DATE: 2020     Assessment and Plan:     Diagnoses and all orders for this visit:    Essential hypertension  -     amLODIPine (NORVASC) 10 mg tablet; Take 1 tablet (10 mg total) by mouth daily    Hyponatremia  -     POCT urine dip auto non-scope  -     Urine culture; Future    ETOH abuse    Dysuria  -     UA w Reflex to Microscopic w Reflex to Culture -Lab Collect  -     ciprofloxacin (CIPRO) 500 mg tablet; Take 1 tablet (500 mg total) by mouth every 12 (twelve) hours for 5 days  -     POCT urine dip auto non-scope  -     Urine culture; Future    Anxiety    Closed fracture of proximal end of left humerus, unspecified fracture morphology, sequela    I had the pleasure of seeing Chintan Castaneda with her  today  Reviewed most of her hospitalization  Unfortunately the patient was found to have hyponatremia with the lowest reading of 117  Patient admits that she was binge drinking several days prior to the hospitalization  Patient has a history of alcohol use with 2 rehab in the past   Patient states that she is drinking her anxiety away which is being provoked by her mother who is 80 and she is trying to take care of her  Patient has been off Effexor since her discharge  discontinued her valsartan-hydrochlorothiazide  At the hospital   At this time the patient is not taking any blood pressure medications  Today her blood pressure is within normal limits however she has been sober for 1 week and has been taking Lasix  Will await her repeat BMP to see if she is cleared for surgical repair of her left shoulder  Would like the patient to come back on Tuesday for evaluation  Dysuria:  UA at the hospital demonstrated leukocytosis/nitrate positive  Repeat today showed leukocytosis and blood will start treatment with Cipro 500 mg twice a day for 5 days  HTN: Continue on Amlodipine and Lasix  Alcohol use with anxiety; education given on her seeing family guidance to establish care  Patient desires to go outside of the [facility] given that her  has a reputation that she would like him to keep  I advised them to contact Alyssia to in  I have given them a deadline of Tuesday to have an existing appointment made to be sure that she gets her anxiety better controlled       Come in for preop on tuesday       Counseling:     · Medication Side Effects: Adverse side effects of medications were reviewed with the patient/guardian today  · I have spent 50 minutes with Patient and family today in which greater than 50% of this time was spent in counseling/coordination of care regarding Diagnostic results, Prognosis, Risks and benefits of tx options, Intructions for management, Patient and family education, Importance of tx compliance, Risk factor reductions and Impressions  · Barriers to treatment include: No identified barriers     Transitional Care Management Review:     Georges Malcolm is a 62 y o  female here for TCM follow-up    During the TCM phone call patient stated:    TCM Call (since 12/24/2019)     Date and time call was made  1/23/2020  7:04 PM    Patient was hospitialized at  Other (comment)    Comment  [place]    Date of Admission  01/19/20    Date of discharge  01/22/20    Diagnosis  fx left humerus seeing ortho Dr Rinaldi [facility] 1-24-20    Disposition  Home    Were the patients medications reviewed and updated  Yes    Current Symptoms  Arm pain - left side    Arm pain left side severity  Severe <img src='C:FILES (X86)      TCM Call (since 12/24/2019)     Should patient be enrolled in anticoag monitoring? No    Scheduled for follow up?   Yes    Did you obtain your prescribed medications  Yes    Do you need help managing your prescriptions or medications  No    Is transportation to your appointment needed  Yes    I have advised the patient to call PCP with any new or worsening symptoms  [id number]    Living Arrangements  Spouse or Significiant other    Are you recieving any outpatient services  No    Are you recieving home care services  No    Are you using any community resources  No    Current waiver services  No    Have you fallen in the last 12 months  Yes    Interperter language line needed  No           HPI:     47-year-old female with a significant history of hypertension, alcohol use, anxiety presenting to the office as a transition of care  Patient was admitted on January 19, 2020 for a syncopal episode  Patient was found to have an electrolyte imbalance with a sodium level of 120-117  Patient was admitted to ICU for evaluation  Patient also obtained a left proximal humerus fracture when she fell  Patient was seen by Nephrology was placed on Lasix to help with the electrolyte  She advised to limit her water intake till seen by the provider  She was supposed to be seeing a kidney specialist outpatient  Patient states that she was having significant amount of white clot had a very high alcohol level when admitted to the hospital   Patient states that she relapsed just recently in the last 3 months given the stressors of her mother  She states that her Effexor was not helping her however alcohol was helping her anxiety improved  Does have a history of needing rehab twice  Patient does not want to see psychiatry in the area of [address] [place] [address] and would like to be seen in a different county  Patient is currently wearing a left shoulder sling which helps her with her pain  She is not taking any narcotics  She is only using Tylenol as needed      The following portions of the patient's history were reviewed and updated as appropriate: allergies, current medications, past family history, past medical history, past social history, past surgical history and problem list      Review of Systems:     Review of Systems   Constitutional: Positive for fatigue  Negative for activity change, appetite change, chills and fever  HENT: Negative for congestion  Respiratory: Positive for cough and wheezing  Negative for chest tightness and shortness of breath  Cardiovascular: Negative for chest pain and leg swelling  Gastrointestinal: Negative for abdominal distention, abdominal pain, constipation, diarrhea, nausea and vomiting  Genitourinary: Positive for dysuria  Musculoskeletal:        Left shoulder pain     All other systems reviewed and are negative  Problem List:     Patient Active Problem List   Diagnosis    H/O total knee replacement    Depression with anxiety    Hematuria    History of alcoholism (HCC)    Thyromegaly    Vitamin B-complex deficiency    Essential hypertension    Hyponatremia    Borderline hyperlipidemia    Chronic fatigue    Murmur    Nontoxic goiter, unspecified        Objective:     /82 (BP Location: Left arm, Patient Position: Sitting, Cuff Size: Standard)   Pulse 96   Temp 98 3 °F (36 8 °C) (Tympanic)   Resp 16   Ht 5' 5" (1 651 m)   Wt 79 8 kg (176 lb)   BMI 29 29 kg/m²     Physical Exam   Constitutional: She is oriented to person, place, and time  Vital signs are normal  She appears well-developed and well-nourished  HENT:   Head: Normocephalic and atraumatic  Right Ear: External ear normal    Left Ear: External ear normal    Nose: Nose normal    Mouth/Throat: Posterior oropharyngeal erythema present  Eyes: Pupils are equal, round, and reactive to light  Conjunctivae and EOM are normal    Neck: Normal range of motion  Neck supple  Cardiovascular: Normal rate, regular rhythm, S1 normal, S2 normal, normal heart sounds and intact distal pulses  No murmur heard  Pulmonary/Chest: Effort normal and breath sounds normal  No respiratory distress  She has no wheezes (no wheezing present)  Abdominal: Soft  Bowel sounds are normal  There is no tenderness     Musculoskeletal:   Deferred shoulder exam  Left shoulder in a sling   Neurological: She is alert and oriented to person, place, and time  She has normal strength  Skin: Skin is warm  Psychiatric: She has a normal mood and affect  Her speech is normal and behavior is normal  Judgment and thought content normal    Vitals reviewed  Laboratory Results: Reviewed with patient and     Radiology/Other Diagnostic Testing Results: ONly reviewed reports    Us Right Upper Quadrant With Liver Dopplers    Result Date: 8/27/2019  RIGHT UPPER QUADRANT ULTRASOUND WITH LIVER DOPPLER INDICATION:     R74 0: Nonspecific elevation of levels of transaminase and lactic acid dehydrogenase (ldh)  COMPARISON:  None  TECHNIQUE:   Real-time ultrasound of the right upper quadrant was performed with a curvilinear transducer with both volumetric sweeps and still imaging techniques  FINDINGS: PANCREAS:  Visualized portions of the pancreas are within normal limits  AORTA AND IVC:  Visualized portions are normal for patient age  LIVER: Size:  Within normal range  The liver measures 15 cm in the midclavicular line  Contour:  Surface contour is smooth  Parenchyma:  Increased parenchymal echogenicity compatible with fatty infiltration  Small cyst in the left hepatic lobe, measuring 1 8 x 1 8 x 1 5 cm  LIVER DOPPLER: The main portal vein and primary branch segments are patent and hepatopetal with normal spectral waveform  Hepatic veins are patent  Spectral waveforms within normal limits  Main hepatic artery appears normal size, patent with normal spectral waveform  BILIARY: The gallbladder is normal in caliber  No wall thickening or pericholecystic fluid  No stones or sludge identified  No sonographic Mirza's sign  No intrahepatic biliary dilatation  CBD measures 5 mm  No choledocholithiasis  KIDNEY: Right kidney measures 9 9 cm  Small simple cyst at the midpole measuring 1 1 x 1 0 x 1 1 cm  ASCITES:   None       Fatty liver, with small cyst in the left hepatic lobe  Small right renal cyst  Workstation performed: [id number]        Current Medications:     Outpatient Medications Prior to Visit   Medication Sig Dispense Refill    acetaminophen (TYLENOL) 325 mg tablet Take 650 mg by mouth      folic acid (FOLVITE) 1 mg tablet Take 1 mg by mouth daily      furosemide (LASIX) 20 mg tablet Take 20 mg by mouth daily      oxyCODONE-acetaminophen (PERCOCET) 5-325 mg per tablet Take 1 tablet by mouth      thiamine (VITAMIN B1) 100 mg tablet Take 100 mg by mouth daily      hydrOXYzine HCL (ATARAX) 25 mg tablet Take 1 tablet (25 mg total) by mouth 2 (two) times a day as needed for anxiety (Patient not taking: Reported on 1/23/2020) 30 tablet 1    pantoprazole (PROTONIX) 40 mg tablet Take 40 mg by mouth daily      amLODIPine (NORVASC) 10 mg tablet Take 10 mg by mouth daily      valsartan-hydrochlorothiazide (DIOVAN-HCT) 160-25 MG per tablet Take 1 tablet by mouth daily (Patient not taking: Reported on 1/23/2020) 90 tablet 2    venlafaxine (EFFEXOR-XR) 75 mg 24 hr capsule Take 1 capsule (75 mg total) by mouth daily (Patient not taking: Reported on 1/23/2020) 90 capsule 3     No facility-administered medications prior to visit          Tommy Velez MD  [address]

## 2020-01-24 NOTE — TELEPHONE ENCOUNTER
Pt  Went to [facility] today in [facility] and requested that they cc'd Dr Samuel Costa but was told that our office can look it up online

## 2020-01-27 ENCOUNTER — TELEPHONE (OUTPATIENT)
Dept: FAMILY MEDICINE CLINIC | Facility: CLINIC | Age: 55
End: 2020-01-27

## 2020-01-27 NOTE — TELEPHONE ENCOUNTER
Can we please obtain these results, given that the ordering physican was outside of our network   Please let me know when performed

## 2020-01-27 NOTE — TELEPHONE ENCOUNTER
Dr Colin Hands:    Patient would like to know the results of her BW  Patient is also experiencing congestion/sinus infection sxs  Would you be able to send in ABX to [facility] in [place]  Please call back to discuss further

## 2020-01-27 NOTE — TELEPHONE ENCOUNTER
Pt has an appt tomorrow will discuss with Dr lisseth Gillespie is aware  I phone [facility] and they will be faxing most recent labs that jamar had done 01/24/2020

## 2020-01-28 ENCOUNTER — TELEPHONE (OUTPATIENT)
Dept: FAMILY MEDICINE CLINIC | Facility: CLINIC | Age: 55
End: 2020-01-28

## 2020-01-28 ENCOUNTER — OFFICE VISIT (OUTPATIENT)
Dept: FAMILY MEDICINE CLINIC | Facility: CLINIC | Age: 55
End: 2020-01-28
Payer: COMMERCIAL

## 2020-01-28 VITALS
HEART RATE: 84 BPM | SYSTOLIC BLOOD PRESSURE: 130 MMHG | RESPIRATION RATE: 14 BRPM | BODY MASS INDEX: 29.16 KG/M2 | DIASTOLIC BLOOD PRESSURE: 90 MMHG | HEIGHT: 65 IN | WEIGHT: 175 LBS | TEMPERATURE: 97.5 F

## 2020-01-28 DIAGNOSIS — F41.8 DEPRESSION WITH ANXIETY: Chronic | ICD-10-CM

## 2020-01-28 DIAGNOSIS — S42.202S CLOSED FRACTURE OF PROXIMAL END OF LEFT HUMERUS, UNSPECIFIED FRACTURE MORPHOLOGY, SEQUELA: ICD-10-CM

## 2020-01-28 DIAGNOSIS — F10.21 HISTORY OF ALCOHOLISM (HCC): Chronic | ICD-10-CM

## 2020-01-28 DIAGNOSIS — I10 ESSENTIAL HYPERTENSION: ICD-10-CM

## 2020-01-28 DIAGNOSIS — S42.202S CLOSED FRACTURE OF PROXIMAL END OF LEFT HUMERUS, UNSPECIFIED FRACTURE MORPHOLOGY, SEQUELA: Primary | ICD-10-CM

## 2020-01-28 DIAGNOSIS — E87.1 HYPONATREMIA: Primary | ICD-10-CM

## 2020-01-28 LAB
BACTERIA UR CULT: ABNORMAL
Lab: ABNORMAL
SL AMB ANTIMICROBIAL SUSCEPTIBILITY: ABNORMAL

## 2020-01-28 PROCEDURE — 99214 OFFICE O/P EST MOD 30 MIN: CPT | Performed by: FAMILY MEDICINE

## 2020-01-28 RX ORDER — ACETAMINOPHEN 325 MG/1
650 TABLET ORAL EVERY 4 HOURS PRN
Qty: 30 TABLET | Refills: 1 | Status: SHIPPED | OUTPATIENT
Start: 2020-01-28 | End: 2020-02-07

## 2020-01-28 NOTE — PROGRESS NOTES
Spencer Cooper 1965 female MRN: [id number]    [address] [place] [address]      ASSESSMENT/PLAN  Spencer Cooper is a 62 y o  female presents to the office for    Diagnoses and all orders for this visit:    Hyponatremia  -     Basic metabolic panel; Future    Essential hypertension  -     Basic metabolic panel; Future    Closed fracture of proximal end of left humerus, unspecified fracture morphology, sequela    Depression with anxiety    History of alcoholism ([facility] )    Other orders  -     Discontinue: Escitalopram Oxalate (LEXAPRO PO)  -     Discontinue: HYDROCHLOROTHIAZIDE PO     patient will be cleared for surgery wants to have a repeat BMP on Friday that shows stable levels or greater than 135    Hyponatremia sodium levels today of 131 improved from discharge  Patient is to continue taking Lasix 20 mg  I advised her that I will be contacting her nephrologist that was seen at the hospital for their plan of action  This is not the 1st time that the patient has had low sodium levels  And would like it to be further addressed  Depression with anxiety:  Currently not on any medications highly educated the importance of seeing a psychiatrist     Closed fracture of the left humerus:  Unsure if the patient is going to have surgery or conservative therapy  Currently the patient would like surgery correction given that she is in significant pain  Continue Tylenol as needed for pain control  Hypertension at goal today  Continue on medications as prescribed    Alcohol use:  Has not had any alcohol since her discharge  Continue on vitamin supplements      Return to the office in 1 month    Will call the patient with clearance on Saturday if her levels come back normal on her blood work drawn Friday               Future Appointments   Date Time Provider Delores Bobby   1/31/2020  2:00 PM Shorty Vogt MD [facility]  SUBJECTIVE  CC: Pre-op Exam (Dr Johns Fu no surgery sched pt has apt Friday with ortho)      HPI:  Denise Naranjo is a 62 y o  female who presents for a follow-up appointment  Patient was advised by her nephrologist that follows her at the hospital that she is now to follow her general practitioner for low sodium levels  She is to continue Lasix 20 mg daily  Patient states that she has not had any alcoholic beverages since her discharge  She tried to seek a psychiatrist but wanted to charge almost 400-500 dollars for the visit  Patient will try to find her therapist that she saw many years ago  History of being on Paxil, Lexapro, Effexor with no relief  Patient's blood pressure is very well controlled  Denying any dizziness, lightheadedness, chest pain, shortness of breath  Patient just has significant pain of the left shoulder given her current humerus fracture  She is opting for surgery and would like to see what the specialists says on Friday  Review of Systems   Constitutional: Negative for activity change, appetite change, chills, fatigue and fever  HENT: Negative for congestion  Respiratory: Negative for cough, chest tightness and shortness of breath  Cardiovascular: Negative for chest pain and leg swelling  Gastrointestinal: Negative for abdominal distention, abdominal pain, constipation, diarrhea, nausea and vomiting  Musculoskeletal:        Left shoulder/arm pain   All other systems reviewed and are negative        Historical Information   The patient history was reviewed as follows:  Past Medical History:   Diagnosis Date    Abnormal immunology findings 06/09/2011    Anemia 05/23/2011    Arthritis     Closed fracture of distal end of fibula     resolved 09/15/17    Depression     Distal radius fracture, left     last assessed 01/30/17    GERD (gastroesophageal reflux disease)     last assessed 05/06/13    Herpes labialis     last assessed 07/23/15    Hypertension essential ; last assessed 08/07/13    Sinus bradycardia     last assessed 05/24/16         Medications:     Current Outpatient Medications:     acetaminophen (TYLENOL) 325 mg tablet, Take 650 mg by mouth, Disp: , Rfl:     amLODIPine (NORVASC) 10 mg tablet, Take 1 tablet (10 mg total) by mouth daily, Disp: 90 tablet, Rfl: 2    ciprofloxacin (CIPRO) 500 mg tablet, Take 1 tablet (500 mg total) by mouth every 12 (twelve) hours for 5 days, Disp: 10 tablet, Rfl: 0    folic acid (FOLVITE) 1 mg tablet, Take 1 mg by mouth daily, Disp: , Rfl:     pantoprazole (PROTONIX) 40 mg tablet, Take 40 mg by mouth daily, Disp: , Rfl:     thiamine (VITAMIN B1) 100 mg tablet, Take 100 mg by mouth daily, Disp: , Rfl:     furosemide (LASIX) 20 mg tablet, Take 20 mg by mouth daily, Disp: , Rfl:     Allergies   Allergen Reactions    Lisinopril      COUGH       OBJECTIVE  Vitals:   Vitals:    01/28/20 0840   BP: 130/90   BP Location: Right arm   Patient Position: Sitting   Cuff Size: Standard   Pulse: 84   Resp: 14   Temp: 97 5 °F (36 4 °C)   TempSrc: Tympanic   Weight: 79 4 kg (175 lb)   Height: 5' 5" (1 651 m)         Physical Exam   Constitutional: She is oriented to person, place, and time  Vital signs are normal  She appears well-developed and well-nourished  HENT:   Head: Normocephalic and atraumatic  Right Ear: External ear normal    Left Ear: External ear normal    Nose: Nose normal    Mouth/Throat: Oropharynx is clear and moist    Eyes: Pupils are equal, round, and reactive to light  Conjunctivae and EOM are normal    Neck: Normal range of motion  Neck supple  Cardiovascular: Normal rate, regular rhythm, S1 normal, S2 normal, normal heart sounds and intact distal pulses  No murmur heard  Pulmonary/Chest: Effort normal and breath sounds normal  No respiratory distress  She has no wheezes  Abdominal: Soft   Bowel sounds are normal    Musculoskeletal:   Deferred evaluation today   Neurological: She is alert and oriented to person, place, and time  She has normal strength  Skin: Skin is warm  Capillary refill takes less than 2 seconds  Psychiatric: She has a normal mood and affect  Her speech is normal and behavior is normal  Judgment and thought content normal    Vitals reviewed                   Anthony Prado MD,   [facility]  1/28/2020

## 2020-01-28 NOTE — TELEPHONE ENCOUNTER
Dr Lindsay Yanes    Patient is requesting refill acetmominoiphen 325 that was prescribed for her at hospital sent to [facility]

## 2020-01-31 ENCOUNTER — OFFICE VISIT (OUTPATIENT)
Dept: OBGYN CLINIC | Facility: CLINIC | Age: 55
End: 2020-01-31
Payer: COMMERCIAL

## 2020-01-31 VITALS
HEIGHT: 65 IN | HEART RATE: 90 BPM | DIASTOLIC BLOOD PRESSURE: 87 MMHG | SYSTOLIC BLOOD PRESSURE: 135 MMHG | WEIGHT: 175 LBS | BODY MASS INDEX: 29.16 KG/M2

## 2020-01-31 DIAGNOSIS — S42.202D CLOSED FRACTURE OF PROXIMAL END OF LEFT HUMERUS WITH ROUTINE HEALING, UNSPECIFIED FRACTURE MORPHOLOGY, SUBSEQUENT ENCOUNTER: Primary | ICD-10-CM

## 2020-01-31 LAB
BUN SERPL-MCNC: 19 MG/DL (ref 6–24)
BUN/CREAT SERPL: 20 (ref 9–23)
CALCIUM SERPL-MCNC: 10.2 MG/DL (ref 8.7–10.2)
CHLORIDE SERPL-SCNC: 97 MMOL/L (ref 96–106)
CO2 SERPL-SCNC: 24 MMOL/L (ref 20–29)
CREAT SERPL-MCNC: 0.94 MG/DL (ref 0.57–1)
GLUCOSE SERPL-MCNC: 69 MG/DL (ref 65–99)
POTASSIUM SERPL-SCNC: 4.2 MMOL/L (ref 3.5–5.2)
SL AMB EGFR AFRICAN AMERICAN: 78 ML/MIN/1.73
SL AMB EGFR NON AFRICAN AMERICAN: 68 ML/MIN/1.73
SODIUM SERPL-SCNC: 136 MMOL/L (ref 134–144)

## 2020-01-31 PROCEDURE — 99214 OFFICE O/P EST MOD 30 MIN: CPT | Performed by: ORTHOPAEDIC SURGERY

## 2020-01-31 NOTE — PROGRESS NOTES
Assessment/Plan:  1  Closed fracture of proximal end of left humerus with routine healing, unspecified fracture morphology, subsequent encounter         Scribe Attestation    I,:   Dayana Agee am acting as a scribe while in the presence of the attending physician :        I,:   Vinod Cline MD personally performed the services described in this documentation    as scribed in my presence :              Robert Malik upon examination and review the CT scan from 1/20/2020 does demonstrate a comminuted impacted proximal humerus fracture with mild subluxation from the glenoid  There are multiple small pieces at the fracture site  Robert Malik does demonstrate moderate to severe ecchymosis formation into the upper arm extending into the elbow, forearm, and wrist   She is neurovascularly intact with normal sensation along the dermatomes, and demonstrates a 2+ radial pulse  I did have a long discussion with Robert Malik regards to surgical intervention versus non operative intervention  I did note that due to the fracture being impacted there is evidence that supports non operative treatment as the approximation of the fracture fragments can heal with callus formation  Additionally I would note that you could consider surgical intervention in the form of an open reduction internal fixation with plate and screws to better approximate the humeral head, and fixate the fractures  However I did remark that there are multiple small fragments that may not sustain a surgical plate and screw fixation  This procedure does also come with risks that increased complications such as nerve injury, bleeding, infection, failure of surgery, blood clots, and surgical hardware failure  Additionally I did remark that he could also consider a total shoulder arthroplasty which with provide a more predictable outcome as the fracture pieces are removed and replaced    However there is also risk with this procedure of premature failure of the prosthesis due to wearing out given her relatively young age and activity level  I am encouraged that Kristy Romero sodium levels have been improving and are almost within normal range according to the last test   However, I did remark that I do feel that conservative measures of treatment would be in her best interest as I do feel that it will heal on its own  Additionally should we try the non operative treatment plan we may certainly consider surgical intervention down the road with a total shoulder arthroplasty should she fail to see improvements or have continued dysfunction  I did also remark that there is evidence in the literature that supports this as well and demonstrates similar functional outcomes in patients that have immediate total shoulder arthroplasty versus delayed due to trial of conservative measures of treatment  Kristy Romero did verbalize understanding to all the information provided to her today, and had no further questions  She would like to try the non operative treatment plan and will be fit with a more substantial sling with a waist band that she has to wear at all times over the next 2 weeks  I would like her to follow up with me in 2 weeks time for repeat clinical evaluation and repeat x-ray  At that time should she demonstrate continued displacement or no evidence of healing we may consider surgical intervention at that time  Subjective:   Matty Evans is a 62 y o  female who presents to the office today for follow up of her left shoulder  She unfortunately suffered a proximal humerus fracture on 1/19/2020 when she fell due to a syncope episode  She notes that she is still experiencing intermittent and mild to moderate sharp pain into the shoulder extending down into the upper arm  She has been immobilized in a sling and notes that she is not experiencing any numbness tingling sensations into the upper extremity  However notes that her hands are cold    She has been following up with her primary care physician in regards to her larger light balance  And notes that her last test did demonstrate a sodium level of 131  She states that 135 would be the level for clearance for any operative intervention  She notes that she is waiting for her updated test and should hear the results today  She did retrieve her CT scan from the date of her injury to reviewed with us today  Review of Systems   Constitutional: Negative for activity change, chills, fever and unexpected weight change  HENT: Negative for hearing loss, nosebleeds and sore throat  Eyes: Negative for pain, redness and visual disturbance  Respiratory: Negative for cough, shortness of breath and wheezing  Cardiovascular: Negative for chest pain, palpitations and leg swelling  Gastrointestinal: Negative for abdominal pain, nausea and vomiting  Endocrine: Negative for polydipsia and polyuria  Genitourinary: Negative for dysuria and hematuria  Musculoskeletal: Positive for arthralgias and myalgias  See HPI   Skin: Negative for rash and wound  Neurological: Negative for dizziness, numbness and headaches  Psychiatric/Behavioral: Negative for decreased concentration and suicidal ideas  The patient is not nervous/anxious            Past Medical History:   Diagnosis Date    Abnormal immunology findings 06/09/2011    Anemia 05/23/2011    Arthritis     Closed fracture of distal end of fibula     resolved 09/15/17    Depression     Distal radius fracture, left     last assessed 01/30/17    GERD (gastroesophageal reflux disease)     last assessed 05/06/13    Herpes labialis     last assessed 07/23/15    Hypertension     essential ; last assessed 08/07/13    Sinus bradycardia     last assessed 05/24/16       Past Surgical History:   Procedure Laterality Date    ANKLE SURGERY      last assessed 09/15/17    BACK SURGERY      lumbar laminectomy L4-L5    BLADDER SUSPENSION      7/2017    COLONOSCOPY      JOINT REPLACEMENT TKR  on right    ORIF TIBIA & FIBULA FRACTURES Right 12/15/2016    Procedure: SURGICAL FIXATION OF RIGHT DISTAL FIBULA FRACTURE;  Surgeon: Ketan Lyle MD;  Location[facility] MAIN OR;  Service:     UT REVISE KNEE JOINT REPLACE,ALL PARTS Right 10/23/2017    Procedure: REVISION TOTAL KNEE REPLACEMENT WITH FROZEN SECTIONS;  Surgeon: Karan William DO;  Location: [address] [place] [address];  Service: Orthopedics    TOTAL KNEE ARTHROPLASTY      last assessed; 11/21/17    WISDOM TOOTH EXTRACTION         Family History   Problem Relation Age of Onset    Arthritis Mother     Osteoporosis Mother     Dementia Father     Other Father         spinal stenosis    Other Family         CREST       Social History     Occupational History    Not on file   Tobacco Use    Smoking status: Never Smoker    Smokeless tobacco: Never Used   Substance and Sexual Activity    Alcohol use: Yes     Comment: moderately    Drug use: No    Sexual activity: Yes     Partners: Male         Current Outpatient Medications:     acetaminophen (TYLENOL) 325 mg tablet, Take 2 tablets (650 mg total) by mouth every 4 (four) hours as needed for mild pain for up to 10 days, Disp: 30 tablet, Rfl: 1    amLODIPine (NORVASC) 10 mg tablet, Take 1 tablet (10 mg total) by mouth daily, Disp: 90 tablet, Rfl: 2    folic acid (FOLVITE) 1 mg tablet, Take 1 mg by mouth daily, Disp: , Rfl:     furosemide (LASIX) 20 mg tablet, Take 20 mg by mouth daily, Disp: , Rfl:     pantoprazole (PROTONIX) 40 mg tablet, Take 40 mg by mouth daily, Disp: , Rfl:     thiamine (VITAMIN B1) 100 mg tablet, Take 100 mg by mouth daily, Disp: , Rfl:     Allergies   Allergen Reactions    Lisinopril      COUGH       Objective:  Vitals:    01/31/20 1346   BP: 135/87   Pulse: 90       Left Shoulder Exam     Tenderness   Left shoulder tenderness location: proximal humerus      Other   Erythema: absent  Scars: absent  Sensation: normal  Pulse: present     Comments:  Ecchymosis of the upper arm, extending down to the elbow and forearm    ROM omitted due to proximal humerus fracture    Strength testing omitted due to proximal humerus fracture              Physical Exam   Constitutional: She is oriented to person, place, and time  She appears well-developed and well-nourished  HENT:   Head: Normocephalic and atraumatic  Eyes: Conjunctivae are normal  Right eye exhibits no discharge  Left eye exhibits no discharge  Neck: Normal range of motion  Neck supple  Cardiovascular: Normal rate and intact distal pulses  Pulmonary/Chest: Effort normal  No respiratory distress  Musculoskeletal:   As noted in HPI   Neurological: She is alert and oriented to person, place, and time  Skin: Skin is warm and dry  Psychiatric: She has a normal mood and affect  Her behavior is normal  Judgment and thought content normal    Vitals reviewed  I have personally reviewed pertinent films in PACS and my interpretation is as follows:    CT scan of the left shoulder demonstrates an impacted comminuted fracture of the humeral head that demonstrates a mildly inferior subluxation from the glenoid

## 2020-02-03 ENCOUNTER — TELEPHONE (OUTPATIENT)
Dept: FAMILY MEDICINE CLINIC | Facility: CLINIC | Age: 55
End: 2020-02-03

## 2020-02-03 DIAGNOSIS — I10 ESSENTIAL HYPERTENSION: Primary | ICD-10-CM

## 2020-02-03 RX ORDER — LOSARTAN POTASSIUM 50 MG/1
50 TABLET ORAL DAILY
Qty: 30 TABLET | Refills: 2 | Status: SHIPPED | OUTPATIENT
Start: 2020-02-03 | End: 2020-05-04 | Stop reason: SDUPTHER

## 2020-02-03 RX ORDER — FUROSEMIDE 20 MG/1
20 TABLET ORAL DAILY
Qty: 30 TABLET | Refills: 0 | Status: SHIPPED | OUTPATIENT
Start: 2020-02-03 | End: 2020-02-27

## 2020-02-03 NOTE — TELEPHONE ENCOUNTER
----- Message from Surinder Rivera MD sent at 2/3/2020  1:03 PM EST -----  Please schedule an appointment for BP check in 1 month  SPoke with patient we switched her amlodipine to losartan given swelling  Sent in refill of Lasix to pharmacy   Rpeat BMP 1 week before her next appt

## 2020-02-03 NOTE — TELEPHONE ENCOUNTER
trc tc/cma----- Message from Violetta Garcia MD sent at 2/3/2020  1:03 PM EST -----  Please schedule an appointment for BP check in 1 month  SPoke with patient we switched her amlodipine to losartan given swelling  Sent in refill of Lasix to pharmacy   Rpeat BMP 1 week before her next appt

## 2020-02-07 ENCOUNTER — TELEPHONE (OUTPATIENT)
Dept: FAMILY MEDICINE CLINIC | Facility: CLINIC | Age: 55
End: 2020-02-07

## 2020-02-07 NOTE — TELEPHONE ENCOUNTER
Dr Darwin Riddle:    Patient would like an Rx for tylenol w/codeine  She is experiencing a lot of pain from her recent surgery  Please send in Rx [facility] in [place]  Please contact patient when this is sent to pharmacy

## 2020-02-07 NOTE — TELEPHONE ENCOUNTER
She hasn't had surgery? ? She waiting for the surgery of the left shoulder   She should call Dr Rubi Pires office first, and advise them she is in pain for direction and see if they will give her script

## 2020-02-19 ENCOUNTER — APPOINTMENT (OUTPATIENT)
Dept: RADIOLOGY | Facility: CLINIC | Age: 55
End: 2020-02-19
Payer: COMMERCIAL

## 2020-02-19 ENCOUNTER — OFFICE VISIT (OUTPATIENT)
Dept: OBGYN CLINIC | Facility: CLINIC | Age: 55
End: 2020-02-19
Payer: COMMERCIAL

## 2020-02-19 VITALS
DIASTOLIC BLOOD PRESSURE: 72 MMHG | HEIGHT: 65 IN | SYSTOLIC BLOOD PRESSURE: 124 MMHG | WEIGHT: 170.2 LBS | HEART RATE: 94 BPM | BODY MASS INDEX: 28.36 KG/M2

## 2020-02-19 DIAGNOSIS — S42.202D CLOSED FRACTURE OF PROXIMAL END OF LEFT HUMERUS WITH ROUTINE HEALING, UNSPECIFIED FRACTURE MORPHOLOGY, SUBSEQUENT ENCOUNTER: ICD-10-CM

## 2020-02-19 DIAGNOSIS — S42.202D CLOSED FRACTURE OF PROXIMAL END OF LEFT HUMERUS WITH ROUTINE HEALING, UNSPECIFIED FRACTURE MORPHOLOGY, SUBSEQUENT ENCOUNTER: Primary | ICD-10-CM

## 2020-02-19 PROCEDURE — 3008F BODY MASS INDEX DOCD: CPT | Performed by: ORTHOPAEDIC SURGERY

## 2020-02-19 PROCEDURE — 1036F TOBACCO NON-USER: CPT | Performed by: ORTHOPAEDIC SURGERY

## 2020-02-19 PROCEDURE — 99213 OFFICE O/P EST LOW 20 MIN: CPT | Performed by: ORTHOPAEDIC SURGERY

## 2020-02-19 PROCEDURE — 3074F SYST BP LT 130 MM HG: CPT | Performed by: ORTHOPAEDIC SURGERY

## 2020-02-19 PROCEDURE — 3078F DIAST BP <80 MM HG: CPT | Performed by: ORTHOPAEDIC SURGERY

## 2020-02-19 PROCEDURE — 73030 X-RAY EXAM OF SHOULDER: CPT

## 2020-02-19 NOTE — PROGRESS NOTES
Assessment/Plan:  1  Closed fracture of proximal end of left humerus with routine healing, unspecified fracture morphology, subsequent encounter  XR shoulder 2+ vw left       Scribe Attestation    I,:   Merlinda Forester Call am acting as a scribe while in the presence of the attending physician :        I,:   Shakeel Abraham MD personally performed the services described in this documentation    as scribed in my presence :              Yue's x-rays demonstrate new healing bone at the fracture and its malalignment remains unchanged  I feel we can continue to treat this conservatively  She can now begin to wean herself out of the sling  I recommend to remove it at home but continue to wear it in public  She will also begin physical therapy  A prescription was provided today  They will simply work on gentle range-of-motion exercises  I will see her back in 4 weeks and have repeat x-rays  Subjective:   Franky Gifford is a 62 y o  female who presents to the office today for follow-up evaluation of a left proximal humerus fracture suffered 1 month ago  Yue's status remains unchanged  She continues to have the persistent mild to moderate ache about the left shoulder that is considered to be global   She will have some radiating pain into the left upper extremity  She denies distal paresthesias  She has been using her sling since last visit and avoiding use of the left upper extremity  She is not sleeping well  She will wake often throughout the night  She is currently sleep on the couch for comfort  On last visit upon review of the CT scan we decided to treat this conservatively  Surgical options remain that of plate and screw fixation of the fracture as well as glenohumeral joint replacement  Reasons for treated conservatively or due to the hope that the fracture will heal due to the impacted nature of the fracture  Review of Systems   Constitutional: Positive for activity change   Negative for chills, fever and unexpected weight change  HENT: Negative for hearing loss, nosebleeds and sore throat  Eyes: Negative for pain, redness and visual disturbance  Respiratory: Negative for cough, shortness of breath and wheezing  Cardiovascular: Negative for chest pain, palpitations and leg swelling  Gastrointestinal: Negative for abdominal pain, nausea and vomiting  Endocrine: Negative for polydipsia and polyuria  Genitourinary: Negative for dysuria and hematuria  Musculoskeletal:        See HPI   Skin: Negative for rash and wound  Neurological: Negative for dizziness, numbness and headaches  Psychiatric/Behavioral: Negative for decreased concentration and suicidal ideas  The patient is not nervous/anxious            Past Medical History:   Diagnosis Date    Abnormal immunology findings 06/09/2011    Anemia 05/23/2011    Arthritis     Closed fracture of distal end of fibula     resolved 09/15/17    Depression     Distal radius fracture, left     last assessed 01/30/17    GERD (gastroesophageal reflux disease)     last assessed 05/06/13    Herpes labialis     last assessed 07/23/15    Hypertension     essential ; last assessed 08/07/13    Sinus bradycardia     last assessed 05/24/16       Past Surgical History:   Procedure Laterality Date    ANKLE SURGERY      last assessed 09/15/17    BACK SURGERY      lumbar laminectomy L4-L5    BLADDER SUSPENSION      7/2017    COLONOSCOPY      JOINT REPLACEMENT      TKR  on right    ORIF TIBIA & FIBULA FRACTURES Right 12/15/2016    Procedure: SURGICAL FIXATION OF RIGHT DISTAL FIBULA FRACTURE;  Surgeon: Vinod Cline MD;  Location: [facility] OR;  Service:     UT REVISE KNEE JOINT REPLACE,ALL PARTS Right 10/23/2017    Procedure: REVISION TOTAL KNEE REPLACEMENT WITH FROZEN SECTIONS;  Surgeon: Ritta Merlin, DO;  Location: [address] [place] [address];  Service: Orthopedics    TOTAL KNEE ARTHROPLASTY      last assessed; 11/21/17    WISDOM TOOTH EXTRACTION Family History   Problem Relation Age of Onset    Arthritis Mother     Osteoporosis Mother     Dementia Father     Other Father         spinal stenosis    Other Family         CREST       Social History     Occupational History    Not on file   Tobacco Use    Smoking status: Never Smoker    Smokeless tobacco: Never Used   Substance and Sexual Activity    Alcohol use: Yes     Comment: moderately    Drug use: No    Sexual activity: Yes     Partners: Male         Current Outpatient Medications:     folic acid (FOLVITE) 1 mg tablet, Take 1 mg by mouth daily, Disp: , Rfl:     furosemide (LASIX) 20 mg tablet, Take 1 tablet (20 mg total) by mouth daily, Disp: 30 tablet, Rfl: 0    losartan (COZAAR) 50 mg tablet, Take 1 tablet (50 mg total) by mouth daily, Disp: 30 tablet, Rfl: 2    pantoprazole (PROTONIX) 40 mg tablet, Take 40 mg by mouth daily, Disp: , Rfl:     thiamine (VITAMIN B1) 100 mg tablet, Take 100 mg by mouth daily, Disp: , Rfl:     Allergies   Allergen Reactions    Lisinopril      COUGH       Objective:  Vitals:    02/19/20 0923   BP: 124/72   Pulse: 94       Left Shoulder Exam     Tenderness   The patient is experiencing no tenderness  Range of Motion   Extension: 20   External rotation: 20   Forward flexion: 20     Other   Erythema: absent  Sensation: normal  Pulse: present (2+ radial)             Physical Exam   Constitutional: She is oriented to person, place, and time  She appears well-developed and well-nourished  HENT:   Head: Normocephalic and atraumatic  Eyes: Conjunctivae are normal  Right eye exhibits no discharge  Left eye exhibits no discharge  Neck: Normal range of motion  Neck supple  Cardiovascular: Regular rhythm and intact distal pulses  Pulmonary/Chest: Effort normal  No stridor  No respiratory distress  Neurological: She is alert and oriented to person, place, and time  Skin: Skin is warm and dry  Psychiatric: She has a normal mood and affect   Her behavior is normal    Vitals reviewed  I have personally reviewed pertinent films in PACS and my interpretation is as follows:  X-rays of the left shoulder demonstrate a healing and displaced proximal humerus fracture with evidence of new bone formation

## 2020-02-20 ENCOUNTER — EVALUATION (OUTPATIENT)
Dept: PHYSICAL THERAPY | Facility: CLINIC | Age: 55
End: 2020-02-20
Payer: COMMERCIAL

## 2020-02-20 DIAGNOSIS — S42.202D CLOSED FRACTURE OF PROXIMAL END OF LEFT HUMERUS WITH ROUTINE HEALING, UNSPECIFIED FRACTURE MORPHOLOGY, SUBSEQUENT ENCOUNTER: ICD-10-CM

## 2020-02-20 PROCEDURE — 97161 PT EVAL LOW COMPLEX 20 MIN: CPT | Performed by: PHYSICAL THERAPIST

## 2020-02-20 NOTE — PROGRESS NOTES
PT Evaluation     Today's date: 2020  Patient name: Matty Evans  : 1965  MRN: [id number]  Referring provider: Omkar Bach MD  Dx:   Encounter Diagnosis     ICD-10-CM    1  Closed fracture of proximal end of left humerus with routine healing, unspecified fracture morphology, subsequent encounter S4 Ambulatory referral to Physical Therapy                  Assessment  Assessment details: Matty Evans is a 62 y o  female who presents with pain, decreased strength, decreased ROM and decreased joint mobility  Due to these impairments, patient has difficulty performing ADL's, IADL's, recreational activities, lifting/carrying, reaching, driving and sleeping  Patient's clinical presentation is consistent with their referring diagnosis of Closed fracture of proximal end of left humerus with routine healing, unspecified fracture morphology, subsequent encounter  Plan: Ambulatory referral to Physical Therapy  Patient has been educated in home exercise program and plan of care  Patient would benefit from skilled physical therapy services to address their aforementioned functional limitations and progress towards prior level of function and independence with home exercise program      Impairments: abnormal or restricted ROM, activity intolerance, impaired physical strength, lacks appropriate home exercise program, pain with function, scapular dyskinesis and poor posture   Functional limitations: sleep disturbed, does not use L arm w/ driving, unable to effectively use L arm for ADL's/IADL's due to unable to effectively elevate her arm from her body  Understanding of Dx/Px/POC: good   Prognosis: good    Goals  Short Term Goals to be met in 4 weeks (3/19/2020)  1  Pt to be independent w/ prelimary HEP  2  Improve PROM L shoulder to 125 flex/100abd/45ER to prepare for LTG's  3  Improve AROM L shoulder to 100 flexion/90 abd/40 [email or web address] to prepare for LTG's    4  Achieve AROM L elbow equal to R to allow for grooming w/ min rdz or better  5  Pt to be able to use L arm to help put away dishes  Long Term Goals to be met in 12 weeks (2020)  1  APROM L shoulder to be within 10 degrees of R shoulder w/o pain to allow ease w/ ADL's and IADL's  2  Improve strength to 4/5 or better to allow lifting, reaching and carrying 5-10# objects w/o c/o   3  Undisturbed sleep  4  Pt to report ease w/ grooming/dressing and driving  5  Reduce pain w/ functional activities to 0-3/10 to allow FOTO score 65 or more  Plan  Plan details:       Patient would benefit from: PT eval and skilled physical therapy  Planned modality interventions: cryotherapy, thermotherapy: hydrocollator packs and unattended electrical stimulation  Planned therapy interventions: manual therapy, neuromuscular re-education, therapeutic exercise, therapeutic activities, home exercise program, stretching, patient education and postural training  Frequency: 2-3x/week  Plan of Care beginning date: 2020  Plan of Care expiration date: 2020  Treatment plan discussed with: patient        Subjective Evaluation    History of Present Illness  Mechanism of injury: Pt fell 2020 due to fainting related to changes in medication for htn and anxiety reacting and causing her sodium levels to drop  She was hospitalized for 5 days  She has since changed her blood pressure medication and she has D/C the anxiety medication  Pain in L shoulder is constant  She wears a sling, but was advised to wean off the sling over the next 2 weeks  She slept in her bed last night for the first time  Pain  At best pain ratin  At worst pain ratin  Location: anterior shoulder  Quality: dull ache  Relieving factors: rest  Progression: improved    Social Support  Exterior steps/ramp assessed: 2-3    Stairs in house: yes (full flight to bedroom)   Lives in: multiple-level home  Lives with: spouse      Diagnostic Tests  X-ray: abnormal (fx site medially shifted at head/neck of humerus)  Treatments  Previous treatment: immobilization  Current treatment: physical therapy  Patient Goals  Patient goals for therapy: decreased pain and increased motion  Patient goal: full use of L arm        Objective      AROM stand/PROM supine R   L      2/20/2020 2/20/2020  Shoulder flexion  [id number]  30A/75P  Shoulder abduction  165A/175P  30A/75P  Shoulder [email or web address]  95A/105P  Unable/10P@75abd  Shoulder IR   T9A/50 P  [email or web address]  Elbow ROM   +2-152A  -8-152A    Wrist and forearm AROM WNL B/L     STRENGTH:    R   L      2/20/2020 2/20/2020  Shoulder flexion  5/5   2+/5  Shoulder abduction  5/5   2+/5  Shoulder [email or web address]  5/5   2+/5  Shoulder IR   5/5   3-/5    Posture: Pt's sitting posture is WFL  Pt wears her sling  With sling off, pt keeps arm in guarded position  Precautions: htn, R knee TKA x2; pt to wean off sling by around 3/5/2020  SOC: 2/20/2020  FOTO: 2/20/2020  POC expiration: 5/14/2020  Daily Treatment Log:  Date 2/20/2020       Visit # 1       Manual        Manual PROM                There exer        pendulums 20x cw/ccw       Stand cane ER neutral 5"x10       Bicep curl B/L 1x10       Table slide flex 5"x10       Table slide abd 5"x10       SL ER        Sup flexion w/ HHA        Sup cane [email or web address]        Stand cane abd                                HEP        NMRed                                                                                Modalities                                HEP 2/20/2020 - pendulums, table slide flex/abd, ER w/ cane neutral, elbow AROM

## 2020-02-24 ENCOUNTER — OFFICE VISIT (OUTPATIENT)
Dept: PHYSICAL THERAPY | Facility: CLINIC | Age: 55
End: 2020-02-24
Payer: COMMERCIAL

## 2020-02-24 DIAGNOSIS — E87.1 HYPONATREMIA: Primary | ICD-10-CM

## 2020-02-24 DIAGNOSIS — S42.202D CLOSED FRACTURE OF PROXIMAL END OF LEFT HUMERUS WITH ROUTINE HEALING, UNSPECIFIED FRACTURE MORPHOLOGY, SUBSEQUENT ENCOUNTER: Primary | ICD-10-CM

## 2020-02-24 PROCEDURE — 97140 MANUAL THERAPY 1/> REGIONS: CPT | Performed by: PHYSICAL THERAPIST

## 2020-02-24 PROCEDURE — 97110 THERAPEUTIC EXERCISES: CPT | Performed by: PHYSICAL THERAPIST

## 2020-02-24 NOTE — PROGRESS NOTES
Daily Note     Today's date: 2020  Patient name: Nelli Doshi  : 1965  MRN: [id number]  Referring provider: Narda Leal MD  Dx:   Encounter Diagnosis     ICD-10-CM    1  Closed fracture of proximal end of left humerus with routine healing, unspecified fracture morphology, subsequent encounter S4                   Subjective: Pt c/o 5/10 pain at rest, more at end ROM      Objective: See treatment diary below; added modal to address c/o pain      Assessment: Tolerated treatment well  Patient demonstrated fatigue post treatment and improved ROM from initial eval       Plan: Progress treatment as tolerated  Precautions: htn, R knee TKA x2; pt to wean off sling by around 3/5/2020  SOC: 2020  FOTO: 2020  POC expiration: 2020  Daily Treatment Log:  Date 2020      Visit # 1 2      Manual        Manual PROM L shoulder  10'              There exer  20'      pendulums 20x cw/ccw 1# 20x cw/ccw      Stand cane ER neutral 5"x10 5"x20      Bicep curl B/L 1x10 1#20x      Table slide flex 5"x10 5"x20      Table slide abd 5"x10 5"x20      Table ER  5"x20      Sup flexion w/ HHA        Sup cane [email or web address]  10x10"      Stand cane abd  1x10      SL ER  2x5                      HEP        NMRed                                                                                Modalities  10'      MH/estim L shoulder to end  premod - skin intact pre/post                      HEP 2020 - pendulums, table slide flex/abd, ER w/ cane neutral, elbow AROM

## 2020-02-25 ENCOUNTER — TELEPHONE (OUTPATIENT)
Dept: FAMILY MEDICINE CLINIC | Facility: CLINIC | Age: 55
End: 2020-02-25

## 2020-02-25 LAB
BUN SERPL-MCNC: 18 MG/DL (ref 6–24)
BUN/CREAT SERPL: 23 (ref 9–23)
CALCIUM SERPL-MCNC: 10.1 MG/DL (ref 8.7–10.2)
CHLORIDE SERPL-SCNC: 96 MMOL/L (ref 96–106)
CO2 SERPL-SCNC: 24 MMOL/L (ref 20–29)
CREAT SERPL-MCNC: 0.8 MG/DL (ref 0.57–1)
GLUCOSE SERPL-MCNC: 86 MG/DL (ref 65–99)
POTASSIUM SERPL-SCNC: 4 MMOL/L (ref 3.5–5.2)
SL AMB EGFR AFRICAN AMERICAN: 95 ML/MIN/1.73
SL AMB EGFR NON AFRICAN AMERICAN: 83 ML/MIN/1.73
SODIUM SERPL-SCNC: 138 MMOL/L (ref 134–144)

## 2020-02-25 NOTE — TELEPHONE ENCOUNTER
----- Message from [place] Jimenez MD sent at 2/25/2020  1:44 PM EST -----  Please advise patient that her sodium levels are stable   Will discuss at our next appointment

## 2020-02-26 ENCOUNTER — OFFICE VISIT (OUTPATIENT)
Dept: PHYSICAL THERAPY | Facility: CLINIC | Age: 55
End: 2020-02-26
Payer: COMMERCIAL

## 2020-02-26 DIAGNOSIS — S42.202D CLOSED FRACTURE OF PROXIMAL END OF LEFT HUMERUS WITH ROUTINE HEALING, UNSPECIFIED FRACTURE MORPHOLOGY, SUBSEQUENT ENCOUNTER: Primary | ICD-10-CM

## 2020-02-26 PROCEDURE — 97110 THERAPEUTIC EXERCISES: CPT | Performed by: PHYSICAL THERAPIST

## 2020-02-26 PROCEDURE — 97140 MANUAL THERAPY 1/> REGIONS: CPT | Performed by: PHYSICAL THERAPIST

## 2020-02-26 RX ORDER — FOLIC ACID 1 MG/1
1 TABLET ORAL DAILY
COMMUNITY
Start: 2020-01-23 | End: 2021-08-09

## 2020-02-26 NOTE — PROGRESS NOTES
Daily Note     Today's date: 2020  Patient name: Padmini Lane  : 1965  MRN: [id number]  Referring provider: Moris Grace MD  Dx:   Encounter Diagnosis     ICD-10-CM    1  Closed fracture of proximal end of left humerus with routine healing, unspecified fracture morphology, subsequent encounter S4                   Subjective: Pt reports some continued aching pain, but "it's not too bad" 3-4/10  Objective: See treatment diary below      Assessment: Tolerated treatment well  Patient demonstrated fatigue post treatment and was able to tolerate progression of program  She remains most limited w/ abduction      Plan: Progress treatment as tolerated  Precautions: htn, R knee TKA x2; pt to wean off sling by around 3/5/2020  SOC: 2020  FOTO: 2020  POC expiration: 2020  Daily Treatment Log:  Date 2020     Visit # 1 2 3     Manual        Manual PROM L shoulder  10' 10'             There exer  20' 30'     pendulums 20x cw/ccw 1# 20x cw/ccw 1# 30x cw/ccw     Stand cane ER neutral 5"x10 5"x20 AROM B/L   2x10     Bicep curl B/L 1x10 1#20x 2# 2x10     tricep B/L press down   job 4#  2x10     Table slide flex 5"x10 5"x20 5"x20     Table slide abd 5"x10 5"x20 5"x20     Table ER  5"x20 5"x20     Sup flexion w/ HHA        Sup cane [email or web address]  10x10" 90/90 10x10"     Stand cane abd  1x10 3x5     SL ER  2x5                      HEP        NMRed                                                                                Modalities  10' 10'     MH/estim L shoulder to end  premod - skin intact pre/post premod - skin intact pre/post                     HEP 2020 - pendulums, table slide flex/abd, ER w/ cane neutral, elbow AROM

## 2020-02-27 ENCOUNTER — OFFICE VISIT (OUTPATIENT)
Dept: FAMILY MEDICINE CLINIC | Facility: CLINIC | Age: 55
End: 2020-02-27
Payer: COMMERCIAL

## 2020-02-27 VITALS
SYSTOLIC BLOOD PRESSURE: 120 MMHG | DIASTOLIC BLOOD PRESSURE: 80 MMHG | HEART RATE: 86 BPM | WEIGHT: 168.4 LBS | BODY MASS INDEX: 28.06 KG/M2 | RESPIRATION RATE: 16 BRPM | TEMPERATURE: 98.4 F | HEIGHT: 65 IN

## 2020-02-27 DIAGNOSIS — F41.8 DEPRESSION WITH ANXIETY: Chronic | ICD-10-CM

## 2020-02-27 DIAGNOSIS — F10.21 HISTORY OF ALCOHOLISM (HCC): Primary | Chronic | ICD-10-CM

## 2020-02-27 DIAGNOSIS — I10 ESSENTIAL HYPERTENSION: ICD-10-CM

## 2020-02-27 DIAGNOSIS — E87.1 HYPONATREMIA: ICD-10-CM

## 2020-02-27 PROCEDURE — 99214 OFFICE O/P EST MOD 30 MIN: CPT | Performed by: FAMILY MEDICINE

## 2020-02-27 PROCEDURE — 3008F BODY MASS INDEX DOCD: CPT | Performed by: FAMILY MEDICINE

## 2020-02-27 PROCEDURE — 3074F SYST BP LT 130 MM HG: CPT | Performed by: FAMILY MEDICINE

## 2020-02-27 PROCEDURE — 1036F TOBACCO NON-USER: CPT | Performed by: FAMILY MEDICINE

## 2020-02-27 PROCEDURE — 3079F DIAST BP 80-89 MM HG: CPT | Performed by: FAMILY MEDICINE

## 2020-02-27 NOTE — PROGRESS NOTES
Padmini Lane 1965 female MRN: [id number]    [address] [place] [address]      ASSESSMENT/PLAN  Padmini Lane is a 62 y o  female presents to the office for    1  Hyponatremia  Repeat BMP reviewed  Patient's sodium has been stable since she has discontinued alcohol use  Will trial her off of Lasix x2 weeks  If I see that the patient's sodium levels decreased then will add on sodium tablets rather than Lasix  - Basic metabolic panel; Future    2  Depression with anxiety  Controlled with lifestyle changes    3  Essential hypertension  Continue on losartan 50 mg  Slight increase in blood pressure expected when discontinuation of Lasix  Advised that I might have to increase losartan to 100 mg daily      4  History of alcoholism ([facility] [address] )  Continues to have sobriety     Once cleared by ortho she can go for mammogram and colonoscopy     Return to the office in 3 months    Future Appointments   Date Time Provider Delores Bobby   2/28/2020  8:45 AM Anna Budge, PT [address]   3/2/2020  8:45 AM Anna Budge, PT [facility] [place]   3/4/2020  8:45 AM Anna Budge, PT [facility] [place]   3/6/2020  8:45 AM Anna Budge, PT [facility] [place]   3/9/2020  8:45 AM Anna Budge, PT [facility] [place]   3/11/2020  8:45 AM Anna Budge, PT [facility] [address]   3/13/2020  8:45 AM Anna Budge, PT [facility] [address]   3/17/2020  8:45 AM Anna Budge, PT [address]   3/18/2020  8:45 AM Candelario Habermann, MD [facility]   3/19/2020  8:45 AM Anna Budge, PT [facility] [address]   3/24/2020  8:45 AM Anna Budge, PT [facility] [place]   3/26/2020  8:45 AM Anna Budge, PT [facility] [place]   3/31/2020  8:45 AM Anna Budge, PT [facility] [address]   CC: Follow-up (Bp and lab results)      HPI:  Padmini Lane is a 62 y o  female who presents for a follow-up appointment  Very pleased to state that the patient has not had any alcoholic beverages since our last hospital admission  Patient states that she has been taking furosemide without any difficulties  Believes that her blood pressure has been very well controlled  Patient denies any syncopal events/dizziness/lightheadedness  Currently under management with orthopedics for her left shoulder fracture  Patient has been going to physical therapy which has been painful but sees improvement in her range of motion  Patient states that her depression with anxiety has been very well controlled since she got help to help her mother and is not solely dependent on her  Patient does not need any medications for her [facility] at this time  She is taking losartan 50 mg daily as prescribed  Review of Systems   Constitutional: Negative for activity change, appetite change, chills, fatigue and fever  HENT: Negative for congestion  Respiratory: Negative for cough, chest tightness and shortness of breath  Cardiovascular: Negative for chest pain and leg swelling  Gastrointestinal: Negative for abdominal distention, abdominal pain, constipation, diarrhea, nausea and vomiting  Musculoskeletal: Positive for arthralgias (Left shoulder pain)  Neurological: Negative  All other systems reviewed and are negative        Historical Information   The patient history was reviewed as follows:  Past Medical History:   Diagnosis Date    Abnormal immunology findings 06/09/2011    Anemia 05/23/2011    Arthritis     Closed fracture of distal end of fibula     resolved 09/15/17    Depression     Distal radius fracture, left     last assessed 01/30/17    GERD (gastroesophageal reflux disease)     last assessed 05/06/13    Herpes labialis     last assessed 07/23/15    Hypertension     essential ; last assessed 08/07/13    Sinus bradycardia     last assessed 05/24/16         Medications:     Current Outpatient Medications:   losartan (COZAAR) 50 mg tablet, Take 1 tablet (50 mg total) by mouth daily, Disp: 30 tablet, Rfl: 2    folic acid (FOLVITE) 1 mg tablet, Take 1 mg by mouth daily, Disp: , Rfl:     thiamine (VITAMIN B1) 100 mg tablet, Take 100 mg by mouth daily, Disp: , Rfl:     Allergies   Allergen Reactions    Lisinopril      COUGH       OBJECTIVE  Vitals:   Vitals:    02/27/20 0925   BP: 120/80   Pulse: 86   Resp: 16   Temp: 98 4 °F (36 9 °C)   Weight: 76 4 kg (168 lb 6 4 oz)   Height: 5' 5" (1 651 m)         Physical Exam   Constitutional: She is oriented to person, place, and time  Vital signs are normal  She appears well-developed and well-nourished  HENT:   Head: Normocephalic and atraumatic  Eyes: Pupils are equal, round, and reactive to light  Conjunctivae and EOM are normal    Neck: Normal range of motion  Neck supple  Cardiovascular: Normal rate, regular rhythm, S1 normal, S2 normal, normal heart sounds and intact distal pulses  No murmur heard  Pulmonary/Chest: Effort normal and breath sounds normal  No respiratory distress  She has no wheezes  Musculoskeletal: She exhibits no edema  Left shoulder: She exhibits decreased range of motion  Neurological: She is alert and oriented to person, place, and time  She has normal strength  Skin: Skin is warm  Psychiatric: She has a normal mood and affect  Her speech is normal and behavior is normal  Judgment and thought content normal    Vitals reviewed                   Sean Padron MD,   [facility]  2/27/2020

## 2020-02-28 ENCOUNTER — OFFICE VISIT (OUTPATIENT)
Dept: PHYSICAL THERAPY | Facility: CLINIC | Age: 55
End: 2020-02-28
Payer: COMMERCIAL

## 2020-02-28 DIAGNOSIS — S42.202D CLOSED FRACTURE OF PROXIMAL END OF LEFT HUMERUS WITH ROUTINE HEALING, UNSPECIFIED FRACTURE MORPHOLOGY, SUBSEQUENT ENCOUNTER: Primary | ICD-10-CM

## 2020-02-28 PROCEDURE — 97110 THERAPEUTIC EXERCISES: CPT | Performed by: PHYSICAL THERAPIST

## 2020-02-28 PROCEDURE — 97140 MANUAL THERAPY 1/> REGIONS: CPT | Performed by: PHYSICAL THERAPIST

## 2020-02-28 NOTE — PROGRESS NOTES
Daily Note     Today's date: 2020  Patient name: Miguelina Choi  : 1965  MRN: [id number]  Referring provider: Cat Lyle MD  Dx:   Encounter Diagnosis     ICD-10-CM    1  Closed fracture of proximal end of left humerus with routine healing, unspecified fracture morphology, subsequent encounter S4                   Subjective: I did too much yesterday - driving and using my arm for normal things, and I really hurt last night (8/10)  Today it's better - 2/10  Objective: See treatment diary below      Assessment: Tolerated treatment well and does continue w/ ERP w/ PROM, but is tolerating progression of program  Patient would benefit from continued PT      Plan: Progress treatment as tolerated  Precautions: htn, R knee TKA x2; pt to wean off sling by around 3/5/2020  SOC: 2020  FOTO: 2020  POC expiration: 2020  Daily Treatment Log:  Date 2020    Visit # 1 2 3 4    Manual        Manual PROM L shoulder  10' 10' 10'            There exer  20' 30' 30'    pendulums 20x cw/ccw 1# 20x cw/ccw 1# 30x cw/ccw 1# 30x cw/ccw    Stand cane ER neutral 5"x10 5"x20 AROM B/L neut  2x10 AROM B/L neut  2x12    Bicep curl B/L 1x10 1#20x 2# 2x10 3# 2x12    tricep B/L press down   job 4#  2x10 job 5#   2x12    Table slide flex 5"x10 5"x20 5"x20 5"x20    Table slide abd 5"x10 5"x20 5"x20 5"x20    Table ER  5"x20 5"x20 5"x20    Sup flexion w/ HHA        Sup cane [email or web address]  10x10" 90/90 10x10" 90/90 10x10"    Stand cane abd  1x10 3x5 3x5    SL ER  2x5      Post shld stretch stand    10"x5            HEP        NMRed                                                                                Modalities  10' 10' 10'    MH/estim L shoulder to end  premod - skin intact pre/post premod - skin intact pre/post premod - skin intact pre/post                    HEP 2020 - pendulums, table slide flex/abd, ER w/ cane neutral, elbow AROM

## 2020-03-02 ENCOUNTER — OFFICE VISIT (OUTPATIENT)
Dept: PHYSICAL THERAPY | Facility: CLINIC | Age: 55
End: 2020-03-02
Payer: COMMERCIAL

## 2020-03-02 DIAGNOSIS — S42.202D CLOSED FRACTURE OF PROXIMAL END OF LEFT HUMERUS WITH ROUTINE HEALING, UNSPECIFIED FRACTURE MORPHOLOGY, SUBSEQUENT ENCOUNTER: Primary | ICD-10-CM

## 2020-03-02 PROCEDURE — 97140 MANUAL THERAPY 1/> REGIONS: CPT | Performed by: PHYSICAL THERAPIST

## 2020-03-02 PROCEDURE — 97110 THERAPEUTIC EXERCISES: CPT | Performed by: PHYSICAL THERAPIST

## 2020-03-02 NOTE — PROGRESS NOTES
Daily Note     Today's date: 3/2/2020  Patient name: Connie Lovett  : 1965  MRN: [id number]  Referring provider: Tigist Alvarado MD  Dx:   Encounter Diagnosis     ICD-10-CM    1  Closed fracture of proximal end of left humerus with routine healing, unspecified fracture morphology, subsequent encounter S4                   Subjective: Pt reports poor active elevation L UE, but PROM is improving  Objective: See treatment diary below; added wall slides to progress toward active strength  Assessment: Tolerated treatment well  Patient would benefit from continued PT and is tolerating progression of program despite continued global ROM loss L shoulder  Plan: Progress treatment as tolerated         Precautions: htn, R knee TKA x2; pt to wean off sling by around 3/5/2020  SOC: 2020  FOTO: 2020  POC expiration: 2020  Daily Treatment Log:  Date 2020 2020 2020 2020 3/2/2020  FOTO   Visit # 1 2 3 4 5   Manual        Manual PROM L shoulder  10' 10' 10' 10'           There exer  20' 30' 30' 30'   pendulums 20x cw/ccw 1# 20x cw/ccw 1# 30x cw/ccw 1# 30x cw/ccw 1# 30x cw/ccw   Stand cane ER neutral 5"x10 5"x20 AROM B/L neut  2x10 AROM B/L neut  2x12 Sit @ 45   2x10   Bicep curl B/L 1x10 1#20x 2# 2x10 3# 2x12 3# 2x12   tricep B/L press down   job 4#  2x10 job 5#   2x12 job 5# 2x12   Table slide flex 5"x10 5"x20 5"x20 5"x20 10x10"   Table slide abd 5"x10 5"x20 5"x20 5"x20 10x10"   Table ER  5"x20 5"x20 5"x20 10x10"   Sup flexion w/ HHA        Sup cane [email or web address]  10x10" 90/90 10x10" 90/90 10x10" 90/90 10x10"   Stand cane abd  1x10 3x5 3x5 3x5   SL ER  2x5      Post shld stretch stand    10"x5 20"x3   Wall slides flex     3x5   HEP        NMRed                                                                                Modalities  10' 10' 10' 10'   MH/estim L shoulder to end  premod - skin intact pre/post premod - skin intact pre/post premod - skin intact pre/post premod - skin intact pre/post                   HEP 2/20/2020 - pendulums, table slide flex/abd, ER w/ cane neutral, elbow AROM

## 2020-03-04 ENCOUNTER — OFFICE VISIT (OUTPATIENT)
Dept: FAMILY MEDICINE CLINIC | Facility: CLINIC | Age: 55
End: 2020-03-04
Payer: COMMERCIAL

## 2020-03-04 ENCOUNTER — OFFICE VISIT (OUTPATIENT)
Dept: PHYSICAL THERAPY | Facility: CLINIC | Age: 55
End: 2020-03-04
Payer: COMMERCIAL

## 2020-03-04 VITALS
OXYGEN SATURATION: 97 % | HEART RATE: 72 BPM | DIASTOLIC BLOOD PRESSURE: 80 MMHG | RESPIRATION RATE: 16 BRPM | WEIGHT: 170.4 LBS | HEIGHT: 65 IN | BODY MASS INDEX: 28.39 KG/M2 | TEMPERATURE: 98 F | SYSTOLIC BLOOD PRESSURE: 120 MMHG

## 2020-03-04 DIAGNOSIS — J06.9 VIRAL URI: Primary | ICD-10-CM

## 2020-03-04 DIAGNOSIS — S42.202D CLOSED FRACTURE OF PROXIMAL END OF LEFT HUMERUS WITH ROUTINE HEALING, UNSPECIFIED FRACTURE MORPHOLOGY, SUBSEQUENT ENCOUNTER: Primary | ICD-10-CM

## 2020-03-04 PROCEDURE — 97140 MANUAL THERAPY 1/> REGIONS: CPT

## 2020-03-04 PROCEDURE — 97110 THERAPEUTIC EXERCISES: CPT

## 2020-03-04 PROCEDURE — 3074F SYST BP LT 130 MM HG: CPT | Performed by: NURSE PRACTITIONER

## 2020-03-04 PROCEDURE — 99213 OFFICE O/P EST LOW 20 MIN: CPT | Performed by: NURSE PRACTITIONER

## 2020-03-04 PROCEDURE — 3008F BODY MASS INDEX DOCD: CPT | Performed by: NURSE PRACTITIONER

## 2020-03-04 PROCEDURE — 3079F DIAST BP 80-89 MM HG: CPT | Performed by: NURSE PRACTITIONER

## 2020-03-04 PROCEDURE — 1036F TOBACCO NON-USER: CPT | Performed by: NURSE PRACTITIONER

## 2020-03-04 NOTE — PROGRESS NOTES
Assessment:      viral upper respiratory illness    no evidence of bacterial infection on exam today  Plan:      Discussed diagnosis and treatment of URI  Discussed the importance of avoiding unnecessary antibiotic therapy  Suggested symptomatic OTC remedies  Nasal saline spray for congestion  Nasal steroids per orders  Follow up as needed  Call in 7 days if symptoms aren't resolving  Subjective:       History was provided by the patient  Jaime Poe is a 62 y o  female who presents for evaluation of symptoms of a URI  Symptoms include coryza, nasal blockage, post nasal drip and sinus and nasal congestion  Onset of symptoms was 2 days ago, unchanged since that time  Denies lightheadedness, low grade fever, purulent nasal discharge, shortness of breath, vertigo and wheezing  She is not drinking much  Evaluation to date: none  Treatment to date: none  The following portions of the patient's history were reviewed and updated as appropriate: allergies, current medications, past family history, past medical history, past social history, past surgical history and problem list     Review of Systems  Pertinent items are noted in HPI        Objective:      /80 (BP Location: Right arm, Patient Position: Sitting, Cuff Size: Large)   Pulse 72   Temp 98 °F (36 7 °C)   Resp 16   Ht 5' 5" (1 651 m)   Wt 77 3 kg (170 lb 6 4 oz)   SpO2 97%   BMI 28 36 kg/m²   General appearance: alert and oriented, in no acute distress  Head: Normocephalic, without obvious abnormality, sinuses nontender to percussion  Ears: normal TM's and external ear canals both ears  Nose: clear discharge, turbinates red  Throat: lips, mucosa, and tongue normal; teeth and gums normal  Lungs: clear to auscultation bilaterally  Heart: regular rate and rhythm, S1, S2 normal, no murmur, click, rub or gallop  Lymph nodes: Cervical, supraclavicular, and axillary nodes normal

## 2020-03-04 NOTE — PROGRESS NOTES
Daily Note     Today's date: 3/4/2020  Patient name: Beryle Guan  : 1965  MRN: [id number]  Referring provider: Yusef Hankins MD  Dx:   Encounter Diagnosis     ICD-10-CM    1  Closed fracture of proximal end of left humerus with routine healing, unspecified fracture morphology, subsequent encounter S4                   Subjective: pt repots no difficulty after last session and no changes in pain  10 at arrival  Reports she has been sleeping better and is awoken less from pain     Objective: See treatment diary below      Assessment: Tolerated treatment well  Patient exhibited good technique with therapeutic exercises cont to have difficulty with overhead AROM  Plan: Continue per plan of care  Precautions: htn, R knee TKA x2; pt to wean off sling by around 3/5/2020  SOC: 2020  FOTO: 2020  POC expiration: 2020  Daily Treatment Log:  Date 3/4/2020       Visit # 6       Manual        Manual PROM L shoulder 10'               There exer 30'       pendulums 3# 30x cw/ccw       Stand cane ER neutral Seated @ 45  5" 2 x10         Bicep curl B/L 3# 2x12       tricep B/L press down job 5# 2x12       Table slide flex 10x10"       Table slide abd 10x10"       Table ER 10x10"       Sup flexion w/ HHA        Sup cane [email or web address] 90/90 10x10"       Stand cane abd 3x5        SL ER        Post shld stretch stand 20" x 4       Wall slides flex 3x5        HEP        NMRed                                                                                Modalities        MH/estim L shoulder to end 10 'premod - skin intact pre/post                       HEP 2020 - pendulums, table slide flex/abd, ER w/ cane neutral, elbow AROM

## 2020-03-06 ENCOUNTER — OFFICE VISIT (OUTPATIENT)
Dept: PHYSICAL THERAPY | Facility: CLINIC | Age: 55
End: 2020-03-06
Payer: COMMERCIAL

## 2020-03-06 DIAGNOSIS — S42.202D CLOSED FRACTURE OF PROXIMAL END OF LEFT HUMERUS WITH ROUTINE HEALING, UNSPECIFIED FRACTURE MORPHOLOGY, SUBSEQUENT ENCOUNTER: Primary | ICD-10-CM

## 2020-03-06 PROCEDURE — 97110 THERAPEUTIC EXERCISES: CPT | Performed by: PHYSICAL THERAPIST

## 2020-03-06 PROCEDURE — 97140 MANUAL THERAPY 1/> REGIONS: CPT | Performed by: PHYSICAL THERAPIST

## 2020-03-06 NOTE — PROGRESS NOTES
Daily Note     Today's date: 3/6/2020  Patient name: Rae Carpenter  : 1965  MRN: [id number]  Referring provider: Sky Santillan MD  Dx:   Encounter Diagnosis     ICD-10-CM    1  Closed fracture of proximal end of left humerus with routine healing, unspecified fracture morphology, subsequent encounter S4                   Subjective: Pt reports pain has been waking her up the past few mornings (8/10) - better during the day  She still cannot actively elevate her arm on her own despite improved PROM  Objective: See treatment diary below; attempted SL abd with arm both straight and w/ elbow flexed - pt unable  Assessment: Tolerated treatment well  Patient demonstrated fatigue post treatment and does demonstrate improving PROM, but no significant change in active flexion or abduction  Plan: Progress treatment as tolerated  Precautions: htn, R knee TKA x2; pt to wean off sling by around 3/5/2020  SOC: 2020  FOTO: 3/2/2020  POC expiration: 2020  Daily Treatment Log:  Date 3/4/2020 3/6/2020        Visit # 6 7      Manual        Manual PROM L shoulder 10' 10'              There exer 30' 30'      pendulums 3# 30x cw/ccw       Stand cane ER neutral Seated @ 45  5" 2 x10   Side lying  2x10      Bicep curl B/L 3# 2x12       tricep B/L press down job 5# 2x12       Table slide flex 10x10" 10x10"      Table slide abd 10x10" 10x10"      Table ER 10x10" 10x10"      Sup flexion w/ HHA        Sup cane [email or web address] 90/90 10x10"       Stand cane abd 3x5  3x5      SL ER        Post shld stretch stand 20" x 4 20"x4      Wall slides flex 3x5  3x5      Pulleys w/ MH  5' to start      [facility]                                                                                Modalities        MH/estim L shoulder to end 10 'premod - skin intact pre/post 10' premod - skin intact pre/post                      HEP 2020 - pendulums, table slide flex/abd, ER w/ cane neutral, elbow AROM

## 2020-03-09 ENCOUNTER — OFFICE VISIT (OUTPATIENT)
Dept: PHYSICAL THERAPY | Facility: CLINIC | Age: 55
End: 2020-03-09
Payer: COMMERCIAL

## 2020-03-09 DIAGNOSIS — S42.202D CLOSED FRACTURE OF PROXIMAL END OF LEFT HUMERUS WITH ROUTINE HEALING, UNSPECIFIED FRACTURE MORPHOLOGY, SUBSEQUENT ENCOUNTER: Primary | ICD-10-CM

## 2020-03-09 PROCEDURE — 97110 THERAPEUTIC EXERCISES: CPT

## 2020-03-09 PROCEDURE — 97140 MANUAL THERAPY 1/> REGIONS: CPT

## 2020-03-09 NOTE — PROGRESS NOTES
Daily Note     Today's date: 3/9/2020  Patient name: Spencer Cooper  : 1965  MRN: [id number]  Referring provider: Natasha Shields MD  Dx:   Encounter Diagnosis     ICD-10-CM    1  Closed fracture of proximal end of left humerus with routine healing, unspecified fracture morphology, subsequent encounter S4D        Start Time: 845  Stop Time: 930  Total time in clinic (min): 45 minutes    Subjective: pt reports she thinks she may be over doing it at home with her HEP, she is very sore after doing them at home but does not get the same soreness after her therapy sessions  3/10 at start of session  Reports she was having some increased pain over the weekend so she wore her sling for a little and that seemed to help  Objective: See treatment diary below      Assessment: Tolerated treatment well  Patient exhibited good technique with therapeutic exercises pt cont to have diff with active motions OH       Plan: Continue per plan of care        Precautions: htn, R knee TKA x2; pt to wean off sling by around 3/5/2020  SOC: 2020  FOTO: 3/2/2020  POC expiration: 2020  Daily Treatment Log:  Date 3/4/2020 3/6/2020   3/9/20     Visit # 6 7 8     Manual   10'     Manual PROM L shoulder 10' 10' 10'             There exer 30' 30' 30'     pendulums 3# 30x cw/ccw  3# 30x cw/ccw      Stand cane ER neutral Seated @ 45  5" 2 x10   Side lying  2x10      Bicep curl B/L 3# 2x12  3# 2x12      tricep B/L press down job 5# 2x12       Table slide flex 10x10" 10x10" 10x10"     Table slide abd 10x10" 10x10" 10x10"     Table ER 10x10" 10x10" 10x10"      Sup flexion w/ HHA        Sup cane [email or web address] 90/90 10x10"       Stand cane abd 3x5  3x5 3x5      SL ER        Post shld stretch stand 20" x 4 20"x4 20"x4      Wall slides flex 3x5  3x5 3x5      Pulleys w/ MH  5' to start 5' to start      [facility]                                                                                Modalities        MH/estim L shoulder to end 10 'premod - skin intact pre/post 10' premod - skin intact pre/post 10' premod - skin intact pre/post                      HEP 2/20/2020 - pendulums, table slide flex/abd, ER w/ cane neutral, elbow AROM

## 2020-03-11 ENCOUNTER — OFFICE VISIT (OUTPATIENT)
Dept: PHYSICAL THERAPY | Facility: CLINIC | Age: 55
End: 2020-03-11
Payer: COMMERCIAL

## 2020-03-11 DIAGNOSIS — S42.202D CLOSED FRACTURE OF PROXIMAL END OF LEFT HUMERUS WITH ROUTINE HEALING, UNSPECIFIED FRACTURE MORPHOLOGY, SUBSEQUENT ENCOUNTER: Primary | ICD-10-CM

## 2020-03-11 PROCEDURE — 97140 MANUAL THERAPY 1/> REGIONS: CPT | Performed by: PHYSICAL THERAPIST

## 2020-03-11 PROCEDURE — 97110 THERAPEUTIC EXERCISES: CPT | Performed by: PHYSICAL THERAPIST

## 2020-03-11 NOTE — PROGRESS NOTES
Daily Note     Today's date: 3/11/2020  Patient name: Matt Cruz  : 1965  MRN: [id number]  Referring provider: Rebecca Zhang MD  Dx:   Encounter Diagnosis     ICD-10-CM    1  Closed fracture of proximal end of left humerus with routine healing, unspecified fracture morphology, subsequent encounter S4                   Subjective: Pt reports no change in pain or AROM  She sees Dr Chase Luu next week  Objective: See treatment diary below      Assessment: Tolerated treatment fair and is able to add supine flexion w/ HHA, but cannot actively elevate flexion or abduction standing or lying    Patient would benefit from continued PT and continued TIW frequency  Plan: Progress treatment as tolerated         Precautions: htn, R knee TKA x2; pt to wean off sling by around 3/5/2020  SOC: 2020  FOTO: 3/2/2020  POC expiration: 2020  Daily Treatment Log:  Date 3/4/2020 3/6/2020   3/9/20 3/11/2020    Visit # 6 7 8 9    Manual   10' 20'    Manual PROM L shoulder 10' 10' 10' 8'    AAROM IR/ER @90 supine    2x10    There exer 30' 30' 30' 25'    pendulums 3# 30x cw/ccw  3# 30x cw/ccw      Stand cane ER neutral Seated @ 45  5" 2 x10   Side lying  2x10      Bicep curl B/L 3# 2x12  3# 2x12      tricep B/L press down job 5# 2x12   job 5# 2x12    TB B/L exten    Red TB 3x5    Table slide flex 10x10" 10x10" 10x10"     Table slide abd 10x10" 10x10" 10x10"     Table ER 10x10" 10x10" 10x10"      Sup flexion w/ HHA    2x5    Sup cane Farrin[employer]" 90/90 10x10"       Stand cane abd 3x5  3x5 3x5  2x10    Stand cane flex    2x10    Post shld stretch stand 20" x 4 20"x4 20"x4  20"x4    Wall slides flex 3x5  3x5 3x5  2x10    Pulleys w/ MH  5' to start 5' to start  5'    NMRed                                                                                Modalities        MH/estim L shoulder to end 10 'premod - skin intact pre/post 10' premod - skin intact pre/post 10' premod - skin intact pre/post  10' premod - skin intact pre/post                    HEP 2/20/2020 - pendulums, table slide flex/abd, ER w/ cane neutral, elbow AROM    HEP additions 3/10/2020: stand cane abd, wall slides and supine flexion HHA

## 2020-03-13 ENCOUNTER — OFFICE VISIT (OUTPATIENT)
Dept: PHYSICAL THERAPY | Facility: CLINIC | Age: 55
End: 2020-03-13
Payer: COMMERCIAL

## 2020-03-13 DIAGNOSIS — S42.202D CLOSED FRACTURE OF PROXIMAL END OF LEFT HUMERUS WITH ROUTINE HEALING, UNSPECIFIED FRACTURE MORPHOLOGY, SUBSEQUENT ENCOUNTER: Primary | ICD-10-CM

## 2020-03-13 PROCEDURE — 97110 THERAPEUTIC EXERCISES: CPT

## 2020-03-13 PROCEDURE — 97140 MANUAL THERAPY 1/> REGIONS: CPT

## 2020-03-13 NOTE — PROGRESS NOTES
Daily Note     Today's date: 3/13/2020  Patient name: Beryle Guan  : 1965  MRN: [id number]  Referring provider: Yusef Hankins MD  Dx:   Encounter Diagnosis     ICD-10-CM    1  Closed fracture of proximal end of left humerus with routine healing, unspecified fracture morphology, subsequent encounter S4D                   Subjective: pt reports no changes at this time in her pain or AROM       Objective: See treatment diary below      Assessment: Tolerated treatment well  Patient exhibited good technique with therapeutic exercises Pt cont to have deficits in AROM/PROM in all planes       Plan: Continue per plan of care        Precautions: htn, R knee TKA x2; pt to wean off sling by around 3/5/2020  SOC: 2020  FOTO: 3/2/2020  POC expiration: 2020  Daily Treatment Log:  Date 3/4/2020 3/6/2020   3/9/20 3/11/2020 3/13/20 FOTO   Visit # 6 7 8 9 10   Manual   10' 21' 10'   Manual PROM L shoulder 10' 10' 10' 8' 10'   AAROM IR/ER @90 supine    2x10    There exer 30' 30' 30' 25' 30'   pendulums 3# 30x cw/ccw  3# 30x cw/ccw   3# 30x cw/ccw    Stand cane ER neutral Seated @ 45  5" 2 x10   Side lying  2x10      Bicep curl B/L 3# 2x12  3# 2x12      tricep B/L press down job 5# 2x12   job 5# 2x12    TB B/L exten    Red TB 3x5 Red TB 3x5    Table slide flex 10x10" 10x10" 10x10"     Table slide abd 10x10" 10x10" 10x10"     Table ER 10x10" 10x10" 10x10"      Sup flexion w/ HHA    2x5 2x5    Sup cane [email or web address] 90/90 10x10"       Stand cane abd 3x5  3x5 3x5  2x10 2x10   Stand cane flex    2x10 2x10    Post shld stretch stand 20" x 4 20"x4 20"x4  20"x4 20"x5   Wall slides flex 3x5  3x5 3x5  2x10 5" 2x10    Pulleys w/ MH  5' to start 5' to start  5' 5'    NMRed                                                                                Modalities        MH/estim L shoulder to end 10 'premod - skin intact pre/post 10' premod - skin intact pre/post 10' premod - skin intact pre/post  10' premod - skin intact pre/post 10' premod - skin intact pre/post                    HEP 2/20/2020 - pendulums, table slide flex/abd, ER w/ cane neutral, elbow AROM    HEP additions 3/10/2020: stand cane abd, wall slides and supine flexion HHA

## 2020-03-17 ENCOUNTER — EVALUATION (OUTPATIENT)
Dept: PHYSICAL THERAPY | Facility: CLINIC | Age: 55
End: 2020-03-17
Payer: COMMERCIAL

## 2020-03-17 DIAGNOSIS — S42.202D CLOSED FRACTURE OF PROXIMAL END OF LEFT HUMERUS WITH ROUTINE HEALING, UNSPECIFIED FRACTURE MORPHOLOGY, SUBSEQUENT ENCOUNTER: Primary | ICD-10-CM

## 2020-03-17 PROCEDURE — 97110 THERAPEUTIC EXERCISES: CPT | Performed by: PHYSICAL THERAPIST

## 2020-03-17 PROCEDURE — 97140 MANUAL THERAPY 1/> REGIONS: CPT | Performed by: PHYSICAL THERAPIST

## 2020-03-17 NOTE — PROGRESS NOTES
PT Re-Evaluation     Today's date: 3/17/2020  Patient name: Francy Pruitt  : 1965  MRN: [id number]  Referring provider: Edison Lopez MD  Dx:   Encounter Diagnosis     ICD-10-CM    1  Closed fracture of proximal end of left humerus with routine healing, unspecified fracture morphology, subsequent encounter S4                   Assessment  Assessment details: 3/2/2002: Francy Pruitt is a 62 y o  female who presents with pain, decreased strength, decreased ROM and decreased joint mobility  Due to these impairments, patient has difficulty performing ADL's, IADL's, recreational activities, lifting/carrying, reaching, driving and sleeping  Patient's clinical presentation is consistent with their referring diagnosis of Closed fracture of proximal end of left humerus with routine healing, unspecified fracture morphology, subsequent encounter  Plan: Ambulatory referral to Physical Therapy  Patient has been educated in home exercise program and plan of care  Patient would benefit from skilled physical therapy services to address their aforementioned functional limitations and progress towards prior level of function and independence with home exercise program    3/17/2020: Pt has attended 11 sessions and demonstrates improved PROM, and minor advancements in AROM  Pain remains high  Pt will need further authorization by insurer to continue PT as per original POC  Pt will follow up w/ Dr Mark Mcconnell tomorrow  Recommend continued PT as per original POC given extent of pt's injury  Impairments: abnormal or restricted ROM, activity intolerance, impaired physical strength, lacks appropriate home exercise program, pain with function, scapular dyskinesis and poor posture   Functional limitations: sleep disturbed, does not use L arm w/ driving, unable to effectively use L arm for ADL's/IADL's due to unable to effectively elevate her arm from her body  Understanding of Dx/Px/POC: good   Prognosis: good    Goals  Short Term Goals to be met in 4 weeks (3/19/2020)  1  Pt to be independent w/ prelimary HEP  2  Improve PROM L shoulder to 125 flex/100abd/45ER to prepare for LTG's  3  Improve AROM L shoulder to 100 flexion/90 abd/40 [email or web address] to prepare for LTG's  4  Achieve AROM L elbow equal to R to allow for grooming w/ min rdz or better  5  Pt to be able to use L arm to help put away dishes  Long Term Goals to be met in 12 weeks (5/14/2020)  1  APROM L shoulder to be within 10 degrees of R shoulder w/o pain to allow ease w/ ADL's and IADL's  2  Improve strength to 4/5 or better to allow lifting, reaching and carrying 5-10# objects w/o c/o   3  Undisturbed sleep  4  Pt to report ease w/ grooming/dressing and driving  5  Reduce pain w/ functional activities to 0-3/10 to allow FOTO score 65 or more  Plan  Plan details:       Patient would benefit from: PT eval and skilled physical therapy  Planned modality interventions: cryotherapy, thermotherapy: hydrocollator packs and unattended electrical stimulation  Planned therapy interventions: manual therapy, neuromuscular re-education, therapeutic exercise, therapeutic activities, home exercise program, stretching, patient education and postural training  Frequency: 2-3x/week  Plan of Care beginning date: 2/20/2020  Plan of Care expiration date: 5/14/2020  Treatment plan discussed with: patient        Subjective Evaluation    History of Present Illness  Mechanism of injury: 3/2/2020: Pt fell 1/19/2020 due to fainting related to changes in medication for htn and anxiety reacting and causing her sodium levels to drop  She was hospitalized for 5 days  She has since changed her blood pressure medication and she has D/C the anxiety medication  Pain in L shoulder is constant  She wears a sling, but was advised to wean off the sling over the next 2 weeks  She slept in her bed last night for the first time    3/17/2020: Pt reports no meaningful progress in pain, AROM or function  She reports she sees improved joint mobility/AAROM and is tolerating HEP better  Quality of life: fair    Pain  At best pain ratin  At worst pain rating: 10  Location: anterior shoulder  Quality: dull ache  Relieving factors: rest  Progression: improved    Social Support  Exterior steps/ramp assessed: 2-3  Stairs in house: yes (full flight to bedroom)   Lives in: multiple-level home  Lives with: spouse      Diagnostic Tests  X-ray: abnormal (fx site medially shifted at head/neck of humerus)  Treatments  Previous treatment: immobilization  Current treatment: physical therapy  Patient Goals  Patient goals for therapy: decreased pain and increased motion  Patient goal: full use of L arm        Objective      AROM stand/PROM supine L  R   L      3/17/2020 2020  2020  Shoulder flexion  65A/115P [id number]  30A/75P  Shoulder abduction  40A/90P 165A/175P  30A/75P  Shoulder [email or web address]  30A/P@90 95A/105P  Unable/10P@75abd  Shoulder IR   SI jtA/20P T9A/50 P  [email or web address]  Elbow ROM   0-150  +2-152A  -8-152A    Wrist and forearm AROM WNL B/L     STRENGTH:    L  R   L      3/17/2020 2020  2020  Shoulder flexion  2+/5  5/5   2+/5  Shoulder abduction  2+/5  5/5   2+/5  Shoulder [email or web address]  3-/5  5/5   2+/5  Shoulder IR   3/5  5/5   3-/5    Posture: Pt's sitting posture is WFL  Pt wears her sling  With sling off, pt keeps arm in guarded position            Precautions: htn, R knee TKA x2  SOC: 2020  FOTO: 3/13/2020  RE: 3/17/2020  POC expiration: 2020  Daily Treatment Log:  Date 3/17/2020 3/6/2020   3/9/20 3/11/2020 3/13/20 FOTO   Visit # 11 7 8 9 10   Manual   10' 25' 10'   Manual PROM L shoulder 10' 10' 10' 8' 10'   AAROM IR/ER @90 supine    2x10    There exer  30' 30' 25' 30'   pendulums   3# 30x cw/ccw   3# 30x cw/ccw    Stand ER AROM neutral 2x10 Side lying  2x10      Bicep curl B/L 3#  3# 2x12      tricep B/L press down 5# job kaiser 5# 2x12    TB B/L exten    Red TB 3x5 Red TB 3x5    Table slide flex  10x10" 10x10"     Table slide abd  10x10" 10x10"     Table ER  10x10" 10x10"      Sup flexion w/ HHA 2x5   2x5 2x5    Sup cane [email or web address]        Stand cane abd 2x10 3x5 3x5  2x10 2x10   Stand cane flex    2x10 2x10    Post shld stretch stand 20"x5 20"x4 20"x4  20"x4 20"x5   Wall slides flex 5" 2x10 3x5 3x5  2x10 5" 2x10    IR w/ strap 10x10"       Pulleys w/ MH 5' to start 5' to start 5' to start  5' 5'    NMRed                                                                                Modalities        MH/estim L shoulder to end 10 'premod - skin intact pre/post 10' premod - skin intact pre/post 10' premod - skin intact pre/post  10' premod - skin intact pre/post 10' premod - skin intact pre/post                    HEP 2/20/2020 - pendulums, table slide flex/abd, ER w/ cane neutral, elbow AROM    HEP additions 3/10/2020: stand cane abd, wall slides and supine flexion HHA  3/17/2020 added strap IR

## 2020-03-18 ENCOUNTER — OFFICE VISIT (OUTPATIENT)
Dept: OBGYN CLINIC | Facility: CLINIC | Age: 55
End: 2020-03-18
Payer: COMMERCIAL

## 2020-03-18 ENCOUNTER — TELEPHONE (OUTPATIENT)
Dept: PHYSICAL THERAPY | Facility: CLINIC | Age: 55
End: 2020-03-18

## 2020-03-18 ENCOUNTER — APPOINTMENT (OUTPATIENT)
Dept: RADIOLOGY | Facility: CLINIC | Age: 55
End: 2020-03-18
Payer: COMMERCIAL

## 2020-03-18 VITALS
HEIGHT: 65 IN | DIASTOLIC BLOOD PRESSURE: 85 MMHG | HEART RATE: 80 BPM | SYSTOLIC BLOOD PRESSURE: 133 MMHG | WEIGHT: 169 LBS | BODY MASS INDEX: 28.16 KG/M2

## 2020-03-18 DIAGNOSIS — S42.202D CLOSED FRACTURE OF PROXIMAL END OF LEFT HUMERUS WITH ROUTINE HEALING, UNSPECIFIED FRACTURE MORPHOLOGY, SUBSEQUENT ENCOUNTER: ICD-10-CM

## 2020-03-18 DIAGNOSIS — S42.202D CLOSED FRACTURE OF PROXIMAL END OF LEFT HUMERUS WITH ROUTINE HEALING, UNSPECIFIED FRACTURE MORPHOLOGY, SUBSEQUENT ENCOUNTER: Primary | ICD-10-CM

## 2020-03-18 PROCEDURE — 1036F TOBACCO NON-USER: CPT | Performed by: ORTHOPAEDIC SURGERY

## 2020-03-18 PROCEDURE — 3075F SYST BP GE 130 - 139MM HG: CPT | Performed by: ORTHOPAEDIC SURGERY

## 2020-03-18 PROCEDURE — 3079F DIAST BP 80-89 MM HG: CPT | Performed by: ORTHOPAEDIC SURGERY

## 2020-03-18 PROCEDURE — 99213 OFFICE O/P EST LOW 20 MIN: CPT | Performed by: ORTHOPAEDIC SURGERY

## 2020-03-18 PROCEDURE — 73030 X-RAY EXAM OF SHOULDER: CPT

## 2020-03-18 PROCEDURE — 3008F BODY MASS INDEX DOCD: CPT | Performed by: ORTHOPAEDIC SURGERY

## 2020-03-18 NOTE — PROGRESS NOTES
Assessment/Plan:  1  Closed fracture of proximal end of left humerus with routine healing, unspecified fracture morphology, subsequent encounter  XR shoulder 2+ vw left       Scribe Attestation    I,:   Eula Harmon MA am acting as a scribe while in the presence of the attending physician :        I,:   Isadora Tong MD personally performed the services described in this documentation    as scribed in my presence :              Oxana Nguyen is doing well  X-rays demonstrate increased fracture callus  She is non-tender on exam  Her swelling is much improved  We can continue to treat this conservatively  I did explain to her that this will be a year long process  She will continue with her HEP and keep in contact with her physical therapist via phone and e-mail due to the current health crisis  She verbalized understanding of this  She has no restrictions at this time and was advised to perform her normal ADLs as this is also good therapy  I did also explain to her that normally I would follow up with her very 4 weeks however, due to the health crisis this is not reasonable  She will call the office in 6-8 weeks for an update and possible virtual visit if that is available at that time  Surgical intervention still remain as a total shoulder replacement however, I am not pleased to due this due to her age and limit in function postoperatively  Subjective:   Shannon Figueroa is a 62 y o  female who presents to the office today for follow up evaluation 2 months s/p closed treatment for a left proximal humerus fracture  Patient states her pain remains unchanged  She states she does have difficulty sleeping due to the pain and this does wake her from sleep  She has been attending formal therapy and does feel as though she is making some progress with this  She has been out of the sling since her last visit  She does not take anything OTC for pain  She denies any distal paraesthesias        Review of Systems Constitutional: Negative for chills and fever  HENT: Negative for drooling and sneezing  Eyes: Negative for redness  Respiratory: Negative for cough and wheezing  Gastrointestinal: Negative for nausea and vomiting  Musculoskeletal: Positive for arthralgias  Negative for joint swelling and myalgias  Neurological: Negative for weakness and numbness  Psychiatric/Behavioral: Negative for behavioral problems  The patient is not nervous/anxious            Past Medical History:   Diagnosis Date    Abnormal immunology findings 06/09/2011    Anemia 05/23/2011    Arthritis     Closed fracture of distal end of fibula     resolved 09/15/17    Depression     Distal radius fracture, left     last assessed 01/30/17    GERD (gastroesophageal reflux disease)     last assessed 05/06/13    Herpes labialis     last assessed 07/23/15    Hypertension     essential ; last assessed 08/07/13    Sinus bradycardia     last assessed 05/24/16       Past Surgical History:   Procedure Laterality Date    ANKLE SURGERY      last assessed 09/15/17    BACK SURGERY      lumbar laminectomy L4-L5    BLADDER SUSPENSION      7/2017    COLONOSCOPY      JOINT REPLACEMENT      TKR  on right    ORIF TIBIA & FIBULA FRACTURES Right 12/15/2016    Procedure: SURGICAL FIXATION OF RIGHT DISTAL FIBULA FRACTURE;  Surgeon: Skylar Macario MD;  Location: [facility] MAIN OR;  Service:     W[place] REVISE KNEE JOINT REPLACE,ALL PARTS Right 10/23/2017    Procedure: REVISION TOTAL KNEE REPLACEMENT WITH FROZEN SECTIONS;  Surgeon: Eleazar Lane DO;  Location: [address] [place] [address];  Service: Orthopedics    TOTAL KNEE ARTHROPLASTY      last assessed; 11/21/17    WISDOM TOOTH EXTRACTION         Family History   Problem Relation Age of Onset    Arthritis Mother     Osteoporosis Mother     Dementia Father     Other Father         spinal stenosis    Other Family         CREST       Social History     Occupational History    Not on file   Tobacco Use    Smoking status: Never Smoker    Smokeless tobacco: Never Used   Substance and Sexual Activity    Alcohol use: Yes     Comment: moderately    Drug use: No    Sexual activity: Yes     Partners: Male         Current Outpatient Medications:     folic acid (FOLVITE) 1 mg tablet, Take 1 mg by mouth daily, Disp: , Rfl:     losartan (COZAAR) 50 mg tablet, Take 1 tablet (50 mg total) by mouth daily, Disp: 30 tablet, Rfl: 2    thiamine (VITAMIN B1) 100 mg tablet, Take 100 mg by mouth daily, Disp: , Rfl:     Allergies   Allergen Reactions    Lisinopril      COUGH       Objective:  Vitals:    03/18/20 0858   BP: 133/85   Pulse: 80       Left Shoulder Exam     Tenderness   The patient is experiencing no tenderness  Range of Motion   Active abduction: 30   Forward flexion: 60     Other   Erythema: absent  Sensation: normal  Pulse: present     Comments:  Strengthening deferred due to fracture   Swelling improved            Physical Exam   Constitutional: She is oriented to person, place, and time  She appears well-developed and well-nourished  HENT:   Head: Normocephalic and atraumatic  Eyes: Conjunctivae are normal  Right eye exhibits no discharge  Left eye exhibits no discharge  Neck: Normal range of motion  Neck supple  Cardiovascular: Normal rate and intact distal pulses  Pulmonary/Chest: Effort normal  No respiratory distress  Musculoskeletal:   As noted in HPI   Neurological: She is alert and oriented to person, place, and time  Skin: Skin is warm and dry  Psychiatric: She has a normal mood and affect  Her behavior is normal  Judgment and thought content normal        I have personally reviewed pertinent films in PACS and my interpretation is as follows:X-ray left shoulder performed in the office today demonstrates increased fracture callus left proximal humerus fracture in acceptable alignment

## 2020-03-18 NOTE — TELEPHONE ENCOUNTER
Pt saw dr Lisa Ceja who instructed her to hold PT for several weeks due to COVID-19 precautions  Cancelled all appts for now

## 2020-03-19 ENCOUNTER — APPOINTMENT (OUTPATIENT)
Dept: PHYSICAL THERAPY | Facility: CLINIC | Age: 55
End: 2020-03-19
Payer: COMMERCIAL

## 2020-03-20 ENCOUNTER — APPOINTMENT (OUTPATIENT)
Dept: PHYSICAL THERAPY | Facility: CLINIC | Age: 55
End: 2020-03-20
Payer: COMMERCIAL

## 2020-03-24 ENCOUNTER — APPOINTMENT (OUTPATIENT)
Dept: PHYSICAL THERAPY | Facility: CLINIC | Age: 55
End: 2020-03-24
Payer: COMMERCIAL

## 2020-03-25 ENCOUNTER — TELEPHONE (OUTPATIENT)
Dept: PHYSICAL THERAPY | Facility: CLINIC | Age: 55
End: 2020-03-25

## 2020-03-25 NOTE — TELEPHONE ENCOUNTER
Pt informed of clinic closure at this time, would like video visits to help her progress through her HEP   Early morning before 11am or later in afternoon work best

## 2020-03-26 ENCOUNTER — APPOINTMENT (OUTPATIENT)
Dept: PHYSICAL THERAPY | Facility: CLINIC | Age: 55
End: 2020-03-26
Payer: COMMERCIAL

## 2020-03-27 ENCOUNTER — APPOINTMENT (OUTPATIENT)
Dept: PHYSICAL THERAPY | Facility: CLINIC | Age: 55
End: 2020-03-27
Payer: COMMERCIAL

## 2020-03-30 ENCOUNTER — TELEPHONE (OUTPATIENT)
Dept: PHYSICAL THERAPY | Facility: CLINIC | Age: 55
End: 2020-03-30

## 2020-03-30 NOTE — TELEPHONE ENCOUNTER
Called pt for follow up and to offer telehealth/video or phone e-visit  She states she saw Dr Jaqui Mak last week  They discussed her continuing w/ HEP for the next few weeks independently  She is comfortable w/ HEP which we just updated at her last session  She understands her fx is healing well, and end result of her AROM won't be known for months  She defers weekly follow up by physical therapy, but would like to be notified when we re-open

## 2020-03-31 ENCOUNTER — APPOINTMENT (OUTPATIENT)
Dept: PHYSICAL THERAPY | Facility: CLINIC | Age: 55
End: 2020-03-31
Payer: COMMERCIAL

## 2020-04-03 ENCOUNTER — APPOINTMENT (OUTPATIENT)
Dept: PHYSICAL THERAPY | Facility: CLINIC | Age: 55
End: 2020-04-03
Payer: COMMERCIAL

## 2020-04-27 NOTE — PROGRESS NOTES
Will resolve physical therapy episode of care as pt has not called to seek guidance/telehealth visit since notification a month ago that clinic was closed temporarily due to COVID-19  Pt was doing well continuing w/ HEP  Telehealth was offered, and pt was advised she could call if she felt she needed it  Pt will be notified when clinic re-opens should she want to resume traditional in-clinic treatment

## 2020-04-29 ENCOUNTER — TELEPHONE (OUTPATIENT)
Dept: PHYSICAL THERAPY | Facility: CLINIC | Age: 55
End: 2020-04-29

## 2020-05-04 ENCOUNTER — EVALUATION (OUTPATIENT)
Dept: PHYSICAL THERAPY | Facility: CLINIC | Age: 55
End: 2020-05-04
Payer: COMMERCIAL

## 2020-05-04 DIAGNOSIS — I10 ESSENTIAL HYPERTENSION: ICD-10-CM

## 2020-05-04 DIAGNOSIS — S42.202D CLOSED FRACTURE OF PROXIMAL END OF LEFT HUMERUS WITH ROUTINE HEALING, UNSPECIFIED FRACTURE MORPHOLOGY, SUBSEQUENT ENCOUNTER: Primary | ICD-10-CM

## 2020-05-04 PROCEDURE — 97110 THERAPEUTIC EXERCISES: CPT | Performed by: PHYSICAL THERAPIST

## 2020-05-04 RX ORDER — LOSARTAN POTASSIUM 50 MG/1
50 TABLET ORAL DAILY
Qty: 90 TABLET | Refills: 2 | Status: SHIPPED | OUTPATIENT
Start: 2020-05-04 | End: 2020-12-28

## 2020-05-05 ENCOUNTER — TELEMEDICINE (OUTPATIENT)
Dept: FAMILY MEDICINE CLINIC | Facility: CLINIC | Age: 55
End: 2020-05-05
Payer: COMMERCIAL

## 2020-05-05 ENCOUNTER — APPOINTMENT (OUTPATIENT)
Dept: RADIOLOGY | Facility: CLINIC | Age: 55
End: 2020-05-05
Payer: COMMERCIAL

## 2020-05-05 VITALS
WEIGHT: 163 LBS | HEART RATE: 85 BPM | SYSTOLIC BLOOD PRESSURE: 132 MMHG | BODY MASS INDEX: 27.12 KG/M2 | DIASTOLIC BLOOD PRESSURE: 80 MMHG

## 2020-05-05 DIAGNOSIS — I10 ESSENTIAL HYPERTENSION: Primary | ICD-10-CM

## 2020-05-05 DIAGNOSIS — S72.002S: ICD-10-CM

## 2020-05-05 DIAGNOSIS — E87.1 HYPONATREMIA: ICD-10-CM

## 2020-05-05 PROCEDURE — 73030 X-RAY EXAM OF SHOULDER: CPT

## 2020-05-05 PROCEDURE — 3079F DIAST BP 80-89 MM HG: CPT | Performed by: FAMILY MEDICINE

## 2020-05-05 PROCEDURE — 99214 OFFICE O/P EST MOD 30 MIN: CPT | Performed by: FAMILY MEDICINE

## 2020-05-05 PROCEDURE — 3075F SYST BP GE 130 - 139MM HG: CPT | Performed by: FAMILY MEDICINE

## 2020-05-07 ENCOUNTER — TELEPHONE (OUTPATIENT)
Dept: FAMILY MEDICINE CLINIC | Facility: CLINIC | Age: 55
End: 2020-05-07

## 2020-05-07 ENCOUNTER — OFFICE VISIT (OUTPATIENT)
Dept: PHYSICAL THERAPY | Facility: CLINIC | Age: 55
End: 2020-05-07
Payer: COMMERCIAL

## 2020-05-07 DIAGNOSIS — S42.202D CLOSED FRACTURE OF PROXIMAL END OF LEFT HUMERUS WITH ROUTINE HEALING, UNSPECIFIED FRACTURE MORPHOLOGY, SUBSEQUENT ENCOUNTER: Primary | ICD-10-CM

## 2020-05-07 PROCEDURE — 97110 THERAPEUTIC EXERCISES: CPT

## 2020-05-11 ENCOUNTER — OFFICE VISIT (OUTPATIENT)
Dept: PHYSICAL THERAPY | Facility: CLINIC | Age: 55
End: 2020-05-11
Payer: COMMERCIAL

## 2020-05-11 DIAGNOSIS — S42.202D CLOSED FRACTURE OF PROXIMAL END OF LEFT HUMERUS WITH ROUTINE HEALING, UNSPECIFIED FRACTURE MORPHOLOGY, SUBSEQUENT ENCOUNTER: Primary | ICD-10-CM

## 2020-05-11 PROCEDURE — 97110 THERAPEUTIC EXERCISES: CPT

## 2020-05-14 ENCOUNTER — OFFICE VISIT (OUTPATIENT)
Dept: PHYSICAL THERAPY | Facility: CLINIC | Age: 55
End: 2020-05-14
Payer: COMMERCIAL

## 2020-05-14 DIAGNOSIS — S42.202D CLOSED FRACTURE OF PROXIMAL END OF LEFT HUMERUS WITH ROUTINE HEALING, UNSPECIFIED FRACTURE MORPHOLOGY, SUBSEQUENT ENCOUNTER: Primary | ICD-10-CM

## 2020-05-14 PROCEDURE — 97110 THERAPEUTIC EXERCISES: CPT | Performed by: PHYSICAL THERAPIST

## 2020-05-18 ENCOUNTER — OFFICE VISIT (OUTPATIENT)
Dept: PHYSICAL THERAPY | Facility: CLINIC | Age: 55
End: 2020-05-18
Payer: COMMERCIAL

## 2020-05-18 DIAGNOSIS — S42.202D CLOSED FRACTURE OF PROXIMAL END OF LEFT HUMERUS WITH ROUTINE HEALING, UNSPECIFIED FRACTURE MORPHOLOGY, SUBSEQUENT ENCOUNTER: Primary | ICD-10-CM

## 2020-05-18 PROCEDURE — 97110 THERAPEUTIC EXERCISES: CPT

## 2020-05-20 ENCOUNTER — OFFICE VISIT (OUTPATIENT)
Dept: OBGYN CLINIC | Facility: CLINIC | Age: 55
End: 2020-05-20
Payer: COMMERCIAL

## 2020-05-20 ENCOUNTER — APPOINTMENT (OUTPATIENT)
Dept: RADIOLOGY | Facility: CLINIC | Age: 55
End: 2020-05-20
Payer: COMMERCIAL

## 2020-05-20 VITALS
TEMPERATURE: 97.1 F | DIASTOLIC BLOOD PRESSURE: 90 MMHG | WEIGHT: 169 LBS | HEIGHT: 65 IN | HEART RATE: 67 BPM | SYSTOLIC BLOOD PRESSURE: 145 MMHG | BODY MASS INDEX: 28.16 KG/M2

## 2020-05-20 DIAGNOSIS — S42.202D CLOSED FRACTURE OF PROXIMAL END OF LEFT HUMERUS WITH ROUTINE HEALING, UNSPECIFIED FRACTURE MORPHOLOGY, SUBSEQUENT ENCOUNTER: ICD-10-CM

## 2020-05-20 DIAGNOSIS — S42.202D CLOSED FRACTURE OF PROXIMAL END OF LEFT HUMERUS WITH ROUTINE HEALING, UNSPECIFIED FRACTURE MORPHOLOGY, SUBSEQUENT ENCOUNTER: Primary | ICD-10-CM

## 2020-05-20 PROCEDURE — 73030 X-RAY EXAM OF SHOULDER: CPT

## 2020-05-20 PROCEDURE — 99213 OFFICE O/P EST LOW 20 MIN: CPT | Performed by: ORTHOPAEDIC SURGERY

## 2020-05-20 PROCEDURE — 3008F BODY MASS INDEX DOCD: CPT | Performed by: ORTHOPAEDIC SURGERY

## 2020-05-20 PROCEDURE — 3077F SYST BP >= 140 MM HG: CPT | Performed by: ORTHOPAEDIC SURGERY

## 2020-05-20 PROCEDURE — 3008F BODY MASS INDEX DOCD: CPT | Performed by: FAMILY MEDICINE

## 2020-05-20 PROCEDURE — 1036F TOBACCO NON-USER: CPT | Performed by: ORTHOPAEDIC SURGERY

## 2020-05-20 PROCEDURE — 3080F DIAST BP >= 90 MM HG: CPT | Performed by: ORTHOPAEDIC SURGERY

## 2020-05-21 ENCOUNTER — OFFICE VISIT (OUTPATIENT)
Dept: PHYSICAL THERAPY | Facility: CLINIC | Age: 55
End: 2020-05-21
Payer: COMMERCIAL

## 2020-05-21 ENCOUNTER — TELEMEDICINE (OUTPATIENT)
Dept: FAMILY MEDICINE CLINIC | Facility: CLINIC | Age: 55
End: 2020-05-21
Payer: COMMERCIAL

## 2020-05-21 DIAGNOSIS — B35.1 TOENAIL FUNGUS: Primary | ICD-10-CM

## 2020-05-21 DIAGNOSIS — S42.202D CLOSED FRACTURE OF PROXIMAL END OF LEFT HUMERUS WITH ROUTINE HEALING, UNSPECIFIED FRACTURE MORPHOLOGY, SUBSEQUENT ENCOUNTER: Primary | ICD-10-CM

## 2020-05-21 DIAGNOSIS — I10 ESSENTIAL HYPERTENSION: ICD-10-CM

## 2020-05-21 PROCEDURE — 99214 OFFICE O/P EST MOD 30 MIN: CPT | Performed by: FAMILY MEDICINE

## 2020-05-21 PROCEDURE — 97110 THERAPEUTIC EXERCISES: CPT | Performed by: PHYSICAL THERAPIST

## 2020-05-21 RX ORDER — PRENATAL VIT 91/IRON/FOLIC/DHA 28-975-200
COMBINATION PACKAGE (EA) ORAL 2 TIMES DAILY
Qty: 35.4 G | Refills: 0 | Status: SHIPPED | OUTPATIENT
Start: 2020-05-21 | End: 2020-12-28

## 2020-05-26 ENCOUNTER — APPOINTMENT (OUTPATIENT)
Dept: PHYSICAL THERAPY | Facility: CLINIC | Age: 55
End: 2020-05-26
Payer: COMMERCIAL

## 2020-05-28 ENCOUNTER — APPOINTMENT (OUTPATIENT)
Dept: PHYSICAL THERAPY | Facility: CLINIC | Age: 55
End: 2020-05-28
Payer: COMMERCIAL

## 2020-06-02 ENCOUNTER — EVALUATION (OUTPATIENT)
Dept: PHYSICAL THERAPY | Facility: CLINIC | Age: 55
End: 2020-06-02
Payer: COMMERCIAL

## 2020-06-02 DIAGNOSIS — S42.202D CLOSED FRACTURE OF PROXIMAL END OF LEFT HUMERUS WITH ROUTINE HEALING, UNSPECIFIED FRACTURE MORPHOLOGY, SUBSEQUENT ENCOUNTER: Primary | ICD-10-CM

## 2020-06-02 PROCEDURE — 97110 THERAPEUTIC EXERCISES: CPT | Performed by: PHYSICAL THERAPIST

## 2020-06-04 ENCOUNTER — OFFICE VISIT (OUTPATIENT)
Dept: PHYSICAL THERAPY | Facility: CLINIC | Age: 55
End: 2020-06-04
Payer: COMMERCIAL

## 2020-06-04 DIAGNOSIS — S42.202D CLOSED FRACTURE OF PROXIMAL END OF LEFT HUMERUS WITH ROUTINE HEALING, UNSPECIFIED FRACTURE MORPHOLOGY, SUBSEQUENT ENCOUNTER: Primary | ICD-10-CM

## 2020-06-04 PROCEDURE — 97110 THERAPEUTIC EXERCISES: CPT

## 2020-06-09 ENCOUNTER — OFFICE VISIT (OUTPATIENT)
Dept: PHYSICAL THERAPY | Facility: CLINIC | Age: 55
End: 2020-06-09
Payer: COMMERCIAL

## 2020-06-09 DIAGNOSIS — S42.202D CLOSED FRACTURE OF PROXIMAL END OF LEFT HUMERUS WITH ROUTINE HEALING, UNSPECIFIED FRACTURE MORPHOLOGY, SUBSEQUENT ENCOUNTER: Primary | ICD-10-CM

## 2020-06-09 PROCEDURE — 97110 THERAPEUTIC EXERCISES: CPT

## 2020-06-11 ENCOUNTER — OFFICE VISIT (OUTPATIENT)
Dept: PHYSICAL THERAPY | Facility: CLINIC | Age: 55
End: 2020-06-11
Payer: COMMERCIAL

## 2020-06-11 DIAGNOSIS — S42.202D CLOSED FRACTURE OF PROXIMAL END OF LEFT HUMERUS WITH ROUTINE HEALING, UNSPECIFIED FRACTURE MORPHOLOGY, SUBSEQUENT ENCOUNTER: Primary | ICD-10-CM

## 2020-06-11 PROCEDURE — 97110 THERAPEUTIC EXERCISES: CPT

## 2020-06-16 ENCOUNTER — OFFICE VISIT (OUTPATIENT)
Dept: PHYSICAL THERAPY | Facility: CLINIC | Age: 55
End: 2020-06-16
Payer: COMMERCIAL

## 2020-06-16 ENCOUNTER — TELEPHONE (OUTPATIENT)
Dept: PHYSICAL THERAPY | Facility: CLINIC | Age: 55
End: 2020-06-16

## 2020-06-16 DIAGNOSIS — S42.202D CLOSED FRACTURE OF PROXIMAL END OF LEFT HUMERUS WITH ROUTINE HEALING, UNSPECIFIED FRACTURE MORPHOLOGY, SUBSEQUENT ENCOUNTER: Primary | ICD-10-CM

## 2020-06-16 PROCEDURE — 97110 THERAPEUTIC EXERCISES: CPT

## 2020-06-17 ENCOUNTER — APPOINTMENT (OUTPATIENT)
Dept: PHYSICAL THERAPY | Facility: CLINIC | Age: 55
End: 2020-06-17
Payer: COMMERCIAL

## 2020-06-18 ENCOUNTER — EVALUATION (OUTPATIENT)
Dept: PHYSICAL THERAPY | Facility: CLINIC | Age: 55
End: 2020-06-18
Payer: COMMERCIAL

## 2020-06-18 DIAGNOSIS — S42.202D CLOSED FRACTURE OF PROXIMAL END OF LEFT HUMERUS WITH ROUTINE HEALING, UNSPECIFIED FRACTURE MORPHOLOGY, SUBSEQUENT ENCOUNTER: Primary | ICD-10-CM

## 2020-06-18 PROCEDURE — 97110 THERAPEUTIC EXERCISES: CPT | Performed by: PHYSICAL THERAPIST

## 2020-06-23 ENCOUNTER — OFFICE VISIT (OUTPATIENT)
Dept: PHYSICAL THERAPY | Facility: CLINIC | Age: 55
End: 2020-06-23
Payer: COMMERCIAL

## 2020-06-23 DIAGNOSIS — S42.202D CLOSED FRACTURE OF PROXIMAL END OF LEFT HUMERUS WITH ROUTINE HEALING, UNSPECIFIED FRACTURE MORPHOLOGY, SUBSEQUENT ENCOUNTER: Primary | ICD-10-CM

## 2020-06-23 PROCEDURE — 97110 THERAPEUTIC EXERCISES: CPT | Performed by: PHYSICAL THERAPIST

## 2020-06-25 ENCOUNTER — OFFICE VISIT (OUTPATIENT)
Dept: PHYSICAL THERAPY | Facility: CLINIC | Age: 55
End: 2020-06-25
Payer: COMMERCIAL

## 2020-06-25 DIAGNOSIS — S42.202D CLOSED FRACTURE OF PROXIMAL END OF LEFT HUMERUS WITH ROUTINE HEALING, UNSPECIFIED FRACTURE MORPHOLOGY, SUBSEQUENT ENCOUNTER: Primary | ICD-10-CM

## 2020-06-25 PROCEDURE — 97110 THERAPEUTIC EXERCISES: CPT

## 2020-06-29 ENCOUNTER — APPOINTMENT (OUTPATIENT)
Dept: PHYSICAL THERAPY | Facility: CLINIC | Age: 55
End: 2020-06-29
Payer: COMMERCIAL

## 2020-06-30 ENCOUNTER — OFFICE VISIT (OUTPATIENT)
Dept: PHYSICAL THERAPY | Facility: CLINIC | Age: 55
End: 2020-06-30
Payer: COMMERCIAL

## 2020-06-30 ENCOUNTER — APPOINTMENT (OUTPATIENT)
Dept: PHYSICAL THERAPY | Facility: CLINIC | Age: 55
End: 2020-06-30
Payer: COMMERCIAL

## 2020-06-30 DIAGNOSIS — S42.202D CLOSED FRACTURE OF PROXIMAL END OF LEFT HUMERUS WITH ROUTINE HEALING, UNSPECIFIED FRACTURE MORPHOLOGY, SUBSEQUENT ENCOUNTER: Primary | ICD-10-CM

## 2020-06-30 PROCEDURE — 97110 THERAPEUTIC EXERCISES: CPT | Performed by: PHYSICAL THERAPIST

## 2020-10-07 ENCOUNTER — TELEPHONE (OUTPATIENT)
Dept: FAMILY MEDICINE CLINIC | Facility: CLINIC | Age: 55
End: 2020-10-07

## 2020-11-17 ENCOUNTER — TELEPHONE (OUTPATIENT)
Dept: FAMILY MEDICINE CLINIC | Facility: CLINIC | Age: 55
End: 2020-11-17

## 2020-11-17 DIAGNOSIS — Z00.00 ANNUAL PHYSICAL EXAM: ICD-10-CM

## 2020-11-17 DIAGNOSIS — E53.9 VITAMIN B-COMPLEX DEFICIENCY: ICD-10-CM

## 2020-11-17 DIAGNOSIS — F41.8 DEPRESSION WITH ANXIETY: Primary | ICD-10-CM

## 2020-11-17 DIAGNOSIS — E78.5 BORDERLINE HYPERLIPIDEMIA: ICD-10-CM

## 2020-11-17 DIAGNOSIS — I10 ESSENTIAL HYPERTENSION: ICD-10-CM

## 2020-11-17 DIAGNOSIS — E55.9 VITAMIN D DEFICIENCY: ICD-10-CM

## 2020-11-17 DIAGNOSIS — R53.82 CHRONIC FATIGUE: ICD-10-CM

## 2020-12-02 LAB
25(OH)D3+25(OH)D2 SERPL-MCNC: 54.5 NG/ML (ref 30–100)
CHOLEST SERPL-MCNC: 199 MG/DL (ref 100–199)
HDLC SERPL-MCNC: 105 MG/DL
LDLC SERPL CALC-MCNC: 86 MG/DL (ref 0–99)
MICRODELETION SYND BLD/T FISH: NORMAL
SL AMB VLDL CHOLESTEROL CALC: 8 MG/DL (ref 5–40)
TRIGL SERPL-MCNC: 42 MG/DL (ref 0–149)
TSH SERPL DL<=0.005 MIU/L-ACNC: 1.8 UIU/ML (ref 0.45–4.5)
VIT B12 SERPL-MCNC: 647 PG/ML (ref 232–1245)

## 2020-12-07 DIAGNOSIS — F41.8 DEPRESSION WITH ANXIETY: Primary | ICD-10-CM

## 2020-12-14 ENCOUNTER — OFFICE VISIT (OUTPATIENT)
Dept: FAMILY MEDICINE CLINIC | Facility: CLINIC | Age: 55
End: 2020-12-14
Payer: COMMERCIAL

## 2020-12-14 VITALS
BODY MASS INDEX: 28.76 KG/M2 | OXYGEN SATURATION: 97 % | WEIGHT: 172.6 LBS | TEMPERATURE: 97.3 F | DIASTOLIC BLOOD PRESSURE: 100 MMHG | RESPIRATION RATE: 16 BRPM | SYSTOLIC BLOOD PRESSURE: 144 MMHG | HEART RATE: 105 BPM | HEIGHT: 65 IN

## 2020-12-14 DIAGNOSIS — R23.8 DRY SCALP: ICD-10-CM

## 2020-12-14 DIAGNOSIS — F41.8 DEPRESSION WITH ANXIETY: ICD-10-CM

## 2020-12-14 DIAGNOSIS — I10 ESSENTIAL HYPERTENSION: ICD-10-CM

## 2020-12-14 DIAGNOSIS — Z00.00 ANNUAL PHYSICAL EXAM: Primary | ICD-10-CM

## 2020-12-14 PROCEDURE — 3077F SYST BP >= 140 MM HG: CPT | Performed by: FAMILY MEDICINE

## 2020-12-14 PROCEDURE — 1036F TOBACCO NON-USER: CPT | Performed by: FAMILY MEDICINE

## 2020-12-14 PROCEDURE — 99396 PREV VISIT EST AGE 40-64: CPT | Performed by: FAMILY MEDICINE

## 2020-12-14 PROCEDURE — 3725F SCREEN DEPRESSION PERFORMED: CPT | Performed by: FAMILY MEDICINE

## 2020-12-14 PROCEDURE — 3008F BODY MASS INDEX DOCD: CPT | Performed by: FAMILY MEDICINE

## 2020-12-14 PROCEDURE — 3080F DIAST BP >= 90 MM HG: CPT | Performed by: FAMILY MEDICINE

## 2020-12-14 RX ORDER — DULOXETIN HYDROCHLORIDE 20 MG/1
20 CAPSULE, DELAYED RELEASE ORAL DAILY
Qty: 90 CAPSULE | Refills: 1 | Status: SHIPPED | OUTPATIENT
Start: 2020-12-14 | End: 2021-05-04 | Stop reason: SDUPTHER

## 2020-12-14 RX ORDER — KETOCONAZOLE 20 MG/G
CREAM TOPICAL DAILY
Qty: 60 G | Refills: 1 | Status: SHIPPED | OUTPATIENT
Start: 2020-12-14 | End: 2021-09-25 | Stop reason: HOSPADM

## 2020-12-25 LAB
ALBUMIN SERPL-MCNC: 4.3 G/DL (ref 3.8–4.9)
ALBUMIN/GLOB SERPL: 1.3 {RATIO} (ref 1.2–2.2)
ALP SERPL-CCNC: 130 IU/L (ref 39–117)
ALT SERPL-CCNC: 18 IU/L (ref 0–32)
AST SERPL-CCNC: 29 IU/L (ref 0–40)
BILIRUB SERPL-MCNC: 0.7 MG/DL (ref 0–1.2)
BUN SERPL-MCNC: 5 MG/DL (ref 6–24)
BUN/CREAT SERPL: 6 (ref 9–23)
CALCIUM SERPL-MCNC: 9.7 MG/DL (ref 8.7–10.2)
CHLORIDE SERPL-SCNC: 96 MMOL/L (ref 96–106)
CO2 SERPL-SCNC: 22 MMOL/L (ref 20–29)
CREAT SERPL-MCNC: 0.82 MG/DL (ref 0.57–1)
GLOBULIN SER-MCNC: 3.3 G/DL (ref 1.5–4.5)
GLUCOSE SERPL-MCNC: 90 MG/DL (ref 65–99)
POTASSIUM SERPL-SCNC: 5.1 MMOL/L (ref 3.5–5.2)
PROT SERPL-MCNC: 7.6 G/DL (ref 6–8.5)
SL AMB EGFR AFRICAN AMERICAN: 92 ML/MIN/1.73
SL AMB EGFR NON AFRICAN AMERICAN: 80 ML/MIN/1.73
SODIUM SERPL-SCNC: 133 MMOL/L (ref 134–144)

## 2020-12-31 ENCOUNTER — OFFICE VISIT (OUTPATIENT)
Dept: FAMILY MEDICINE CLINIC | Facility: CLINIC | Age: 55
End: 2020-12-31
Payer: COMMERCIAL

## 2020-12-31 VITALS
RESPIRATION RATE: 16 BRPM | SYSTOLIC BLOOD PRESSURE: 128 MMHG | WEIGHT: 171 LBS | HEART RATE: 85 BPM | BODY MASS INDEX: 28.49 KG/M2 | HEIGHT: 65 IN | DIASTOLIC BLOOD PRESSURE: 78 MMHG | TEMPERATURE: 99.1 F | OXYGEN SATURATION: 98 %

## 2020-12-31 DIAGNOSIS — I10 ESSENTIAL HYPERTENSION: Primary | ICD-10-CM

## 2020-12-31 DIAGNOSIS — F10.10 ETOH ABUSE: ICD-10-CM

## 2020-12-31 DIAGNOSIS — R76.8 JO-1 ANTIBODY POSITIVE: ICD-10-CM

## 2020-12-31 DIAGNOSIS — E87.1 HYPONATREMIA: ICD-10-CM

## 2020-12-31 PROCEDURE — 3008F BODY MASS INDEX DOCD: CPT | Performed by: FAMILY MEDICINE

## 2020-12-31 PROCEDURE — 99214 OFFICE O/P EST MOD 30 MIN: CPT | Performed by: FAMILY MEDICINE

## 2020-12-31 RX ORDER — THIAMINE MONONITRATE (VIT B1) 100 MG
100 TABLET ORAL DAILY
Qty: 90 TABLET | Refills: 2 | Status: SHIPPED | OUTPATIENT
Start: 2020-12-31 | End: 2021-10-04 | Stop reason: SDUPTHER

## 2020-12-31 RX ORDER — FOLIC ACID 1 MG/1
1 TABLET ORAL DAILY
Qty: 90 TABLET | Refills: 2 | Status: SHIPPED | OUTPATIENT
Start: 2020-12-31 | End: 2021-10-13 | Stop reason: SDUPTHER

## 2020-12-31 RX ORDER — LOSARTAN POTASSIUM 25 MG/1
25 TABLET ORAL DAILY
COMMUNITY
End: 2021-02-19 | Stop reason: SDUPTHER

## 2020-12-31 RX ORDER — FUROSEMIDE 20 MG/1
20 TABLET ORAL DAILY
Qty: 30 TABLET | Refills: 1 | Status: SHIPPED | OUTPATIENT
Start: 2020-12-31 | End: 2021-02-18 | Stop reason: SDUPTHER

## 2021-01-07 ENCOUNTER — TELEPHONE (OUTPATIENT)
Dept: FAMILY MEDICINE CLINIC | Facility: CLINIC | Age: 56
End: 2021-01-07

## 2021-01-07 LAB
BUN SERPL-MCNC: 8 MG/DL (ref 6–24)
BUN/CREAT SERPL: 9 (ref 9–23)
CALCIUM SERPL-MCNC: 10 MG/DL (ref 8.7–10.2)
CHLORIDE SERPL-SCNC: 100 MMOL/L (ref 96–106)
CO2 SERPL-SCNC: 23 MMOL/L (ref 20–29)
CREAT SERPL-MCNC: 0.93 MG/DL (ref 0.57–1)
GLUCOSE SERPL-MCNC: 83 MG/DL (ref 65–99)
POTASSIUM SERPL-SCNC: 4.6 MMOL/L (ref 3.5–5.2)
SL AMB EGFR AFRICAN AMERICAN: 79 ML/MIN/1.73
SL AMB EGFR NON AFRICAN AMERICAN: 68 ML/MIN/1.73
SODIUM SERPL-SCNC: 138 MMOL/L (ref 134–144)

## 2021-01-07 NOTE — TELEPHONE ENCOUNTER
Dr Amie Schwab,    Patient dropped of her blood pressure log for your review    Placed in your folder

## 2021-01-08 NOTE — TELEPHONE ENCOUNTER
Please advise the patient that I have reviewed her blood pressure  I do not want make any changes given that she has been such a good patient (please tell her thank you for being a good patient)  I want to continue the Lasix  I want her to continue taking her blood pressure is only at nighttime  If she has fair time she can take it in the morning also  However due for 1 week and then have her come in for blood pressure check here at our office

## 2021-01-15 ENCOUNTER — OFFICE VISIT (OUTPATIENT)
Dept: FAMILY MEDICINE CLINIC | Facility: CLINIC | Age: 56
End: 2021-01-15
Payer: COMMERCIAL

## 2021-01-15 VITALS
TEMPERATURE: 98.5 F | OXYGEN SATURATION: 97 % | HEIGHT: 65 IN | WEIGHT: 167.4 LBS | BODY MASS INDEX: 27.89 KG/M2 | DIASTOLIC BLOOD PRESSURE: 86 MMHG | RESPIRATION RATE: 16 BRPM | HEART RATE: 86 BPM | SYSTOLIC BLOOD PRESSURE: 124 MMHG

## 2021-01-15 DIAGNOSIS — I10 ESSENTIAL HYPERTENSION: Primary | ICD-10-CM

## 2021-01-15 DIAGNOSIS — Z12.11 SCREENING FOR COLON CANCER: ICD-10-CM

## 2021-01-15 PROCEDURE — 99213 OFFICE O/P EST LOW 20 MIN: CPT | Performed by: FAMILY MEDICINE

## 2021-01-15 PROCEDURE — 3008F BODY MASS INDEX DOCD: CPT | Performed by: FAMILY MEDICINE

## 2021-01-15 PROCEDURE — 3725F SCREEN DEPRESSION PERFORMED: CPT | Performed by: FAMILY MEDICINE

## 2021-01-15 PROCEDURE — 3079F DIAST BP 80-89 MM HG: CPT | Performed by: FAMILY MEDICINE

## 2021-01-15 PROCEDURE — 1036F TOBACCO NON-USER: CPT | Performed by: FAMILY MEDICINE

## 2021-01-15 PROCEDURE — 3074F SYST BP LT 130 MM HG: CPT | Performed by: FAMILY MEDICINE

## 2021-01-18 ENCOUNTER — TELEPHONE (OUTPATIENT)
Dept: ADMINISTRATIVE | Facility: OTHER | Age: 56
End: 2021-01-18

## 2021-01-18 NOTE — TELEPHONE ENCOUNTER
Upon review of the In Basket request and the patient's chart, initial outreach has been made via fax, please see Contacts section for details       Thank you  Cabrera Tan

## 2021-01-18 NOTE — LETTER
Procedure Request Form: Cervical Cancer Screening      Date Requested: 21  Patient: Matt Cruz  Patient : 1965   Referring Provider: Lake Jimenez, MD        Date of Procedure ______________________________       The above patient has informed us that they have completed their   most recent Cervical Cancer Screening at your facility  Please complete   this form and attach all corresponding procedure reports/results  Comments __________________________________________________________  ____________________________________________________________________  ____________________________________________________________________  ____________________________________________________________________    Facility Completing Procedure _________________________________________    Form Completed By (print name) _______________________________________      Signature __________________________________________________________      These reports are needed for  compliance    Please fax this completed form and a copy of the procedure report to our office located at [place] [address] as soon as possible to [phone] césar Lund: Phone [phone]    We thank you for your assistance in treating our mutual patient

## 2021-01-18 NOTE — LETTER
Procedure Request Form: Cervical Cancer Screening      Date Requested: 21  Patient: Orie Coad  Patient : 1965   Referring Provider: Frank David, MD        Date of Procedure ______________________________       The above patient has informed us that they have completed their   most recent Cervical Cancer Screening at your facility  Please complete   this form and attach all corresponding procedure reports/results  Comments __________________________________________________________  ____________________________________________________________________  ____________________________________________________________________  ____________________________________________________________________    Facility Completing Procedure _________________________________________    Form Completed By (print name) _______________________________________      Signature __________________________________________________________      These reports are needed for  compliance    Please fax this completed form and a copy of the procedure report to our office located at [place] [address] as soon as possible to [phone] attention Marissa Boucher: Phone [phone]    We thank you for your assistance in treating our mutual patient

## 2021-01-18 NOTE — TELEPHONE ENCOUNTER
----- Message from Shravan Boland sent at 1/15/2021  9:13 AM EST -----  01/15/21 9:14 AM    Hello, our patient Miguelina Choi has had pap smear completed/performed  Please assist in updating the patient chart by Dr Imagene Brittle in [address]  The date of service is May 2020      Thank you,  Delmer FAY

## 2021-01-18 NOTE — LETTER
Procedure Request Form: Cervical Cancer Screening      Date Requested: 21  Patient: Trung Sis  Patient : 1965   Referring Provider: Steven Presto, MD        Date of Procedure ______________________________       The above patient has informed us that they have completed their   most recent Cervical Cancer Screening at your facility  Please complete   this form and attach all corresponding procedure reports/results  Comments __________________________________________________________  ____________________________________________________________________  ____________________________________________________________________  ____________________________________________________________________    Facility Completing Procedure _________________________________________    Form Completed By (print name) _______________________________________      Signature __________________________________________________________      These reports are needed for  compliance    Please fax this completed form and a copy of the procedure report to our office located at [place] [address] as soon as possible to [phone] attention Aaron Mo: Phone [phone]    We thank you for your assistance in treating our mutual patient

## 2021-01-21 ENCOUNTER — TELEPHONE (OUTPATIENT)
Dept: PHYSICAL THERAPY | Facility: CLINIC | Age: 56
End: 2021-01-21

## 2021-01-21 NOTE — TELEPHONE ENCOUNTER
Pt left message at 7:30 am cancelling her 8:00 evaluation  Called her back to discuss, as her message indicated she might not need PT  Requested return call

## 2021-01-24 NOTE — TELEPHONE ENCOUNTER
As a follow-up, a second attempt has been made for outreach via fax, please see Contacts section for details      Thank you  Den Esparza

## 2021-02-04 NOTE — TELEPHONE ENCOUNTER
Upon review of the In Basket request we were able to locate, review, and update the patient chart as requested for Pap Smear (HPV) aka Cervical Cancer Screening  Any additional questions or concerns should be emailed to the Practice Liaisons via [email or web address] email, please do not reply via In Basket      Thank you  Lorenzo Ramos

## 2021-02-09 ENCOUNTER — TELEPHONE (OUTPATIENT)
Dept: FAMILY MEDICINE CLINIC | Facility: CLINIC | Age: 56
End: 2021-02-09

## 2021-02-09 NOTE — TELEPHONE ENCOUNTER
----- Message from Surinder Rivera MD sent at 2/9/2021 10:29 AM EST -----  Please advise patient Cologuard is negative repeat in 3 years

## 2021-02-18 DIAGNOSIS — I10 ESSENTIAL HYPERTENSION: ICD-10-CM

## 2021-02-18 DIAGNOSIS — E87.1 HYPONATREMIA: ICD-10-CM

## 2021-02-18 RX ORDER — FUROSEMIDE 20 MG/1
TABLET ORAL
Qty: 30 TABLET | Refills: 1 | Status: SHIPPED | OUTPATIENT
Start: 2021-02-18 | End: 2021-04-28 | Stop reason: SDUPTHER

## 2021-02-19 DIAGNOSIS — I10 ESSENTIAL HYPERTENSION: Primary | ICD-10-CM

## 2021-02-19 RX ORDER — LOSARTAN POTASSIUM 25 MG/1
25 TABLET ORAL DAILY
Status: CANCELLED | OUTPATIENT
Start: 2021-02-19

## 2021-02-19 RX ORDER — LOSARTAN POTASSIUM 50 MG/1
50 TABLET ORAL DAILY
Qty: 90 TABLET | Refills: 1 | Status: SHIPPED | OUTPATIENT
Start: 2021-02-19 | End: 2021-08-25 | Stop reason: SDUPTHER

## 2021-04-28 DIAGNOSIS — I10 ESSENTIAL HYPERTENSION: ICD-10-CM

## 2021-04-28 DIAGNOSIS — E87.1 HYPONATREMIA: ICD-10-CM

## 2021-04-28 RX ORDER — FUROSEMIDE 20 MG/1
20 TABLET ORAL DAILY
Qty: 30 TABLET | Refills: 0 | Status: SHIPPED | OUTPATIENT
Start: 2021-04-28 | End: 2021-06-01 | Stop reason: SDUPTHER

## 2021-05-04 ENCOUNTER — TELEPHONE (OUTPATIENT)
Dept: FAMILY MEDICINE CLINIC | Facility: CLINIC | Age: 56
End: 2021-05-04

## 2021-05-04 DIAGNOSIS — F41.8 DEPRESSION WITH ANXIETY: ICD-10-CM

## 2021-05-04 RX ORDER — DULOXETIN HYDROCHLORIDE 30 MG/1
30 CAPSULE, DELAYED RELEASE ORAL DAILY
Qty: 90 CAPSULE | Refills: 1 | Status: SHIPPED | OUTPATIENT
Start: 2021-05-04 | End: 2021-06-01 | Stop reason: SDUPTHER

## 2021-05-04 NOTE — TELEPHONE ENCOUNTER
Left a voice message just to be sure she is taking Cymbalta not Celexa  I have increased her Cymbalta from 20 mg to 30 mg  I am going to be seeing her in July  If it is not improving would like her to be bumped up to 60 mg at the month adalberto    The please call us and let us know how she feels in 1 month and we will see her in July

## 2021-06-01 DIAGNOSIS — F41.8 DEPRESSION WITH ANXIETY: ICD-10-CM

## 2021-06-01 DIAGNOSIS — E87.1 HYPONATREMIA: ICD-10-CM

## 2021-06-01 DIAGNOSIS — I10 ESSENTIAL HYPERTENSION: ICD-10-CM

## 2021-06-01 RX ORDER — DULOXETIN HYDROCHLORIDE 30 MG/1
30 CAPSULE, DELAYED RELEASE ORAL DAILY
Qty: 90 CAPSULE | Refills: 1 | Status: CANCELLED | OUTPATIENT
Start: 2021-06-01

## 2021-06-01 RX ORDER — DULOXETIN HYDROCHLORIDE 60 MG/1
60 CAPSULE, DELAYED RELEASE ORAL DAILY
Qty: 90 CAPSULE | Refills: 1 | Status: SHIPPED | OUTPATIENT
Start: 2021-06-01 | End: 2021-11-22 | Stop reason: SDUPTHER

## 2021-06-01 RX ORDER — FUROSEMIDE 20 MG/1
20 TABLET ORAL DAILY
Qty: 30 TABLET | Refills: 0 | Status: SHIPPED | OUTPATIENT
Start: 2021-06-01 | End: 2021-06-30 | Stop reason: SDUPTHER

## 2021-06-01 NOTE — TELEPHONE ENCOUNTER
Pt just need lasix refill ,no dose change    She would like a refill of cymbalta , her therapist says she  Is very depressed and is not eating again ,so she may need an increase with the medication tc/cma

## 2021-06-15 ENCOUNTER — TELEPHONE (OUTPATIENT)
Dept: FAMILY MEDICINE CLINIC | Facility: CLINIC | Age: 56
End: 2021-06-15

## 2021-06-15 NOTE — TELEPHONE ENCOUNTER
Patient called back  She is leaving for vacation tonight and will be gone for 2+ weeks  She is scheduled on 7/15 for follow up appointment and does not want to schedule ER follow up

## 2021-06-30 DIAGNOSIS — E87.1 HYPONATREMIA: ICD-10-CM

## 2021-06-30 DIAGNOSIS — I10 ESSENTIAL HYPERTENSION: ICD-10-CM

## 2021-06-30 RX ORDER — FUROSEMIDE 20 MG/1
20 TABLET ORAL DAILY
Qty: 90 TABLET | Refills: 3 | Status: SHIPPED | OUTPATIENT
Start: 2021-06-30 | End: 2021-09-25 | Stop reason: HOSPADM

## 2021-06-30 NOTE — TELEPHONE ENCOUNTER
Dr Ray Hernández    Patient is requesting refill furosemide sent to [facility]  She has appointment scheduled 7/15

## 2021-07-15 ENCOUNTER — RA CDI HCC (OUTPATIENT)
Dept: OTHER | Facility: HOSPITAL | Age: 56
End: 2021-07-15

## 2021-07-15 NOTE — PROGRESS NOTES
[place] [address]  coding opportunities             Chart reviewed, (number of) suggestions sent to provider: 2                  Patients insurance company: [employer] (Medicare and [employer] for [employer] and [employer])           F10 21 Alcohol dependence, in remission    F33 2 Major Depressive disorder, recurrent, severe (PHQ-9 23 as noted on 12/14/2020)    If this is correct, please document and assess at your next visit, July 22    [place] [address]  coding opportunities             Chart Reviewed * (Number of) Inbasket suggestions sent to Provider: 2     Problem listed updated   Provider Accepted, (number of) suggestions accepted: 2               Patients insurance company: [employer] (Medicare and Commercial for [employer] and [employer])     Visit status: Patient canceled the appointment

## 2021-07-21 ENCOUNTER — RA CDI HCC (OUTPATIENT)
Dept: OTHER | Facility: HOSPITAL | Age: 56
End: 2021-07-21

## 2021-07-21 NOTE — PROGRESS NOTES
[employer]  coding opportunities             Chart reviewed, (number of) suggestions sent to provider: 2     Problem listed updated  Provider Accepted, (number of) suggestions accepted: 2            Number of suggestions NOT actually used: 2     Patients insurance company: [employer] (Medicare and Commercial for [employer] and [employer])     Visit status: Patient canceled the appointment        [facility] [employer]  coding opportunities             Chart reviewed, (number of) suggestions sent to provider: 2     Problem listed updated  Provider Accepted, (number of) suggestions accepted: 2               Patients insurance company: [employer] (Medicare and Commercial for [employer] and [employer])           [employer]  coding opportunities             Chart reviewed, (number of) suggestions sent to provider: 2     Problem listed updated  Provider Accepted, (number of) suggestions accepted: 2               Patients insurance company: [employer] (Medicare and Commercial for [employer] and [employer])           [employer]  coding opportunities             Chart reviewed, (number of) suggestions sent to provider: 2     F10 21 Alcohol dependence, in remission     F33 2 Major Depressive disorder, recurrent, severe (PHQ-9 23 as noted on 12/14/2020)    If this is correct, please document and assess at your next visit  7/27/2021               Patients insurance company: [employer] (Medicare and Commercial for [employer] and [employer])

## 2021-08-03 PROBLEM — F33.2 MAJOR DEPRESSIVE DISORDER, RECURRENT SEVERE WITHOUT PSYCHOTIC FEATURES (HCC): Status: ACTIVE | Noted: 2021-08-03

## 2021-08-09 ENCOUNTER — TELEPHONE (OUTPATIENT)
Dept: ADMINISTRATIVE | Facility: OTHER | Age: 56
End: 2021-08-09

## 2021-08-09 ENCOUNTER — OFFICE VISIT (OUTPATIENT)
Dept: FAMILY MEDICINE CLINIC | Facility: CLINIC | Age: 56
End: 2021-08-09
Payer: COMMERCIAL

## 2021-08-09 VITALS
TEMPERATURE: 97.3 F | SYSTOLIC BLOOD PRESSURE: 116 MMHG | DIASTOLIC BLOOD PRESSURE: 82 MMHG | WEIGHT: 180.8 LBS | BODY MASS INDEX: 30.12 KG/M2 | HEART RATE: 98 BPM | HEIGHT: 65 IN

## 2021-08-09 DIAGNOSIS — Z78.0 POST-MENOPAUSE: ICD-10-CM

## 2021-08-09 DIAGNOSIS — I10 ESSENTIAL HYPERTENSION: Primary | ICD-10-CM

## 2021-08-09 DIAGNOSIS — S22.31XS CLOSED FRACTURE OF ONE RIB OF RIGHT SIDE, SEQUELA: ICD-10-CM

## 2021-08-09 DIAGNOSIS — F41.8 DEPRESSION WITH ANXIETY: ICD-10-CM

## 2021-08-09 PROCEDURE — 99214 OFFICE O/P EST MOD 30 MIN: CPT | Performed by: FAMILY MEDICINE

## 2021-08-09 PROCEDURE — 1036F TOBACCO NON-USER: CPT | Performed by: FAMILY MEDICINE

## 2021-08-09 PROCEDURE — 3079F DIAST BP 80-89 MM HG: CPT | Performed by: FAMILY MEDICINE

## 2021-08-09 PROCEDURE — 3008F BODY MASS INDEX DOCD: CPT | Performed by: FAMILY MEDICINE

## 2021-08-09 PROCEDURE — 3074F SYST BP LT 130 MM HG: CPT | Performed by: FAMILY MEDICINE

## 2021-08-09 NOTE — TELEPHONE ENCOUNTER
----- Message from Maryann Perez MD sent at 8/9/2021  9:30 AM EDT -----  Regarding: care gap request  08/09/21 9:30 AM    Hello, our patient attached above has had CRC: Cologuard completed/performed  Please assist in updating the patient chart by pulling the document from the Media Tab  The date of service is  feb 2021         Thank you,  MD LAURIE Arias

## 2021-08-09 NOTE — TELEPHONE ENCOUNTER
Upon review of the In Basket request we were able to locate, review, and update the patient chart as requested for CRC: Cheryl  Any additional questions or concerns should be emailed to the Practice Liaisons via [email or web address] email, please do not reply via In Basket      Thank you  Anisha Dalton

## 2021-08-09 NOTE — PROGRESS NOTES
Tone Zepeda 1965 female MRN: [id number]    [facility] OFFICE VISIT  [facility] - [address]      ASSESSMENT/PLAN  Tone Zepeda is a 60 y o  female presents to the office for     Diagnoses and all orders for this visit:    Essential hypertension  -     Basic metabolic panel; Future    Depression with anxiety    Post-menopause  -     DXA bone density spine hip and pelvis; Future    Closed fracture of one rib of right side, sequela  -     DXA bone density spine hip and pelvis; Future       Hypertension:  - Continue on  Medications as prescribed  - Encourage low NA diet  - Encourage exercise  -  Cheryl in chart  Sent a message to the team to update the patient's chart  - patient has a history of a closed fracture of the right rib would like to do a bone density given this is not the patient's  1st fracture  - depression on Cymbalta but not fully controlled given stress at home  Patient already seeking out therapy in her  is taking on vacation to try to help her  BMI Counseling: Body mass index is 30 09 kg/m²  The BMI is above normal  Nutrition recommendations include decreasing portion sizes, encouraging healthy choices of fruits and vegetables, decreasing fast food intake and consuming healthier snacks  Exercise recommendations include exercising 3-5 times per week  Disposition: Return to the office in 1 week if symptoms worsen  No future appointments  SUBJECTIVE  CC: Follow-up (hypertension)      HPI:  Tone Zepeda is a 60 y o  female who presents for a blood pressure check  Hypertension: Patient currently taking medications as prescribed without any s/e or concerns  Does take BP at home  Currently denies any dizziness, headache, visual changes, weakness, dyspnea, chest pain, palpitations, confusion  patient just recently had a right rib fracture after trying to replace something in her house    Patient's has a history of fractures in the past   Has never had a bone density performed by her OBGYN  Depression with anxiety currently continues to have stressors at home with her son's upcoming wedding  Patient states that she has not been invited formally and is taking a toll on her  Her  going to be taking her on a get a way to try to get her to relax a little bit more  Denies any other complaints or concerns at this time    Review of Systems   Constitutional: Negative for activity change, appetite change, chills, fatigue and fever  HENT: Negative for congestion  Respiratory: Negative for cough, chest tightness and shortness of breath  Cardiovascular: Negative for chest pain and leg swelling  Gastrointestinal: Negative for abdominal distention, abdominal pain, constipation, diarrhea, nausea and vomiting  Psychiatric/Behavioral: The patient is nervous/anxious  All other systems reviewed and are negative        Historical Information   The patient history was reviewed as follows:  Past Medical History:   Diagnosis Date    Abnormal immunology findings 06/09/2011    Anemia 05/23/2011    Arthritis     Closed fracture of distal end of fibula     resolved 09/15/17    Depression     Distal radius fracture, left     last assessed 01/30/17    GERD (gastroesophageal reflux disease)     last assessed 05/06/13    Herpes labialis     last assessed 07/23/15    Hypertension     essential ; last assessed 08/07/13    Sinus bradycardia     last assessed 05/24/16         Medications:     Current Outpatient Medications:     DULoxetine (CYMBALTA) 60 mg delayed release capsule, Take 1 capsule (60 mg total) by mouth daily, Disp: 90 capsule, Rfl: 1    folic acid (FOLVITE) 1 mg tablet, Take 1 tablet (1 mg total) by mouth daily, Disp: 90 tablet, Rfl: 2    furosemide (LASIX) 20 mg tablet, Take 1 tablet (20 mg total) by mouth daily, Disp: 90 tablet, Rfl: 3    ketoconazole (NIZORAL) 2 % cream, Apply topically daily, Disp: 60 g, Rfl: 1    losartan (COZAAR) 50 mg tablet, Take 1 tablet (50 mg total) by mouth daily, Disp: 90 tablet, Rfl: 1    thiamine (VITAMIN B1) 100 mg tablet, Take 1 tablet (100 mg total) by mouth daily, Disp: 90 tablet, Rfl: 2    Allergies   Allergen Reactions    Lisinopril      COUGH       OBJECTIVE  Vitals:   Vitals:    08/09/21 0916   BP: 116/82   BP Location: Left arm   Patient Position: Sitting   Cuff Size: Large   Pulse: 98   Temp: (!) 97 3 °F (36 3 °C)   Weight: 82 kg (180 lb 12 8 oz)   Height: 5' 5" (1 651 m)         Physical Exam  Vitals reviewed  Constitutional:       Appearance: She is well-developed  HENT:      Head: Normocephalic and atraumatic  Eyes:      Conjunctiva/sclera: Conjunctivae normal       Pupils: Pupils are equal, round, and reactive to light  Cardiovascular:      Rate and Rhythm: Normal rate and regular rhythm  Heart sounds: Normal heart sounds  Pulmonary:      Effort: Pulmonary effort is normal  No respiratory distress  Breath sounds: Normal breath sounds  Musculoskeletal:         General: Normal range of motion  Cervical back: Normal range of motion and neck supple  Skin:     General: Skin is warm  Capillary Refill: Capillary refill takes less than 2 seconds  Neurological:      Mental Status: She is alert and oriented to person, place, and time  Psychiatric:         Mood and Affect: Mood normal          Behavior: Behavior normal          Thought Content:  Thought content normal          Judgment: Judgment normal                     Sheyla Rivera MD,   [facility]  8/9/2021

## 2021-08-12 ENCOUNTER — HOSPITAL ENCOUNTER (OUTPATIENT)
Dept: RADIOLOGY | Facility: HOSPITAL | Age: 56
Discharge: HOME | End: 2021-08-12
Attending: FAMILY MEDICINE
Payer: COMMERCIAL

## 2021-08-12 DIAGNOSIS — S22.31XS CLOSED FRACTURE OF ONE RIB OF RIGHT SIDE, SEQUELA: ICD-10-CM

## 2021-08-12 DIAGNOSIS — Z78.0 POST-MENOPAUSE: ICD-10-CM

## 2021-08-12 DIAGNOSIS — Z13.820 SCREENING FOR OSTEOPOROSIS: ICD-10-CM

## 2021-08-12 PROCEDURE — 77080 DXA BONE DENSITY AXIAL: CPT

## 2021-08-25 DIAGNOSIS — I10 ESSENTIAL HYPERTENSION: ICD-10-CM

## 2021-08-25 RX ORDER — LOSARTAN POTASSIUM 50 MG/1
50 TABLET ORAL DAILY
Qty: 90 TABLET | Refills: 1 | Status: SHIPPED | OUTPATIENT
Start: 2021-08-25 | End: 2022-03-01 | Stop reason: SDUPTHER

## 2021-08-25 NOTE — TELEPHONE ENCOUNTER
Spoke to pharmacy and he said losartan (cozaar ) and losartan potasium are the same    In may she picked up losartan 50mg tc/cma

## 2021-08-25 NOTE — TELEPHONE ENCOUNTER
Please clarify with pharmacy to see if this is the same medication like always?  Not sure what she meant by losartan potassium

## 2021-08-25 NOTE — TELEPHONE ENCOUNTER
Dr Leora Martinez    Patient is requesting refill  Says it should be losartan potassium, not just losartan

## 2021-09-24 ENCOUNTER — APPOINTMENT (EMERGENCY)
Dept: RADIOLOGY | Facility: HOSPITAL | Age: 56
End: 2021-09-24
Payer: COMMERCIAL

## 2021-09-24 ENCOUNTER — HOSPITAL ENCOUNTER (OUTPATIENT)
Facility: HOSPITAL | Age: 56
Setting detail: OBSERVATION
Discharge: HOME | End: 2021-09-25
Attending: EMERGENCY MEDICINE | Admitting: INTERNAL MEDICINE
Payer: COMMERCIAL

## 2021-09-24 DIAGNOSIS — I10 HTN (HYPERTENSION): ICD-10-CM

## 2021-09-24 DIAGNOSIS — F41.8 DEPRESSION WITH ANXIETY: ICD-10-CM

## 2021-09-24 DIAGNOSIS — R53.1 GENERALIZED WEAKNESS: ICD-10-CM

## 2021-09-24 DIAGNOSIS — E87.1 HYPONATREMIA: ICD-10-CM

## 2021-09-24 DIAGNOSIS — R06.02 SHORTNESS OF BREATH: Primary | ICD-10-CM

## 2021-09-24 PROBLEM — R06.09 DYSPNEA ON EXERTION: Status: ACTIVE | Noted: 2021-09-24

## 2021-09-24 PROBLEM — E87.6 HYPOKALEMIA: Status: ACTIVE | Noted: 2021-09-24

## 2021-09-24 PROBLEM — R06.00 DYSPNEA ON EXERTION: Status: ACTIVE | Noted: 2021-09-24

## 2021-09-24 LAB
ALBUMIN SERPL BCP-MCNC: 4.1 G/DL (ref 3.5–5)
ALP SERPL-CCNC: 105 U/L (ref 46–116)
ALT SERPL W P-5'-P-CCNC: 45 U/L (ref 12–78)
ANION GAP SERPL CALCULATED.3IONS-SCNC: 11 MMOL/L (ref 4–13)
ANION GAP SERPL CALCULATED.3IONS-SCNC: 9 MMOL/L (ref 4–13)
APTT PPP: 27 SECONDS (ref 23–37)
AST SERPL W P-5'-P-CCNC: 41 U/L (ref 5–45)
BACTERIA UR QL AUTO: NORMAL /HPF
BASOPHILS # BLD AUTO: 0.04 THOUSANDS/ΜL (ref 0–0.1)
BASOPHILS NFR BLD AUTO: 1 % (ref 0–1)
BILIRUB SERPL-MCNC: 1.86 MG/DL (ref 0.2–1)
BILIRUB UR QL STRIP: NEGATIVE
BUN SERPL-MCNC: 13 MG/DL (ref 5–25)
BUN SERPL-MCNC: 18 MG/DL (ref 5–25)
CALCIUM SERPL-MCNC: 8.8 MG/DL (ref 8.3–10.1)
CALCIUM SERPL-MCNC: 9.4 MG/DL (ref 8.3–10.1)
CHLORIDE SERPL-SCNC: 91 MMOL/L (ref 100–108)
CHLORIDE SERPL-SCNC: 96 MMOL/L (ref 100–108)
CLARITY UR: ABNORMAL
CO2 SERPL-SCNC: 23 MMOL/L (ref 21–32)
CO2 SERPL-SCNC: 28 MMOL/L (ref 21–32)
COLOR UR: YELLOW
CREAT SERPL-MCNC: 1.06 MG/DL (ref 0.6–1.3)
CREAT SERPL-MCNC: 1.14 MG/DL (ref 0.6–1.3)
D DIMER PPP FEU-MCNC: 0.58 UG/ML FEU
EOSINOPHIL # BLD AUTO: 0.05 THOUSAND/ΜL (ref 0–0.61)
EOSINOPHIL NFR BLD AUTO: 1 % (ref 0–6)
ERYTHROCYTE [DISTWIDTH] IN BLOOD BY AUTOMATED COUNT: 11.8 % (ref 11.6–15.1)
GFR SERPL CREATININE-BSD FRML MDRD: 53 ML/MIN/1.73SQ M
GFR SERPL CREATININE-BSD FRML MDRD: 58 ML/MIN/1.73SQ M
GLUCOSE SERPL-MCNC: 181 MG/DL (ref 65–140)
GLUCOSE SERPL-MCNC: 93 MG/DL (ref 65–140)
GLUCOSE UR STRIP-MCNC: NEGATIVE MG/DL
HCT VFR BLD AUTO: 41.7 % (ref 34.8–46.1)
HGB BLD-MCNC: 13.9 G/DL (ref 11.5–15.4)
HGB UR QL STRIP.AUTO: ABNORMAL
IMM GRANULOCYTES # BLD AUTO: 0.05 THOUSAND/UL (ref 0–0.2)
IMM GRANULOCYTES NFR BLD AUTO: 1 % (ref 0–2)
INR PPP: 0.97 (ref 0.84–1.19)
KETONES UR STRIP-MCNC: NEGATIVE MG/DL
LEUKOCYTE ESTERASE UR QL STRIP: ABNORMAL
LYMPHOCYTES # BLD AUTO: 2.18 THOUSANDS/ΜL (ref 0.6–4.47)
LYMPHOCYTES NFR BLD AUTO: 25 % (ref 14–44)
MAGNESIUM SERPL-MCNC: 1.7 MG/DL (ref 1.6–2.6)
MCH RBC QN AUTO: 33.4 PG (ref 26.8–34.3)
MCHC RBC AUTO-ENTMCNC: 33.3 G/DL (ref 31.4–37.4)
MCV RBC AUTO: 100 FL (ref 82–98)
MONOCYTES # BLD AUTO: 0.88 THOUSAND/ΜL (ref 0.17–1.22)
MONOCYTES NFR BLD AUTO: 10 % (ref 4–12)
NEUTROPHILS # BLD AUTO: 5.42 THOUSANDS/ΜL (ref 1.85–7.62)
NEUTS SEG NFR BLD AUTO: 62 % (ref 43–75)
NITRITE UR QL STRIP: NEGATIVE
NON-SQ EPI CELLS URNS QL MICRO: NORMAL /HPF
NRBC BLD AUTO-RTO: 0 /100 WBCS
NT-PROBNP SERPL-MCNC: 321 PG/ML
PH UR STRIP.AUTO: 7 [PH]
PLATELET # BLD AUTO: 219 THOUSANDS/UL (ref 149–390)
PMV BLD AUTO: 10.3 FL (ref 8.9–12.7)
POTASSIUM SERPL-SCNC: 3.4 MMOL/L (ref 3.5–5.3)
POTASSIUM SERPL-SCNC: 4 MMOL/L (ref 3.5–5.3)
PROT SERPL-MCNC: 8.7 G/DL (ref 6.4–8.2)
PROT UR STRIP-MCNC: NEGATIVE MG/DL
PROTHROMBIN TIME: 12.8 SECONDS (ref 11.6–14.5)
RBC # BLD AUTO: 4.16 MILLION/UL (ref 3.81–5.12)
RBC #/AREA URNS AUTO: NORMAL /HPF
SARS-COV-2 RNA RESP QL NAA+PROBE: NEGATIVE
SODIUM SERPL-SCNC: 128 MMOL/L (ref 136–145)
SODIUM SERPL-SCNC: 130 MMOL/L (ref 136–145)
SP GR UR STRIP.AUTO: <=1.005 (ref 1–1.03)
TROPONIN I SERPL-MCNC: <0.02 NG/ML
TROPONIN I SERPL-MCNC: <0.02 NG/ML
UROBILINOGEN UR QL STRIP.AUTO: 0.2 E.U./DL
WBC # BLD AUTO: 8.62 THOUSAND/UL (ref 4.31–10.16)
WBC #/AREA URNS AUTO: NORMAL /HPF

## 2021-09-24 PROCEDURE — 71045 X-RAY EXAM CHEST 1 VIEW: CPT

## 2021-09-24 PROCEDURE — 96375 TX/PRO/DX INJ NEW DRUG ADDON: CPT

## 2021-09-24 PROCEDURE — 84484 ASSAY OF TROPONIN QUANT: CPT | Performed by: EMERGENCY MEDICINE

## 2021-09-24 PROCEDURE — 83735 ASSAY OF MAGNESIUM: CPT | Performed by: EMERGENCY MEDICINE

## 2021-09-24 PROCEDURE — 80053 COMPREHEN METABOLIC PANEL: CPT | Performed by: EMERGENCY MEDICINE

## 2021-09-24 PROCEDURE — 83930 ASSAY OF BLOOD OSMOLALITY: CPT | Performed by: NURSE PRACTITIONER

## 2021-09-24 PROCEDURE — 85025 COMPLETE CBC W/AUTO DIFF WBC: CPT | Performed by: EMERGENCY MEDICINE

## 2021-09-24 PROCEDURE — U0003 INFECTIOUS AGENT DETECTION BY NUCLEIC ACID (DNA OR RNA); SEVERE ACUTE RESPIRATORY SYNDROME CORONAVIRUS 2 (SARS-COV-2) (CORONAVIRUS DISEASE [COVID-19]), AMPLIFIED PROBE TECHNIQUE, MAKING USE OF HIGH THROUGHPUT TECHNOLOGIES AS DESCRIBED BY CMS-2020-01-R: HCPCS | Performed by: EMERGENCY MEDICINE

## 2021-09-24 PROCEDURE — 96361 HYDRATE IV INFUSION ADD-ON: CPT

## 2021-09-24 PROCEDURE — 85610 PROTHROMBIN TIME: CPT | Performed by: EMERGENCY MEDICINE

## 2021-09-24 PROCEDURE — 81001 URINALYSIS AUTO W/SCOPE: CPT | Performed by: EMERGENCY MEDICINE

## 2021-09-24 PROCEDURE — U0005 INFEC AGEN DETEC AMPLI PROBE: HCPCS | Performed by: EMERGENCY MEDICINE

## 2021-09-24 PROCEDURE — 99220 PR INITIAL OBSERVATION CARE/DAY 70 MINUTES: CPT | Performed by: INTERNAL MEDICINE

## 2021-09-24 PROCEDURE — 96374 THER/PROPH/DIAG INJ IV PUSH: CPT

## 2021-09-24 PROCEDURE — 84300 ASSAY OF URINE SODIUM: CPT | Performed by: NURSE PRACTITIONER

## 2021-09-24 PROCEDURE — 85379 FIBRIN DEGRADATION QUANT: CPT | Performed by: EMERGENCY MEDICINE

## 2021-09-24 PROCEDURE — 85730 THROMBOPLASTIN TIME PARTIAL: CPT | Performed by: EMERGENCY MEDICINE

## 2021-09-24 PROCEDURE — 99285 EMERGENCY DEPT VISIT HI MDM: CPT

## 2021-09-24 PROCEDURE — 99285 EMERGENCY DEPT VISIT HI MDM: CPT | Performed by: EMERGENCY MEDICINE

## 2021-09-24 PROCEDURE — 84484 ASSAY OF TROPONIN QUANT: CPT | Performed by: INTERNAL MEDICINE

## 2021-09-24 PROCEDURE — 83935 ASSAY OF URINE OSMOLALITY: CPT | Performed by: NURSE PRACTITIONER

## 2021-09-24 PROCEDURE — 80048 BASIC METABOLIC PNL TOTAL CA: CPT | Performed by: INTERNAL MEDICINE

## 2021-09-24 PROCEDURE — 83880 ASSAY OF NATRIURETIC PEPTIDE: CPT | Performed by: EMERGENCY MEDICINE

## 2021-09-24 PROCEDURE — G1004 CDSM NDSC: HCPCS

## 2021-09-24 PROCEDURE — 36415 COLL VENOUS BLD VENIPUNCTURE: CPT | Performed by: EMERGENCY MEDICINE

## 2021-09-24 PROCEDURE — 71275 CT ANGIOGRAPHY CHEST: CPT

## 2021-09-24 RX ORDER — ACETAMINOPHEN 325 MG/1
650 TABLET ORAL EVERY 6 HOURS PRN
Status: DISCONTINUED | OUTPATIENT
Start: 2021-09-24 | End: 2021-09-25 | Stop reason: HOSPADM

## 2021-09-24 RX ORDER — IPRATROPIUM BROMIDE AND ALBUTEROL SULFATE 2.5; .5 MG/3ML; MG/3ML
3 SOLUTION RESPIRATORY (INHALATION) EVERY 6 HOURS PRN
Status: DISCONTINUED | OUTPATIENT
Start: 2021-09-24 | End: 2021-09-25 | Stop reason: HOSPADM

## 2021-09-24 RX ORDER — LORAZEPAM 2 MG/ML
0.5 INJECTION INTRAMUSCULAR ONCE
Status: COMPLETED | OUTPATIENT
Start: 2021-09-24 | End: 2021-09-24

## 2021-09-24 RX ORDER — LOSARTAN POTASSIUM 50 MG/1
50 TABLET ORAL DAILY
Status: DISCONTINUED | OUTPATIENT
Start: 2021-09-25 | End: 2021-09-25 | Stop reason: HOSPADM

## 2021-09-24 RX ORDER — SODIUM CHLORIDE 9 MG/ML
50 INJECTION, SOLUTION INTRAVENOUS CONTINUOUS
Status: DISCONTINUED | OUTPATIENT
Start: 2021-09-24 | End: 2021-09-25 | Stop reason: HOSPADM

## 2021-09-24 RX ORDER — ALPRAZOLAM 0.25 MG/1
0.25 TABLET ORAL 2 TIMES DAILY PRN
Status: DISCONTINUED | OUTPATIENT
Start: 2021-09-24 | End: 2021-09-25 | Stop reason: HOSPADM

## 2021-09-24 RX ORDER — METHYLPREDNISOLONE SODIUM SUCCINATE 125 MG/2ML
60 INJECTION, POWDER, LYOPHILIZED, FOR SOLUTION INTRAMUSCULAR; INTRAVENOUS ONCE
Status: COMPLETED | OUTPATIENT
Start: 2021-09-24 | End: 2021-09-24

## 2021-09-24 RX ORDER — KETOROLAC TROMETHAMINE 30 MG/ML
15 INJECTION, SOLUTION INTRAMUSCULAR; INTRAVENOUS ONCE
Status: COMPLETED | OUTPATIENT
Start: 2021-09-24 | End: 2021-09-24

## 2021-09-24 RX ORDER — LABETALOL 20 MG/4 ML (5 MG/ML) INTRAVENOUS SYRINGE
10 ONCE
Status: COMPLETED | OUTPATIENT
Start: 2021-09-24 | End: 2021-09-24

## 2021-09-24 RX ORDER — ONDANSETRON 2 MG/ML
4 INJECTION INTRAMUSCULAR; INTRAVENOUS EVERY 6 HOURS PRN
Status: DISCONTINUED | OUTPATIENT
Start: 2021-09-24 | End: 2021-09-25 | Stop reason: HOSPADM

## 2021-09-24 RX ADMIN — METHYLPREDNISOLONE SODIUM SUCCINATE 60 MG: 125 INJECTION, POWDER, FOR SOLUTION INTRAMUSCULAR; INTRAVENOUS at 12:45

## 2021-09-24 RX ADMIN — LABETALOL 20 MG/4 ML (5 MG/ML) INTRAVENOUS SYRINGE 10 MG: at 16:15

## 2021-09-24 RX ADMIN — KETOROLAC TROMETHAMINE 15 MG: 30 INJECTION, SOLUTION INTRAMUSCULAR at 12:45

## 2021-09-24 RX ADMIN — ALBUTEROL SULFATE 5 MG: 2.5 SOLUTION RESPIRATORY (INHALATION) at 12:45

## 2021-09-24 RX ADMIN — FAMOTIDINE 40 MG: 10 INJECTION, SOLUTION INTRAVENOUS at 12:45

## 2021-09-24 RX ADMIN — IOHEXOL 85 ML: 350 INJECTION, SOLUTION INTRAVENOUS at 14:21

## 2021-09-24 RX ADMIN — SODIUM CHLORIDE 50 ML/HR: 0.9 INJECTION, SOLUTION INTRAVENOUS at 19:14

## 2021-09-24 RX ADMIN — LORAZEPAM 0.5 MG: 2 INJECTION INTRAMUSCULAR; INTRAVENOUS at 16:24

## 2021-09-24 RX ADMIN — ALPRAZOLAM 0.25 MG: 0.25 TABLET ORAL at 21:24

## 2021-09-24 RX ADMIN — IPRATROPIUM BROMIDE 0.5 MG: 0.5 SOLUTION RESPIRATORY (INHALATION) at 12:45

## 2021-09-24 RX ADMIN — SODIUM CHLORIDE 1000 ML: 0.9 INJECTION, SOLUTION INTRAVENOUS at 12:44

## 2021-09-24 NOTE — ASSESSMENT & PLAN NOTE
POA, as evidenced by Na 128 and Cl 91  Likely hypovolemia hyponatremia   Patient received NS 1 L bolus in ED  · Continue gentle IVF  · Hold Lasix  · Check urine osmo, urine na, and serum osmo  · Monitor Na in am

## 2021-09-24 NOTE — H&P
Stevie 43  H&P- Vini Gay 1965, 60 y o  female MRN: [id number]  Unit/Bed#: ED 03 Encounter: [id number]  Primary Care Provider: Jb Godfrey MD   Date and time admitted to hospital: 9/24/2021 12:16 PM    * Dyspnea on exertion  Assessment & Plan  Presents with 3 days of increasing shortness of breath, dyspnea on exertion, and weakness   CXR, no acute cardiopulmonary disease  CTA chest, no PE  Oxygen saturation % on room air  · Likely due to anxiety and fatigue  · Continue Cymbalta and trial low-dose Xanax PRN  · Gentle IVF  · Encourage incentive spirometry   · Bronchodilators as needed   · No indication for oxygen at this time  · Monitor     Hyponatremia  Assessment & Plan  POA, as evidenced by Na 128 and Cl 91  Likely hypovolemia hyponatremia   Patient received NS 1 L bolus in ED  · Continue gentle IVF  · Hold Lasix  · Check urine osmo, urine na, and serum osmo  · Monitor Na in am     Hypokalemia  Assessment & Plan  · K+ 3 4  · Replete and monitor in am     Serum total bilirubin elevated  Assessment & Plan  POA, T Bili 1 86  · Likely nonspecific in nature  Abdominal tenderness noted on exam  · Check LFTs in am     Essential hypertension  Assessment & Plan  Blood pressure 183/109 on admission, most recent BP improved to 133/80  · Hold Lasix due to hyponatremia  · Continue home medication, Cozaar   · Monitor BP    Depression with anxiety  Assessment & Plan  · Continue Cymbalta     VTE Prophylaxis: Enoxaparin (Lovenox)  / reason for no mechanical VTE prophylaxis ambulatory   Code Status: full code   POLST: POLST form is not discussed and not completed at this time  Discussion with family: None at bedside     Anticipated Length of Stay:  Patient will be admitted on an Observation basis with an anticipated length of stay of  Less than 2 midnights     Justification for Hospital Stay: dyspnea on exertion, hyponatremia    Total Time for Visit, including Counseling / Coordination of Care: 45 minutes  Greater than 50% of this total time spent on direct patient counseling and coordination of care  Chief Complaint:   dyspnea on exertion    History of Present Illness:    Saurabh Figueroa is a 60 y o  female with a PMH including HTN, anxiety, former alcohol abuse who presents with dyspnea on exertion, fatigue, and anxiety  Patient states she has had a very stressful week  She has no appetite and is not eating or drinking  She reports shortness of breath with minimal exertion  She states she has to sit down and take a break while doing simple house chores like making her bed  She denies chest pain, vomiting, or diarrhea  She is compliant with her medications including lasix  She denies tobacco abuse  She denies alcohol abuse  Endorses social alcohol intake on the weekends  Workup in the ED revealed tachycardia, hypertension, hyponatremia, and hypokalemia  Patient was given Solumedrol, bronchodilator, and ativan with improvement in symptoms  Review of Systems:    Review of Systems   Constitutional: Positive for activity change and appetite change  Negative for chills and fever  HENT: Negative for congestion and sore throat  Eyes: Negative for visual disturbance  Respiratory: Positive for shortness of breath  Negative for cough  Cardiovascular: Negative for chest pain, palpitations and leg swelling  Gastrointestinal: Negative for abdominal pain, constipation, diarrhea, nausea and vomiting  Genitourinary: Negative for dysuria and hematuria  Musculoskeletal: Negative for arthralgias and back pain  Skin: Negative for wound  Neurological: Negative for dizziness, seizures, syncope, light-headedness and numbness  Psychiatric/Behavioral: The patient is nervous/anxious  All other systems reviewed and are negative        Past Medical and Surgical History:     Past Medical History:   Diagnosis Date    Abnormal immunology findings 06/09/2011    Anemia 05/23/2011    Arthritis     Closed fracture of distal end of fibula     resolved 09/15/17    Depression     Distal radius fracture, left     last assessed 01/30/17    GERD (gastroesophageal reflux disease)     last assessed 05/06/13    Herpes labialis     last assessed 07/23/15    Hypertension     essential ; last assessed 08/07/13    Sinus bradycardia     last assessed 05/24/16       Past Surgical History:   Procedure Laterality Date    ANKLE SURGERY      last assessed 09/15/17    BACK SURGERY      lumbar laminectomy L4-L5    BLADDER SUSPENSION      7/2017    COLONOSCOPY      JOINT REPLACEMENT      TKR  on right    ORIF TIBIA & FIBULA FRACTURES Right 12/15/2016    Procedure: SURGICAL FIXATION OF RIGHT DISTAL FIBULA FRACTURE;  Surgeon: Gallo Duran MD;  Location: [facility] MAIN OR;  Service:     AR REVISE KNEE JOINT REPLACE,ALL PARTS Right 10/23/2017    Procedure: REVISION TOTAL KNEE REPLACEMENT WITH FROZEN SECTIONS;  Surgeon: Raciel Perez DO;  Location: [address] [place] [address];  Service: Orthopedics    TOTAL KNEE ARTHROPLASTY      last assessed; 11/21/17    WISDOM TOOTH EXTRACTION         Meds/Allergies:    Prior to Admission medications    Medication Sig Start Date End Date Taking?  Authorizing Provider   furosemide (LASIX) 20 mg tablet Take 1 tablet (20 mg total) by mouth daily 6/30/21  Yes Juan Carlos West MD   losartan (COZAAR) 50 mg tablet Take 1 tablet (50 mg total) by mouth daily 8/25/21  Yes Juan Carlos West MD   DULoxetine (CYMBALTA) 60 mg delayed release capsule Take 1 capsule (60 mg total) by mouth daily  Patient taking differently: Take 90 mg by mouth daily  6/1/21   Juan Carlos West MD   folic acid (FOLVITE) 1 mg tablet Take 1 tablet (1 mg total) by mouth daily 12/31/20   Juan Carlos West MD   ketoconazole (NIZORAL) 2 % cream Apply topically daily  Patient not taking: Reported on 9/24/2021 12/14/20   Juan Carlos West MD   thiamine (VITAMIN B1) 100 mg tablet Take 1 tablet (100 mg total) by mouth daily 12/31/20 Rashida Peral MD   furosemide (LASIX) 20 mg tablet Take 1 tablet (20 mg total) by mouth daily 12/31/20   Rashida Pearl MD     I have reviewed home medications with patient personally  Allergies: Allergies   Allergen Reactions    Lisinopril      COUGH       Social History:     Marital Status: /Civil Union   Occupation: retired    Patient Pre-hospital Living Situation: home  Patient Pre-hospital Level of Mobility: independent   Patient Pre-hospital Diet Restrictions: none  Substance Use History:   Social History     Substance and Sexual Activity   Alcohol Use Yes    Comment: moderately     Social History     Tobacco Use   Smoking Status Never Smoker   Smokeless Tobacco Never Used     Social History     Substance and Sexual Activity   Drug Use No       Family History:    Family History   Problem Relation Age of Onset    Arthritis Mother     Osteoporosis Mother     Dementia Father     Other Father         spinal stenosis    Other Family         CREST       Physical Exam:     Vitals:   Blood Pressure: 133/80 (09/24/21 1730)  Pulse: 90 (09/24/21 1730)  Temperature: 97 5 °F (36 4 °C) (09/24/21 1211)  Temp Source: Tympanic (09/24/21 1211)  Respirations: 18 (09/24/21 1730)  Weight - Scale: 81 2 kg (179 lb) (09/24/21 1211)  SpO2: 97 % (09/24/21 1730)    Physical Exam  Vitals and nursing note reviewed  Constitutional:       General: She is not in acute distress  Appearance: She is well-developed  She is not ill-appearing, toxic-appearing or diaphoretic  Comments: Pleasant, talkative female resting in bed on room air   Speaking full sentences without difficulty    HENT:      Head: Normocephalic and atraumatic  Eyes:      Conjunctiva/sclera: Conjunctivae normal    Cardiovascular:      Rate and Rhythm: Normal rate and regular rhythm  Pulmonary:      Effort: Pulmonary effort is normal  No respiratory distress  Breath sounds: Normal breath sounds  No wheezing or rales     Abdominal: General: Bowel sounds are normal  There is no distension  Palpations: Abdomen is soft  Tenderness: There is no abdominal tenderness  Musculoskeletal:         General: Normal range of motion  Cervical back: Normal range of motion  Right lower leg: No edema  Left lower leg: No edema  Skin:     General: Skin is warm and dry  Neurological:      Mental Status: She is alert and oriented to person, place, and time  Mental status is at baseline  Psychiatric:         Mood and Affect: Mood is anxious  Speech: Speech normal          Behavior: Behavior normal          Thought Content: Thought content normal          Cognition and Memory: Cognition normal          Judgment: Judgment normal              Additional Data:     Lab Results: I have personally reviewed pertinent reports  Results from last 7 days   Lab Units 09/24/21  1244   WBC Thousand/uL 8 62   HEMOGLOBIN g/dL 13 9   HEMATOCRIT % 41 7   PLATELETS Thousands/uL 219   NEUTROS PCT % 62   LYMPHS PCT % 25   MONOS PCT % 10   EOS PCT % 1     Results from last 7 days   Lab Units 09/24/21  1244   SODIUM mmol/L 128*   POTASSIUM mmol/L 3 4*   CHLORIDE mmol/L 91*   CO2 mmol/L 28   BUN mg/dL 13   CREATININE mg/dL 1 06   ANION GAP mmol/L 9   CALCIUM mg/dL 9 4   ALBUMIN g/dL 4 1   TOTAL BILIRUBIN mg/dL 1 86*   ALK PHOS U/L 105   ALT U/L 45   AST U/L 41   GLUCOSE RANDOM mg/dL 93     Results from last 7 days   Lab Units 09/24/21  1323   INR  0 97                   Imaging: I have personally reviewed pertinent reports  CTA ED chest PE Study   Final Result by Gaye Ozuna MD (09/24 1804)      No pulmonary embolism  Workstation performed: [id number]         XR chest 1 view portable   Final Result by Danielle Ramos MD (09/24 4446)   No acute cardiopulmonary disease        Findings are stable            Workstation performed: [id number]             EKG, Pathology, and Other Studies Reviewed on Admission: · EKG: NSR    Allscripts / Epic Records Reviewed: Yes     ** Please Note: This note has been constructed using a voice recognition system   **

## 2021-09-24 NOTE — ASSESSMENT & PLAN NOTE
Blood pressure 183/109 on admission, most recent BP improved to 133/80  · Hold Lasix due to hyponatremia  · Continue home medication, Cozaar   · Monitor BP

## 2021-09-24 NOTE — ASSESSMENT & PLAN NOTE
Presents with 3 days of increasing shortness of breath, dyspnea on exertion, and weakness   CXR, no acute cardiopulmonary disease  CTA chest, no PE  Oxygen saturation % on room air  · Likely due to anxiety and fatigue  · Continue Cymbalta and trial low-dose Xanax PRN  · Gentle IVF  · Encourage incentive spirometry   · Bronchodilators as needed   · No indication for oxygen at this time  · Monitor

## 2021-09-24 NOTE — ASSESSMENT & PLAN NOTE
POA, T Bili 1 86  · Likely nonspecific in nature    Abdominal tenderness noted on exam  · Check LFTs in am

## 2021-09-25 VITALS
SYSTOLIC BLOOD PRESSURE: 151 MMHG | RESPIRATION RATE: 20 BRPM | TEMPERATURE: 98.4 F | HEIGHT: 65 IN | DIASTOLIC BLOOD PRESSURE: 90 MMHG | OXYGEN SATURATION: 98 % | HEART RATE: 68 BPM | BODY MASS INDEX: 30.56 KG/M2 | WEIGHT: 183.42 LBS

## 2021-09-25 PROBLEM — D72.829 LEUKOCYTOSIS: Status: ACTIVE | Noted: 2021-09-25

## 2021-09-25 PROBLEM — E87.6 HYPOKALEMIA: Status: RESOLVED | Noted: 2021-09-24 | Resolved: 2021-09-25

## 2021-09-25 LAB
ALBUMIN SERPL BCP-MCNC: 3.5 G/DL (ref 3.5–5)
ALP SERPL-CCNC: 88 U/L (ref 46–116)
ALT SERPL W P-5'-P-CCNC: 31 U/L (ref 12–78)
ANION GAP SERPL CALCULATED.3IONS-SCNC: 12 MMOL/L (ref 4–13)
AST SERPL W P-5'-P-CCNC: 33 U/L (ref 5–45)
BASOPHILS # BLD AUTO: 0.02 THOUSANDS/ΜL (ref 0–0.1)
BASOPHILS NFR BLD AUTO: 0 % (ref 0–1)
BILIRUB SERPL-MCNC: 1.15 MG/DL (ref 0.2–1)
BUN SERPL-MCNC: 13 MG/DL (ref 5–25)
CALCIUM SERPL-MCNC: 8.7 MG/DL (ref 8.3–10.1)
CHLORIDE SERPL-SCNC: 98 MMOL/L (ref 100–108)
CO2 SERPL-SCNC: 25 MMOL/L (ref 21–32)
CORTIS SERPL-MCNC: 1.3 UG/DL
CREAT SERPL-MCNC: 0.75 MG/DL (ref 0.6–1.3)
EOSINOPHIL # BLD AUTO: 0.04 THOUSAND/ΜL (ref 0–0.61)
EOSINOPHIL NFR BLD AUTO: 0 % (ref 0–6)
ERYTHROCYTE [DISTWIDTH] IN BLOOD BY AUTOMATED COUNT: 11.9 % (ref 11.6–15.1)
GFR SERPL CREATININE-BSD FRML MDRD: 88 ML/MIN/1.73SQ M
GLUCOSE SERPL-MCNC: 90 MG/DL (ref 65–140)
HCT VFR BLD AUTO: 34.7 % (ref 34.8–46.1)
HGB BLD-MCNC: 11.9 G/DL (ref 11.5–15.4)
IMM GRANULOCYTES # BLD AUTO: 0.05 THOUSAND/UL (ref 0–0.2)
IMM GRANULOCYTES NFR BLD AUTO: 0 % (ref 0–2)
LYMPHOCYTES # BLD AUTO: 2.34 THOUSANDS/ΜL (ref 0.6–4.47)
LYMPHOCYTES NFR BLD AUTO: 20 % (ref 14–44)
MAGNESIUM SERPL-MCNC: 1.9 MG/DL (ref 1.6–2.6)
MCH RBC QN AUTO: 34 PG (ref 26.8–34.3)
MCHC RBC AUTO-ENTMCNC: 34.3 G/DL (ref 31.4–37.4)
MCV RBC AUTO: 99 FL (ref 82–98)
MONOCYTES # BLD AUTO: 1.23 THOUSAND/ΜL (ref 0.17–1.22)
MONOCYTES NFR BLD AUTO: 11 % (ref 4–12)
NEUTROPHILS # BLD AUTO: 7.99 THOUSANDS/ΜL (ref 1.85–7.62)
NEUTS SEG NFR BLD AUTO: 69 % (ref 43–75)
NRBC BLD AUTO-RTO: 0 /100 WBCS
OSMOLALITY UR/SERPL-RTO: 274 MMOL/KG (ref 282–298)
OSMOLALITY UR: 492 MMOL/KG
PHOSPHATE SERPL-MCNC: 4.2 MG/DL (ref 2.7–4.5)
PLATELET # BLD AUTO: 203 THOUSANDS/UL (ref 149–390)
PMV BLD AUTO: 11.7 FL (ref 8.9–12.7)
POTASSIUM SERPL-SCNC: 4 MMOL/L (ref 3.5–5.3)
PROT SERPL-MCNC: 7.7 G/DL (ref 6.4–8.2)
RBC # BLD AUTO: 3.5 MILLION/UL (ref 3.81–5.12)
SODIUM 24H UR-SCNC: 31 MOL/L
SODIUM SERPL-SCNC: 135 MMOL/L (ref 136–145)
TSH SERPL DL<=0.05 MIU/L-ACNC: 0.75 UIU/ML (ref 0.36–3.74)
URATE SERPL-MCNC: 4.2 MG/DL (ref 2–6.8)
WBC # BLD AUTO: 11.67 THOUSAND/UL (ref 4.31–10.16)

## 2021-09-25 PROCEDURE — 85025 COMPLETE CBC W/AUTO DIFF WBC: CPT | Performed by: NURSE PRACTITIONER

## 2021-09-25 PROCEDURE — 84100 ASSAY OF PHOSPHORUS: CPT | Performed by: NURSE PRACTITIONER

## 2021-09-25 PROCEDURE — 99217 PR OBSERVATION CARE DISCHARGE MANAGEMENT: CPT | Performed by: NURSE PRACTITIONER

## 2021-09-25 PROCEDURE — 80053 COMPREHEN METABOLIC PANEL: CPT | Performed by: NURSE PRACTITIONER

## 2021-09-25 PROCEDURE — 83735 ASSAY OF MAGNESIUM: CPT | Performed by: NURSE PRACTITIONER

## 2021-09-25 PROCEDURE — 84443 ASSAY THYROID STIM HORMONE: CPT | Performed by: NURSE PRACTITIONER

## 2021-09-25 PROCEDURE — 82533 TOTAL CORTISOL: CPT | Performed by: INTERNAL MEDICINE

## 2021-09-25 PROCEDURE — 84550 ASSAY OF BLOOD/URIC ACID: CPT | Performed by: INTERNAL MEDICINE

## 2021-09-25 RX ORDER — ALPRAZOLAM 0.25 MG/1
0.25 TABLET ORAL 2 TIMES DAILY PRN
Qty: 10 TABLET | Refills: 0 | Status: SHIPPED | OUTPATIENT
Start: 2021-09-25 | End: 2022-01-20

## 2021-09-25 RX ADMIN — DULOXETINE HYDROCHLORIDE 90 MG: 60 CAPSULE, DELAYED RELEASE ORAL at 08:18

## 2021-09-25 RX ADMIN — LOSARTAN POTASSIUM 50 MG: 50 TABLET, FILM COATED ORAL at 08:18

## 2021-09-25 RX ADMIN — ENOXAPARIN SODIUM 40 MG: 40 INJECTION SUBCUTANEOUS at 08:18

## 2021-09-25 NOTE — ED PROVIDER NOTES
History  Chief Complaint   Patient presents with    Shortness of Breath     started with shortness of breath and diaphoresis no energy 3 days ago, was vomiting     61-year-old female with past history of depression, hyponatremia, arthritis, hypertension, GERD, presents to the ED for evaluation of shortness of breath, dyspnea on exertion, orthopnea over the past 3 days  Patient states that she is worried about dehydration and low sodium levels  Patient states that the last time she filled this with her sodium level was low  Patient also states that she has been very depressed lately because of her son and not eating or drinking properly  She denies any history of CHF, asthma, COPD  Patient is not a smoker  History provided by:  Patient  Shortness of Breath  Associated symptoms: no abdominal pain, no chest pain, no cough, no ear pain, no fever, no headaches, no rash and no wheezing        Prior to Admission Medications   Prescriptions Last Dose Informant Patient Reported? Taking?    DULoxetine (CYMBALTA) 60 mg delayed release capsule   No No   Sig: Take 1 capsule (60 mg total) by mouth daily   Patient taking differently: Take 90 mg by mouth daily    folic acid (FOLVITE) 1 mg tablet   No No   Sig: Take 1 tablet (1 mg total) by mouth daily   furosemide (LASIX) 20 mg tablet 9/24/2021 at Unknown time  No Yes   Sig: Take 1 tablet (20 mg total) by mouth daily   ketoconazole (NIZORAL) 2 % cream Not Taking at Unknown time  No No   Sig: Apply topically daily   Patient not taking: Reported on 9/24/2021   losartan (COZAAR) 50 mg tablet 9/24/2021 at Unknown time  No Yes   Sig: Take 1 tablet (50 mg total) by mouth daily   thiamine (VITAMIN B1) 100 mg tablet   No No   Sig: Take 1 tablet (100 mg total) by mouth daily      Facility-Administered Medications: None       Past Medical History:   Diagnosis Date    Abnormal immunology findings 06/09/2011    Anemia 05/23/2011    Arthritis     Closed fracture of distal end of fibula     resolved 09/15/17    Depression     Distal radius fracture, left     last assessed 01/30/17    GERD (gastroesophageal reflux disease)     last assessed 05/06/13    Herpes labialis     last assessed 07/23/15    Hypertension     essential ; last assessed 08/07/13    Sinus bradycardia     last assessed 05/24/16       Past Surgical History:   Procedure Laterality Date    ANKLE SURGERY      last assessed 09/15/17    BACK SURGERY      lumbar laminectomy L4-L5    BLADDER SUSPENSION      7/2017    COLONOSCOPY      JOINT REPLACEMENT      TKR  on right    ORIF TIBIA & FIBULA FRACTURES Right 12/15/2016    Procedure: SURGICAL FIXATION OF RIGHT DISTAL FIBULA FRACTURE;  Surgeon: Reed Torres MD;  Location: [facility] MAIN OR;  Service:     AR REVISE KNEE JOINT REPLACE,ALL PARTS Right 10/23/2017    Procedure: REVISION TOTAL KNEE REPLACEMENT WITH FROZEN SECTIONS;  Surgeon: Roxane Espinoza DO;  Location: [address] [place] [address];  Service: Orthopedics    TOTAL KNEE ARTHROPLASTY      last assessed; 11/21/17    WISDOM TOOTH EXTRACTION         Family History   Problem Relation Age of Onset    Arthritis Mother     Osteoporosis Mother     Dementia Father     Other Father         spinal stenosis    Other Family         CREST     I have reviewed and agree with the history as documented  E-Cigarette/Vaping    E-Cigarette Use Never User      E-Cigarette/Vaping Substances    Nicotine No     THC No     CBD No      Social History     Tobacco Use    Smoking status: Never Smoker    Smokeless tobacco: Never Used   Vaping Use    Vaping Use: Never used   Substance Use Topics    Alcohol use: Yes     Comment: moderately    Drug use: No       Review of Systems   Constitutional: Negative for activity change, appetite change, chills and fever  HENT: Negative for congestion and ear pain  Eyes: Negative for pain and discharge  Respiratory: Positive for shortness of breath   Negative for cough, chest tightness, wheezing and stridor  Cardiovascular: Negative for chest pain and palpitations  Gastrointestinal: Negative for abdominal distention, abdominal pain, constipation, diarrhea and nausea  Endocrine: Negative for cold intolerance  Genitourinary: Negative for dysuria, frequency and urgency  Musculoskeletal: Negative for arthralgias and back pain  Skin: Negative for color change and rash  Allergic/Immunologic: Negative for environmental allergies and food allergies  Neurological: Negative for dizziness, weakness, numbness and headaches  Hematological: Negative for adenopathy  Psychiatric/Behavioral: Negative for agitation, behavioral problems and confusion  The patient is not nervous/anxious  All other systems reviewed and are negative  Physical Exam  Physical Exam  Vitals and nursing note reviewed  Constitutional:       Appearance: She is well-developed  HENT:      Head: Normocephalic and atraumatic  Eyes:      Conjunctiva/sclera: Conjunctivae normal    Cardiovascular:      Rate and Rhythm: Normal rate and regular rhythm  Heart sounds: Normal heart sounds  Pulmonary:      Effort: Pulmonary effort is normal       Breath sounds: Normal breath sounds  Comments: Lungs are clear to auscultation bilateral   Abdominal:      General: Bowel sounds are normal  There is no distension  Palpations: Abdomen is soft  Tenderness: There is no abdominal tenderness  Musculoskeletal:         General: Normal range of motion  Cervical back: Normal range of motion and neck supple  Comments: Pulses intact bilateral lower extremities  No pedal edema noted bilateral    Skin:     General: Skin is warm and dry  Neurological:      Mental Status: She is alert and oriented to person, place, and time  Psychiatric:         Behavior: Behavior normal          Thought Content:  Thought content normal          Judgment: Judgment normal          Vital Signs  ED Triage Vitals [09/24/21 1211] Temperature Pulse Respirations Blood Pressure SpO2   97 5 °F (36 4 °C) (!) 108 (!) 24 (!) 177/104 100 %      Temp Source Heart Rate Source Patient Position - Orthostatic VS BP Location FiO2 (%)   Tympanic Monitor Sitting Right arm --      Pain Score       No Pain           Vitals:    09/24/21 1730 09/24/21 2000 09/24/21 2018 09/24/21 2100   BP: 133/80 150/71 (!) 166/103    Pulse: 90 85 94 95   Patient Position - Orthostatic VS:  Sitting           Visual Acuity      ED Medications  Medications   DULoxetine (CYMBALTA) delayed release capsule 90 mg (has no administration in time range)   losartan (COZAAR) tablet 50 mg (has no administration in time range)   sodium chloride 0 9 % infusion (50 mL/hr Intravenous New Bag 9/24/21 1914)   acetaminophen (TYLENOL) tablet 650 mg (has no administration in time range)   ondansetron (ZOFRAN) injection 4 mg (has no administration in time range)   enoxaparin (LOVENOX) subcutaneous injection 40 mg (has no administration in time range)   ALPRAZolam (XANAX) tablet 0 25 mg (0 25 mg Oral Given 9/24/21 2124)   ipratropium-albuterol (DUO-NEB) 0 5-2 5 mg/3 mL inhalation solution 3 mL (has no administration in time range)   ketorolac (TORADOL) injection 15 mg (15 mg Intravenous Given 9/24/21 1245)   sodium chloride 0 9 % bolus 1,000 mL (0 mL Intravenous Stopped 9/24/21 1621)   famotidine (PEPCID) injection 40 mg (40 mg Intravenous Given 9/24/21 1245)   ipratropium (ATROVENT) 0 02 % inhalation solution 0 5 mg (0 5 mg Nebulization Given 9/24/21 1245)   albuterol inhalation solution 5 mg (5 mg Nebulization Given 9/24/21 1245)   methylPREDNISolone sodium succinate (Solu-MEDROL) injection 60 mg (60 mg Intravenous Given 9/24/21 1245)   iohexol (OMNIPAQUE) 350 MG/ML injection (SINGLE-DOSE) 85 mL (85 mL Intravenous Given 9/24/21 1421)   Labetalol HCl (NORMODYNE) injection 10 mg (10 mg Intravenous Given 9/24/21 1615)   LORazepam (ATIVAN) injection 0 5 mg (0 5 mg Intravenous Given 9/24/21 1624) Diagnostic Studies  Results Reviewed     Procedure Component Value Units Date/Time    Basic metabolic panel [[id number]]  (Abnormal) Collected: 09/24/21 2224    Lab Status: Final result Specimen: Blood from Arm, Left Updated: 09/24/21 2246     Sodium 130 mmol/L      Potassium 4 0 mmol/L      Chloride 96 mmol/L      CO2 23 mmol/L      ANION GAP 11 mmol/L      BUN 18 mg/dL      Creatinine 1 14 mg/dL      Glucose 181 mg/dL      Calcium 8 8 mg/dL      eGFR 53 ml/min/1 73sq m     Narrative:      [place] guidelines for Chronic Kidney Disease (CKD):     Stage 1 with normal or high GFR (GFR > 90 mL/min/1 73 square meters)    Stage 2 Mild CKD (GFR = 60-89 mL/min/1 73 square meters)    Stage 3A Moderate CKD (GFR = 45-59 mL/min/1 73 square meters)    Stage 3B Moderate CKD (GFR = 30-44 mL/min/1 73 square meters)    Stage 4 Severe CKD (GFR = 15-29 mL/min/1 73 square meters)    Stage 5 End Stage CKD (GFR <15 mL/min/1 73 square meters)  Note: GFR calculation is accurate only with a steady state creatinine    Sodium, urine, random [[id number]] Collected: 09/24/21 2216    Lab Status: In process Specimen: Urine, Clean Catch Updated: 09/24/21 2229    Osmolality, urine [[id number]] Collected: 09/24/21 2216    Lab Status: In process Specimen: Urine, Clean Catch Updated: 09/24/21 2229    Osmolality-"If this is regarding a toxic alcohol, STOP  Test is not routinely indicated  Please consult medical  on call for further guidance " [[id number]] Collected: 09/24/21 1244    Lab Status:  In process Specimen: Blood from Arm, Right Updated: 09/24/21 1847    Novel Coronavirus (Covid-19),PCR [facility] - 2 Hour Stat [[id number]]  (Normal) Collected: 09/24/21 1609    Lab Status: Final result Specimen: Nares from Nose Updated: 09/24/21 1807     SARS-CoV-2 Negative    Narrative:      FOR PEDIATRIC PATIENTS - copy/paste COVID Guidelines URL to browser: [email or web address]    The specimen collection materials, transport medium, and/or testing methodology utilized in the production of these test results have been proven to be reliable in a limited validation with an abbreviated program under the Emergency Utilization Authorization provided by the FDA  Testing reported as "Presumptive positive" will be confirmed with secondary testing to ensure result accuracy  Clinical caution and judgement should be used with the interpretation of these results with consideration of the clinical impression and other laboratory testing  Testing reported as "Positive" or "Negative" has been proven to be accurate according to standard laboratory validation requirements  All testing is performed with control materials showing appropriate reactivity at standard intervals      D-Dimer [[id number]]  (Abnormal) Collected: 09/24/21 1323    Lab Status: Final result Specimen: Blood from Arm, Right Updated: 09/24/21 1353     D-Dimer, Quant 0 58 ug/ml FEU     Protime-INR [[id number]]  (Normal) Collected: 09/24/21 1323    Lab Status: Final result Specimen: Blood from Arm, Right Updated: 09/24/21 1342     Protime 12 8 seconds      INR 0 97    APTT [[id number]]  (Normal) Collected: 09/24/21 1323    Lab Status: Final result Specimen: Blood from Arm, Right Updated: 09/24/21 1342     PTT 27 seconds     Urine Microscopic [[id number]]  (Normal) Collected: 09/24/21 1323    Lab Status: Final result Specimen: Urine, Clean Catch Updated: 09/24/21 1339     RBC, UA 1-2 /hpf      WBC, UA 0-5 /hpf      Epithelial Cells Occasional /hpf      Bacteria, UA Occasional /hpf     Comprehensive metabolic panel [[id number]]  (Abnormal) Collected: 09/24/21 1244    Lab Status: Final result Specimen: Blood from Arm, Right Updated: 09/24/21 1336     Sodium 128 mmol/L      Potassium 3 4 mmol/L      Chloride 91 mmol/L      CO2 28 mmol/L      ANION GAP 9 mmol/L      BUN 13 mg/dL      Creatinine 1 06 mg/dL      Glucose 93 mg/dL      Calcium 9 4 mg/dL      AST 41 U/L      ALT 45 U/L      Alkaline Phosphatase 105 U/L      Total Protein 8 7 g/dL      Albumin 4 1 g/dL      Total Bilirubin 1 86 mg/dL      eGFR 58 ml/min/1 73sq m     Narrative:      [place] guidelines for Chronic Kidney Disease (CKD):     Stage 1 with normal or high GFR (GFR > 90 mL/min/1 73 square meters)    Stage 2 Mild CKD (GFR = 60-89 mL/min/1 73 square meters)    Stage 3A Moderate CKD (GFR = 45-59 mL/min/1 73 square meters)    Stage 3B Moderate CKD (GFR = 30-44 mL/min/1 73 square meters)    Stage 4 Severe CKD (GFR = 15-29 mL/min/1 73 square meters)    Stage 5 End Stage CKD (GFR <15 mL/min/1 73 square meters)  Note: GFR calculation is accurate only with a steady state creatinine    Magnesium [[id number]]  (Normal) Collected: 09/24/21 1244    Lab Status: Final result Specimen: Blood from Arm, Right Updated: 09/24/21 1336     Magnesium 1 7 mg/dL     NT-BNP PRO [[id number]]  (Abnormal) Collected: 09/24/21 1244    Lab Status: Final result Specimen: Blood from Arm, Right Updated: 09/24/21 1336     NT-proBNP 321 pg/mL     UA w Reflex to Microscopic w Reflex to Culture [[id number]]  (Abnormal) Collected: 09/24/21 1323    Lab Status: Final result Specimen: Urine, Clean Catch Updated: 09/24/21 1330     Color, UA Yellow     Clarity, UA Slightly Cloudy     Specific Gravity, UA <=1 005     pH, UA 7 0     Leukocytes, UA Small     Nitrite, UA Negative     Protein, UA Negative mg/dl      Glucose, UA Negative mg/dl      Ketones, UA Negative mg/dl      Urobilinogen, UA 0 2 E U /dl      Bilirubin, UA Negative     Blood, UA Trace-Intact    Troponin I [[id number]]  (Normal) Collected: 09/24/21 1244    Lab Status: Final result Specimen: Blood from Arm, Right Updated: 09/24/21 1323     Troponin I <0 02 ng/mL     CBC and differential [[id number]]  (Abnormal) Collected: 09/24/21 1244    Lab Status: Final result Specimen: Blood from Arm, Right Updated: 09/24/21 1259     WBC 8 62 Thousand/uL      RBC 4 16 Million/uL      Hemoglobin 13 9 g/dL      Hematocrit 41 7 %       fL      MCH 33 4 pg      MCHC 33 3 g/dL      RDW 11 8 %      MPV 10 3 fL      Platelets 779 Thousands/uL      nRBC 0 /100 WBCs      Neutrophils Relative 62 %      Immat GRANS % 1 %      Lymphocytes Relative 25 %      Monocytes Relative 10 %      Eosinophils Relative 1 %      Basophils Relative 1 %      Neutrophils Absolute 5 42 Thousands/µL      Immature Grans Absolute 0 05 Thousand/uL      Lymphocytes Absolute 2 18 Thousands/µL      Monocytes Absolute 0 88 Thousand/µL      Eosinophils Absolute 0 05 Thousand/µL      Basophils Absolute 0 04 Thousands/µL                  CTA ED chest PE Study   Final Result by Grace Gutierrez MD (09/24 1183)      No pulmonary embolism  Workstation performed: [id number]         XR chest 1 view portable   Final Result by Gerardo Urrutia MD (09/24 1414)   No acute cardiopulmonary disease  Findings are stable            Workstation performed: [id number]                    Procedures  ECG 12 Lead Documentation Only    Date/Time: 9/24/2021 12:38 PM  Performed by: Rosalio Flores DO  Authorized by: Rosalio Flores DO     Indications / Diagnosis:  Shortness of breath  ECG reviewed by me, the ED Provider: yes    Patient location:  ED  Previous ECG:     Previous ECG:  Compared to current    Similarity:  No change  Interpretation:     Interpretation: normal    Comments:      Sinus rhythm, rate 92, normal axis, normal intervals, no acute ST/T-wave abnormality is noted, otherwise unremarkable EKG, unchanged from previous study  ED Course                             SBIRT 22yo+      Most Recent Value   SBIRT (22 yo +)   In order to provide better care to our patients, we are screening all of our patients for alcohol and drug use  Would it be okay to ask you these screening questions?   No Filed at: 09/24/2021 1218          Wells' Criteria for PE      Most Recent Value   Wells' Criteria for PE   Clinical signs and symptoms of DVT  0 Filed at: 09/24/2021 1356   PE is primary diagnosis or equally likely  3 Filed at: 09/24/2021 1356   HR >100  1 5 Filed at: 09/24/2021 1356   Immobilization at least 3 days or Surgery in the previous 4 weeks  0 Filed at: 09/24/2021 1356   Previous, objectively diagnosed PE or DVT  0 Filed at: 09/24/2021 1356   Hemoptysis  0 Filed at: 09/24/2021 1356   Malignancy with treatment within 6 months or palliative  0 Filed at: 09/24/2021 1356   Wells' Criteria Total  4 5 Filed at: 09/24/2021 1356                [facility]  Number of Diagnoses or Management Options  Generalized weakness: new and requires workup  HTN (hypertension): new and requires workup  Hyponatremia: new and requires workup  Shortness of breath: new and requires workup  Diagnosis management comments: The blood work, EKG, chest x-ray  Give steroids, neb treatment       Amount and/or Complexity of Data Reviewed  Clinical lab tests: ordered and reviewed  Tests in the radiology section of CPT®: ordered and reviewed  Tests in the medicine section of CPT®: reviewed and ordered  Review and summarize past medical records: yes  Independent visualization of images, tracings, or specimens: yes    Risk of Complications, Morbidity, and/or Mortality  General comments: Patient's sodium was low in the ED  IV fluid hydration started  Patient's D-dimer was elevated here subsequent CT PE study was unremarkable for any pulmonary embolism  At this time patient is admitted for further evaluation of her hyponatremia and weakness  Patient agrees with admission plans         Patient Progress  Patient progress: stable      Disposition  Final diagnoses:   Shortness of breath   Generalized weakness   Hyponatremia   HTN (hypertension)     Time reflects when diagnosis was documented in both MDM as applicable and the Disposition within this note Time User Action Codes Description Comment    9/24/2021  4:06 PM Gerson Mike Add [R06 02] Shortness of breath     9/24/2021  4:06 PM Harley Wilson Add [R53 1] Generalized weakness     9/24/2021  4:07 PM Harley Wilson Add [E87 1] Hyponatremia     9/24/2021  4:07 PM Harley Wilson Add [I10] HTN (hypertension)       ED Disposition     ED Disposition Condition Date/Time Comment    Admit Stable Fri Sep 24, 2021  4:06 PM Case was discussed with Dr Laray Schwab and the patient's admission status was agreed to be Admission Status: observation status to the service of Dr Laray Schwab  Follow-up Information    None         Current Discharge Medication List      CONTINUE these medications which have NOT CHANGED    Details   furosemide (LASIX) 20 mg tablet Take 1 tablet (20 mg total) by mouth daily  Qty: 90 tablet, Refills: 3    Associated Diagnoses: Essential hypertension; Hyponatremia      losartan (COZAAR) 50 mg tablet Take 1 tablet (50 mg total) by mouth daily  Qty: 90 tablet, Refills: 1    Associated Diagnoses: Essential hypertension      DULoxetine (CYMBALTA) 60 mg delayed release capsule Take 1 capsule (60 mg total) by mouth daily  Qty: 90 capsule, Refills: 1    Associated Diagnoses: Depression with anxiety      folic acid (FOLVITE) 1 mg tablet Take 1 tablet (1 mg total) by mouth daily  Qty: 90 tablet, Refills: 2    Associated Diagnoses: ETOH abuse      ketoconazole (NIZORAL) 2 % cream Apply topically daily  Qty: 60 g, Refills: 1    Associated Diagnoses: Dry scalp      thiamine (VITAMIN B1) 100 mg tablet Take 1 tablet (100 mg total) by mouth daily  Qty: 90 tablet, Refills: 2    Associated Diagnoses: ETOH abuse           No discharge procedures on file      PDMP Review     None          ED Provider  Electronically Signed by           Diego Sandoval DO  09/24/21 6467

## 2021-09-25 NOTE — ASSESSMENT & PLAN NOTE
POA, T Bili 1 86 -> 1 15  · Noted to be chronic  Likely nonspecific in nature    Abdominal tenderness noted on exam

## 2021-09-25 NOTE — ASSESSMENT & PLAN NOTE
Presents with 3 days of increasing shortness of breath, dyspnea on exertion, and weakness   CXR, no acute cardiopulmonary disease  CTA chest, no PE  Oxygen saturation % on room air  · Likely due to anxiety and fatigue  · Improved with low-dose Xanax as needed  · Will discharge on low-dose Xanax twice daily as needed if PCP follow-up  · Continue home medication, Cymbalta Cymbalta   · No indication for oxygen at this time

## 2021-09-25 NOTE — DISCHARGE SUMMARY
Stevie 43  Discharge- Angelique Diamond 1965, 60 y o  female MRN: [id number]  Unit/Bed#: [address] [place] [address] Encounter: [id number]  Primary Care Provider: Nicole Musa MD   Date and time admitted to hospital: 9/24/2021 12:16 PM    * Dyspnea on exertion  Assessment & Plan  Presents with 3 days of increasing shortness of breath, dyspnea on exertion, and weakness   CXR, no acute cardiopulmonary disease  CTA chest, no PE  Oxygen saturation % on room air  · Likely due to anxiety and fatigue  · Improved with low-dose Xanax as needed  · Will discharge on low-dose Xanax twice daily as needed if PCP follow-up  · Continue home medication, Cymbalta Cymbalta   · No indication for oxygen at this time    Hyponatremia  Assessment & Plan  POA, as evidenced by Na 128 and Cl 91  Likely hypovolemia hyponatremia   Patient received NS 1 L bolus in ED  TSH 0 75, urine Osmo 492, urine sodium 31, serum Osmo 274  Morning cortisol low, but this is likely due to receiving steroids  · Sodium improved to 135 following gentle IV fluids overnight  · Patient will discontinue Lasix on discharge  Obtain a wound to the in 1 week  Follow up with her PCP to see how her sodium fairs  Leukocytosis  Assessment & Plan  · WC increased on day 2, likely due to receiving steroids in ER    Essential hypertension  Assessment & Plan  · Blood pressure 183/109 on admission, most recent BP improved to 151/90  · Discontinue Lasix due to hyponatremia  · Continue home medication, Cozaar   · Follow-up PCP    Hypokalemia-resolved as of 9/25/2021  Assessment & Plan  · K+ 3 4  · Resolved with repletion    Serum total bilirubin elevated  Assessment & Plan  POA, T Bili 1 86 -> 1 15  · Noted to be chronic  Likely nonspecific in nature    Abdominal tenderness noted on exam    Depression with anxiety  Assessment & Plan  · Continue Cymbalta       Discharging Physician / Practitioner: TAMMY Sylvester  PCP: Nicole Musa MD  Admission Date:   Admission Orders (From admission, onward)     Ordered        09/24/21 1607  Place in Observation  Once                   Discharge Date: 09/25/21    Medical Problems     Resolved Problems  Date Reviewed: 9/25/2021        Resolved    Hypokalemia 9/25/2021     Resolved by  Bailey Elizabeth, [address] [place] [address] Stay:  · None    Procedures Performed:   · CXR:  No acute cardiopulmonary disease  · CTA chest:  No pulmonary embolism    Significant Findings / Test Results:   · Anxiety    Incidental Findings:   · None     Test Results Pending at Discharge (will require follow up): · None     Outpatient Tests Requested:  · BMP    Complications:  None    Reason for Admission:  Dyspnea on exertion, hyponatremia    Hospital Course:     Farshad Perry is a 60 y o  female patient who originally presented to the hospital on 9/24/2021 due to dyspnea on exertion  Patient has been undergoing a lot of stress at home  She recently went away for the weekend with her  to get away from the recent stressors  During her get away she admits to binge drinking  Patient presented to the ER due to complaints of dyspnea on exertion  Workup revealed hyponatremia hypo kalemia  Patient was admitted for further evaluation and treatment  She was given IV hydration and potassium  Patient rapidly improved  On day of discharge she is ambulating hallways on room air without any signs of shortness of breath  On discharge recommend patient discontinue Lasix  Okay to continue Cozaar  Recommend outpatient BMP to monitor sodium level  Recommend outpatient PCP follow-up to review sodium level and blood pressure  Seems like patient's shortness of breath was also related to anxiety  Patient improved with low-dose Xanax  Will prescribe a few tablets on discharge and have patient follow-up with her PCP  Please see above list of diagnoses and related plan for additional information  Condition at Discharge: stable     Discharge Day Visit / Exam:     Subjective:  Patient seen and examined at bedside this morning  Patient states she much improved  She has no shortness of birth restaurant she had no shortness of breath when ambulating around nurse's station  She denies any chest pain, headache, dizziness, abdominal pain, nausea, diarrhea  States she ate 100% of breakfast   Desires discharge home  Vitals: Blood Pressure: 151/90 (09/25/21 0917)  Pulse: 68 (09/25/21 0736)  Temperature: 98 4 °F (36 9 °C) (09/25/21 0736)  Temp Source: Oral (09/25/21 0032)  Respirations: 20 (09/25/21 0736)  Height: 5' 5" (165 1 cm) (09/24/21 2019)  Weight - Scale: 83 2 kg (183 lb 6 8 oz) (09/24/21 2019)  SpO2: 98 % (09/25/21 0736)  Exam:   Physical Exam  Vitals and nursing note reviewed  Constitutional:       General: She is not in acute distress  Appearance: She is well-developed  She is not ill-appearing, toxic-appearing or diaphoretic  HENT:      Head: Normocephalic and atraumatic  Eyes:      Conjunctiva/sclera: Conjunctivae normal    Cardiovascular:      Rate and Rhythm: Normal rate and regular rhythm  Pulmonary:      Effort: Pulmonary effort is normal  No respiratory distress  Breath sounds: Normal breath sounds  No wheezing or rales  Abdominal:      General: Bowel sounds are normal  There is no distension  Palpations: Abdomen is soft  Tenderness: There is no abdominal tenderness  Musculoskeletal:         General: Normal range of motion  Cervical back: Normal range of motion  Right lower leg: No edema  Left lower leg: No edema  Skin:     General: Skin is warm and dry  Capillary Refill: Capillary refill takes less than 2 seconds  Neurological:      Mental Status: She is alert and oriented to person, place, and time  Mental status is at baseline     Psychiatric:         Mood and Affect: Mood normal          Behavior: Behavior normal          Thought Content: Thought content normal          Judgment: Judgment normal          Discussion with Family: None    Discharge instructions/Information to patient and family:   See after visit summary for information provided to patient and family  Provisions for Follow-Up Care:  See after visit summary for information related to follow-up care and any pertinent home health orders  Disposition:     Home    For Discharges to [facility]F:   · Not Applicable to this Patient - Not Applicable to this Patient    Planned Readmission: None      Discharge Statement:  I spent > 30 minutes discharging the patient  This time was spent on the day of discharge  I had direct contact with the patient on the day of discharge  Greater than 50% of the total time was spent examining patient, answering all patient questions, arranging and discussing plan of care with patient as well as directly providing post-discharge instructions  Additional time then spent on discharge activities  Discharge Medications:  See after visit summary for reconciled discharge medications provided to patient and family        ** Please Note: This note has been constructed using a voice recognition system **

## 2021-09-25 NOTE — ASSESSMENT & PLAN NOTE
· Blood pressure 183/109 on admission, most recent BP improved to 151/90  · Discontinue Lasix due to hyponatremia  · Continue home medication, Cozaar   · Follow-up PCP

## 2021-09-25 NOTE — NURSING NOTE
AVS reviewed with pt  Script for blood work and med education given  IV removed   Pt left walking to go home

## 2021-09-25 NOTE — ASSESSMENT & PLAN NOTE
POA, as evidenced by Na 128 and Cl 91  Likely hypovolemia hyponatremia   Patient received NS 1 L bolus in ED  TSH 0 75, urine Osmo 492, urine sodium 31, serum Osmo 274  Morning cortisol low, but this is likely due to receiving steroids  · Sodium improved to 135 following gentle IV fluids overnight  · Patient will discontinue Lasix on discharge  Obtain a wound to the in 1 week  Follow up with her PCP to see how her sodium fairs

## 2021-09-25 NOTE — CASE MANAGEMENT
SW met with pt to issue observation notice  Pt gave verbal understanding, signed, and was provided a copy  No questions or concerns at this time  Pt is discharged today and will drive self home  No other CM needs identified

## 2021-09-25 NOTE — PLAN OF CARE

## 2021-09-25 NOTE — DISCHARGE INSTR - AVS FIRST PAGE
Hyponatremia:   Please stop your Lasix  Check a BMP in 1 week  Follow-up blood work results with PCP      Anxiety:  Take Xanax 0 25 mg twice daily as needed for anxiety

## 2021-09-27 ENCOUNTER — TRANSITIONAL CARE MANAGEMENT (OUTPATIENT)
Dept: FAMILY MEDICINE CLINIC | Facility: CLINIC | Age: 56
End: 2021-09-27

## 2021-10-04 DIAGNOSIS — F10.10 ETOH ABUSE: ICD-10-CM

## 2021-10-04 RX ORDER — THIAMINE MONONITRATE (VIT B1) 100 MG
100 TABLET ORAL DAILY
Qty: 90 TABLET | Refills: 2 | Status: SHIPPED | OUTPATIENT
Start: 2021-10-04 | End: 2022-04-20 | Stop reason: SDUPTHER

## 2021-10-13 DIAGNOSIS — F10.10 ETOH ABUSE: ICD-10-CM

## 2021-10-13 RX ORDER — FOLIC ACID 1 MG/1
1 TABLET ORAL DAILY
Qty: 90 TABLET | Refills: 2 | Status: SHIPPED | OUTPATIENT
Start: 2021-10-13 | End: 2021-10-19 | Stop reason: SDUPTHER

## 2021-10-14 PROCEDURE — 99285 EMERGENCY DEPT VISIT HI MDM: CPT

## 2021-10-15 ENCOUNTER — HOSPITAL ENCOUNTER (EMERGENCY)
Facility: HOSPITAL | Age: 56
End: 2021-10-15
Attending: EMERGENCY MEDICINE | Admitting: EMERGENCY MEDICINE
Payer: COMMERCIAL

## 2021-10-15 ENCOUNTER — APPOINTMENT (EMERGENCY)
Dept: RADIOLOGY | Facility: HOSPITAL | Age: 56
End: 2021-10-15
Payer: COMMERCIAL

## 2021-10-15 ENCOUNTER — HOSPITAL ENCOUNTER (EMERGENCY)
Facility: HOSPITAL | Age: 56
Discharge: HOME | End: 2021-10-15
Admitting: STUDENT IN AN ORGANIZED HEALTH CARE EDUCATION/TRAINING PROGRAM
Payer: COMMERCIAL

## 2021-10-15 VITALS
WEIGHT: 190.8 LBS | RESPIRATION RATE: 18 BRPM | DIASTOLIC BLOOD PRESSURE: 67 MMHG | BODY MASS INDEX: 31.75 KG/M2 | OXYGEN SATURATION: 98 % | HEART RATE: 86 BPM | SYSTOLIC BLOOD PRESSURE: 125 MMHG | TEMPERATURE: 97.4 F

## 2021-10-15 VITALS
OXYGEN SATURATION: 96 % | SYSTOLIC BLOOD PRESSURE: 136 MMHG | DIASTOLIC BLOOD PRESSURE: 64 MMHG | HEART RATE: 64 BPM | RESPIRATION RATE: 14 BRPM

## 2021-10-15 DIAGNOSIS — E87.1 HYPONATREMIA: ICD-10-CM

## 2021-10-15 DIAGNOSIS — M54.9 BACK PAIN: ICD-10-CM

## 2021-10-15 DIAGNOSIS — S22.089A CLOSED FRACTURE OF TWELFTH THORACIC VERTEBRA, UNSPECIFIED FRACTURE MORPHOLOGY, INITIAL ENCOUNTER (HCC): Primary | ICD-10-CM

## 2021-10-15 DIAGNOSIS — M48.50XA VERTEBRAL COMPRESSION FRACTURE (HCC): ICD-10-CM
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
## 2021-10-15 PROBLEM — S32.029A CLOSED FRACTURE OF SECOND LUMBAR VERTEBRA (HCC): Status: ACTIVE | Noted: 2021-10-15

## 2021-10-15 PROBLEM — M54.50 ACUTE LEFT-SIDED LOW BACK PAIN WITHOUT SCIATICA: Status: ACTIVE | Noted: 2021-10-15

## 2021-10-15 LAB
ALBUMIN SERPL BCP-MCNC: 3.8 G/DL (ref 3.5–5)
ALP SERPL-CCNC: 108 U/L (ref 46–116)
ALT SERPL W P-5'-P-CCNC: 37 U/L (ref 12–78)
ANION GAP SERPL CALCULATED.3IONS-SCNC: 10 MMOL/L (ref 4–13)
ANION GAP SERPL CALCULATED.3IONS-SCNC: 10 MMOL/L (ref 4–13)
AST SERPL W P-5'-P-CCNC: 29 U/L (ref 5–45)
BACTERIA UR QL AUTO: NORMAL /HPF
BASOPHILS # BLD AUTO: 0.05 THOUSANDS/ΜL (ref 0–0.1)
BASOPHILS NFR BLD AUTO: 1 % (ref 0–1)
BILIRUB SERPL-MCNC: 0.58 MG/DL (ref 0.2–1)
BILIRUB UR QL STRIP: NEGATIVE
BUN SERPL-MCNC: 6 MG/DL (ref 5–25)
BUN SERPL-MCNC: 7 MG/DL (ref 5–25)
CALCIUM SERPL-MCNC: 7.7 MG/DL (ref 8.3–10.1)
CALCIUM SERPL-MCNC: 8.4 MG/DL (ref 8.3–10.1)
CHLORIDE SERPL-SCNC: 93 MMOL/L (ref 100–108)
CHLORIDE SERPL-SCNC: 94 MMOL/L (ref 100–108)
CLARITY UR: CLEAR
CO2 SERPL-SCNC: 25 MMOL/L (ref 21–32)
CO2 SERPL-SCNC: 25 MMOL/L (ref 21–32)
COLOR UR: ABNORMAL
CREAT SERPL-MCNC: 0.65 MG/DL (ref 0.6–1.3)
CREAT SERPL-MCNC: 0.67 MG/DL (ref 0.6–1.3)
EOSINOPHIL # BLD AUTO: 0.19 THOUSAND/ΜL (ref 0–0.61)
EOSINOPHIL NFR BLD AUTO: 2 % (ref 0–6)
ERYTHROCYTE [DISTWIDTH] IN BLOOD BY AUTOMATED COUNT: 11.6 % (ref 11.6–15.1)
GFR SERPL CREATININE-BSD FRML MDRD: 97 ML/MIN/1.73SQ M
GFR SERPL CREATININE-BSD FRML MDRD: 98 ML/MIN/1.73SQ M
GLUCOSE SERPL-MCNC: 101 MG/DL (ref 65–140)
GLUCOSE SERPL-MCNC: 92 MG/DL (ref 65–140)
GLUCOSE UR STRIP-MCNC: NEGATIVE MG/DL
HCT VFR BLD AUTO: 37.4 % (ref 34.8–46.1)
HGB BLD-MCNC: 12.6 G/DL (ref 11.5–15.4)
HGB UR QL STRIP.AUTO: ABNORMAL
IMM GRANULOCYTES # BLD AUTO: 0.05 THOUSAND/UL (ref 0–0.2)
IMM GRANULOCYTES NFR BLD AUTO: 1 % (ref 0–2)
KETONES UR STRIP-MCNC: NEGATIVE MG/DL
LEUKOCYTE ESTERASE UR QL STRIP: NEGATIVE
LYMPHOCYTES # BLD AUTO: 2.65 THOUSANDS/ΜL (ref 0.6–4.47)
LYMPHOCYTES NFR BLD AUTO: 32 % (ref 14–44)
MAGNESIUM SERPL-MCNC: 1.8 MG/DL (ref 1.6–2.6)
MCH RBC QN AUTO: 33.4 PG (ref 26.8–34.3)
MCHC RBC AUTO-ENTMCNC: 33.7 G/DL (ref 31.4–37.4)
MCV RBC AUTO: 99 FL (ref 82–98)
MONOCYTES # BLD AUTO: 1.07 THOUSAND/ΜL (ref 0.17–1.22)
MONOCYTES NFR BLD AUTO: 13 % (ref 4–12)
NEUTROPHILS # BLD AUTO: 4.33 THOUSANDS/ΜL (ref 1.85–7.62)
NEUTS SEG NFR BLD AUTO: 51 % (ref 43–75)
NITRITE UR QL STRIP: NEGATIVE
NON-SQ EPI CELLS URNS QL MICRO: NORMAL /HPF
NRBC BLD AUTO-RTO: 0 /100 WBCS
PH UR STRIP.AUTO: 6.5 [PH]
PLATELET # BLD AUTO: 259 THOUSANDS/UL (ref 149–390)
PMV BLD AUTO: 9.6 FL (ref 8.9–12.7)
POTASSIUM SERPL-SCNC: 3.7 MMOL/L (ref 3.5–5.3)
POTASSIUM SERPL-SCNC: 3.9 MMOL/L (ref 3.5–5.3)
PROT SERPL-MCNC: 8.1 G/DL (ref 6.4–8.2)
PROT UR STRIP-MCNC: NEGATIVE MG/DL
RBC # BLD AUTO: 3.77 MILLION/UL (ref 3.81–5.12)
RBC #/AREA URNS AUTO: NORMAL /HPF
SODIUM SERPL-SCNC: 128 MMOL/L (ref 136–145)
SODIUM SERPL-SCNC: 129 MMOL/L (ref 136–145)
SP GR UR STRIP.AUTO: <=1.005 (ref 1–1.03)
UROBILINOGEN UR QL STRIP.AUTO: 0.2 E.U./DL
WBC # BLD AUTO: 8.34 THOUSAND/UL (ref 4.31–10.16)
WBC #/AREA URNS AUTO: NORMAL /HPF

## 2021-10-15 PROCEDURE — 96361 HYDRATE IV INFUSION ADD-ON: CPT

## 2021-10-15 PROCEDURE — 74177 CT ABD & PELVIS W/CONTRAST: CPT

## 2021-10-15 PROCEDURE — 85025 COMPLETE CBC W/AUTO DIFF WBC: CPT | Performed by: EMERGENCY MEDICINE

## 2021-10-15 PROCEDURE — 99204 OFFICE O/P NEW MOD 45 MIN: CPT | Performed by: PHYSICIAN ASSISTANT

## 2021-10-15 PROCEDURE — 80048 BASIC METABOLIC PNL TOTAL CA: CPT | Performed by: EMERGENCY MEDICINE

## 2021-10-15 PROCEDURE — 36415 COLL VENOUS BLD VENIPUNCTURE: CPT | Performed by: EMERGENCY MEDICINE

## 2021-10-15 PROCEDURE — 81001 URINALYSIS AUTO W/SCOPE: CPT | Performed by: EMERGENCY MEDICINE

## 2021-10-15 PROCEDURE — 99284 EMERGENCY DEPT VISIT MOD MDM: CPT

## 2021-10-15 PROCEDURE — 83735 ASSAY OF MAGNESIUM: CPT | Performed by: EMERGENCY MEDICINE

## 2021-10-15 PROCEDURE — 96374 THER/PROPH/DIAG INJ IV PUSH: CPT

## 2021-10-15 PROCEDURE — 99285 EMERGENCY DEPT VISIT HI MDM: CPT | Performed by: EMERGENCY MEDICINE

## 2021-10-15 PROCEDURE — 72080 X-RAY EXAM THORACOLMB 2/> VW: CPT

## 2021-10-15 PROCEDURE — G1004 CDSM NDSC: HCPCS

## 2021-10-15 PROCEDURE — 80053 COMPREHEN METABOLIC PANEL: CPT | Performed by: EMERGENCY MEDICINE

## 2021-10-15 PROCEDURE — 99284 EMERGENCY DEPT VISIT MOD MDM: CPT | Performed by: STUDENT IN AN ORGANIZED HEALTH CARE EDUCATION/TRAINING PROGRAM

## 2021-10-15 RX ORDER — NAPROXEN 500 MG/1
500 TABLET ORAL ONCE
Status: DISCONTINUED | OUTPATIENT
Start: 2021-10-15 | End: 2021-10-15

## 2021-10-15 RX ORDER — OXYCODONE HYDROCHLORIDE 5 MG/1
5 TABLET ORAL EVERY 4 HOURS PRN
Status: DISCONTINUED | OUTPATIENT
Start: 2021-10-15 | End: 2021-10-15 | Stop reason: HOSPADM

## 2021-10-15 RX ORDER — OXYCODONE HYDROCHLORIDE 5 MG/1
2.5 TABLET ORAL EVERY 4 HOURS PRN
Status: DISCONTINUED | OUTPATIENT
Start: 2021-10-15 | End: 2021-10-15 | Stop reason: HOSPADM

## 2021-10-15 RX ORDER — KETOROLAC TROMETHAMINE 30 MG/ML
15 INJECTION, SOLUTION INTRAMUSCULAR; INTRAVENOUS ONCE
Status: COMPLETED | OUTPATIENT
Start: 2021-10-15 | End: 2021-10-15

## 2021-10-15 RX ORDER — METHOCARBAMOL 500 MG/1
500 TABLET, FILM COATED ORAL EVERY 6 HOURS SCHEDULED
Qty: 40 TABLET | Refills: 0 | Status: SHIPPED | OUTPATIENT
Start: 2021-10-15 | End: 2022-01-20 | Stop reason: SDUPTHER

## 2021-10-15 RX ORDER — METHOCARBAMOL 500 MG/1
750 TABLET, FILM COATED ORAL EVERY 6 HOURS SCHEDULED
Status: DISCONTINUED | OUTPATIENT
Start: 2021-10-15 | End: 2021-10-15 | Stop reason: HOSPADM

## 2021-10-15 RX ORDER — ACETAMINOPHEN 325 MG/1
975 TABLET ORAL EVERY 8 HOURS
Status: DISCONTINUED | OUTPATIENT
Start: 2021-10-15 | End: 2021-10-15 | Stop reason: HOSPADM

## 2021-10-15 RX ORDER — ACETAMINOPHEN 325 MG/1
650 TABLET ORAL EVERY 8 HOURS
Refills: 0
Start: 2021-10-15 | End: 2022-07-13 | Stop reason: ALTCHOICE

## 2021-10-15 RX ORDER — CYCLOBENZAPRINE HCL 10 MG
10 TABLET ORAL ONCE
Status: COMPLETED | OUTPATIENT
Start: 2021-10-15 | End: 2021-10-15

## 2021-10-15 RX ORDER — ONDANSETRON 2 MG/ML
4 INJECTION INTRAMUSCULAR; INTRAVENOUS EVERY 4 HOURS PRN
Status: DISCONTINUED | OUTPATIENT
Start: 2021-10-15 | End: 2021-10-15 | Stop reason: HOSPADM

## 2021-10-15 RX ORDER — MORPHINE SULFATE 4 MG/ML
4 INJECTION, SOLUTION INTRAMUSCULAR; INTRAVENOUS ONCE
Status: DISCONTINUED | OUTPATIENT
Start: 2021-10-15 | End: 2021-10-15 | Stop reason: HOSPADM

## 2021-10-15 RX ADMIN — METHOCARBAMOL 750 MG: 500 TABLET ORAL at 13:32

## 2021-10-15 RX ADMIN — PREDNISONE 50 MG: 20 TABLET ORAL at 01:58

## 2021-10-15 RX ADMIN — IOHEXOL 100 ML: 350 INJECTION, SOLUTION INTRAVENOUS at 03:23

## 2021-10-15 RX ADMIN — CYCLOBENZAPRINE HYDROCHLORIDE 10 MG: 10 TABLET, FILM COATED ORAL at 01:58

## 2021-10-15 RX ADMIN — KETOROLAC TROMETHAMINE 15 MG: 30 INJECTION, SOLUTION INTRAMUSCULAR; INTRAVENOUS at 01:59

## 2021-10-15 RX ADMIN — ACETAMINOPHEN 975 MG: 325 TABLET, FILM COATED ORAL at 13:31

## 2021-10-15 RX ADMIN — SODIUM CHLORIDE 1000 ML: 0.9 INJECTION, SOLUTION INTRAVENOUS at 02:00

## 2021-10-19 ENCOUNTER — OFFICE VISIT (OUTPATIENT)
Dept: FAMILY MEDICINE CLINIC | Facility: CLINIC | Age: 56
End: 2021-10-19
Payer: COMMERCIAL

## 2021-10-19 VITALS
HEART RATE: 96 BPM | OXYGEN SATURATION: 98 % | BODY MASS INDEX: 30.96 KG/M2 | DIASTOLIC BLOOD PRESSURE: 82 MMHG | WEIGHT: 185.8 LBS | RESPIRATION RATE: 16 BRPM | SYSTOLIC BLOOD PRESSURE: 124 MMHG | TEMPERATURE: 97.8 F | HEIGHT: 65 IN

## 2021-10-19 DIAGNOSIS — E87.1 HYPONATREMIA: ICD-10-CM

## 2021-10-19 DIAGNOSIS — R32 URINARY INCONTINENCE, UNSPECIFIED TYPE: ICD-10-CM

## 2021-10-19 DIAGNOSIS — I10 ESSENTIAL HYPERTENSION: Primary | ICD-10-CM

## 2021-10-19 DIAGNOSIS — S32.029S CLOSED FRACTURE OF SECOND LUMBAR VERTEBRA, UNSPECIFIED FRACTURE MORPHOLOGY, SEQUELA: ICD-10-CM

## 2021-10-19 DIAGNOSIS — F41.8 DEPRESSION WITH ANXIETY: ICD-10-CM

## 2021-10-19 DIAGNOSIS — S22.089S CLOSED FRACTURE OF TWELFTH THORACIC VERTEBRA, UNSPECIFIED FRACTURE MORPHOLOGY, SEQUELA: ICD-10-CM

## 2021-10-19 DIAGNOSIS — F10.10 ETOH ABUSE: ICD-10-CM

## 2021-10-19 PROCEDURE — 99495 TRANSJ CARE MGMT MOD F2F 14D: CPT | Performed by: FAMILY MEDICINE

## 2021-10-19 RX ORDER — ALPRAZOLAM 0.25 MG/1
0.25 TABLET ORAL 2 TIMES DAILY PRN
Qty: 10 TABLET | Refills: 0 | Status: CANCELLED | OUTPATIENT
Start: 2021-10-19

## 2021-10-19 RX ORDER — FUROSEMIDE 20 MG/1
20 TABLET ORAL DAILY
Qty: 30 TABLET | Refills: 1 | Status: CANCELLED | OUTPATIENT
Start: 2021-10-19

## 2021-10-19 RX ORDER — FOLIC ACID 1 MG/1
1 TABLET ORAL DAILY
Qty: 90 TABLET | Refills: 2 | Status: SHIPPED | OUTPATIENT
Start: 2021-10-19 | End: 2021-10-21 | Stop reason: SDUPTHER

## 2021-10-21 DIAGNOSIS — F10.10 ETOH ABUSE: ICD-10-CM

## 2021-10-21 RX ORDER — FOLIC ACID 1 MG/1
1 TABLET ORAL DAILY
Qty: 30 TABLET | Refills: 0 | Status: SHIPPED | OUTPATIENT
Start: 2021-10-21

## 2021-10-25 LAB
BUN SERPL-MCNC: 10 MG/DL (ref 6–24)
BUN/CREAT SERPL: 14 (ref 9–23)
CALCIUM SERPL-MCNC: 9.6 MG/DL (ref 8.7–10.2)
CHLORIDE SERPL-SCNC: 94 MMOL/L (ref 96–106)
CO2 SERPL-SCNC: 24 MMOL/L (ref 20–29)
CREAT SERPL-MCNC: 0.7 MG/DL (ref 0.57–1)
GLUCOSE SERPL-MCNC: 84 MG/DL (ref 65–99)
POTASSIUM SERPL-SCNC: 5.5 MMOL/L (ref 3.5–5.2)
SL AMB EGFR AFRICAN AMERICAN: 110 ML/MIN/1.73
SL AMB EGFR NON AFRICAN AMERICAN: 96 ML/MIN/1.73
SODIUM SERPL-SCNC: 131 MMOL/L (ref 134–144)

## 2021-10-26 ENCOUNTER — VBI (OUTPATIENT)
Dept: ADMINISTRATIVE | Facility: OTHER | Age: 56
End: 2021-10-26

## 2021-10-26 ENCOUNTER — TELEPHONE (OUTPATIENT)
Dept: FAMILY MEDICINE CLINIC | Facility: CLINIC | Age: 56
End: 2021-10-26

## 2021-10-27 ENCOUNTER — OFFICE VISIT (OUTPATIENT)
Dept: FAMILY MEDICINE CLINIC | Facility: CLINIC | Age: 56
End: 2021-10-27
Payer: COMMERCIAL

## 2021-10-27 ENCOUNTER — TELEPHONE (OUTPATIENT)
Dept: FAMILY MEDICINE CLINIC | Facility: CLINIC | Age: 56
End: 2021-10-27

## 2021-10-27 VITALS
WEIGHT: 189 LBS | BODY MASS INDEX: 31.49 KG/M2 | TEMPERATURE: 98.3 F | OXYGEN SATURATION: 99 % | SYSTOLIC BLOOD PRESSURE: 188 MMHG | RESPIRATION RATE: 16 BRPM | HEIGHT: 65 IN | HEART RATE: 80 BPM | DIASTOLIC BLOOD PRESSURE: 110 MMHG

## 2021-10-27 DIAGNOSIS — I10 ESSENTIAL HYPERTENSION: Primary | ICD-10-CM

## 2021-10-27 PROCEDURE — 1036F TOBACCO NON-USER: CPT | Performed by: FAMILY MEDICINE

## 2021-10-27 PROCEDURE — 3008F BODY MASS INDEX DOCD: CPT | Performed by: FAMILY MEDICINE

## 2021-10-27 PROCEDURE — 3077F SYST BP >= 140 MM HG: CPT | Performed by: FAMILY MEDICINE

## 2021-10-27 PROCEDURE — 99214 OFFICE O/P EST MOD 30 MIN: CPT | Performed by: FAMILY MEDICINE

## 2021-10-27 PROCEDURE — 3080F DIAST BP >= 90 MM HG: CPT | Performed by: FAMILY MEDICINE

## 2021-10-27 RX ORDER — AMLODIPINE BESYLATE 2.5 MG/1
2.5 TABLET ORAL DAILY
Qty: 30 TABLET | Refills: 5 | Status: SHIPPED | OUTPATIENT
Start: 2021-10-27 | End: 2021-11-04 | Stop reason: SDUPTHER

## 2021-10-28 ENCOUNTER — TELEPHONE (OUTPATIENT)
Dept: FAMILY MEDICINE CLINIC | Facility: CLINIC | Age: 56
End: 2021-10-28

## 2021-10-29 ENCOUNTER — APPOINTMENT (OUTPATIENT)
Dept: LAB | Facility: CLINIC | Age: 56
End: 2021-10-29
Payer: COMMERCIAL

## 2021-10-29 DIAGNOSIS — E87.1 HYPONATREMIA: Primary | ICD-10-CM

## 2021-10-29 DIAGNOSIS — I10 ESSENTIAL HYPERTENSION: ICD-10-CM

## 2021-10-29 LAB
ANION GAP SERPL CALCULATED.3IONS-SCNC: 4 MMOL/L (ref 4–13)
BUN SERPL-MCNC: 10 MG/DL (ref 5–25)
CALCIUM SERPL-MCNC: 9.9 MG/DL (ref 8.3–10.1)
CHLORIDE SERPL-SCNC: 97 MMOL/L (ref 100–108)
CO2 SERPL-SCNC: 30 MMOL/L (ref 21–32)
CREAT SERPL-MCNC: 0.86 MG/DL (ref 0.6–1.3)
GFR SERPL CREATININE-BSD FRML MDRD: 75 ML/MIN/1.73SQ M
GLUCOSE P FAST SERPL-MCNC: 89 MG/DL (ref 65–99)
POTASSIUM SERPL-SCNC: 4.9 MMOL/L (ref 3.5–5.3)
SODIUM SERPL-SCNC: 131 MMOL/L (ref 136–145)

## 2021-10-29 PROCEDURE — 36415 COLL VENOUS BLD VENIPUNCTURE: CPT

## 2021-10-29 PROCEDURE — 80048 BASIC METABOLIC PNL TOTAL CA: CPT

## 2021-11-04 ENCOUNTER — TELEPHONE (OUTPATIENT)
Dept: NEPHROLOGY | Facility: CLINIC | Age: 56
End: 2021-11-04

## 2021-11-04 ENCOUNTER — TELEPHONE (OUTPATIENT)
Dept: FAMILY MEDICINE CLINIC | Facility: CLINIC | Age: 56
End: 2021-11-04

## 2021-11-04 DIAGNOSIS — I10 ESSENTIAL HYPERTENSION: ICD-10-CM

## 2021-11-04 RX ORDER — AMLODIPINE BESYLATE 5 MG/1
5 TABLET ORAL DAILY
Qty: 30 TABLET | Refills: 0 | Status: SHIPPED | OUTPATIENT
Start: 2021-11-04 | End: 2022-04-20 | Stop reason: SDUPTHER

## 2021-11-18 ENCOUNTER — TELEPHONE (OUTPATIENT)
Dept: FAMILY MEDICINE CLINIC | Facility: CLINIC | Age: 56
End: 2021-11-18

## 2021-11-22 ENCOUNTER — TELEMEDICINE (OUTPATIENT)
Dept: FAMILY MEDICINE CLINIC | Facility: CLINIC | Age: 56
End: 2021-11-22
Payer: COMMERCIAL

## 2021-11-22 DIAGNOSIS — I10 ESSENTIAL HYPERTENSION: Primary | ICD-10-CM

## 2021-11-22 DIAGNOSIS — F41.8 DEPRESSION WITH ANXIETY: ICD-10-CM

## 2021-11-22 PROCEDURE — 99214 OFFICE O/P EST MOD 30 MIN: CPT | Performed by: FAMILY MEDICINE

## 2021-11-22 PROCEDURE — 1036F TOBACCO NON-USER: CPT | Performed by: FAMILY MEDICINE

## 2021-11-22 RX ORDER — DULOXETIN HYDROCHLORIDE 20 MG/1
CAPSULE, DELAYED RELEASE ORAL
Qty: 42 CAPSULE | Refills: 0 | Status: SHIPPED | OUTPATIENT
Start: 2021-11-22 | End: 2021-12-17

## 2021-11-29 ENCOUNTER — TELEPHONE (OUTPATIENT)
Dept: PHYSICAL THERAPY | Facility: CLINIC | Age: 56
End: 2021-11-29

## 2021-12-02 ENCOUNTER — TELEPHONE (OUTPATIENT)
Dept: PHYSICAL THERAPY | Facility: CLINIC | Age: 56
End: 2021-12-02

## 2021-12-07 ENCOUNTER — TELEPHONE (OUTPATIENT)
Dept: NEPHROLOGY | Facility: CLINIC | Age: 56
End: 2021-12-07

## 2021-12-08 ENCOUNTER — TELEPHONE (OUTPATIENT)
Dept: NEPHROLOGY | Facility: CLINIC | Age: 56
End: 2021-12-08

## 2021-12-08 ENCOUNTER — EVALUATION (OUTPATIENT)
Dept: PHYSICAL THERAPY | Facility: CLINIC | Age: 56
End: 2021-12-08
Payer: COMMERCIAL

## 2021-12-08 DIAGNOSIS — S22.000A COMPRESSION FRACTURE OF BODY OF THORACIC VERTEBRA (HCC): ICD-10-CM

## 2021-12-08 DIAGNOSIS — M54.9 DORSALGIA: Primary | ICD-10-CM

## 2021-12-08 PROCEDURE — 97161 PT EVAL LOW COMPLEX 20 MIN: CPT

## 2021-12-17 ENCOUNTER — TELEMEDICINE (OUTPATIENT)
Dept: FAMILY MEDICINE CLINIC | Facility: CLINIC | Age: 56
End: 2021-12-17
Payer: COMMERCIAL

## 2021-12-17 DIAGNOSIS — I10 ESSENTIAL HYPERTENSION: ICD-10-CM

## 2021-12-17 DIAGNOSIS — F41.8 DEPRESSION WITH ANXIETY: Chronic | ICD-10-CM

## 2021-12-17 DIAGNOSIS — J06.9 UPPER RESPIRATORY TRACT INFECTION, UNSPECIFIED TYPE: Primary | ICD-10-CM

## 2021-12-17 PROCEDURE — 99214 OFFICE O/P EST MOD 30 MIN: CPT | Performed by: FAMILY MEDICINE

## 2021-12-17 PROCEDURE — 1036F TOBACCO NON-USER: CPT | Performed by: FAMILY MEDICINE

## 2021-12-17 RX ORDER — AZITHROMYCIN 250 MG/1
TABLET, FILM COATED ORAL
Qty: 6 TABLET | Refills: 0 | Status: SHIPPED | OUTPATIENT
Start: 2021-12-17 | End: 2021-12-21

## 2021-12-20 ENCOUNTER — TELEPHONE (OUTPATIENT)
Dept: NEPHROLOGY | Facility: CLINIC | Age: 56
End: 2021-12-20

## 2022-01-03 ENCOUNTER — TELEPHONE (OUTPATIENT)
Dept: NEPHROLOGY | Facility: CLINIC | Age: 57
End: 2022-01-03

## 2022-01-12 ENCOUNTER — TELEPHONE (OUTPATIENT)
Dept: NEPHROLOGY | Facility: CLINIC | Age: 57
End: 2022-01-12

## 2022-01-12 NOTE — TELEPHONE ENCOUNTER
[address] ENCOUNTER          NURSE PRACTITIONER NOTE       Date of evaluation: 2/1/2021    Chief Complaint     Numbness (in hands and feet) and Shortness of Breath (COVID+ Friday resulted)      History of Present Illness     Luis Angel Hylton is a 19 y.o. female who presents the emergency department with a complaint of numbness and tingling in her bilateral hands and feet, shortness of breath, cough, nausea vomiting. Patient began feeling poorly with a headache 6 days ago, progressively worsened to fatigue/body aches and \"flulike symptoms\". Patient tested positive for Covid19 on 1/29/2021. Patient states for the past couple days she has been experiencing numbness and tingling in her hands and feet worse when her temperature is spiking, as well as nausea vomiting since yesterday. Patient states that she is been taking ibuprofen with control of her fevers, and this seems to control her other symptoms as well. Patient states that she was talking to family members who are worried that she could possibly have developed a blood clot due to the numbness and tingling, and urged her to be evaluated in the emergency department. Patient denies any associated chest pain, pleuritic pain, abdominal pain, urinary changes. She has no history of DVTs or PEs. No recent travel, no recent surgery. Non-smoker. Review of Systems     Review of Systems   Constitutional: Positive for appetite change, chills, fatigue and fever. Respiratory: Positive for cough (non productive) and shortness of breath. Cardiovascular: Negative for chest pain. Gastrointestinal: Positive for nausea and vomiting. Negative for abdominal pain and diarrhea. Genitourinary: Negative for dysuria, frequency and urgency. Musculoskeletal: Positive for myalgias. Skin: Negative. Allergic/Immunologic: Negative for immunocompromised state. Neurological: Positive for headaches. LM for patient offering sooner appt with Dr Elvi Boyd 1/13 Hematological: Does not bruise/bleed easily. Psychiatric/Behavioral: Negative. Past Medical, Surgical, Family, and Social History     She has no past medical history on file. She has no past surgical history on file. Her family history is not on file. She reports that she has never smoked. She has never used smokeless tobacco. She reports current alcohol use. She reports that she does not use drugs. Medications     There are no discharge medications for this patient. Allergies     She has No Known Allergies. Physical Exam     INITIAL VITALS: BP: 113/65, Temp: 98.4 °F (36.9 °C), Pulse: 95, Resp: 20, SpO2: 97 %   Physical Exam  Vitals signs and nursing note reviewed. Constitutional:       Appearance: She is well-developed. HENT:      Head: Normocephalic and atraumatic. Neck:      Musculoskeletal: Normal range of motion and neck supple. Cardiovascular:      Rate and Rhythm: Regular rhythm. Pulses: Normal pulses. Heart sounds: Normal heart sounds. No murmur. No friction rub. No gallop. Pulmonary:      Effort: Pulmonary effort is normal. No respiratory distress. Breath sounds: Normal breath sounds. Musculoskeletal: Normal range of motion. Skin:     General: Skin is warm and dry. Neurological:      Mental Status: She is alert and oriented to person, place, and time. Psychiatric:         Mood and Affect: Mood normal.         Behavior: Behavior normal.           Diagnostic Results       RADIOLOGY:  XR CHEST PORTABLE   Final Result      1. Mild patchy airspace disease right base.                 LABS:   Results for orders placed or performed during the hospital encounter of 02/01/21   Blood gas, venous (Lab)   Result Value Ref Range    pH, Taran 7.352 7.350 - 7.450    pCO2, Taran 46.7 41.0 - 51.0 mmHg    pO2, Taran 28.5 25.0 - 40.0 mmHg    HCO3, Venous 25.9 24.0 - 28.0 mmol/L    Base Excess, Taran -0.1 -2.0 - 3.0 mmol/L    O2 Sat, Taran 47 Not established % Carboxyhemoglobin 0.9 0.0 - 1.5 %    MetHgb, Taran 0.2 0.0 - 1.5 %    TC02 (Calc), Taran 27 mmol/L   CBC auto differential   Result Value Ref Range    WBC 4.0 4.0 - 11.0 K/uL    RBC 5.00 4.00 - 5.20 M/uL    Hemoglobin 10.9 (L) 12.0 - 16.0 g/dL    Hematocrit 34.8 (L) 36.0 - 48.0 %    MCV 69.7 (L) 80.0 - 100.0 fL    MCH 21.9 (L) 26.0 - 34.0 pg    MCHC 31.4 31.0 - 36.0 g/dL    RDW 16.3 (H) 12.4 - 15.4 %    Platelets 269 111 - 652 K/uL    MPV 8.2 5.0 - 10.5 fL    PLATELET SLIDE REVIEW Adequate     SLIDE REVIEW see below     Neutrophils % 50.6 %    Lymphocytes % 37.8 %    Monocytes % 11.2 %    Eosinophils % 0.1 %    Basophils % 0.3 %    Neutrophils Absolute 2.0 1.7 - 7.7 K/uL    Lymphocytes Absolute 1.5 1.0 - 5.1 K/uL    Monocytes Absolute 0.4 0.0 - 1.3 K/uL    Eosinophils Absolute 0.0 0.0 - 0.6 K/uL    Basophils Absolute 0.0 0.0 - 0.2 K/uL    Anisocytosis Occasional (A)     Polychromasia Occasional (A)    Basic Metabolic Panel   Result Value Ref Range    Sodium 136 136 - 145 mmol/L    Potassium 3.5 3.5 - 5.1 mmol/L    Chloride 102 99 - 110 mmol/L    CO2 24 21 - 32 mmol/L    Anion Gap 10 3 - 16    Glucose 111 (H) 70 - 99 mg/dL    BUN 9 7 - 20 mg/dL    CREATININE 0.7 0.6 - 1.1 mg/dL    GFR Non-African American >60 >60    GFR African American >60 >60    Calcium 8.6 8.3 - 10.6 mg/dL     RECENT VITALS:  BP: 102/68, Temp: 98.4 °F (36.9 °C), Pulse: 95, Resp: 20, SpO2: 97 %       ED Course     Nursing Notes, Past Medical Hx, Past Surgical Hx, Social Hx, Allergies, and Family Hx were reviewed. The patient was given the following medications:  Orders Placed This Encounter   Medications    lactated ringers infusion 1,000 mL    ondansetron (ZOFRAN) injection 4 mg            CONSULTS:  None    MEDICAL DECISION MAKING / ASSESSMENT / Jonel Dinora is a 19 y.o. female who presents with complaints as noted in HPI. Patient with known [id number] presenting to the emergency department with a complaint of numbness and tingling in bilateral hands and feet, shortness of breath, nausea and vomiting. On presentation patient is hemodynamically stable, afebrile, overall very well-appearing. Physical exam reassuring. She has full sensation in all extremities, with good cap refill, no color changes noted. After discussion with the patient it sounds like she is most likely having an anxiety reaction as the numbness and tingling in her bilateral hands and feet happens when she notices that her temperature is rising and she feels like her symptoms are at their worst.  Laboratory evaluation today including CBC, BMP, VBG was obtained. CBC with findings consistent with microcytic anemia, patient encouraged to f/u with a pcp for ongoing management of noted mild microcytic anemia and possible further testing. Chest x-ray unremarkable. Given reassuring work-up, and patient's well appearance, I do feel she is stable for discharge home. Will follow up with the PCP regarding noted mild anemia today, as well as ongoing medical needs. Given strict return precautions which he verbalized understanding of. This patient was also evaluated by the attending physician. All care plans were discussed and agreed upon. Clinical Impression     1. COVID-19 virus infection        Disposition     PATIENT REFERRED TO:  [facility] Emergency Department  [address] [place] [address]    If symptoms worsen    [facility] [address]  [address] [place] [address] [address] [id number]    call for follow up with a primary care provider for ongoing medical care and further evaluation for mild anemia noted on bloodwork today      DISCHARGE MEDICATIONS:  There are no discharge medications for this patient.       DISPOSITION  Home in stable condition       Nolia Meckel, APRN - CNP  02/01/21 6538

## 2022-01-20 ENCOUNTER — HOSPITAL ENCOUNTER (OUTPATIENT)
Dept: RADIOLOGY | Facility: HOSPITAL | Age: 57
Discharge: HOME | End: 2022-01-20
Attending: FAMILY MEDICINE
Payer: COMMERCIAL

## 2022-01-20 ENCOUNTER — TELEPHONE (OUTPATIENT)
Dept: ADMINISTRATIVE | Facility: OTHER | Age: 57
End: 2022-01-20

## 2022-01-20 ENCOUNTER — OFFICE VISIT (OUTPATIENT)
Dept: FAMILY MEDICINE CLINIC | Facility: CLINIC | Age: 57
End: 2022-01-20
Payer: COMMERCIAL

## 2022-01-20 VITALS
SYSTOLIC BLOOD PRESSURE: 135 MMHG | HEIGHT: 65 IN | RESPIRATION RATE: 16 BRPM | HEART RATE: 83 BPM | BODY MASS INDEX: 30.66 KG/M2 | DIASTOLIC BLOOD PRESSURE: 82 MMHG | TEMPERATURE: 98.2 F | WEIGHT: 184 LBS

## 2022-01-20 DIAGNOSIS — R10.9 LEFT FLANK PAIN: ICD-10-CM

## 2022-01-20 DIAGNOSIS — I10 ESSENTIAL HYPERTENSION: Primary | ICD-10-CM

## 2022-01-20 DIAGNOSIS — F41.8 DEPRESSION WITH ANXIETY: ICD-10-CM

## 2022-01-20 PROCEDURE — 3079F DIAST BP 80-89 MM HG: CPT | Performed by: FAMILY MEDICINE

## 2022-01-20 PROCEDURE — 99214 OFFICE O/P EST MOD 30 MIN: CPT | Performed by: FAMILY MEDICINE

## 2022-01-20 PROCEDURE — 71101 X-RAY EXAM UNILAT RIBS/CHEST: CPT

## 2022-01-20 PROCEDURE — 3008F BODY MASS INDEX DOCD: CPT | Performed by: FAMILY MEDICINE

## 2022-01-20 PROCEDURE — 3725F SCREEN DEPRESSION PERFORMED: CPT | Performed by: FAMILY MEDICINE

## 2022-01-20 PROCEDURE — 3075F SYST BP GE 130 - 139MM HG: CPT | Performed by: FAMILY MEDICINE

## 2022-01-20 RX ORDER — METHOCARBAMOL 500 MG/1
500 TABLET, FILM COATED ORAL EVERY 6 HOURS SCHEDULED
Qty: 40 TABLET | Refills: 0 | Status: SHIPPED | OUTPATIENT
Start: 2022-01-20 | End: 2022-02-03

## 2022-01-20 NOTE — TELEPHONE ENCOUNTER
----- Message from Bayron Pritchett MD sent at 1/20/2022  9:16 AM EST -----  Regarding: care gap request  01/20/22 9:16 AM    Hello, our patient attached above has had Cologuard negative  And isn't due till 2024 for colonoscopy   Therefore this shouldn't be an issuea    Thank you,  MD LAURIE Moore

## 2022-01-20 NOTE — PROGRESS NOTES
Josi Gomez 1965 female MRN: [id number]    [facility] OFFICE VISIT  [facility] - [address]      ASSESSMENT/PLAN  Josi Gomez is a 60 y o  female presents to the office for    Diagnoses and all orders for this visit:    Essential hypertension  -     Comprehensive metabolic panel; Future  -     Comprehensive metabolic panel    Depression with anxiety    Left flank pain  -     XR ribs bilateral 3 vw; Future  -     methocarbamol (ROBAXIN) 500 mg tablet; Take 1 tablet (500 mg total) by mouth every 6 (six) hours     left flank pain likely costochondritis recommend that the patient use rib oxygen as needed  Patient's depression controlled on medication as prescribed  Hypertension currently well controlled  Already to see nephrology next month           Future Appointments   Date Time Provider Delores Bobby   2/3/2022  1:30 PM Ramana Alaniz MD [facility]          SUBJECTIVE  CC: Flank Pain      HPI:  Josi Gomez is a 60 y o  female who presents for an acute appointment  Patient has had several months of left flank pain  Believes it was secondary to her cough  She states that she has improved with a cough but continues to have the pain around her left ribs  Patient states that she has a history of fractures  Patient states her depression is controlled on the medications as prescribed  She has been a little frustrated at home given that her mother has been having signs of dementia  Patient with a history of a Cologuard negative as well as a colonoscopy that was advised to repeat within 10 years  Hypertension she is taking her medication as prescribed scheduled to see the kidney specialist next month  Review of Systems   Constitutional: Negative for activity change, appetite change, chills, fatigue and fever  HENT: Negative for congestion  Respiratory: Negative for cough, chest tightness and shortness of breath      Cardiovascular: Negative for chest pain and leg swelling  Gastrointestinal: Negative for abdominal distention, abdominal pain, constipation, diarrhea, nausea and vomiting  All other systems reviewed and are negative  Historical Information   The patient history was reviewed as follows:  Past Medical History:   Diagnosis Date    Abnormal immunology findings 06/09/2011    Anemia 05/23/2011    Arthritis     Closed fracture of distal end of fibula     resolved 09/15/17    Depression     Distal radius fracture, left     last assessed 01/30/17    GERD (gastroesophageal reflux disease)     last assessed 05/06/13    Herpes labialis     last assessed 07/23/15    Hypertension     essential ; last assessed 08/07/13    Sinus bradycardia     last assessed 05/24/16         Medications:     Current Outpatient Medications:     acetaminophen (TYLENOL) 325 mg tablet, Take 2 tablets (650 mg total) by mouth every 8 (eight) hours, Disp: , Rfl: 0    amLODIPine (NORVASC) 5 mg tablet, Take 1 tablet (5 mg total) by mouth daily, Disp: 30 tablet, Rfl: 0    buPROPion (Wellbutrin XL) 150 mg 24 hr tablet, Take 1 tablet (150 mg total) by mouth every morning, Disp: 30 tablet, Rfl: 5    folic acid (FOLVITE) 1 mg tablet, Take 1 tablet (1 mg total) by mouth daily, Disp: 30 tablet, Rfl: 0    losartan (COZAAR) 50 mg tablet, Take 1 tablet (50 mg total) by mouth daily, Disp: 90 tablet, Rfl: 1    methocarbamol (ROBAXIN) 500 mg tablet, Take 1 tablet (500 mg total) by mouth every 6 (six) hours, Disp: 40 tablet, Rfl: 0    thiamine (VITAMIN B1) 100 mg tablet, Take 1 tablet (100 mg total) by mouth daily, Disp: 90 tablet, Rfl: 2    Allergies   Allergen Reactions    Lisinopril      COUGH       OBJECTIVE  Vitals:   Vitals:    01/20/22 0909   BP: 135/82   Pulse: 83   Resp: 16   Temp: 98 2 °F (36 8 °C)   Weight: 83 5 kg (184 lb)   Height: 5' 5" (1 651 m)         Physical Exam  Vitals reviewed  Constitutional:       Appearance: She is well-developed     HENT: Head: Normocephalic and atraumatic  Eyes:      Conjunctiva/sclera: Conjunctivae normal       Pupils: Pupils are equal, round, and reactive to light  Cardiovascular:      Rate and Rhythm: Normal rate and regular rhythm  Heart sounds: Normal heart sounds  Pulmonary:      Effort: Pulmonary effort is normal  No respiratory distress  Breath sounds: Normal breath sounds  Musculoskeletal:         General: Normal range of motion  Cervical back: Normal range of motion and neck supple  Skin:     General: Skin is warm  Capillary Refill: Capillary refill takes less than 2 seconds  Neurological:      Mental Status: She is alert and oriented to person, place, and time  Psychiatric:         Mood and Affect: Mood normal          Behavior: Behavior normal          Thought Content:  Thought content normal          Judgment: Judgment normal                     Fady Garza MD,   [facility]  1/20/2022

## 2022-01-24 ENCOUNTER — TELEPHONE (OUTPATIENT)
Dept: FAMILY MEDICINE CLINIC | Facility: CLINIC | Age: 57
End: 2022-01-24

## 2022-01-24 NOTE — TELEPHONE ENCOUNTER
[facility] tc/cma  ----- Message from Alfornia Cushing, MD sent at 1/24/2022 12:32 PM EST -----  No signs of Rib fractures   Continue muscle relaxant as needed

## 2022-01-24 NOTE — TELEPHONE ENCOUNTER
Upon review of the In Basket request we updated due date for 3 years for cologuard from date of service    Any additional questions or concerns should be emailed to the Practice Liaisons via [email or web address] email, please do not reply via In Basket      Thank you  David Velasco MA

## 2022-02-03 ENCOUNTER — OFFICE VISIT (OUTPATIENT)
Dept: FAMILY MEDICINE CLINIC | Facility: CLINIC | Age: 57
End: 2022-02-03
Payer: COMMERCIAL

## 2022-02-03 ENCOUNTER — CONSULT (OUTPATIENT)
Dept: NEPHROLOGY | Facility: CLINIC | Age: 57
End: 2022-02-03
Payer: COMMERCIAL

## 2022-02-03 VITALS
BODY MASS INDEX: 30.16 KG/M2 | HEIGHT: 65 IN | TEMPERATURE: 98.4 F | HEART RATE: 84 BPM | WEIGHT: 181 LBS | RESPIRATION RATE: 14 BRPM | SYSTOLIC BLOOD PRESSURE: 130 MMHG | DIASTOLIC BLOOD PRESSURE: 80 MMHG

## 2022-02-03 VITALS
WEIGHT: 181 LBS | DIASTOLIC BLOOD PRESSURE: 78 MMHG | HEIGHT: 65 IN | BODY MASS INDEX: 30.16 KG/M2 | SYSTOLIC BLOOD PRESSURE: 132 MMHG

## 2022-02-03 DIAGNOSIS — E87.1 HYPONATREMIA: Primary | ICD-10-CM

## 2022-02-03 DIAGNOSIS — E87.1 HYPONATREMIA: ICD-10-CM

## 2022-02-03 DIAGNOSIS — R10.9 LEFT FLANK PAIN: ICD-10-CM

## 2022-02-03 DIAGNOSIS — I10 ESSENTIAL HYPERTENSION: ICD-10-CM

## 2022-02-03 DIAGNOSIS — R05.8 POST-VIRAL COUGH SYNDROME: ICD-10-CM

## 2022-02-03 PROCEDURE — 99204 OFFICE O/P NEW MOD 45 MIN: CPT | Performed by: INTERNAL MEDICINE

## 2022-02-03 PROCEDURE — 99214 OFFICE O/P EST MOD 30 MIN: CPT | Performed by: FAMILY MEDICINE

## 2022-02-03 PROCEDURE — 3079F DIAST BP 80-89 MM HG: CPT | Performed by: FAMILY MEDICINE

## 2022-02-03 PROCEDURE — 3075F SYST BP GE 130 - 139MM HG: CPT | Performed by: FAMILY MEDICINE

## 2022-02-03 PROCEDURE — 3008F BODY MASS INDEX DOCD: CPT | Performed by: FAMILY MEDICINE

## 2022-02-03 RX ORDER — BUDESONIDE AND FORMOTEROL FUMARATE DIHYDRATE 160; 4.5 UG/1; UG/1
2 AEROSOL RESPIRATORY (INHALATION) 2 TIMES DAILY
Qty: 10.2 G | Refills: 0 | Status: SHIPPED | OUTPATIENT
Start: 2022-02-03 | End: 2022-05-26

## 2022-02-03 NOTE — PROGRESS NOTES
NEPHROLOGY OUTPATIENT CONSULTATION   Sarah Jensen 60 y o  female MRN: [id number]  Date: 2/3/2022  Reason for consultation:   Chief Complaint   Patient presents with    Consult       ASSESSMENT and PLAN:    Thank you for the courtesy of this consultation  I had the pleasure of seeing Lambert Matthews today in the renal clinic for the initial management of hyponatremia  Hyponatremia  --chronic  --volume status:  Euvolemic  --presumed to be secondary to combination high free water intake, along alcoholic intake approximately 3 hard seltzer per day, and possibly a component of SIADH from her Wellbutrin  --her sodium level has been chronic since 2018  --noted to have a low a m  Cortisol level of 1 3 back in September of 2021  --check urine sodium and urine osmolality  --check serum osmolality  --x-ray noted which showed no acute cardiopulmonary disease  --repeat basic metabolic panel  --check TSH  --repeat a m  Cortisol level, if low will need ACTH stimulation test, and if that is abnormal will need to refer to endocrinology  --educated on a fluid restriction, reduction of alcohol intake, more balanced diet    Hypertension  --at goal  --continue amlodipine and losartan  --like to avoid salt tablets      PATIENT INSTRUCTIONS:    Patient Instructions   Hyponatremia   WHAT YOU NEED TO KNOW:   Hyponatremia occurs when the amount of sodium (salt) in your blood is lower than normal  Sodium is an electrolyte (mineral) that helps your muscles, heart, and digestive system work properly  It helps control blood pressure and fluid balance  DISCHARGE INSTRUCTIONS:   Follow up with your healthcare provider as directed: You may need to return for more tests  Write down your questions so you remember to ask them during your visits  Nutrition:  You may need to increase your intake of sodium  Foods that are high in sodium include milk, packaged snacks such as pretzels, or processed meats (avery, sausage, and ham)   Ask your dietitian to help you create a meal plan that is right for you  Liquids: Follow your healthcare provider's advice if you need to limit the amount of liquid you drink  Ask how much liquid to drink each day and which liquids are best for you  You may be asked to drink liquids that have water, sugar, and salt, such as juices, milk, or sports drinks  These liquids help your body hold in fluid and prevent dehydration  Contact your healthcare provider if:   · You have muscle cramps or twitching  · You feel very weak or tired  · You have nausea or are vomiting  · You have questions or concerns about your condition or care  Return to the emergency department if:   · You have a seizure  · You have an irregular heartbeat  · You have trouble breathing  · You cannot move your arms and legs  · You are confused or cannot think clearly  © Copyright [employer] 2021 Information is for End User's use only and may not be sold, redistributed or otherwise used for commercial purposes  All illustrations and images included in CareNotes® are the copyrighted property of A D A M , Inc  or [address] [place], [place] [address]   The above information is an  only  It is not intended as medical advice for individual conditions or treatments  Talk to your doctor, nurse or pharmacist before following any medical regimen to see if it is safe and effective for you     1 ) Cut back on your total fluid intake per day, aim for less then 2 Liter per day, reduce alcohol intake , and better diet    2 ) Repeat blood and urine test      HISTORY OF PRESENT ILLNESS:  Requesting Physician: Jeronimo Whipple MD    Leoncio Valdes is a 60 y o  female who has a history of chronic hyponatremia since 2018 around the time that her father passed away and she developed depression and was started on medication for depression and also started drinking heavily at that time  Her sodium at that has been ranging in the low 130s since that time    She is currently maintained on Wellbutrin for the last couple months but have been on different anti depression medications prior to this  He is maintained on losartan and amlodipine for blood pressure  She is a nonsmoker  Her recent x-ray done this month showed no evidence of acute cardiopulmonary disease as she has been having a nonproductive cough  She reported negative COVID testing recently  She reports no chest pain or shortness of breath no swelling the legs  Her renal function remains stable and she has no evidence of hyperkalemia or hyper glycemia  She drinks about 3 White Claws per day  She is not on a fluid restriction and has been trying to drink plenty of Gatorade, Pedialyte and other beverages      PAST MEDICAL HISTORY:  Past Medical History:   Diagnosis Date    Abnormal immunology findings 06/09/2011    Anemia 05/23/2011    Arthritis     Closed fracture of distal end of fibula     resolved 09/15/17    Depression     Distal radius fracture, left     last assessed 01/30/17    GERD (gastroesophageal reflux disease)     last assessed 05/06/13    Herpes labialis     last assessed 07/23/15    Hypertension     essential ; last assessed 08/07/13    Sinus bradycardia     last assessed 05/24/16       PAST SURGICAL HISTORY:  Past Surgical History:   Procedure Laterality Date    ANKLE SURGERY      last assessed 09/15/17    BACK SURGERY      lumbar laminectomy L4-L5    BLADDER SUSPENSION      7/2017    COLONOSCOPY      JOINT REPLACEMENT      TKR  on right    ORIF TIBIA & FIBULA FRACTURES Right 12/15/2016    Procedure: SURGICAL FIXATION OF RIGHT DISTAL FIBULA FRACTURE;  Surgeon: Miguel Ángel Ayala MD;  Location: [facility] MAIN OR;  Service:     ME REVISE KNEE JOINT REPLACE,ALL PARTS Right 10/23/2017    Procedure: REVISION TOTAL KNEE REPLACEMENT WITH FROZEN SECTIONS;  Surgeon: Bianca Merida DO;  Location: [address] [place] [address];  Service: Orthopedics    TOTAL KNEE ARTHROPLASTY      last assessed; 11/21/17    WISDOM TOOTH EXTRACTION         ALLERGIES:  Allergies   Allergen Reactions    Lisinopril      COUGH       SOCIAL HISTORY:  Social History     Substance and Sexual Activity   Alcohol Use Yes    Comment: moderately     Social History     Substance and Sexual Activity   Drug Use No     Social History     Tobacco Use   Smoking Status Never Smoker   Smokeless Tobacco Never Used       FAMILY HISTORY:  Family History   Problem Relation Age of Onset    Arthritis Mother     Osteoporosis Mother     Dementia Father     Other Father         spinal stenosis    Other Family         CREST       MEDICATIONS:    Current Outpatient Medications:     amLODIPine (NORVASC) 5 mg tablet, Take 1 tablet (5 mg total) by mouth daily, Disp: 30 tablet, Rfl: 0    buPROPion (Wellbutrin XL) 150 mg 24 hr tablet, Take 1 tablet (150 mg total) by mouth every morning, Disp: 30 tablet, Rfl: 5    folic acid (FOLVITE) 1 mg tablet, Take 1 tablet (1 mg total) by mouth daily, Disp: 30 tablet, Rfl: 0    losartan (COZAAR) 50 mg tablet, Take 1 tablet (50 mg total) by mouth daily, Disp: 90 tablet, Rfl: 1    thiamine (VITAMIN B1) 100 mg tablet, Take 1 tablet (100 mg total) by mouth daily, Disp: 90 tablet, Rfl: 2    acetaminophen (TYLENOL) 325 mg tablet, Take 2 tablets (650 mg total) by mouth every 8 (eight) hours, Disp: , Rfl: 0    budesonide-formoterol (Symbicort) 160-4 5 mcg/act inhaler, Inhale 2 puffs 2 (two) times a day Rinse mouth after use , Disp: 10 2 g, Rfl: 0    REVIEW OF SYSTEMS:  Review of Systems   Constitutional: Negative for activity change and fever  Respiratory: Negative for cough, chest tightness, shortness of breath and wheezing  Cardiovascular: Negative for chest pain and leg swelling  Gastrointestinal: Negative for abdominal pain, diarrhea, nausea and vomiting  Endocrine: Negative for polyuria  Genitourinary: Negative for difficulty urinating, dysuria, flank pain, frequency and urgency  Skin: Negative for rash  Neurological: Negative for dizziness, syncope, light-headedness and headaches  All the systems were reviewed and were negative except as documented on the HPI  PHYSICAL EXAM:  Current Weight: Body mass index is 30 12 kg/m²  Vitals:    02/03/22 1346   Weight: 82 1 kg (181 lb)   Height: 5' 5" (1 651 m)       Physical Exam  Vitals and nursing note reviewed  Constitutional:       General: She is not in acute distress  Appearance: She is well-developed  She is obese  HENT:      Head: Normocephalic and atraumatic  Eyes:      General: No scleral icterus  Conjunctiva/sclera: Conjunctivae normal       Pupils: Pupils are equal, round, and reactive to light  Cardiovascular:      Rate and Rhythm: Normal rate and regular rhythm  Heart sounds: S1 normal and S2 normal  No murmur heard  No friction rub  No gallop  Pulmonary:      Effort: Pulmonary effort is normal  No respiratory distress  Breath sounds: Normal breath sounds  No wheezing or rales  Abdominal:      General: Bowel sounds are normal       Palpations: Abdomen is soft  Tenderness: There is no abdominal tenderness  There is no rebound  Musculoskeletal:         General: Normal range of motion  Cervical back: Normal range of motion and neck supple  Skin:     Findings: No rash  Neurological:      Mental Status: She is alert and oriented to person, place, and time     Psychiatric:         Behavior: Behavior normal          Laboratory results:   Results for orders placed or performed in visit on 75/63/40   Basic metabolic panel   Result Value Ref Range    Sodium 131 (L) 136 - 145 mmol/L    Potassium 4 9 3 5 - 5 3 mmol/L    Chloride 97 (L) 100 - 108 mmol/L    CO2 30 21 - 32 mmol/L    ANION GAP 4 4 - 13 mmol/L    BUN 10 5 - 25 mg/dL    Creatinine 0 86 0 60 - 1 30 mg/dL    Glucose, Fasting 89 65 - 99 mg/dL    Calcium 9 9 8 3 - 10 1 mg/dL    eGFR 75 ml/min/1 73sq m

## 2022-02-03 NOTE — PROGRESS NOTES
Obdulia Lai 1965 female MRN: [id number]    [address]      ASSESSMENT/PLAN  Obdulia Lai is a 60 y o  female presents to the office for     Diagnoses and all orders for this visit:    Hyponatremia    Post-viral cough syndrome  -     budesonide-formoterol (Symbicort) 160-4 5 mcg/act inhaler; Inhale 2 puffs 2 (two) times a day Rinse mouth after use  Essential hypertension    Left flank pain      I believe that the patient likely has costochondritis  Given that she has had a persistent cough for 6 weeks  Therefore will start her on Symbicort to hopefully give her relief of her left flank pain  As well as the postviral cough with inspiration  If it continues to persist after using Symbicort and will refer her to Pulmonary  Patient has an upcoming appointment today with Nephrology given her significantly low levels of sodium  Patient does have a history of drinking but has been only social drinking these days  Health Maintence:         Disposition: Return to the office in 3 months  Future Appointments   Date Time Provider Delores Bobby   2/3/2022  1:30 PM Deborah Fitzgerald MD [address] [place] [address]  CC: Cough (dry cough for 6 weeks)      HPI:  Obdulia Lai is a 60 y o  femalepresenting to the office for a follow up a cough that the patient had formal 6 weeks  Patient states that she did get sick in [place] time and did have a Z-Mazin at that time  Patient was not walked for COVID-19 at that time  She has a lingering cough that happens during inspiration states that it does not happen when lying down or sitting down  It is only when she is exerting herself  Blood pressure has been significantly improved on medications as seen on both med list   Patient continues to have low sodiums on blood work  Review of Systems   Constitutional: Negative for activity change, appetite change, chills, fatigue and fever     HENT: Negative for congestion  Respiratory: Positive for cough  Negative for chest tightness and shortness of breath  Cardiovascular: Negative for chest pain and leg swelling  Gastrointestinal: Negative for abdominal distention, abdominal pain, constipation, diarrhea, nausea and vomiting  Musculoskeletal: Positive for arthralgias  All other systems reviewed and are negative        Historical Information   The patient history was reviewed as follows:    Past Medical History:   Diagnosis Date    Abnormal immunology findings 06/09/2011    Anemia 05/23/2011    Arthritis     Closed fracture of distal end of fibula     resolved 09/15/17    Depression     Distal radius fracture, left     last assessed 01/30/17    GERD (gastroesophageal reflux disease)     last assessed 05/06/13    Herpes labialis     last assessed 07/23/15    Hypertension     essential ; last assessed 08/07/13    Sinus bradycardia     last assessed 05/24/16     Past Surgical History:   Procedure Laterality Date    ANKLE SURGERY      last assessed 09/15/17    BACK SURGERY      lumbar laminectomy L4-L5    BLADDER SUSPENSION      7/2017    COLONOSCOPY      JOINT REPLACEMENT      TKR  on right    ORIF TIBIA & FIBULA FRACTURES Right 12/15/2016    Procedure: SURGICAL FIXATION OF RIGHT DISTAL FIBULA FRACTURE;  Surgeon: Laure Rai MD;  Location[facility] MAIN OR;  Service:     OK REVISE KNEE JOINT REPLACE,ALL PARTS Right 10/23/2017    Procedure: REVISION TOTAL KNEE REPLACEMENT WITH FROZEN SECTIONS;  Surgeon: Wesley Olmos DO;  Location: [address] [place] [address];  Service: Orthopedics    TOTAL KNEE ARTHROPLASTY      last assessed; 11/21/17    WISDOM TOOTH EXTRACTION       Family History   Problem Relation Age of Onset    Arthritis Mother     Osteoporosis Mother     Dementia Father     Other Father         spinal stenosis    Other Family         [employer]      Social History   Social History     Substance and Sexual Activity   Alcohol Use Yes Comment: moderately     Social History     Substance and Sexual Activity   Drug Use No     Social History     Tobacco Use   Smoking Status Never Smoker   Smokeless Tobacco Never Used       Medications:     Current Outpatient Medications:     acetaminophen (TYLENOL) 325 mg tablet, Take 2 tablets (650 mg total) by mouth every 8 (eight) hours, Disp: , Rfl: 0    amLODIPine (NORVASC) 5 mg tablet, Take 1 tablet (5 mg total) by mouth daily, Disp: 30 tablet, Rfl: 0    buPROPion (Wellbutrin XL) 150 mg 24 hr tablet, Take 1 tablet (150 mg total) by mouth every morning, Disp: 30 tablet, Rfl: 5    folic acid (FOLVITE) 1 mg tablet, Take 1 tablet (1 mg total) by mouth daily, Disp: 30 tablet, Rfl: 0    losartan (COZAAR) 50 mg tablet, Take 1 tablet (50 mg total) by mouth daily, Disp: 90 tablet, Rfl: 1    thiamine (VITAMIN B1) 100 mg tablet, Take 1 tablet (100 mg total) by mouth daily, Disp: 90 tablet, Rfl: 2    budesonide-formoterol (Symbicort) 160-4 5 mcg/act inhaler, Inhale 2 puffs 2 (two) times a day Rinse mouth after use , Disp: 10 2 g, Rfl: 0  Allergies   Allergen Reactions    Lisinopril      COUGH       OBJECTIVE    Vitals:   Vitals:    02/03/22 0858   BP: 130/80   BP Location: Left arm   Patient Position: Sitting   Cuff Size: Adult   Pulse: 84   Resp: 14   Temp: 98 4 °F (36 9 °C)   TempSrc: Temporal   Weight: 82 1 kg (181 lb)   Height: 5' 5" (1 651 m)           Physical Exam  Vitals reviewed  Constitutional:       Appearance: She is well-developed  HENT:      Head: Normocephalic and atraumatic  Eyes:      Conjunctiva/sclera: Conjunctivae normal       Pupils: Pupils are equal, round, and reactive to light  Cardiovascular:      Rate and Rhythm: Normal rate and regular rhythm  Heart sounds: Normal heart sounds  Pulmonary:      Effort: Pulmonary effort is normal  No respiratory distress  Breath sounds: Normal breath sounds  Musculoskeletal:         General: Tenderness (left lower ribs) present  Normal range of motion  Cervical back: Normal range of motion and neck supple  Skin:     General: Skin is warm  Capillary Refill: Capillary refill takes less than 2 seconds  Neurological:      Mental Status: She is alert and oriented to person, place, and time              Kelli Avalos MD  [facility] [address]

## 2022-02-03 NOTE — PATIENT INSTRUCTIONS
Hyponatremia   WHAT YOU NEED TO KNOW:   Hyponatremia occurs when the amount of sodium (salt) in your blood is lower than normal  Sodium is an electrolyte (mineral) that helps your muscles, heart, and digestive system work properly  It helps control blood pressure and fluid balance  DISCHARGE INSTRUCTIONS:   Follow up with your healthcare provider as directed: You may need to return for more tests  Write down your questions so you remember to ask them during your visits  Nutrition:  You may need to increase your intake of sodium  Foods that are high in sodium include milk, packaged snacks such as pretzels, or processed meats (avery, sausage, and ham)  Ask your dietitian to help you create a meal plan that is right for you  Liquids: Follow your healthcare provider's advice if you need to limit the amount of liquid you drink  Ask how much liquid to drink each day and which liquids are best for you  You may be asked to drink liquids that have water, sugar, and salt, such as juices, milk, or sports drinks  These liquids help your body hold in fluid and prevent dehydration  Contact your healthcare provider if:   · You have muscle cramps or twitching  · You feel very weak or tired  · You have nausea or are vomiting  · You have questions or concerns about your condition or care  Return to the emergency department if:   · You have a seizure  · You have an irregular heartbeat  · You have trouble breathing  · You cannot move your arms and legs  · You are confused or cannot think clearly  © Copyright [employer] 2021 Information is for End User's use only and may not be sold, redistributed or otherwise used for commercial purposes  All illustrations and images included in CareNotes® are the copyrighted property of [employer] M [employer] Inc  or [facility] [employer]   The above information is an  only  It is not intended as medical advice for individual conditions or treatments   Talk to your doctor, nurse or pharmacist before following any medical regimen to see if it is safe and effective for you     1 ) Cut back on your total fluid intake per day, aim for less then 2 Liter per day, reduce alcohol intake , and better diet    2 ) Repeat blood and urine test

## 2022-02-23 ENCOUNTER — TELEMEDICINE (OUTPATIENT)
Dept: FAMILY MEDICINE CLINIC | Facility: CLINIC | Age: 57
End: 2022-02-23
Payer: COMMERCIAL

## 2022-02-23 ENCOUNTER — TELEPHONE (OUTPATIENT)
Dept: FAMILY MEDICINE CLINIC | Facility: CLINIC | Age: 57
End: 2022-02-23

## 2022-02-23 DIAGNOSIS — R30.0 DYSURIA: Primary | ICD-10-CM

## 2022-02-23 LAB
SL AMB  POCT GLUCOSE, UA: ABNORMAL
SL AMB LEUKOCYTE ESTERASE,UA: 500
SL AMB POCT BILIRUBIN,UA: ABNORMAL
SL AMB POCT BLOOD,UA: 50
SL AMB POCT CLARITY,UA: ABNORMAL
SL AMB POCT COLOR,UA: YELLOW
SL AMB POCT KETONES,UA: ABNORMAL
SL AMB POCT NITRITE,UA: ABNORMAL
SL AMB POCT PH,UA: 6
SL AMB POCT SPECIFIC GRAVITY,UA: 1
SL AMB POCT URINE PROTEIN: ABNORMAL
SL AMB POCT UROBILINOGEN: ABNORMAL

## 2022-02-23 PROCEDURE — 81003 URINALYSIS AUTO W/O SCOPE: CPT

## 2022-02-23 PROCEDURE — 99213 OFFICE O/P EST LOW 20 MIN: CPT | Performed by: FAMILY MEDICINE

## 2022-02-23 PROCEDURE — 1036F TOBACCO NON-USER: CPT | Performed by: FAMILY MEDICINE

## 2022-02-23 RX ORDER — CIPROFLOXACIN 500 MG/1
500 TABLET, FILM COATED ORAL EVERY 12 HOURS SCHEDULED
Qty: 10 TABLET | Refills: 0 | Status: SHIPPED | OUTPATIENT
Start: 2022-02-23 | End: 2022-02-28

## 2022-02-23 NOTE — TELEPHONE ENCOUNTER
Dipped urine and it showed evidence of UTI  Sent to [facility] for urine culture

## 2022-02-23 NOTE — PROGRESS NOTES
Virtual Regular Visit    Verification of patient location:    Patient is located in the following state in which I hold an active license [place]      Assessment/Plan:    Problem List Items Addressed This Visit     None      Visit Diagnoses     Dysuria    -  Primary    Relevant Medications    ciprofloxacin (CIPRO) 500 mg tablet        Patient will obtain Cipro twice a day for total of 5 days  Recommend her to drop off a urine sample today  Advised her that our office does close at 5  I do not want her taking a dose of antibiotics prior to leaving sample patient understands    Reviewed Nephrology note with patient       Reason for visit is   Chief Complaint   Patient presents with    Virtual Regular Visit        Encounter provider Fady Garza MD    Provider located at [address] [place] [id number]      Recent Visits  No visits were found meeting these conditions  Showing recent visits within past 7 days and meeting all other requirements  Today's Visits  Date Type Provider Dept   02/23/22 Telemedicine Fady Garza  [facility] today's visits and meeting all other requirements  Future Appointments  No visits were found meeting these conditions  Showing future appointments within next 150 days and meeting all other requirements       The patient was identified by name and date of birth  Geovanni Claudio was informed that this is a telemedicine visit and that the visit is being conducted through [facility] and patient was informed that this is not a secure, HIPAA-compliant platform  She agrees to proceed     My office door was closed  No one else was in the room  She acknowledged consent and understanding of privacy and security of the video platform  The patient has agreed to participate and understands they can discontinue the visit at any time    It was my intent to perform this visit via video technology but the patient was not able to do a video connection so the visit was completed via audio telephone only  Patient is aware this is a billable service  Subjective  Jeniffer Naik is a 60 y o  female presents    " I have a pretty bad UTI"  Azo-> no helping at all  She is very uncomfortable  Valentines day-> after intercourse has been persistent ever since        Past Medical History:   Diagnosis Date    Abnormal immunology findings 06/09/2011    Anemia 05/23/2011    Arthritis     Closed fracture of distal end of fibula     resolved 09/15/17    Depression     Distal radius fracture, left     last assessed 01/30/17    GERD (gastroesophageal reflux disease)     last assessed 05/06/13    Herpes labialis     last assessed 07/23/15    Hypertension     essential ; last assessed 08/07/13    Sinus bradycardia     last assessed 05/24/16       Past Surgical History:   Procedure Laterality Date    ANKLE SURGERY      last assessed 09/15/17    BACK SURGERY      lumbar laminectomy L4-L5    BLADDER SUSPENSION      7/2017    COLONOSCOPY      JOINT REPLACEMENT      TKR  on right    ORIF TIBIA & FIBULA FRACTURES Right 12/15/2016    Procedure: SURGICAL FIXATION OF RIGHT DISTAL FIBULA FRACTURE;  Surgeon: Gurmeet Matta MD;  [facility] MA[facility] OR;  Service:     CO REVISE KNEE JOINT REPLACE,ALL PARTS Right 10/23/2017    Procedure: REVISION TOTAL KNEE REPLACEMENT WITH FROZEN SECTIONS;  Surgeon: Sandro Armando DO;  Location: [address] [place] [address];  Service: Orthopedics    TOTAL KNEE ARTHROPLASTY      last assessed; 11/21/17    WISDOM TOOTH EXTRACTION         Current Outpatient Medications   Medication Sig Dispense Refill    acetaminophen (TYLENOL) 325 mg tablet Take 2 tablets (650 mg total) by mouth every 8 (eight) hours  0    amLODIPine (NORVASC) 5 mg tablet Take 1 tablet (5 mg total) by mouth daily 30 tablet 0    budesonide-formoterol (Symbicort) 160-4 5 mcg/act inhaler Inhale 2 puffs 2 (two) times a day Rinse mouth after use  10 2 g 0    buPROPion (Wellbutrin XL) 150 mg 24 hr tablet Take 1 tablet (150 mg total) by mouth every morning 30 tablet 5    ciprofloxacin (CIPRO) 500 mg tablet Take 1 tablet (500 mg total) by mouth every 12 (twelve) hours for 5 days 10 tablet 0    folic acid (FOLVITE) 1 mg tablet Take 1 tablet (1 mg total) by mouth daily 30 tablet 0    losartan (COZAAR) 50 mg tablet Take 1 tablet (50 mg total) by mouth daily 90 tablet 1    thiamine (VITAMIN B1) 100 mg tablet Take 1 tablet (100 mg total) by mouth daily 90 tablet 2     No current facility-administered medications for this visit  Allergies   Allergen Reactions    Lisinopril      COUGH       Review of Systems   Constitutional: Negative for activity change, appetite change, chills, fatigue and fever  HENT: Negative for congestion  Respiratory: Negative for cough, chest tightness and shortness of breath  Cardiovascular: Negative for chest pain and leg swelling  Gastrointestinal: Negative for abdominal distention, abdominal pain, constipation, diarrhea, nausea and vomiting  Genitourinary: Positive for dysuria, frequency and urgency  All other systems reviewed and are negative  Video Exam    There were no vitals filed for this visit  I spent 15 minutes directly with the patient during this visit    VIRTUAL VISIT DISCLAIMER      Tammi Canela verbally agrees to participate in [employer]  Pt is aware that [employer] could be limited without vital signs or the ability to perform a full hands-on physical Serjio Giron understands she or the provider may request at any time to terminate the video visit and request the patient to seek care or treatment in person

## 2022-02-25 LAB
BACTERIA UR CULT: NORMAL
Lab: NORMAL

## 2022-03-01 ENCOUNTER — TELEPHONE (OUTPATIENT)
Dept: FAMILY MEDICINE CLINIC | Facility: CLINIC | Age: 57
End: 2022-03-01

## 2022-03-01 NOTE — TELEPHONE ENCOUNTER
Dr Quinteros Shock    Patient is requesting refill losartan potassium 50 mg 1 x daily sent to [facility]  Only see losartan on med list - pt says it is losartan potassium

## 2022-03-08 ENCOUNTER — OFFICE VISIT (OUTPATIENT)
Dept: OBGYN CLINIC | Facility: CLINIC | Age: 57
End: 2022-03-08
Payer: COMMERCIAL

## 2022-03-08 VITALS
SYSTOLIC BLOOD PRESSURE: 148 MMHG | HEART RATE: 78 BPM | HEIGHT: 65 IN | DIASTOLIC BLOOD PRESSURE: 89 MMHG | BODY MASS INDEX: 29.82 KG/M2 | WEIGHT: 179 LBS

## 2022-03-08 DIAGNOSIS — M54.50 ACUTE LEFT-SIDED LOW BACK PAIN WITHOUT SCIATICA: Primary | ICD-10-CM

## 2022-03-08 PROCEDURE — 3077F SYST BP >= 140 MM HG: CPT | Performed by: ORTHOPAEDIC SURGERY

## 2022-03-08 PROCEDURE — 99214 OFFICE O/P EST MOD 30 MIN: CPT | Performed by: ORTHOPAEDIC SURGERY

## 2022-03-08 PROCEDURE — 3079F DIAST BP 80-89 MM HG: CPT | Performed by: ORTHOPAEDIC SURGERY

## 2022-03-08 PROCEDURE — 3008F BODY MASS INDEX DOCD: CPT | Performed by: ORTHOPAEDIC SURGERY

## 2022-03-08 PROCEDURE — 1036F TOBACCO NON-USER: CPT | Performed by: ORTHOPAEDIC SURGERY

## 2022-03-08 NOTE — PROGRESS NOTES
Assessment/Plan:  1  Acute left-sided low back pain without sciatica  Ambulatory referral to Physical Therapy     Mitzy Rousseau has low back pain associated with her mild scoliosis  Most of her pain is on the left paraspinal region just where the largest area of curve is in her spine  I recommended formal physical therapy at this time  She verbalized understanding of this plan will follow-up after therapy if the pain persists or worsens  Subjective:   Georgiana Olivarez is a 60 y o  female who presents to the office for evaluation for low back pain that has been bother her for the last 4-5 months she initially saw her primary care physician for pain over her left flank secondary to a cough  She has a history of compression fractures in her thoracic spine and she was diagnosed with a rib fracture  She had x-rays in the past demonstrating degenerative changes and scoliosis  She continues to have pain over the left flank region and pain that worsens with activity  She tried 1 day of physical therapy and states this made it feel worse  She denies any numbness or tingling or radiating pain down her legs  Review of Systems   Constitutional: Negative for chills, fever and unexpected weight change  HENT: Negative for hearing loss, nosebleeds and sore throat  Eyes: Negative for pain, redness and visual disturbance  Respiratory: Negative for cough, shortness of breath and wheezing  Cardiovascular: Negative for chest pain, palpitations and leg swelling  Gastrointestinal: Negative for abdominal pain, nausea and vomiting  Endocrine: Negative for polydipsia and polyuria  Genitourinary: Negative for dysuria and hematuria  Musculoskeletal:        See HPI   Skin: Negative for rash and wound  Neurological: Negative for dizziness, numbness and headaches  Psychiatric/Behavioral: Negative for decreased concentration and suicidal ideas  The patient is not nervous/anxious            Past Medical History: Diagnosis Date    Abnormal immunology findings 06/09/2011    Anemia 05/23/2011    Arthritis     Closed fracture of distal end of fibula     resolved 09/15/17    Depression     Distal radius fracture, left     last assessed 01/30/17    GERD (gastroesophageal reflux disease)     last assessed 05/06/13    Herpes labialis     last assessed 07/23/15    Hypertension     essential ; last assessed 08/07/13    Sinus bradycardia     last assessed 05/24/16       Past Surgical History:   Procedure Laterality Date    ANKLE SURGERY      last assessed 09/15/17    BACK SURGERY      lumbar laminectomy L4-L5    BLADDER SUSPENSION      7/2017    COLONOSCOPY      JOINT REPLACEMENT      TKR  on right    ORIF TIBIA & FIBULA FRACTURES Right 12/15/2016    Procedure: SURGICAL FIXATION OF RIGHT DISTAL FIBULA FRACTURE;  Surgeon: Stephanie Tuttle MD;  Location: [facility] MAIN OR;  Service:     WA REVISE KNEE JOINT REPLACE,ALL PARTS Right 10/23/2017    Procedure: REVISION TOTAL KNEE REPLACEMENT WITH FROZEN SECTIONS;  Surgeon: Matthew Jolly DO;  Location: [address] [place] [address];  Service: Orthopedics    TOTAL KNEE ARTHROPLASTY      last assessed; 11/21/17    WISDOM TOOTH EXTRACTION         Family History   Problem Relation Age of Onset    Arthritis Mother     Osteoporosis Mother     Dementia Father     Other Father         spinal stenosis    Other Family         CREST       Social History     Occupational History    Not on file   Tobacco Use    Smoking status: Never Smoker    Smokeless tobacco: Never Used   Vaping Use    Vaping Use: Never used   Substance and Sexual Activity    Alcohol use: Yes     Comment: moderately    Drug use: No    Sexual activity: Yes     Partners: Male         Current Outpatient Medications:     acetaminophen (TYLENOL) 325 mg tablet, Take 2 tablets (650 mg total) by mouth every 8 (eight) hours, Disp: , Rfl: 0    amLODIPine (NORVASC) 5 mg tablet, Take 1 tablet (5 mg total) by mouth daily, Disp: 30 tablet, Rfl: 0    budesonide-formoterol (Symbicort) 160-4 5 mcg/act inhaler, Inhale 2 puffs 2 (two) times a day Rinse mouth after use , Disp: 10 2 g, Rfl: 0    buPROPion (Wellbutrin XL) 150 mg 24 hr tablet, Take 1 tablet (150 mg total) by mouth every morning, Disp: 30 tablet, Rfl: 5    folic acid (FOLVITE) 1 mg tablet, Take 1 tablet (1 mg total) by mouth daily, Disp: 30 tablet, Rfl: 0    losartan (COZAAR) 50 mg tablet, Take 1 tablet (50 mg total) by mouth daily, Disp: 90 tablet, Rfl: 2    thiamine (VITAMIN B1) 100 mg tablet, Take 1 tablet (100 mg total) by mouth daily, Disp: 90 tablet, Rfl: 2    Allergies   Allergen Reactions    Lisinopril      COUGH       Objective:  Vitals:    03/08/22 1207   BP: 148/89   Pulse: 78       Back Exam     Tenderness   Back tenderness location: Tenderness to palpation over left lumbar paraspinal region  Range of Motion   Extension: normal   Flexion: normal     Muscle Strength   Right Quadriceps:  5/5   Left Quadriceps:  5/5   Right Hamstrings:  5/5   Left Hamstrings:  5/5     Tests   Straight leg raise right: negative  Straight leg raise left: negative    Other   Sensation: normal  Gait: normal   Erythema: no back redness            Physical Exam  Vitals and nursing note reviewed  Constitutional:       Appearance: She is well-developed  HENT:      Head: Normocephalic and atraumatic  Eyes:      General: No scleral icterus  Conjunctiva/sclera: Conjunctivae normal    Cardiovascular:      Rate and Rhythm: Normal rate  Pulmonary:      Effort: Pulmonary effort is normal  No respiratory distress  Musculoskeletal:      Cervical back: Normal range of motion and neck supple  Lumbar back: Negative right straight leg raise test and negative left straight leg raise test       Comments: As noted in HPI   Skin:     General: Skin is warm and dry  Neurological:      Mental Status: She is alert and oriented to person, place, and time     Psychiatric:         Behavior: Behavior normal          I have personally reviewed pertinent films in PACS and my interpretation is as follows:  X-rays of the thoracolumbar spine from 10/15/21 demonstrates wedge deformities of T12 and L2 which remained unchanged from previous imaging  Degenerative changes and disc height loss at L4-5 mild levoscoliosis

## 2022-03-10 ENCOUNTER — EVALUATION (OUTPATIENT)
Dept: PHYSICAL THERAPY | Facility: CLINIC | Age: 57
End: 2022-03-10
Payer: COMMERCIAL

## 2022-03-10 DIAGNOSIS — M54.50 ACUTE LEFT-SIDED LOW BACK PAIN WITHOUT SCIATICA: Primary | ICD-10-CM

## 2022-03-10 DIAGNOSIS — M54.6 THORACIC SPINE PAIN: ICD-10-CM

## 2022-03-10 PROCEDURE — 97161 PT EVAL LOW COMPLEX 20 MIN: CPT

## 2022-03-10 NOTE — PROGRESS NOTES
PT Evaluation     Today's date: 3/10/2022  Patient name: Supriya Armstrong  : 1965  MRN: [id number]  Referring provider: Nicole Gibbons DO  Dx:   Encounter Diagnosis     ICD-10-CM    1  Acute left-sided low back pain without sciatica  M54 50 Ambulatory referral to Physical Therapy   2  Thoracic spine pain  M54 6                   Assessment  Assessment details: Supriya Armstrong is a 60 y o  female who presents with probable thoracic derangement causing pain, decreased strength, decreased ROM, ambulatory dysfunction and postural  dysfunction  Due to these impairments, patient has difficulty performing ADL's, recreational activities, ambulation, lifting/carrying, reaching  Patient's clinical presentation is consistent with their referring diagnosis of Acute left-sided low back pain without sciatica  (primary encounter diagnosis), Thoracic spine pain  Patient has been educated in home exercise program and plan of care   Patient would benefit from skilled physical therapy services to address their aforementioned functional limitations and progress towards prior level of function and independence with home exercise program      Impairments: abnormal or restricted ROM, activity intolerance, impaired physical strength, lacks appropriate home exercise program, pain with function, poor posture  and poor body mechanics    Goals  Short Term Goals to be accomplished in 4 weeks:  STG1: Pt will be I with HEP   STG2: Pt will be I with posture management  STG3: Pt will demo thoracic/lumbar AROM >50% improvement  STG4: Pt will demo 1/2 MMT grade inc in cervical stabilizers and shoulder strength  STG5: Pt will deny sleep disturbance due to pain     Long Term Goals to be accomplished in 12 weeks:   LTG1: Pt will demo cervical stab/shoulder strength to WNL as per PLOF  LTG2: Pt will return to as per PLOF pain free   LTG3: Pt will demo thoracic AROM WNL      Plan  Plan details: HEP development, stretching, strengthening, A/AA/PROM, joint mobilizations, posture education, STM/MI as needed to reduce muscle tension, muscle reeducation, PLOC discussed and agreed upon with patient  Patient would benefit from: PT eval and skilled physical therapy  Planned modality interventions: cryotherapy and thermotherapy: hydrocollator packs  Planned therapy interventions: manual therapy, neuromuscular re-education, self care, therapeutic activities, therapeutic exercise and home exercise program  Frequency: 2x week  Plan of Care beginning date: 3/10/2022  Plan of Care expiration date: 2022  Treatment plan discussed with: patient        Subjective Evaluation    History of Present Illness  Mechanism of injury: Patient presents w/ c/c of L sided rib/thoracic spine pain  States she fell this summer and ended up with compression fractures in her thoracic spine but that pain has resolved  Pain is constant in nature  Was referred from orthopedic  Pain  Current pain ratin  At best pain ratin  At worst pain ratin  Location: L sided ribs  Quality: tight, pulling, dull ache and sharp  Relieving factors: rest  Aggravating factors: sitting and lifting (bending, twisting, taking deep breathe in, reaching across her body )  Progression: no change    Hand dominance: right      Diagnostic Tests  X-ray: normal (lumbar spine, thoracic spine, ribs )  Treatments  No previous or current treatments  Patient Goals  Patient goals for therapy: decreased pain, increased motion, independence with ADLs/IADLs and return to sport/leisure activities          Objective  (*=pain)  Posture: Lumbar lordosis is WNL in standing  There is nil lateral shift  In siting, lumbar roll NE on comfort      Lumbar AROM limitations:  (*=  Pain)  Lumbar flexion: WNL  Lumbar extension: min-mod loss*   R side glide: WNL   L side glide: WNL    Thoracic AROM loss:  Extension mod loss*  R Rotation min-mod loss*  L Rotation WNL    Shoulder AROM: R  L   Flexion   170  130*  Abduction  170  130   Functional IR  T5  Occiput   Functional ER  T12  Lat hip    Mechanical Asessment: pre-test symtpoms include 3/10 L sided rib/thoracic pain   Repeated Extension in Standing (LYNDSAY): Inc/NW  Thoracic ext in sitting: Inc/B (reduced pain inc mobility)    Strength:  Core strength: Fair     Right  Left  Hip flexion:  5/5  5/5  Knee ext  5/5  5/5  Ankle DF  5/5  4/5  Knee flex  5/5  5/5  Hip ext   4-/5  4-/5  Hip abd   4/5  4/5    Strength: MMT R  L  Shoulder flexion 5/5  3+/5*   Shoulder abd  5/5  3+/5*   Mid trap  3+/5  3/5     Low trap  3+/5  3/5     Tenderness/Palpation:   + TTP L thoracic paraspinals w/ in hypertonicity noted           Precautions: L humerus fx (healed)  Past Medical History:   Diagnosis Date    Abnormal immunology findings 06/09/2011    Anemia 05/23/2011    Arthritis     Closed fracture of distal end of fibula     resolved 09/15/17    Depression     Distal radius fracture, left     last assessed 01/30/17    GERD (gastroesophageal reflux disease)     last assessed 05/06/13    Herpes labialis     last assessed 07/23/15    Hypertension     essential ; last assessed 08/07/13    Sinus bradycardia     last assessed 05/24/16       Daily Treatment Log:  Date 3/10/22        Visit #        Manual                        Ther Exer        T/S ext in chair w/ MB         Wall slides         LYNDSAY over counter                                        HEP        Ther Activ                                                        NMReed                                                Modalities                                  HEP: Rep T/S ext in sitting 10x every 2-3 hrs  [Annual Wellness Visit] : an annual wellness visit

## 2022-03-14 ENCOUNTER — OFFICE VISIT (OUTPATIENT)
Dept: PHYSICAL THERAPY | Facility: CLINIC | Age: 57
End: 2022-03-14
Payer: COMMERCIAL

## 2022-03-14 DIAGNOSIS — M54.6 THORACIC SPINE PAIN: Primary | ICD-10-CM

## 2022-03-14 DIAGNOSIS — M54.50 ACUTE LEFT-SIDED LOW BACK PAIN WITHOUT SCIATICA: ICD-10-CM

## 2022-03-14 PROCEDURE — 97140 MANUAL THERAPY 1/> REGIONS: CPT

## 2022-03-14 PROCEDURE — 97110 THERAPEUTIC EXERCISES: CPT

## 2022-03-14 NOTE — PROGRESS NOTES
Daily Note     Today's date: 3/14/2022  Patient name: Leoncio Valdes  : 1965  MRN: [id number]  Referring provider: Juanjose Newsome DO  Dx:   Encounter Diagnosis     ICD-10-CM    1  Thoracic spine pain  M54 6    2  Acute left-sided low back pain without sciatica  M54 50                   Subjective: Notes she feels the exercise made her pain inc so she stopped doing them  Objective: See treatment diary below      Assessment: Tolerated treatment well, w/ more relief from LNYDSAY compared to T/S ext in sitting today  Patient demonstrated fatigue post treatment, exhibited good technique with therapeutic exercises and would benefit from continued PT      Plan: Continue per plan of care  LYNDSAY 10x every 2-3 hrs instead of thoracic ext  Precautions: L humerus fx (healed)  Past Medical History:   Diagnosis Date    Abnormal immunology findings 2011    Anemia 2011    Arthritis     Closed fracture of distal end of fibula     resolved 09/15/17    Depression     Distal radius fracture, left     last assessed 17    GERD (gastroesophageal reflux disease)     last assessed 13    Herpes labialis     last assessed 07/23/15    Hypertension     essential ; last assessed 13    Sinus bradycardia     last assessed 16       Daily Treatment Log:  Date 3/10/22  3/14/22      Visit # 1 2 FOTO      Manual  10'      Cupping   L thoracolumbar paraspinals              Ther Exer  30'      T/S ext in chair w/ MB   2x10 (NE)      Wall slides   1x10 5"      LYNDSAY over counter  2x10 (Dec/) x2      ZHENG   3x30"       REIL  2x10  (Dec/ )                                              HEP        Ther Activ                                                        NMReed                                                Modalities                                  HEP: LYNDSAY 10x every 2-3 hrs

## 2022-03-17 ENCOUNTER — OFFICE VISIT (OUTPATIENT)
Dept: PHYSICAL THERAPY | Facility: CLINIC | Age: 57
End: 2022-03-17
Payer: COMMERCIAL

## 2022-03-17 DIAGNOSIS — M54.6 THORACIC SPINE PAIN: Primary | ICD-10-CM

## 2022-03-17 DIAGNOSIS — M54.50 ACUTE LEFT-SIDED LOW BACK PAIN WITHOUT SCIATICA: ICD-10-CM

## 2022-03-17 PROCEDURE — 97140 MANUAL THERAPY 1/> REGIONS: CPT

## 2022-03-17 PROCEDURE — 97110 THERAPEUTIC EXERCISES: CPT

## 2022-03-17 NOTE — PROGRESS NOTES
Daily Note     Today's date: 3/17/2022  Patient name: Jeniffer Naik  : 1965  MRN: [id number]  Referring provider: Olga Chamorro DO  Dx:   Encounter Diagnosis     ICD-10-CM    1  Thoracic spine pain  M54 6    2  Acute left-sided low back pain without sciatica  M54 50                   Subjective: pt reports "she is a changed woman" and is feeling much better since her last session  She reports compliance with her LYNDSAY every couple hours  Objective: See treatment diary below      Assessment: Tolerated treatment well  Pt edu to cont with her ext based LYNDSAY at home every couple hours  Pt eager to DC from PT now she is feeling better, pt edu to wait until she has 4-5 days without increased pain before prematurely DC from PT, dem an understanding  Patient would benefit from continued PT      Plan: Continue per plan of care        Precautions: L humerus fx (healed)  Past Medical History:   Diagnosis Date    Abnormal immunology findings 2011    Anemia 2011    Arthritis     Closed fracture of distal end of fibula     resolved 09/15/17    Depression     Distal radius fracture, left     last assessed 17    GERD (gastroesophageal reflux disease)     last assessed 13    Herpes labialis     last assessed 07/23/15    Hypertension     essential ; last assessed 13    Sinus bradycardia     last assessed 16       Daily Treatment Log:  Date 3/10/22  3/14/22 3/17/2022     Visit # 1 2 FOTO 3      Manual  10' 10'      Cupping   L thoracolumbar paraspinals L thoracolumbar paraspinals             Ther Exer  30' 20'     T/S ext in chair w/ MB   2x10 (NE)      Wall slides   1x10 5" 5" 2x10      LYNDSAY over counter  2x10 (Dec/B) x2 2x10; 2x10      ZHENG   3x30"  1'x2      REIL  2x10  (Dec/B ) 2x10                                              HEP        Ther Activ                                                        NMReed                                                Modalities HEP: LYNDSAY 10x every 2-3 hrs

## 2022-03-18 ENCOUNTER — TELEPHONE (OUTPATIENT)
Dept: PHYSICAL THERAPY | Facility: CLINIC | Age: 57
End: 2022-03-18

## 2022-03-18 NOTE — TELEPHONE ENCOUNTER
Pt called today to cancel her appt for 3/21 - has to take her mom to an appt  She confirmed for 3/24, did not want to r/s 3/21

## 2022-03-21 ENCOUNTER — APPOINTMENT (OUTPATIENT)
Dept: PHYSICAL THERAPY | Facility: CLINIC | Age: 57
End: 2022-03-21
Payer: COMMERCIAL

## 2022-03-24 ENCOUNTER — OFFICE VISIT (OUTPATIENT)
Dept: PHYSICAL THERAPY | Facility: CLINIC | Age: 57
End: 2022-03-24
Payer: COMMERCIAL

## 2022-03-24 DIAGNOSIS — M54.50 ACUTE LEFT-SIDED LOW BACK PAIN WITHOUT SCIATICA: Primary | ICD-10-CM

## 2022-03-24 DIAGNOSIS — M54.6 THORACIC SPINE PAIN: ICD-10-CM

## 2022-03-24 PROCEDURE — 97110 THERAPEUTIC EXERCISES: CPT

## 2022-03-24 PROCEDURE — 97140 MANUAL THERAPY 1/> REGIONS: CPT

## 2022-03-24 NOTE — PROGRESS NOTES
Daily Note     Today's date: 3/24/2022  Patient name: Leoncio Valdes  : 1965  MRN: [id number]  Referring provider: Juanjose Newsome DO  Dx:   Encounter Diagnosis     ICD-10-CM    1  Acute left-sided low back pain without sciatica  M54 50    2  Thoracic spine pain  M54 6                   Subjective: Pt reports she began walking in her neighborhood again yesterday and felt she may have irritated it  She feels better this morning  States she been doing LYNDSAY but not as frequent as last week  Objective: See treatment diary below      Assessment: Tolerated treatment well, cont to respond to lumbar ext with edu to cont to perform as frequently as she can which will help w/ return to functional activities w/ improved comfort  Patient would benefit from continued PT      Plan: Continue per plan of care        Precautions: L humerus fx (healed)  Past Medical History:   Diagnosis Date    Abnormal immunology findings 2011    Anemia 2011    Arthritis     Closed fracture of distal end of fibula     resolved 09/15/17    Depression     Distal radius fracture, left     last assessed 17    GERD (gastroesophageal reflux disease)     last assessed 13    Herpes labialis     last assessed 07/23/15    Hypertension     essential ; last assessed 13    Sinus bradycardia     last assessed 16       Daily Treatment Log:  Date 3/10/22  3/14/22 3/17/2022 3/24/22     Visit # 1 2 FOTO 3  4 FOTO    Manual  10' 10'  10'    Cupping   L thoracolumbar paraspinals L thoracolumbar paraspinals L thoracolumbar paraspinals             Ther Exer  30' 20' 30'    T/S ext in chair w/ MB   2x10 (NE)      Wall slides   1x10 5" 5" 2x10  5" 2x10     LYNDSAY over counter  2x10 (Dec/) x2 2x10; 2x10  2x10 to start and end    ZHENG   3x30"  1'x2  1'x2    REIL  2x10  (Dec/ ) 2x10  2x10                                            HEP        Ther Activ                                                        NMReed Modalities                                  HEP: LYNDSAY 10x every 2-3 hrs

## 2022-03-28 ENCOUNTER — TELEPHONE (OUTPATIENT)
Dept: FAMILY MEDICINE CLINIC | Facility: CLINIC | Age: 57
End: 2022-03-28

## 2022-03-28 ENCOUNTER — OFFICE VISIT (OUTPATIENT)
Dept: PHYSICAL THERAPY | Facility: CLINIC | Age: 57
End: 2022-03-28
Payer: COMMERCIAL

## 2022-03-28 DIAGNOSIS — M54.50 ACUTE LEFT-SIDED LOW BACK PAIN WITHOUT SCIATICA: Primary | ICD-10-CM

## 2022-03-28 DIAGNOSIS — M54.6 THORACIC SPINE PAIN: ICD-10-CM

## 2022-03-28 PROCEDURE — 97110 THERAPEUTIC EXERCISES: CPT

## 2022-03-28 NOTE — TELEPHONE ENCOUNTER
Patient called to request medication for anxiety  She is complaining of heart palpations and cannot sleep  She thought you had prescribed Zanex for her but I did not see that listed

## 2022-03-28 NOTE — PROGRESS NOTES
Daily Note     Today's date: 3/28/2022  Patient name: Tavo Fowler  : 1965  MRN: [id number]  Referring provider: Duarte Jaramillo DO  Dx:   Encounter Diagnosis     ICD-10-CM    1  Acute left-sided low back pain without sciatica  M54 50    2  Thoracic spine pain  M54 6                   Subjective: pt reports that her back feels better than it was before she started PT but feels that she is at a  "plateau of better"  She reports she has been walking a lot  Reports she has had some LBP with standing       Objective: See treatment diary below      Assessment: Tolerated treatment well  Trial of postural strengthening/stretching today in conjunction with previous treatment  Pt reports feeling looser post session, edu to cont with her REIL/LYNDSAY at home  Patient would benefit from continued PT      Plan: Continue per plan of care        Precautions: L humerus fx (healed)  Past Medical History:   Diagnosis Date    Abnormal immunology findings 2011    Anemia 2011    Arthritis     Closed fracture of distal end of fibula     resolved 09/15/17    Depression     Distal radius fracture, left     last assessed 17    GERD (gastroesophageal reflux disease)     last assessed 13    Herpes labialis     last assessed 07/23/15    Hypertension     essential ; last assessed 13    Sinus bradycardia     last assessed 16       Daily Treatment Log:  Date 3/10/22  3/14/22 3/17/2022 3/24/22  3/28/2022   Visit # 1 2 FOTO 3  4 FOTO 5    Manual  10' 10'  10' 10'    Cupping   L thoracolumbar paraspinals L thoracolumbar paraspinals L thoracolumbar paraspinals  L thoracolumbar paraspinals            Ther Exer  30' 20' 30' 25'    T/S ext in chair w/ MB   2x10 (NE)   2x10    Wall slides   1x10 5" 5" 2x10  5" 2x10  10"x10    LYNDSAY over counter  2x10 (Dec/) x2 2x10; 2x10  2x10 to start and end 2x10; 2x10    ZHENG   3x30"  1'x2  1'x2    REIL  2x10  (Dec/ ) 2x10  2x10 2x10 HEP        Ther Activ                                                        NMReed        Corner stretch      20"x5    B/L irasema abd      RTB 2x10                            Modalities                                  HEP: LYNDSAY 10x every 2-3 hrs

## 2022-03-28 NOTE — TELEPHONE ENCOUNTER
I called in Xanax for the patient given that I know her very well  But I was not 1 the prescribed at last year  Advised her that I sent in 5 tablets to help her

## 2022-03-31 ENCOUNTER — OFFICE VISIT (OUTPATIENT)
Dept: PHYSICAL THERAPY | Facility: CLINIC | Age: 57
End: 2022-03-31
Payer: COMMERCIAL

## 2022-03-31 DIAGNOSIS — M54.50 ACUTE LEFT-SIDED LOW BACK PAIN WITHOUT SCIATICA: Primary | ICD-10-CM

## 2022-03-31 DIAGNOSIS — M54.6 THORACIC SPINE PAIN: ICD-10-CM

## 2022-03-31 PROCEDURE — 97112 NEUROMUSCULAR REEDUCATION: CPT

## 2022-03-31 PROCEDURE — 97140 MANUAL THERAPY 1/> REGIONS: CPT

## 2022-03-31 PROCEDURE — 97110 THERAPEUTIC EXERCISES: CPT

## 2022-03-31 NOTE — PROGRESS NOTES
Daily Note     Today's date: 3/31/2022  Patient name: Salas Banks  : 1965  MRN: [id number]  Referring provider: Ni Luong DO  Dx:   Encounter Diagnosis     ICD-10-CM    1  Acute left-sided low back pain without sciatica  M54 50    2  Thoracic spine pain  M54 6                   Subjective: pt reports things are feeling good this morning /10  Every night she feels everything is sore and when things bother her most  Reports feeling sore the whole next day after she was here last        Objective: See treatment diary below      Assessment: Tolerated treatment well  Pt had relief post manual therapy interventions manuel after TPR, edu on how to perform this at home if she cont to find relief with this  Edu to cont with her LYNDSAY/REIL and stretching during her time away  Patient would benefit from continued PT      Plan: Continue per plan of care  pt is going on vacation tomorrow and will be away for a week, scheduled to resume PT the week she gets back        Precautions: L humerus fx (healed)  Past Medical History:   Diagnosis Date    Abnormal immunology findings 2011    Anemia 2011    Arthritis     Closed fracture of distal end of fibula     resolved 09/15/17    Depression     Distal radius fracture, left     last assessed 17    GERD (gastroesophageal reflux disease)     last assessed 13    Herpes labialis     last assessed 07/23/15    Hypertension     essential ; last assessed 13    Sinus bradycardia     last assessed 16       Daily Treatment Log:  Date 3/31/22        Visit # 6       Manual 15'        Cupping  L thoracolumbar paraspinals        TPR  L paraspinal       Prone PA mobs  Lower thoracic to lumbar 5'        Ther Exer 15'        T/S ext in chair w/ MB  2x10        Wall slides  10"x10        LYNDSAY over counter 2x10        ZHENG         REIL 2x10                                                HEP        Ther Activ NMReed 10'        Corner stretch  20"x5        B/L horz abd  RTB        Supine on PB B/L OH flex w/ MB  2# MB 5"x15                        Modalities                                  HEP: LYNDSAY 10x every 2-3 hrs

## 2022-04-01 ENCOUNTER — APPOINTMENT (OUTPATIENT)
Dept: LAB | Facility: CLINIC | Age: 57
End: 2022-04-01
Payer: COMMERCIAL

## 2022-04-01 DIAGNOSIS — E87.1 HYPONATREMIA: ICD-10-CM

## 2022-04-01 LAB
CORTIS AM PEAK SERPL-MCNC: 22 UG/DL (ref 4.2–22.4)
OSMOLALITY UR/SERPL-RTO: 293 MMOL/KG (ref 282–298)
OSMOLALITY UR: 420 MMOL/KG
SODIUM 24H UR-SCNC: 47 MOL/L
TSH SERPL DL<=0.05 MIU/L-ACNC: 2.39 UIU/ML
URATE SERPL-MCNC: 4.9 MG/DL (ref 2–6.8)

## 2022-04-01 PROCEDURE — 36415 COLL VENOUS BLD VENIPUNCTURE: CPT

## 2022-04-01 PROCEDURE — 84443 ASSAY THYROID STIM HORMONE: CPT

## 2022-04-01 PROCEDURE — 84300 ASSAY OF URINE SODIUM: CPT

## 2022-04-01 PROCEDURE — 83935 ASSAY OF URINE OSMOLALITY: CPT

## 2022-04-01 PROCEDURE — 84550 ASSAY OF BLOOD/URIC ACID: CPT

## 2022-04-01 PROCEDURE — 82533 TOTAL CORTISOL: CPT

## 2022-04-01 PROCEDURE — 83930 ASSAY OF BLOOD OSMOLALITY: CPT

## 2022-04-11 ENCOUNTER — OFFICE VISIT (OUTPATIENT)
Dept: PHYSICAL THERAPY | Facility: CLINIC | Age: 57
End: 2022-04-11
Payer: COMMERCIAL

## 2022-04-11 DIAGNOSIS — M54.50 ACUTE LEFT-SIDED LOW BACK PAIN WITHOUT SCIATICA: Primary | ICD-10-CM

## 2022-04-11 DIAGNOSIS — M54.6 THORACIC SPINE PAIN: ICD-10-CM

## 2022-04-11 PROCEDURE — 97110 THERAPEUTIC EXERCISES: CPT

## 2022-04-11 PROCEDURE — 97140 MANUAL THERAPY 1/> REGIONS: CPT

## 2022-04-11 NOTE — PROGRESS NOTES
Daily Note     Today's date: 2022  Patient name: Bobo Monsivais  : 1965  MRN: [id number]  Referring provider: Yuliana Oconnor*  Dx:   Encounter Diagnosis     ICD-10-CM    1  Acute left-sided low back pain without sciatica  M54 50    2  Thoracic spine pain  M54 6                   Subjective: pt reports she was able to manage her pain w/ exercises while on vacation  Notes she feels much better, pain is no longer constant and is only reproduced with certain activities including prolonged unsupported sitting  Objective: See treatment diary below      Assessment: Tolerated treatment well without inc in sx's  Patient demonstrated fatigue post treatment, exhibited good technique with therapeutic exercises and would benefit from continued PT  Plan: Potential discharge next visit       Precautions: L humerus fx (healed)  Past Medical History:   Diagnosis Date    Abnormal immunology findings 2011    Anemia 2011    Arthritis     Closed fracture of distal end of fibula     resolved 09/15/17    Depression     Distal radius fracture, left     last assessed 17    GERD (gastroesophageal reflux disease)     last assessed 13    Herpes labialis     last assessed 07/23/15    Hypertension     essential ; last assessed 13    Sinus bradycardia     last assessed 16       Daily Treatment Log:  Date 3/31/22  4/11/22      Visit # 6 7      Manual 15'  10'      Cupping  L thoracolumbar paraspinals  5'      TPR  L paraspinal       Prone PA mobs  Lower thoracic to lumbar 5'  5'              Ther Exer 15'  10'      T/S ext in chair w/ MB  2x10  2x10      Wall slides  10"x10  10"x10       LYNDSAY over counter 2x10  2x10      REIL 2x10  2x10                                              HEP        Ther Activ                                                        NMReed 10'  15'      Corner stretch  20"x5  20"x4       B/L horz abd  RTB  On pball RTB 2x10      Supine on PB B/L OH flex w/ MB  2# MB 5"x15  2# MB 5"x15                      Modalities                                  HEP: LYNDSAY 10x every 2-3 hrs

## 2022-04-14 ENCOUNTER — OFFICE VISIT (OUTPATIENT)
Dept: PHYSICAL THERAPY | Facility: CLINIC | Age: 57
End: 2022-04-14
Payer: COMMERCIAL

## 2022-04-14 DIAGNOSIS — M54.50 ACUTE LEFT-SIDED LOW BACK PAIN WITHOUT SCIATICA: Primary | ICD-10-CM

## 2022-04-14 DIAGNOSIS — M54.6 THORACIC SPINE PAIN: ICD-10-CM

## 2022-04-14 PROCEDURE — 97110 THERAPEUTIC EXERCISES: CPT

## 2022-04-14 NOTE — PROGRESS NOTES
PT Discharge    Today's date: 2022  Patient name: Vera Suárez  : 1965  MRN: [id number]  Referring provider: Og Aguirre  Dx:   Encounter Diagnosis     ICD-10-CM    1  Acute left-sided low back pain without sciatica  M54 50    2  Thoracic spine pain  M54 6                   Assessment  Assessment details: Vera Suárez is a 60 y o  female who has made stead progress over 8 sessions of therapy with regards to left sided lumbar/thoracic spine pain  Patient demo's dec pain, inc ROM, inc strength and flexibility and has resumed functional activities pain free  At this time she is able to manage remaining intermittent soreness with REIL/LYNDSAY and will be discharged to HEP  Goals  Short Term Goals to be accomplished in 4 weeks: (met)  STG1: Pt will be I with HEP   STG2: Pt will be I with posture management  STG3: Pt will demo thoracic/lumbar AROM >50% improvement  STG4: Pt will demo 1/2 MMT grade inc in cervical stabilizers and shoulder strength  STG5: Pt will deny sleep disturbance due to pain      Long Term Goals to be accomplished in 12 weeks: (met)  LTG1: Pt will demo cervical stab/shoulder strength to WNL as per PLOF  LTG2: Pt will return to as per PLOF pain free   LTG3: Pt will demo thoracic AROM WNL          Subjective Evaluation    History of Present Illness  Mechanism of injury: Patient reports improved symptoms since starting therapy  States at this time she is able to manage her intermittent discomfort with her exercises      Pain  Current pain ratin  At best pain ratin  At worst pain ratin  Location: left sided low back   Quality: dull ache  Relieving factors: rest  Exacerbated by: sitting unsupported   Progression: improved    Hand dominance: right      Diagnostic Tests  X-ray: normal (lumbar spine, thoracic spine, ribs )  Treatments  No previous or current treatments  Patient Goals  Patient goals for therapy: decreased pain, increased motion, independence with ADLs/IADLs and return to sport/leisure activities          Objective  (*=pain)  Posture: Lumbar lordosis is WNL in standing  There is nil lateral shift  In siting, lumbar roll inc on comfort      Lumbar AROM limitations:  (*=  Pain)  Lumbar flexion: WNL  Lumbar extension: min loss   R side glide: WNL   L side glide: WNL    Thoracic AROM loss:  Extension WNL  R Rotation min loss  L Rotation WNL    Shoulder AROM:  R  L   Flexion   170  140  Abduction  170  130   Functional IR  T5  Occiput   Functional ER  T12  Lat hip    Mechanical Asessment: not assessed-pt pain free    Strength:  Core strength: Good     Right  Left  Hip flexion:  5/5  5/5  Knee ext  5/5  5/5  Ankle DF  5/5  4/5  Knee flex  5/5  5/5  Hip ext   4/5  4/5  Hip abd   4+/5  4+/5    Strength: MMT R  L  Shoulder flexion 5/5  4/5   Shoulder abd  5/5  4-/5   Mid trap  4/5  4-/5     Low trap  4/5  4-/5     Tenderness/Palpation:   WNL          Precautions: L humerus fx (healed)  Past Medical History:   Diagnosis Date    Abnormal immunology findings 06/09/2011    Anemia 05/23/2011    Arthritis     Closed fracture of distal end of fibula     resolved 09/15/17    Depression     Distal radius fracture, left     last assessed 01/30/17    GERD (gastroesophageal reflux disease)     last assessed 05/06/13    Herpes labialis     last assessed 07/23/15    Hypertension     essential ; last assessed 08/07/13    Sinus bradycardia     last assessed 05/24/16     Daily Treatment Log:  Date 3/31/22  4/11/22 4/14/22     Visit # 6 7 8 RE/DC/FOTO     Manual 15'  10'      Cupping  L thoracolumbar paraspinals  5'      TPR  L paraspinal       Prone PA mobs  Lower thoracic to lumbar 5'  5'              Ther Exer 15'  10' 15'     T/S ext in chair w/ MB  2x10  2x10 2x10     Wall slides  10"x10  10"x10       LYNDSAY over counter 2x10  2x10 2x10     REIL 2x10  2x10 2x10        obj measures 10'                                     HEP        Ther Activ NMReed 10'  15'      Corner stretch  20"x5  20"x4       B/L horz abd  RTB  On pball RTB 2x10      Supine on PB B/L OH flex w/ MB  2# MB 5"x15  2# MB 5"x15                      Modalities                                  HEP: LYNDSAY 10x every 2-3 hrs

## 2022-04-20 DIAGNOSIS — F10.10 ETOH ABUSE: ICD-10-CM

## 2022-04-20 DIAGNOSIS — I10 ESSENTIAL HYPERTENSION: ICD-10-CM

## 2022-04-20 RX ORDER — AMLODIPINE BESYLATE 5 MG/1
5 TABLET ORAL DAILY
Qty: 90 TABLET | Refills: 2 | Status: SHIPPED | OUTPATIENT
Start: 2022-04-20

## 2022-04-20 RX ORDER — THIAMINE MONONITRATE (VIT B1) 100 MG
100 TABLET ORAL DAILY
Qty: 90 TABLET | Refills: 2 | Status: SHIPPED | OUTPATIENT
Start: 2022-04-20

## 2022-05-10 ENCOUNTER — TELEPHONE (OUTPATIENT)
Dept: NEPHROLOGY | Facility: CLINIC | Age: 57
End: 2022-05-10

## 2022-05-24 ENCOUNTER — TELEPHONE (OUTPATIENT)
Dept: FAMILY MEDICINE CLINIC | Facility: CLINIC | Age: 57
End: 2022-05-24

## 2022-05-26 ENCOUNTER — HOSPITAL ENCOUNTER (OUTPATIENT)
Dept: RADIOLOGY | Facility: HOSPITAL | Age: 57
Discharge: HOME | End: 2022-05-26
Payer: COMMERCIAL

## 2022-05-26 ENCOUNTER — OFFICE VISIT (OUTPATIENT)
Dept: FAMILY MEDICINE CLINIC | Facility: CLINIC | Age: 57
End: 2022-05-26
Payer: COMMERCIAL

## 2022-05-26 VITALS
BODY MASS INDEX: 29.32 KG/M2 | RESPIRATION RATE: 16 BRPM | WEIGHT: 176 LBS | HEIGHT: 65 IN | TEMPERATURE: 98.6 F | DIASTOLIC BLOOD PRESSURE: 94 MMHG | SYSTOLIC BLOOD PRESSURE: 140 MMHG | HEART RATE: 78 BPM

## 2022-05-26 DIAGNOSIS — R05.9 COUGH IN ADULT: ICD-10-CM

## 2022-05-26 DIAGNOSIS — R05.9 COUGH IN ADULT: Primary | ICD-10-CM

## 2022-05-26 PROCEDURE — 99214 OFFICE O/P EST MOD 30 MIN: CPT | Performed by: FAMILY MEDICINE

## 2022-05-26 PROCEDURE — 71046 X-RAY EXAM CHEST 2 VIEWS: CPT

## 2022-05-26 PROCEDURE — 1036F TOBACCO NON-USER: CPT | Performed by: FAMILY MEDICINE

## 2022-05-26 PROCEDURE — 3008F BODY MASS INDEX DOCD: CPT | Performed by: FAMILY MEDICINE

## 2022-05-26 PROCEDURE — 3080F DIAST BP >= 90 MM HG: CPT | Performed by: FAMILY MEDICINE

## 2022-05-26 PROCEDURE — 3077F SYST BP >= 140 MM HG: CPT | Performed by: FAMILY MEDICINE

## 2022-05-26 RX ORDER — METHYLPREDNISOLONE 4 MG/1
TABLET ORAL
Qty: 21 EACH | Refills: 0 | Status: SHIPPED | OUTPATIENT
Start: 2022-05-26 | End: 2022-07-13 | Stop reason: ALTCHOICE

## 2022-05-26 NOTE — PROGRESS NOTES
Chief Complaint   Patient presents with    Cough     Dry         Patient ID: Brittney Dewitt is a 59 y o  female  Cough  This is a chronic problem  The current episode started more than 1 month ago (started 5 m ago after URI symptoms for 1 wk )  The problem has been unchanged  The problem occurs every few hours  The cough is non-productive  Pertinent negatives include no chest pain, chills, ear congestion, ear pain, fever, headaches, heartburn, hemoptysis, myalgias, nasal congestion, postnasal drip, rash, rhinorrhea, sore throat, shortness of breath, sweats, weight loss or wheezing  Nothing aggravates the symptoms  She has tried steroid inhaler for the symptoms  The treatment provided no relief  There is no history of asthma, bronchiectasis, bronchitis, COPD, emphysema, environmental allergies or pneumonia  The following portions of the patient's history were reviewed and updated as appropriate: allergies, current medications, past family history, past medical history, past social history, past surgical history and problem list     Review of Systems   Constitutional: Negative for chills, fever and weight loss  HENT: Negative for ear pain, postnasal drip, rhinorrhea and sore throat  Respiratory: Positive for cough  Negative for hemoptysis, shortness of breath and wheezing  Cardiovascular: Negative for chest pain  Gastrointestinal: Negative  Negative for heartburn  Musculoskeletal: Negative for myalgias  Skin: Negative for rash  Allergic/Immunologic: Negative for environmental allergies  Neurological: Negative for headaches         Current Outpatient Medications   Medication Sig Dispense Refill    acetaminophen (TYLENOL) 325 mg tablet Take 2 tablets (650 mg total) by mouth every 8 (eight) hours  0    ALPRAZolam (XANAX) 0 25 mg tablet Take 1 tablet (0 25 mg total) by mouth daily at bedtime as needed for anxiety 5 tablet 0    amLODIPine (NORVASC) 5 mg tablet Take 1 tablet (5 mg total) by mouth daily 90 tablet 2    buPROPion (Wellbutrin XL) 150 mg 24 hr tablet Take 1 tablet (150 mg total) by mouth every morning 30 tablet 5    folic acid (FOLVITE) 1 mg tablet Take 1 tablet (1 mg total) by mouth daily 30 tablet 0    losartan (COZAAR) 50 mg tablet Take 1 tablet (50 mg total) by mouth daily 90 tablet 2    thiamine (VITAMIN B1) 100 mg tablet Take 1 tablet (100 mg total) by mouth daily 90 tablet 2     No current facility-administered medications for this visit  Objective:    /94   Pulse 78   Temp 98 6 °F (37 °C)   Resp 16   Ht 5' 5" (1 651 m)   Wt 79 8 kg (176 lb)   BMI 29 29 kg/m²        Physical Exam  Constitutional:       General: She is not in acute distress  Appearance: She is not ill-appearing  HENT:      Nose: No congestion or rhinorrhea  Mouth/Throat:      Pharynx: No oropharyngeal exudate or posterior oropharyngeal erythema  Cardiovascular:      Rate and Rhythm: Normal rate and regular rhythm  Heart sounds: No murmur heard  Pulmonary:      Effort: Pulmonary effort is normal  No respiratory distress  Breath sounds: No wheezing, rhonchi or rales  Neurological:      Mental Status: She is alert  Assessment/Plan:         Diagnoses and all orders for this visit:    Cough in adult  -     XR chest pa & lateral; Future  -     Ambulatory Referral to Pulmonology;  Future  -     methylPREDNISolone 4 MG tablet therapy pack; Use as directed on package        rto prn                     Sarah Do MD

## 2022-05-31 ENCOUNTER — TELEPHONE (OUTPATIENT)
Dept: FAMILY MEDICINE CLINIC | Facility: CLINIC | Age: 57
End: 2022-05-31

## 2022-05-31 NOTE — TELEPHONE ENCOUNTER
----- Message from Isela Hurley MD sent at 5/31/2022 11:25 AM EDT -----  Pl, advise pt -  normal CXR

## 2022-06-02 ENCOUNTER — OFFICE VISIT (OUTPATIENT)
Dept: OBGYN CLINIC | Facility: CLINIC | Age: 57
End: 2022-06-02
Payer: COMMERCIAL

## 2022-06-02 ENCOUNTER — APPOINTMENT (OUTPATIENT)
Dept: RADIOLOGY | Facility: CLINIC | Age: 57
End: 2022-06-02
Payer: COMMERCIAL

## 2022-06-02 VITALS
HEIGHT: 65 IN | SYSTOLIC BLOOD PRESSURE: 122 MMHG | WEIGHT: 176 LBS | HEART RATE: 77 BPM | BODY MASS INDEX: 29.32 KG/M2 | DIASTOLIC BLOOD PRESSURE: 84 MMHG

## 2022-06-02 DIAGNOSIS — M25.562 LEFT KNEE PAIN, UNSPECIFIED CHRONICITY: ICD-10-CM

## 2022-06-02 DIAGNOSIS — Z01.89 ENCOUNTER FOR OTHER SPECIFIED SPECIAL EXAMINATIONS: ICD-10-CM

## 2022-06-02 DIAGNOSIS — G89.29 CHRONIC PAIN OF LEFT KNEE: ICD-10-CM

## 2022-06-02 DIAGNOSIS — M17.12 PRIMARY OSTEOARTHRITIS OF LEFT KNEE: Primary | ICD-10-CM

## 2022-06-02 DIAGNOSIS — M25.562 CHRONIC PAIN OF LEFT KNEE: ICD-10-CM

## 2022-06-02 PROCEDURE — 73560 X-RAY EXAM OF KNEE 1 OR 2: CPT

## 2022-06-02 PROCEDURE — 1036F TOBACCO NON-USER: CPT | Performed by: ORTHOPAEDIC SURGERY

## 2022-06-02 PROCEDURE — 3008F BODY MASS INDEX DOCD: CPT | Performed by: ORTHOPAEDIC SURGERY

## 2022-06-02 PROCEDURE — 3074F SYST BP LT 130 MM HG: CPT | Performed by: ORTHOPAEDIC SURGERY

## 2022-06-02 PROCEDURE — 99214 OFFICE O/P EST MOD 30 MIN: CPT | Performed by: ORTHOPAEDIC SURGERY

## 2022-06-02 PROCEDURE — 73562 X-RAY EXAM OF KNEE 3: CPT

## 2022-06-02 PROCEDURE — 3079F DIAST BP 80-89 MM HG: CPT | Performed by: ORTHOPAEDIC SURGERY

## 2022-06-02 NOTE — PROGRESS NOTES
Assessment/Plan:  1  Primary osteoarthritis of left knee  Ambulatory Referral to Physical Therapy   2  Chronic pain of left knee  XR knee 3 vw left non injury    Ambulatory Referral to Physical Therapy     Scribe Attestation    I,:  Scott Wolf am acting as a scribe while in the presence of the attending physician :       I,:  Sylvia Holloway, DO personally performed the services described in this documentation    as scribed in my presence :         Nataliia Montero is a pleasant 57-year-old female well known to the practice after undergoing revision right total knee arthroplasty who presents today for initial evaluation of her left knee pain  After reviewing her imaging and performing a thorough history and physical exam I explained that she is symptomatic of her mild underlying osteoarthritis, which is most pronounced in the patellofemoral compartment  I recommended that she initiate formal physical therapy and provided her with a referral today  We also discussed bracing which she declined  I encouraged her to use over-the-counter NSAID medication as needed for pain relief  I would like to see her back in 2 months for repeat clinical evaluation  Subjective:  Initial evaluation for left knee pain    Patient ID: Miguelina Choi is a 59 y o  female well known to the practice after undergoing revision right total knee arthroplasty who presents today for initial evaluation of her left knee pain  At today's visit, she complains of activity-related anterior knee pain and crunching in this area as well  Her symptoms are most pronounced with navigating stairs, as well as squatting and kneeling  She reports that this pain occurs daily and can be moderate  She denies any recent injury or trauma  She denies any significant swelling about the knee  Review of Systems   Constitutional: Positive for activity change  Negative for chills, fever and unexpected weight change     HENT: Negative for hearing loss, nosebleeds and sore throat  Eyes: Negative for pain, redness and visual disturbance  Respiratory: Negative for cough, shortness of breath and wheezing  Cardiovascular: Negative for chest pain, palpitations and leg swelling  Gastrointestinal: Negative for abdominal pain, nausea and vomiting  Endocrine: Negative for polydipsia and polyuria  Genitourinary: Negative for dysuria and hematuria  Musculoskeletal: Positive for arthralgias and myalgias  Negative for joint swelling  See HPI   Skin: Negative for rash and wound  Neurological: Negative for dizziness, numbness and headaches  Psychiatric/Behavioral: Negative for decreased concentration and suicidal ideas  The patient is not nervous/anxious            Past Medical History:   Diagnosis Date    Abnormal immunology findings 06/09/2011    Anemia 05/23/2011    Arthritis     Closed fracture of distal end of fibula     resolved 09/15/17    Depression     Distal radius fracture, left     last assessed 01/30/17    GERD (gastroesophageal reflux disease)     last assessed 05/06/13    Herpes labialis     last assessed 07/23/15    Hypertension     essential ; last assessed 08/07/13    Sinus bradycardia     last assessed 05/24/16       Past Surgical History:   Procedure Laterality Date    ANKLE SURGERY      last assessed 09/15/17    BACK SURGERY      lumbar laminectomy L4-L5    BLADDER SUSPENSION      7/2017    COLONOSCOPY      JOINT REPLACEMENT      TKR  on right    ORIF TIBIA & FIBULA FRACTURES Right 12/15/2016    Procedure: SURGICAL FIXATION OF RIGHT DISTAL FIBULA FRACTURE;  Surgeon: Shakeel Abraham MD;  Location: [facility] MAIN OR;  Service:     [place] REVISE KNEE JOINT REPLACE,ALL PARTS Right 10/23/2017    Procedure: REVISION TOTAL KNEE REPLACEMENT WITH FROZEN SECTIONS;  Surgeon: Jaqui Tate DO;  Location: [address] [place] [address];  Service: Orthopedics    TOTAL KNEE ARTHROPLASTY      last assessed; 11/21/17    WISDOM TOOTH EXTRACTION         Family History   Problem Relation Age of Onset    Arthritis Mother     Osteoporosis Mother     Dementia Father     Other Father         spinal stenosis    Other Family         [facility]       Social History     Occupational History    Not on file   Tobacco Use    Smoking status: Never Smoker    Smokeless tobacco: Never Used   Vaping Use    Vaping Use: Never used   Substance and Sexual Activity    Alcohol use: Yes     Comment: moderately    Drug use: No    Sexual activity: Yes     Partners: Male         Current Outpatient Medications:     acetaminophen (TYLENOL) 325 mg tablet, Take 2 tablets (650 mg total) by mouth every 8 (eight) hours, Disp: , Rfl: 0    ALPRAZolam (XANAX) 0 25 mg tablet, Take 1 tablet (0 25 mg total) by mouth daily at bedtime as needed for anxiety, Disp: 5 tablet, Rfl: 0    amLODIPine (NORVASC) 5 mg tablet, Take 1 tablet (5 mg total) by mouth daily, Disp: 90 tablet, Rfl: 2    buPROPion (Wellbutrin XL) 150 mg 24 hr tablet, Take 1 tablet (150 mg total) by mouth every morning, Disp: 30 tablet, Rfl: 5    folic acid (FOLVITE) 1 mg tablet, Take 1 tablet (1 mg total) by mouth daily, Disp: 30 tablet, Rfl: 0    losartan (COZAAR) 50 mg tablet, Take 1 tablet (50 mg total) by mouth daily, Disp: 90 tablet, Rfl: 2    methylPREDNISolone 4 MG tablet therapy pack, Use as directed on package, Disp: 21 each, Rfl: 0    thiamine (VITAMIN B1) 100 mg tablet, Take 1 tablet (100 mg total) by mouth daily, Disp: 90 tablet, Rfl: 2    Allergies   Allergen Reactions    Lisinopril      COUGH       Objective:  Vitals:    06/02/22 1052   BP: 122/84   Pulse: 77       Body mass index is 29 29 kg/m²  Left Knee Exam     Tenderness   The patient is experiencing tenderness in the patella  Range of Motion   Left knee extension: 0  Left knee flexion: 120       Tests   Varus: negative Valgus: negative  Drawer:  Anterior - negative     Posterior - negative    Other   Erythema: absent  Scars: absent  Sensation: normal  Pulse: present  Swelling: none  Effusion: no effusion present    Comments:  Stable at 0, 30 and 90 degrees  Neurovascularly in tact distally  No warmth or erythema  Significant parapatellar crepitance  Patellofemoral grind: positive          Observations   Left Knee   Negative for effusion  Physical Exam  Vitals and nursing note reviewed  Constitutional:       Appearance: She is well-developed  HENT:      Head: Normocephalic and atraumatic  Eyes:      General: No scleral icterus  Conjunctiva/sclera: Conjunctivae normal    Cardiovascular:      Rate and Rhythm: Normal rate  Pulmonary:      Effort: Pulmonary effort is normal  No respiratory distress  Musculoskeletal:      Cervical back: Normal range of motion and neck supple  Left knee: No effusion  Comments: As noted in HPI   Skin:     General: Skin is warm and dry  Neurological:      Mental Status: She is alert and oriented to person, place, and time  Psychiatric:         Behavior: Behavior normal          I have personally reviewed pertinent films in PACS  X-ray of the left knee obtained on 06/02/2022 reviewed demonstrating mild degenerative change most pronounced in the patellofemoral compartment  There is sclerosis and osteophytosis  There is also lateral tilt of the patella noted  There is no acute fracture, dislocation, lytic or blastic lesion

## 2022-06-06 NOTE — PROGRESS NOTES
Assessment    1  Encounter for annual physical exam (V70 0) (Z00 00)   2  Thyromegaly (240 9) (E04 9)   3  Sinus bradycardia (427 89) (R00 1)   4  Murmur (785 2) (R01 1)   5  Depression with anxiety (300 4) (F41 8)   6  Chronic fatigue (780 79) (R53 82)    Plan   Chronic fatigue, History of alcoholism, Murmur, Sinus bradycardia    · ECHO COMPLETE WITH CONTRAST IF INDICATED, TTE / TRANSTHORACIC;  Status:Active; Requested for:20May2016;   Chronic fatigue, PSH: Reported Hx Of Knee Replacement    · (LC) Babesia microti Antibody Panel; Status:Active; Requested for:20May2016;    · (LC) EBV, Chronic/Active Infection; Status:Active; Requested for:20May2016;    · (LC) Lyme, Total Ab Test/Reflex; Status:Active; Requested for:20May2016;   Depression with anxiety    · Escitalopram Oxalate 10 MG Oral Tablet (Lexapro); take 1 tablet by mouth once  daily  Right knee pain    · *1- [facility] Physician Referral  Consult  Status: Hold For -  Scheduling  Requested for: [id number]  Care Summary provided  : Yes    US THYROID; Status:Hold For - Scheduling; Requested for:20May2016;   Perform:[facility]; KUL:[id number];AFJSLRS; For:Chronic fatigue, Thyromegaly; Ordered By:Ariella Manzo;      Discussion/Summary  health maintenance visit Currently, she eats an adequate diet, has an inadequate exercise regimen and due to knee problem  cervical cancer screening is current Breast cancer screening: mammogram is current  Colorectal cancer screening: the risks and benefits of colorectal cancer screening were discussed  Osteoporosis screening: the risks and benefits of osteoporosis screening were discussed  Screening lab work includes recent labs-inc cardiac-CRP=5 30; CBC wnl, CMP and lipids wnl; TSH=1 400; UA neg  Advice and education were given regarding nutrition, vitamin D supplements, cardiovascular risk reduction, alcohol use, self skin examination and advanced directive planning   Patient discussion: Patient's call transferred to author  Patient read [employer] message - thought it would be too long to try and type a message back     Patient states the pharmacy was of no help - did not provide any assistance for getting the correct Adderall filled     Patient states she will be switching pharmacies after this refill as this has been a hassle to not get the alternative that caused a reaction     States the pharmacy reported to have PCP send in a refill of Adderall and they will fill with the correct tablet patient had been on historically     Patient states she has tolerated the orange pill and had a reaction to the blue pill   Patient will be mindful when picking up prescription as to which tablet is being dispensed     Norberto Espinal RN     discussed with the patient  Possible side effects of new medications were reviewed with the patient/guardian today  Chief Complaint  pt here for PE  she would like to review BW results  tc/cma      History of Present Illness  HM, Adult Female: The patient is being seen for a health maintenance evaluation  The last health maintenance visit was 2 year(s) ago  General Health: The patient's health since the last visit is described as fair  She has regular dental visits  She complains of vision problems  She denies hearing loss  Immunizations status: up to date  Lifestyle:  She has weight concerns  She does not exercise regularly  She does not use tobacco  She denies alcohol use  She reports being in recovery from alcohol abuse  She denies drug use  Reproductive health:  she is sexually active  pregnancy history:  Screening: cancer screening reviewed and updated  metabolic screening reviewed and updated  risk screening reviewed and updated  Additional History:  gyn--Dr Marcy Capellan  Review of Systems    Constitutional: feeling tired  Eyes: eyesight problems  ENT: no complaints of earache, no loss of hearing, no nose bleeds, no nasal discharge, no sore throat, no hoarseness  Cardiovascular: No complaints of slow heart rate, no fast heart rate, no chest pain, no palpitations, no leg claudication, no lower extremity edema  Respiratory: No complaints of shortness of breath, no wheezing, no cough, no SOB on exertion, no orthopnea, no PND  Gastrointestinal: No complaints of abdominal pain, no constipation, no nausea or vomiting, no diarrhea, no bloody stools  Genitourinary: No complaints of dysuria, no incontinence, no pelvic pain, no dysmenorrhea, no vaginal discharge or bleeding  Musculoskeletal: arthralgias, joint swelling, myalgias and joint stiffness  Integumentary: skin lesion     Neurological: No complaints of headache, no confusion, no convulsions, no numbness, no dizziness or fainting, no tingling, no limb weakness, no difficulty walking  Psychiatric: anxiety  Endocrine: No complaints of proptosis, no hot flashes, no muscle weakness, no deepening of the voice, no feelings of weakness  Hematologic/Lymphatic: No complaints of swollen glands, no swollen glands in the neck, does not bleed easily, does not bruise easily  Active Problems    1  Cold sore (054 9) (B00 1)   2  Depression with anxiety (300 4) (F41 8)   3  Encounter for screening for malignant neoplasm of colon (V76 51) (Z12 11)   4  Encounter for screening mammogram for malignant neoplasm of breast (V76 12)   (Z12 31)   5  Hematuria (599 70) (R31 9)   6  History of alcoholism (V11 3) (F10 21)   7  Immunology Studies Nonspecific Abnormal Findings (795 79)   8  Need for subacute bacterial endocarditis prophylaxis (V07 8) (Z41 8)   9  Paronychia of toe (681 11) (L03 039)   10  Reported Hx Of Knee Replacement   11  Right knee pain (719 46) (M25 561)   12  Upper respiratory infection (465 9) (J06 9)   13   Vitamin B-complex deficiency (266 9) (E53 9)    Past Medical History    · History of _   · History of Acute lower UTI (599 0) (N39 0)   · History of Acute upper respiratory infection (465 9) (J06 9)   · History of Benign essential hypertension (401 1) (I10)   · History of Contusion Of The Toe(S) With Intact Skin Surface (924 3)   · History of Fever of unknown origin (780 60) (R50 9)   · History of anemia (V12 3) (Z86 2)   · History of fever (V13 89) (R36 596)   · History of gastroesophageal reflux (GERD) (V12 79) (Z87 19)   · History of Infected Nonvenomous Insect Bite (919 5)   · History of Numbness (782 0) (R20 0)   · History of Skin rash (782 1) (R21)   · History of Tingling (782 0) (R20 2)   · History of Uncomplicated alcohol abuse (305 00) (F10 10)   · History of Visit for pre-operative examination (V72 84) (Z01 818)   · History of Well adult on routine health check (V70 0) (Z00 00)   · History of Wound infection (958  3) (T14 8,L08 9)   · History of Wound of back (876 0) (S21 209A)    Surgical History    · Reported Hx Of Knee Replacement    Family History  Mother    · Family history of Arthritis   · Family history of Osteoporosis  Father    · Family history of Dementia   · Family history of Spinal stenosis  Family History    · Family history of CREST Syndrome    Social History    ·    · Never A Smoker   · Recovering Alcoholic    Current Meds   1  No Reported Medications Recorded    Allergies    1  No Known Drug Allergies    Vitals   Recorded: 43QIU7353 01:09PM   Temperature 97 1 F, Tympanic   Heart Rate 78, R Radial   Pulse Quality Normal, R Radial   Respiration 18   Respiration Quality Normal   Systolic 850, RUE, Sitting   Diastolic 20, RUE, Sitting   Height 5 ft 4 6 in   Weight 166 lb    BMI Calculated 27 97   BSA Calculated 1 82     Physical Exam    Constitutional   General appearance: No acute distress, well appearing and well nourished  overweight  Eyes   Conjunctiva and lids: No swelling, erythema or discharge  Pupils and irises: Equal, round, reactive to light  Ears, Nose, Mouth, and Throat   External inspection of ears and nose: Normal     Otoscopic examination: Tympanic membranes translucent with normal light reflex  Canals patent without erythema  Hearing: Normal     Nasal mucosa, septum, and turbinates: Normal without edema or erythema  Lips, teeth, and gums: Normal, good dentition  Oropharynx: Normal with no erythema, edema, exudate or lesions  Neck   Neck: Supple, symmetric, trachea midline, no masses  Thyroid: Abnormal   The thyroid was diffusely enlarged, but was nontender  Pulmonary   Respiratory effort: No increased work of breathing or signs of respiratory distress  Auscultation of lungs: Clear to auscultation  Cardiovascular   Auscultation of heart: Abnormal   The heart rate was normal  The rhythm was regular  A grade 1 systolic murmur was heard at the LLSB     Pedal pulses: 2+ bilaterally  Examination of extremities for edema and/or varicosities: Normal     Chest done by gyn  Abdomen   Abdomen: Non-tender, no masses  Liver and spleen: No hepatomegaly or splenomegaly  Examination for hernias: No hernia appreciated  Genitourinary done by gyn  Lymphatic   Palpation of lymph nodes in neck: No lymphadenopathy  Musculoskeletal   Gait and station: Abnormal   painful sec ongoing knee problem  Digits and nails: Normal without clubbing or cyanosis  Joints, bones, and muscles: Normal     Range of motion: Abnormal   dec ROM knee  Stability: Normal     Muscle strength/tone: Normal     Skin   Skin and subcutaneous tissue: Normal without rashes or lesions  Neurologic   Cranial nerves: Cranial nerves II-XII intact  Reflexes: 2+ and symmetric  Sensation: No sensory loss  Psychiatric   Judgment and insight: Normal     Orientation to person, place, and time: Normal     Recent and remote memory: Intact  Mood and affect: Normal   Mood and Affect: anxious  Results/Data   EKG--SB HR=51 otherwise wnl  Procedure    Procedure: Visual Acuity Test    Indication: routine screening  Results: 20/13 in both eyes without corrective device, 20/13 in the right eye without corrective device, 20/13 in the left eye without corrective device      Health Management  Encounter for screening for malignant neoplasm of colon   COLONOSCOPY; every 5 years; Last 51TDL0127; Next Due: 06Epg2759; Active  Encounter for screening mammogram for malignant neoplasm of breast   Digital Bilateral Screening Mammogram With CAD; every 1 year; Last 40Sou5358; Next  Due: 92AHX9082;  Active    Signatures   Electronically signed by : CINTHYA Romero ; May 24 2016  8:36PM EST                       (Author)

## 2022-06-16 ENCOUNTER — EVALUATION (OUTPATIENT)
Dept: PHYSICAL THERAPY | Facility: CLINIC | Age: 57
End: 2022-06-16
Payer: COMMERCIAL

## 2022-06-16 DIAGNOSIS — M17.12 PRIMARY OSTEOARTHRITIS OF LEFT KNEE: Primary | ICD-10-CM

## 2022-06-16 DIAGNOSIS — M25.562 CHRONIC PAIN OF LEFT KNEE: ICD-10-CM

## 2022-06-16 DIAGNOSIS — G89.29 CHRONIC PAIN OF LEFT KNEE: ICD-10-CM

## 2022-06-16 PROCEDURE — 97161 PT EVAL LOW COMPLEX 20 MIN: CPT

## 2022-06-16 PROCEDURE — 97110 THERAPEUTIC EXERCISES: CPT

## 2022-06-16 NOTE — PROGRESS NOTES
PT Evaluation     Today's date: 2022  Patient name: Hosey Councilman  : 1965  MRN: [id number]  Referring provider: Terry Duarte DO  Dx:   Encounter Diagnosis     ICD-10-CM    1  Chronic pain of left knee  M25 562 Ambulatory Referral to Physical Therapy    G89 29    2  Primary osteoarthritis of left knee  M17 12 Ambulatory Referral to Physical Therapy                  Assessment  Assessment details: Hosey Councilman is a 59 y o  female who presents with mild pain, decreased strength, decreased ROM, decreased joint mobility and ambulatory dysfunction  Due to these impairments, patient has difficulty performing ADL's (carying laundry up/down the stairs), ambulation on uneven surfaces, stair negotiation, lifting/carrying  Patient's clinical presentation is consistent with their referring diagnosis of Primary osteoarthritis of left knee  (primary encounter diagnosis), Chronic pain of left knee  Patient has been educated in home exercise program, plan of care and importance of activity modification  Patient would benefit from skilled physical therapy services to address their aforementioned functional limitations and progress towards prior level of function and independence with home exercise program  She will trial HEP over the next two weeks with one follow up scheduled to confirm that she is able to manage her impairments at home       Impairments: abnormal or restricted ROM, activity intolerance, impaired physical strength, lacks appropriate home exercise program and pain with function    Goals  Short Term Goals to be accomplished in 4 weeks:  STG1: Pt will be I with HEP  STG2: Pt will demo 50% inc in knee AROM  STG3: Pt will demo 1/2 MMT strength in knee   STG4: Pt will amb community distance without gait deviates due to pain     Long Term Goals to be accomplished in 12 weeks:   LTG1: Pt will demo knee strength WNL to return to PLOF  LTG2: Pt will demo knee AROM WNL to minimize gait deviations  LTG3: Pt will return to household ADLs and work duties pain free as per [address] [facility] details: HEP development with follow up in 2 weeks      Patient would benefit from: PT eval and skilled physical therapy  Planned modality interventions: cryotherapy and thermotherapy: hydrocollator packs  Planned therapy interventions: manual therapy, neuromuscular re-education, self care, therapeutic activities, therapeutic exercise, home exercise program and patient education  Plan of Care beginning date: 2022  Plan of Care expiration date: 2022  Treatment plan discussed with: patient        Subjective Evaluation    History of Present Illness  Mechanism of injury: Patient reports onset of left knee pain that she noticed after [place]  Notes pain and "crunching" with walking and going up and down the stairs  Saw ortho was dx w/ OA in patella jt  She would like to try to manage with HEP     Pain  Current pain ratin  At best pain ratin  At worst pain ratin  Location: anterior knee  Quality: dull ache and sharp  Aggravating factors: walking and stair climbing  Progression: no change      Diagnostic Tests  X-ray: abnormal (PF OA )  Treatments  No previous or current treatments  Patient Goals  Patient goals for therapy: decreased pain and increased strength          Objective    (*=pain)    A/PROM   R   L  Knee flex sup   130/120  130/110  Knee ext   0   0    Strength   R   L  Knee flex   5/5   5/5  knee ext   5/5   4/5  Hip flex   5/5   4+/5  Hip Abd   4/5   4-/5  Hip ext    4/5   4-/5  Hip add   4/5   4/5    Flexibility:  + Mild tightness hamstring, quad/hip flexors, gastroc    Observation:  Mild lateral tracking patella     Tenderness/Palpation:  No TTP     Special Tests:  Valgus Stress (-)  Varus Stress (-)  McMurrary's (-)  Anterior Drawer (-)   Patellar grind (+)    Balance:   Tandem R posterior: 1'+  Tandem L posterior: 1'+  SLS R: 30"  SLS L: 30"    Function:   Stairs: reciprocal but painful  Squatting: WNL  Ambulation: WNL             Precautions:   Past Medical History:   Diagnosis Date    Abnormal immunology findings 06/09/2011    Anemia 05/23/2011    Arthritis     Closed fracture of distal end of fibula     resolved 09/15/17    Depression     Distal radius fracture, left     last assessed 01/30/17    GERD (gastroesophageal reflux disease)     last assessed 05/06/13    Herpes labialis     last assessed 07/23/15    Hypertension     essential ; last assessed 08/07/13    Sinus bradycardia     last assessed 05/24/16     Access Code: [email or web address]  URL: [email or web address]  Date: 06/16/2022  Prepared by: Lear Lazar    Exercises  · Long Sitting Quad Set - 1 x daily - 3-4 x weekly - 2 sets - 10 reps - 5 hold  · Active Straight Leg Raise with Quad Set - 1 x daily - 3-4 x weekly - 3 sets - 10 reps  · Supine Bridge with Resistance Band - 1 x daily - 3-4 x weekly - 3 sets - 10 reps  · Clamshell with Resistance - 1 x daily - 3-4 x weekly - 3 sets - 10 reps  · Prone Quadriceps Stretch with Strap - 1 x daily - 7 x weekly - 3 reps - 30 hold  · Seated Hamstring Stretch - 1 x daily - 7 x weekly - 3 reps - 30 hold  · Gastroc Stretch on Wall - 1 x daily - 7 x weekly - 3 reps - 30 hold

## 2022-06-23 DIAGNOSIS — F41.9 ANXIETY: ICD-10-CM

## 2022-06-23 RX ORDER — ALPRAZOLAM 0.25 MG/1
0.25 TABLET ORAL
Qty: 5 TABLET | Refills: 0 | Status: SHIPPED | OUTPATIENT
Start: 2022-06-23 | End: 2022-07-13 | Stop reason: SDUPTHER

## 2022-06-27 ENCOUNTER — TELEPHONE (OUTPATIENT)
Dept: PHYSICAL THERAPY | Facility: CLINIC | Age: 57
End: 2022-06-27

## 2022-06-27 NOTE — TELEPHONE ENCOUNTER
Patient called to cancel her afternoon appointment due to testing + for Covid  States her HEP is going well and she feels comfortable fully discharging at this time which is what she was scheduled for today

## 2022-07-13 ENCOUNTER — OFFICE VISIT (OUTPATIENT)
Dept: FAMILY MEDICINE CLINIC | Facility: CLINIC | Age: 57
End: 2022-07-13
Payer: COMMERCIAL

## 2022-07-13 VITALS
SYSTOLIC BLOOD PRESSURE: 130 MMHG | RESPIRATION RATE: 16 BRPM | HEIGHT: 61 IN | HEART RATE: 88 BPM | DIASTOLIC BLOOD PRESSURE: 70 MMHG | TEMPERATURE: 98.7 F | WEIGHT: 166.2 LBS | BODY MASS INDEX: 31.38 KG/M2

## 2022-07-13 DIAGNOSIS — F41.9 ANXIETY AND DEPRESSION: Primary | ICD-10-CM

## 2022-07-13 DIAGNOSIS — F32.A ANXIETY AND DEPRESSION: Primary | ICD-10-CM

## 2022-07-13 PROCEDURE — 99213 OFFICE O/P EST LOW 20 MIN: CPT | Performed by: FAMILY MEDICINE

## 2022-07-13 RX ORDER — ESCITALOPRAM OXALATE 10 MG/1
10 TABLET ORAL DAILY
Qty: 30 TABLET | Refills: 0 | Status: SHIPPED | OUTPATIENT
Start: 2022-07-13 | End: 2022-08-23 | Stop reason: SDUPTHER

## 2022-07-13 RX ORDER — ALPRAZOLAM 0.25 MG/1
0.25 TABLET ORAL
Qty: 30 TABLET | Refills: 0 | Status: SHIPPED | OUTPATIENT
Start: 2022-07-13

## 2022-07-28 ENCOUNTER — OFFICE VISIT (OUTPATIENT)
Dept: OBGYN CLINIC | Facility: CLINIC | Age: 57
End: 2022-07-28
Payer: COMMERCIAL

## 2022-07-28 ENCOUNTER — OFFICE VISIT (OUTPATIENT)
Dept: FAMILY MEDICINE CLINIC | Facility: CLINIC | Age: 57
End: 2022-07-28
Payer: COMMERCIAL

## 2022-07-28 VITALS
DIASTOLIC BLOOD PRESSURE: 75 MMHG | WEIGHT: 162 LBS | HEART RATE: 94 BPM | HEIGHT: 65 IN | BODY MASS INDEX: 26.99 KG/M2 | SYSTOLIC BLOOD PRESSURE: 117 MMHG

## 2022-07-28 VITALS
RESPIRATION RATE: 16 BRPM | HEIGHT: 65 IN | DIASTOLIC BLOOD PRESSURE: 83 MMHG | TEMPERATURE: 97.5 F | WEIGHT: 165.6 LBS | HEART RATE: 80 BPM | SYSTOLIC BLOOD PRESSURE: 119 MMHG | BODY MASS INDEX: 27.59 KG/M2

## 2022-07-28 DIAGNOSIS — M17.12 PRIMARY OSTEOARTHRITIS OF LEFT KNEE: Primary | ICD-10-CM

## 2022-07-28 DIAGNOSIS — I10 ESSENTIAL HYPERTENSION: ICD-10-CM

## 2022-07-28 DIAGNOSIS — E78.5 BORDERLINE HYPERLIPIDEMIA: ICD-10-CM

## 2022-07-28 DIAGNOSIS — H92.01 RIGHT EAR PAIN: ICD-10-CM

## 2022-07-28 DIAGNOSIS — F41.8 DEPRESSION WITH ANXIETY: Primary | ICD-10-CM

## 2022-07-28 DIAGNOSIS — M25.562 CHRONIC PAIN OF LEFT KNEE: ICD-10-CM

## 2022-07-28 DIAGNOSIS — E01.0 THYROMEGALY: ICD-10-CM

## 2022-07-28 DIAGNOSIS — G89.29 CHRONIC PAIN OF LEFT KNEE: ICD-10-CM

## 2022-07-28 PROCEDURE — 99213 OFFICE O/P EST LOW 20 MIN: CPT | Performed by: ORTHOPAEDIC SURGERY

## 2022-07-28 PROCEDURE — 99214 OFFICE O/P EST MOD 30 MIN: CPT | Performed by: FAMILY MEDICINE

## 2022-07-28 RX ORDER — METHYLPREDNISOLONE 4 MG/1
TABLET ORAL
Qty: 21 EACH | Refills: 0 | Status: SHIPPED | OUTPATIENT
Start: 2022-07-28

## 2022-07-28 RX ORDER — CIPROFLOXACIN AND DEXAMETHASONE 3; 1 MG/ML; MG/ML
4 SUSPENSION/ DROPS AURICULAR (OTIC) 2 TIMES DAILY
Qty: 2 ML | Refills: 0 | Status: SHIPPED | OUTPATIENT
Start: 2022-07-28 | End: 2022-08-02

## 2022-07-28 NOTE — PROGRESS NOTES
Dannielle Malcolm 1965 female MRN: [id number]    [address] [place] [address]      ASSESSMENT/PLAN  Dannielle Malcolm is a 59 y o  female presents to the office for    1  Depression with anxiety  Recommend started Lexapro if symptoms start up  However currently now relationship with son improved so depression has shown improvement    2  Right ear pain  - ciprofloxacin-dexamethasone (CIPRODEX) otic suspension; Administer 4 drops to the right ear 2 (two) times a day for 5 days  Dispense: 2 mL; Refill: 0  - methylPREDNISolone 4 MG tablet therapy pack; Use as directed on package  Dispense: 21 each; Refill: 0    3  Essential hypertension  Controlled; continue medications  - Comprehensive metabolic panel; Future  - CBC and differential; Future  - UA w Reflex to Microscopic w Reflex to Culture -Lab Collect; Future  - Comprehensive metabolic panel  - CBC and differential    4  Borderline hyperlipidemia  - Lipid panel; Future  - Lipid panel    5  Thyromegaly  - TSH, 3rd generation; Future  - TSH, 3rd generation            Future Appointments   Date Time Provider Delores Bobby   8/9/2022  9:20 AM [facility]   8/23/2022 11:30 AM Vazquez Purvis MD [facility]          SUBJECTIVE  CC: No chief complaint on file  HPI:  Dannielle Malcolm is a 59 y o  female who presents for an acute appointment  Patient has been spending her time in the pool as much as possible  Patient states that she thinks that she might have an ear infection  She has continuous right ear pain  Patient states that she has an upcoming pulmonologist appointment but states that her cough is slightly improved  Patient is also seeing her nephrologist as scheduled like normal for her sodium levels  Patient states that her blood pressures been very well controlled  She is no longer drinking    Review of Systems   Constitutional: Negative for activity change, appetite change, chills, fatigue and fever  HENT: Positive for ear pain  Negative for congestion and facial swelling  Respiratory: Negative for cough, chest tightness and shortness of breath  Cardiovascular: Negative for chest pain and leg swelling  Gastrointestinal: Negative for abdominal distention, abdominal pain, constipation, diarrhea, nausea and vomiting  All other systems reviewed and are negative        Historical Information   The patient history was reviewed as follows:  Past Medical History:   Diagnosis Date    Abnormal immunology findings 06/09/2011    Anemia 05/23/2011    Arthritis     Closed fracture of distal end of fibula     resolved 09/15/17    Depression     Distal radius fracture, left     last assessed 01/30/17    GERD (gastroesophageal reflux disease)     last assessed 05/06/13    Herpes labialis     last assessed 07/23/15    Hypertension     essential ; last assessed 08/07/13    Sinus bradycardia     last assessed 05/24/16         Medications:     Current Outpatient Medications:     ciprofloxacin-dexamethasone (CIPRODEX) otic suspension, Administer 4 drops to the right ear 2 (two) times a day for 5 days, Disp: 2 mL, Rfl: 0    methylPREDNISolone 4 MG tablet therapy pack, Use as directed on package, Disp: 21 each, Rfl: 0    ALPRAZolam (XANAX) 0 25 mg tablet, Take 1 tablet (0 25 mg total) by mouth daily at bedtime as needed for anxiety, Disp: 30 tablet, Rfl: 0    amLODIPine (NORVASC) 5 mg tablet, Take 1 tablet (5 mg total) by mouth daily, Disp: 90 tablet, Rfl: 2    escitalopram (Lexapro) 10 mg tablet, Take 1 tablet (10 mg total) by mouth daily, Disp: 30 tablet, Rfl: 0    folic acid (FOLVITE) 1 mg tablet, Take 1 tablet (1 mg total) by mouth daily, Disp: 30 tablet, Rfl: 0    losartan (COZAAR) 50 mg tablet, Take 1 tablet (50 mg total) by mouth daily, Disp: 90 tablet, Rfl: 2    thiamine (VITAMIN B1) 100 mg tablet, Take 1 tablet (100 mg total) by mouth daily, Disp: 90 tablet, Rfl: 2    Allergies   Allergen Reactions    Lisinopril      COUGH    Bupropion Anxiety       OBJECTIVE  Vitals:   Vitals:    07/28/22 1512   BP: 119/83   Pulse: 80   Resp: 16   Temp: 97 5 °F (36 4 °C)   Weight: 75 1 kg (165 lb 9 6 oz)   Height: 5' 5" (1 651 m)         Physical Exam  Vitals reviewed  Constitutional:       Appearance: She is well-developed  HENT:      Head: Normocephalic and atraumatic  Right Ear: Swelling and tenderness present  Left Ear: Tympanic membrane, ear canal and external ear normal    Eyes:      Conjunctiva/sclera: Conjunctivae normal       Pupils: Pupils are equal, round, and reactive to light  Cardiovascular:      Rate and Rhythm: Normal rate and regular rhythm  Heart sounds: Normal heart sounds  Pulmonary:      Effort: Pulmonary effort is normal  No respiratory distress  Breath sounds: Normal breath sounds  Musculoskeletal:         General: Normal range of motion  Cervical back: Normal range of motion and neck supple  Skin:     General: Skin is warm  Capillary Refill: Capillary refill takes less than 2 seconds  Neurological:      Mental Status: She is alert and oriented to person, place, and time                      Krystian Cavanaugh MD,   [facility]  7/31/2022

## 2022-07-28 NOTE — PROGRESS NOTES
Assessment/Plan:  1  Primary osteoarthritis of left knee     2  Chronic pain of left knee       Scribe Attestation    I,:  Hansel Mistry am acting as a scribe while in the presence of the attending physician :       I,:  Kimberley Barnhart, DO personally performed the services described in this documentation    as scribed in my presence :         Bebeto Lema is a pleasant 59year old who presents to the office today for a follow-up evalaution of her left knee osteoarthritis  Unfortunately, she has failed to see pain relief in the forms of a home exercise program that was provided to her by physical therapy  She has not used any over-the-counter anti-inflammatories  Her main complaint is the crunching of her knee  I discussed with the patient that if her pain is manageable she may take over-the-counter anti-inflammatories  If the pain becomes too severe, we can consider a left knee corticosteroid injection  I will follow-up with her as needed  She understood and had no further questions  Subjective: Follow-up left knee    Patient ID: Mandi Scott is a 59 y o  female who presents to the office today for a follow-up evalaution of her left knee osteoarthritis  She states that she has been performing her home exercise program that was provided to her by formal physical therapy and notes no improvements  She states that she does not use over-the-counter anti-inflammatories  She states that she will experience a daily intermittent sharp pain located over the anterior aspect of her left knee  She states that she will experience crunching about her knee  She states that her left knee pain is exacerbated with ambulating up and down the stairs  She denies any new injury or trauma  Review of Systems   Constitutional: Positive for activity change  Negative for chills, fever and unexpected weight change  HENT: Negative for hearing loss, nosebleeds and sore throat  Eyes: Negative for pain, redness and visual disturbance  Respiratory: Negative for cough, shortness of breath and wheezing  Cardiovascular: Negative for chest pain, palpitations and leg swelling  Gastrointestinal: Negative for abdominal pain, nausea and vomiting  Endocrine: Negative for polyphagia and polyuria  Genitourinary: Negative for dysuria and hematuria  Musculoskeletal: Positive for arthralgias and myalgias  Skin: Negative for rash and wound  Neurological: Negative for dizziness, numbness and headaches  Psychiatric/Behavioral: Negative for confusion and suicidal ideas  The patient is not nervous/anxious            Past Medical History:   Diagnosis Date    Abnormal immunology findings 06/09/2011    Anemia 05/23/2011    Arthritis     Closed fracture of distal end of fibula     resolved 09/15/17    Depression     Distal radius fracture, left     last assessed 01/30/17    GERD (gastroesophageal reflux disease)     last assessed 05/06/13    Herpes labialis     last assessed 07/23/15    Hypertension     essential ; last assessed 08/07/13    Sinus bradycardia     last assessed 05/24/16       Past Surgical History:   Procedure Laterality Date    ANKLE SURGERY      last assessed 09/15/17    BACK SURGERY      lumbar laminectomy L4-L5    BLADDER SUSPENSION      7/2017    COLONOSCOPY      JOINT REPLACEMENT      TKR  on right    ORIF TIBIA & FIBULA FRACTURES Right 12/15/2016    Procedure: SURGICAL FIXATION OF RIGHT DISTAL FIBULA FRACTURE;  Surgeon: Bekah Potter MD;  Location: [facility] MAIN OR;  Service:     CO REVISE KNEE JOINT REPLACE,ALL PARTS Right 10/23/2017    Procedure: REVISION TOTAL KNEE REPLACEMENT WITH FROZEN SECTIONS;  Surgeon: Arnaud aWrd DO;  Location: [address] [place] [address];  Service: Orthopedics    TOTAL KNEE ARTHROPLASTY      last assessed; 11/21/17    WISDOM TOOTH EXTRACTION         Family History   Problem Relation Age of Onset    Arthritis Mother     Osteoporosis Mother     Dementia Father     Other Father         spinal stenosis    Other Family         CREST       Social History     Occupational History    Not on file   Tobacco Use    Smoking status: Never Smoker    Smokeless tobacco: Never Used   Vaping Use    Vaping Use: Never used   Substance and Sexual Activity    Alcohol use: Yes     Comment: moderately    Drug use: No    Sexual activity: Yes     Partners: Male         Current Outpatient Medications:     ALPRAZolam (XANAX) 0 25 mg tablet, Take 1 tablet (0 25 mg total) by mouth daily at bedtime as needed for anxiety, Disp: 30 tablet, Rfl: 0    amLODIPine (NORVASC) 5 mg tablet, Take 1 tablet (5 mg total) by mouth daily, Disp: 90 tablet, Rfl: 2    escitalopram (Lexapro) 10 mg tablet, Take 1 tablet (10 mg total) by mouth daily, Disp: 30 tablet, Rfl: 0    folic acid (FOLVITE) 1 mg tablet, Take 1 tablet (1 mg total) by mouth daily, Disp: 30 tablet, Rfl: 0    losartan (COZAAR) 50 mg tablet, Take 1 tablet (50 mg total) by mouth daily, Disp: 90 tablet, Rfl: 2    thiamine (VITAMIN B1) 100 mg tablet, Take 1 tablet (100 mg total) by mouth daily, Disp: 90 tablet, Rfl: 2    Allergies   Allergen Reactions    Lisinopril      COUGH    Bupropion Anxiety       Objective:  Vitals:    07/28/22 0910   BP: 117/75   Pulse: 94       Body mass index is 26 96 kg/m²  Left Knee Exam     Tenderness   The patient is experiencing tenderness in the patella and medial joint line  Range of Motion   Extension: 0   Flexion: 120     Tests   Varus: negative Valgus: negative  Drawer:  Anterior - negative     Posterior - negative    Other   Erythema: absent  Scars: absent  Sensation: normal  Pulse: present  Swelling: none  Effusion: no effusion present    Comments:  No erythema, warmth or signs of infections  Significant parapatellar crepitus appreciated  Knee is stable on ligamentous exam at 0, 30, 90 degrees  Neurovascularly intact           Observations   Left Knee   Negative for effusion  Physical Exam  Vitals reviewed  Constitutional:       Appearance: Normal appearance  She is well-developed  HENT:      Head: Normocephalic and atraumatic  Eyes:      General:         Right eye: No discharge  Left eye: No discharge  Extraocular Movements: Extraocular movements intact  Conjunctiva/sclera: Conjunctivae normal    Cardiovascular:      Rate and Rhythm: Normal rate  Pulmonary:      Effort: Pulmonary effort is normal  No respiratory distress  Musculoskeletal:      Cervical back: Normal range of motion and neck supple  Left knee: No effusion  Skin:     General: Skin is warm and dry  Neurological:      General: No focal deficit present  Mental Status: She is alert and oriented to person, place, and time  Psychiatric:         Mood and Affect: Mood normal          Behavior: Behavior normal          I have personally reviewed pertinent films in PACS  No new images reviewed today

## 2022-08-02 PROBLEM — F41.9 ANXIETY: Status: ACTIVE | Noted: 2022-08-02

## 2022-08-03 NOTE — PROGRESS NOTES
Assessment/Plan:    1  Anxiety and depression  -     escitalopram (Lexapro) 10 mg tablet; Take 1 tablet (10 mg total) by mouth daily  -     ALPRAZolam (XANAX) 0 25 mg tablet; Take 1 tablet (0 25 mg total) by mouth daily at bedtime as needed for anxiety    2  BMI 27 0-27 9,adult      BMI Counseling: Body mass index is 31 92 kg/m²  The BMI is above normal  Nutrition recommendations include encouraging healthy choices of fruits and vegetables, consuming healthier snacks, moderation in carbohydrate intake, increasing intake of lean protein, reducing intake of saturated and trans fat and reducing intake of cholesterol  Exercise recommendations include exercising 3-5 times per week and strength training exercises  No pharmacotherapy was ordered  Rationale for BMI follow-up plan is due to patient being overweight or obese  Subjective:      Patient ID: David Fregoso is a 59 y o  female  Chief Complaint   Patient presents with    Anxiety     Pt said she can not take wellbutrin , and she wants something to slow her anxiety now   Anxiety  Presents for follow-up visit  Symptoms include decreased concentration, depressed mood, dry mouth, excessive worry, insomnia, irritability, malaise, muscle tension, nervous/anxious behavior, palpitations and restlessness  Patient reports no chest pain or suicidal ideas  Symptoms occur most days  The quality of sleep is non-restorative  cannot take Wellbutrin  significant family stressors --father passed, caring for elderly mother (80); son      The following portions of the patient's history were reviewed and updated as appropriate: allergies, current medications, past family history, past medical history, past social history, past surgical history and problem list     Review of Systems   Constitutional: Positive for irritability  Cardiovascular: Positive for palpitations  Negative for chest pain     Psychiatric/Behavioral: Positive for decreased concentration  Negative for suicidal ideas  The patient is nervous/anxious and has insomnia  Current Outpatient Medications   Medication Sig Dispense Refill    ALPRAZolam (XANAX) 0 25 mg tablet Take 1 tablet (0 25 mg total) by mouth daily at bedtime as needed for anxiety 30 tablet 0    amLODIPine (NORVASC) 5 mg tablet Take 1 tablet (5 mg total) by mouth daily 90 tablet 2    escitalopram (Lexapro) 10 mg tablet Take 1 tablet (10 mg total) by mouth daily 30 tablet 0    folic acid (FOLVITE) 1 mg tablet Take 1 tablet (1 mg total) by mouth daily 30 tablet 0    losartan (COZAAR) 50 mg tablet Take 1 tablet (50 mg total) by mouth daily 90 tablet 2    thiamine (VITAMIN B1) 100 mg tablet Take 1 tablet (100 mg total) by mouth daily 90 tablet 2    ciprofloxacin-dexamethasone (CIPRODEX) otic suspension Administer 4 drops to the right ear 2 (two) times a day for 5 days 2 mL 0    methylPREDNISolone 4 MG tablet therapy pack Use as directed on package 21 each 0     No current facility-administered medications for this visit  Objective:    /70 (BP Location: Left arm, Patient Position: Sitting, Cuff Size: Large)   Pulse 88   Temp 98 7 °F (37 1 °C)   Resp 16   Ht 5' 0 5" (1 537 m)   Wt 75 4 kg (166 lb 3 2 oz)   BMI 31 92 kg/m²        Physical Exam  Vitals and nursing note reviewed  Constitutional:       General: She is not in acute distress  Cardiovascular:      Rate and Rhythm: Normal rate and regular rhythm  Pulmonary:      Effort: Pulmonary effort is normal  No respiratory distress  Abdominal:      General: Bowel sounds are normal       Palpations: Abdomen is soft  Tenderness: There is no abdominal tenderness  Musculoskeletal:      Cervical back: Neck supple  Skin:     General: Skin is warm and dry  Neurological:      General: No focal deficit present  Mental Status: She is alert and oriented to person, place, and time  Cranial Nerves: No cranial nerve deficit  Psychiatric:      Comments: Anxious, depressed, denies a/v halluc , denies Tommy Bradford MD

## 2022-08-09 ENCOUNTER — CONSULT (OUTPATIENT)
Dept: PULMONOLOGY | Facility: MEDICAL CENTER | Age: 57
End: 2022-08-09
Payer: COMMERCIAL

## 2022-08-09 VITALS
HEIGHT: 65 IN | RESPIRATION RATE: 12 BRPM | DIASTOLIC BLOOD PRESSURE: 82 MMHG | BODY MASS INDEX: 27.49 KG/M2 | HEART RATE: 72 BPM | WEIGHT: 165 LBS | OXYGEN SATURATION: 98 % | TEMPERATURE: 97.6 F | SYSTOLIC BLOOD PRESSURE: 138 MMHG

## 2022-08-09 DIAGNOSIS — R05.9 COUGH IN ADULT: ICD-10-CM

## 2022-08-09 PROBLEM — R05.3 CHRONIC COUGH: Status: ACTIVE | Noted: 2022-08-09

## 2022-08-09 PROCEDURE — 94010 BREATHING CAPACITY TEST: CPT | Performed by: INTERNAL MEDICINE

## 2022-08-09 PROCEDURE — 99204 OFFICE O/P NEW MOD 45 MIN: CPT | Performed by: INTERNAL MEDICINE

## 2022-08-09 PROCEDURE — 3075F SYST BP GE 130 - 139MM HG: CPT | Performed by: INTERNAL MEDICINE

## 2022-08-09 PROCEDURE — 3079F DIAST BP 80-89 MM HG: CPT | Performed by: INTERNAL MEDICINE

## 2022-08-09 NOTE — ASSESSMENT & PLAN NOTE
After careful review of her symptoms and office spirometry which showed some mild obstructive physiology I think Nadeen Lester has some airway inflammation status post her infection  I would call this an episode of cough variant asthma after her infection in December  I reviewed her chest x-ray which was normal   At this point it is not impacting her quality of life and given that she is very active I would not put her on antihistamines at this time  Instead will treat her airway inflammation with 1 month of Trellogy have given her samples  I think that they anticholinergic agent as well as the steroid will help decrease her obstructive airways and hence improve her cough  She will let me know in 1 month if this has not worked

## 2022-08-09 NOTE — PROGRESS NOTES
Assessment/Plan:    Chronic cough  After careful review of her symptoms and office spirometry which showed some mild obstructive physiology I think Yasmin Rosen has some airway inflammation status post her infection  I would call this an episode of cough variant asthma after her infection in December  I reviewed her chest x-ray which was normal   At this point it is not impacting her quality of life and given that she is very active I would not put her on antihistamines at this time  Instead will treat her airway inflammation with 1 month of Trellogy have given her samples  I think that they anticholinergic agent as well as the steroid will help decrease her obstructive airways and hence improve her cough  She will let me know in 1 month if this has not worked  Diagnoses and all orders for this visit:    Cough in adult  -     Ambulatory Referral to Pulmonology  -     POCT spirometry          Subjective:      Patient ID: Shorty Saleh is a 59 y o  female  Yasmin Rosen is here for evaluation of a cough  About 6 months ago she contracted a very bad upper respiratory infection and since that time she is had a dry cough  She denies any specific asthma triggers no shortness of breath or wheezing  No postnasal drip or acid reflux  She denies any allergies or any frequent bronchitis or pneumonia  She is a lifelong nonsmoker and 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  She does note that the coughing does not happen during sleep but it does increase when she is been talking or drinking carbonated beverages  She has trialed on Proventil which did not help      The following portions of the patient's history were reviewed and updated as appropriate: allergies, current medications, past family history, past medical history, past social history, past surgical history and problem list     Review of Systems   Constitutional: Negative  Negative for unexpected weight change  HENT: Negative    Negative for postnasal drip     Eyes: Negative  Respiratory: Negative  Negative for cough, shortness of breath and wheezing  Cardiovascular: Negative  Negative for chest pain and leg swelling  Gastrointestinal: Negative  Endocrine: Negative  Genitourinary: Negative  Musculoskeletal: Negative  Allergic/Immunologic: Negative  Neurological: Negative  Hematological: Negative  Objective:      /82 (BP Location: Left arm, Patient Position: Sitting, Cuff Size: Standard)   Pulse 72   Temp 97 6 °F (36 4 °C) (Temporal)   Resp 12   Ht 5' 5" (1 651 m)   Wt 74 8 kg (165 lb)   SpO2 98%   BMI 27 46 kg/m²          Physical Exam  Constitutional:       Appearance: She is well-developed  HENT:      Head: Normocephalic  Eyes:      Pupils: Pupils are equal, round, and reactive to light  Cardiovascular:      Rate and Rhythm: Normal rate  Heart sounds: No murmur heard  Pulmonary:      Effort: Pulmonary effort is normal  No respiratory distress  Breath sounds: Normal breath sounds  No wheezing or rales  Abdominal:      Palpations: Abdomen is soft  Musculoskeletal:         General: Normal range of motion  Cervical back: Normal range of motion and neck supple  Skin:     General: Skin is warm and dry  Neurological:      Mental Status: She is alert and oriented to person, place, and time

## 2022-08-23 DIAGNOSIS — F32.A ANXIETY AND DEPRESSION: ICD-10-CM

## 2022-08-23 DIAGNOSIS — F41.9 ANXIETY AND DEPRESSION: ICD-10-CM

## 2022-08-23 RX ORDER — ESCITALOPRAM OXALATE 10 MG/1
10 TABLET ORAL DAILY
Qty: 90 TABLET | Refills: 2 | Status: SHIPPED | OUTPATIENT
Start: 2022-08-23

## 2022-10-26 DIAGNOSIS — F10.10 ETOH ABUSE: ICD-10-CM

## 2022-10-26 DIAGNOSIS — I10 ESSENTIAL HYPERTENSION: ICD-10-CM

## 2022-10-26 RX ORDER — LOSARTAN POTASSIUM 50 MG/1
50 TABLET ORAL DAILY
Qty: 90 TABLET | Refills: 2 | Status: SHIPPED | OUTPATIENT
Start: 2022-10-26 | End: 2022-11-03 | Stop reason: SDUPTHER

## 2022-10-26 RX ORDER — FOLIC ACID 1 MG/1
1 TABLET ORAL DAILY
Qty: 30 TABLET | Refills: 11 | Status: SHIPPED | OUTPATIENT
Start: 2022-10-26

## 2022-11-03 DIAGNOSIS — I10 ESSENTIAL HYPERTENSION: ICD-10-CM

## 2022-11-03 RX ORDER — LOSARTAN POTASSIUM 50 MG/1
50 TABLET ORAL DAILY
Qty: 90 TABLET | Refills: 2 | Status: SHIPPED | OUTPATIENT
Start: 2022-11-03

## 2022-11-08 ENCOUNTER — OFFICE VISIT (OUTPATIENT)
Dept: FAMILY MEDICINE CLINIC | Facility: CLINIC | Age: 57
End: 2022-11-08

## 2022-11-08 VITALS
BODY MASS INDEX: 28.49 KG/M2 | WEIGHT: 171 LBS | HEIGHT: 65 IN | SYSTOLIC BLOOD PRESSURE: 150 MMHG | DIASTOLIC BLOOD PRESSURE: 90 MMHG | TEMPERATURE: 98.7 F | HEART RATE: 84 BPM | RESPIRATION RATE: 14 BRPM

## 2022-11-08 DIAGNOSIS — F41.9 ANXIETY AND DEPRESSION: Primary | ICD-10-CM

## 2022-11-08 DIAGNOSIS — Z63.5 FAMILY DISRUPTION DUE TO DIVORCE: ICD-10-CM

## 2022-11-08 DIAGNOSIS — F32.A ANXIETY AND DEPRESSION: Primary | ICD-10-CM

## 2022-11-08 DIAGNOSIS — Z23 ENCOUNTER FOR ADMINISTRATION OF VACCINE: ICD-10-CM

## 2022-11-08 DIAGNOSIS — I10 ESSENTIAL HYPERTENSION: ICD-10-CM

## 2022-11-08 RX ORDER — BUSPIRONE HYDROCHLORIDE 5 MG/1
5 TABLET ORAL 2 TIMES DAILY
Qty: 60 TABLET | Refills: 5 | Status: SHIPPED | OUTPATIENT
Start: 2022-11-08 | End: 2022-11-15 | Stop reason: SDUPTHER

## 2022-11-08 RX ORDER — ESCITALOPRAM OXALATE 20 MG/1
20 TABLET ORAL DAILY
Qty: 90 TABLET | Refills: 1 | Status: SHIPPED | OUTPATIENT
Start: 2022-11-08

## 2022-11-08 RX ORDER — ONDANSETRON 4 MG/1
4 TABLET, ORALLY DISINTEGRATING ORAL EVERY 6 HOURS PRN
Qty: 20 TABLET | Refills: 0 | Status: SHIPPED | OUTPATIENT
Start: 2022-11-08

## 2022-11-08 SDOH — SOCIAL STABILITY - SOCIAL INSECURITY: DISRUPTION OF FAMILY BY SEPARATION AND DIVORCE: Z63.5

## 2022-11-08 NOTE — PROGRESS NOTES
Viktoriya Mims 1965 female MRN: [id number]    [address] [place] [address]      ASSESSMENT/PLAN  Viktoriya Mims is a 59 y o  female presents to the office for    Diagnoses and all orders for this visit:    Anxiety and depression  -     escitalopram (Lexapro) 20 mg tablet; Take 1 tablet (20 mg total) by mouth daily  -     busPIRone (BUSPAR) 5 mg tablet; Take 1 tablet (5 mg total) by mouth 2 (two) times a day  -     ondansetron (Zofran ODT) 4 mg disintegrating tablet; Take 1 tablet (4 mg total) by mouth every 6 (six) hours as needed for nausea or vomiting  -     Basic metabolic panel; Future  -     Basic metabolic panel    Encounter for administration of vaccine  -     influenza vaccine, quadrivalent, recombinant, PF, 0 5 mL, for patients 18 yr+ (FLUBLOK)    Essential hypertension    Family disruption due to divorce     Above medications started  Close follow up needed  HTN elevated today  Recommend monitoring         Future Appointments   Date Time Provider Delores Bobby   11/15/2022 11:40 AM Colt Gonzáles MD [facility]          SUBJECTIVE  CC: Anxiety      HPI:  Viktoriya Mims is a 59 y o  female who presents for an acute appointment  Patient has had acute panic attacks  Unfortunately her  just offered her divorce and she has been meeting with   She states that she is taking care of her mother that Drove them apart for 6 months  Patient taking all her medications as prescribed  Review of Systems   Constitutional: Negative for activity change, appetite change, chills, fatigue and fever  HENT: Negative for congestion  Respiratory: Negative for cough, chest tightness and shortness of breath  Cardiovascular: Negative for chest pain and leg swelling  Gastrointestinal: Negative for abdominal distention, abdominal pain, constipation, diarrhea, nausea and vomiting     Psychiatric/Behavioral: Positive for sleep disturbance  The patient is nervous/anxious  All other systems reviewed and are negative        Historical Information   The patient history was reviewed as follows:  Past Medical History:   Diagnosis Date   • Abnormal immunology findings 06/09/2011   • Anemia 05/23/2011   • Arthritis    • Closed fracture of distal end of fibula     resolved 09/15/17   • Depression    • Distal radius fracture, left     last assessed 01/30/17   • GERD (gastroesophageal reflux disease)     last assessed 05/06/13   • Herpes labialis     last assessed 07/23/15   • Hypertension     essential ; last assessed 08/07/13   • Sinus bradycardia     last assessed 05/24/16         Medications:     Current Outpatient Medications:   •  amLODIPine (NORVASC) 5 mg tablet, Take 1 tablet (5 mg total) by mouth daily, Disp: 90 tablet, Rfl: 2  •  busPIRone (BUSPAR) 5 mg tablet, Take 1 tablet (5 mg total) by mouth 2 (two) times a day, Disp: 60 tablet, Rfl: 5  •  escitalopram (Lexapro) 20 mg tablet, Take 1 tablet (20 mg total) by mouth daily, Disp: 90 tablet, Rfl: 1  •  folic acid (FOLVITE) 1 mg tablet, Take 1 tablet (1 mg total) by mouth daily, Disp: 30 tablet, Rfl: 11  •  losartan (COZAAR) 50 mg tablet, Take 1 tablet (50 mg total) by mouth daily, Disp: 90 tablet, Rfl: 2  •  ondansetron (Zofran ODT) 4 mg disintegrating tablet, Take 1 tablet (4 mg total) by mouth every 6 (six) hours as needed for nausea or vomiting, Disp: 20 tablet, Rfl: 0  •  thiamine (VITAMIN B1) 100 mg tablet, Take 1 tablet (100 mg total) by mouth daily, Disp: 90 tablet, Rfl: 2  •  ALPRAZolam (XANAX) 0 25 mg tablet, Take 1 tablet (0 25 mg total) by mouth daily at bedtime as needed for anxiety (Patient not taking: No sig reported), Disp: 30 tablet, Rfl: 0    Allergies   Allergen Reactions   • Lisinopril      COUGH   • Bupropion Anxiety       OBJECTIVE  Vitals:   Vitals:    11/08/22 1516   BP: 150/90   BP Location: Left arm   Patient Position: Sitting   Cuff Size: Standard   Pulse: 84 Resp: 14   Temp: 98 7 °F (37 1 °C)   Weight: 77 6 kg (171 lb)   Height: 5' 5" (1 651 m)         Physical Exam  Vitals reviewed  Constitutional:       Appearance: She is well-developed  HENT:      Head: Normocephalic and atraumatic  Eyes:      Conjunctiva/sclera: Conjunctivae normal       Pupils: Pupils are equal, round, and reactive to light  Cardiovascular:      Rate and Rhythm: Normal rate and regular rhythm  Heart sounds: Normal heart sounds  Pulmonary:      Effort: Pulmonary effort is normal  No respiratory distress  Breath sounds: Normal breath sounds  Musculoskeletal:         General: Normal range of motion  Cervical back: Normal range of motion and neck supple  Skin:     General: Skin is warm  Capillary Refill: Capillary refill takes less than 2 seconds  Neurological:      Mental Status: She is alert and oriented to person, place, and time                      Debby Dennis,   [facility]  11/10/2022

## 2022-11-10 ENCOUNTER — TELEPHONE (OUTPATIENT)
Dept: ADMINISTRATIVE | Facility: OTHER | Age: 57
End: 2022-11-10

## 2022-11-10 NOTE — TELEPHONE ENCOUNTER
Upon review of the In Basket request we found that your request is for a facility is a [employer]  The [facility] update was requested and neither [facility] ([facility]) nor [facility] provided vaccine information  Please complete the reconciling process at the patient's next visit, or request their original COVID vaccine card and follow COVID Vaccine workflows to update the chart appropriately  Leadership and the VBI Team thanks you  Any additional questions or concerns should be emailed to the Practice Liaisons via the appropriate education email address, please do not reply via In Basket      Thank you  Khadra Quijano MA

## 2022-11-10 NOTE — TELEPHONE ENCOUNTER
----- Message from Kain Click sent at 11/8/2022  3:20 PM EST -----  Regarding: COVID  11/08/22 3:20 PM    Hello, our patient Alisa Locke has had Immunization(s) completed/performed  Please assist in updating the patient chart by making an External outreach to [facility] facility located in [facility]  The date of service is 2021/2022      Thank you,  Kain Click  [facility]

## 2022-11-15 ENCOUNTER — OFFICE VISIT (OUTPATIENT)
Dept: FAMILY MEDICINE CLINIC | Facility: CLINIC | Age: 57
End: 2022-11-15

## 2022-11-15 VITALS
BODY MASS INDEX: 28.66 KG/M2 | TEMPERATURE: 98.2 F | HEIGHT: 65 IN | HEART RATE: 84 BPM | RESPIRATION RATE: 16 BRPM | DIASTOLIC BLOOD PRESSURE: 90 MMHG | WEIGHT: 172 LBS | SYSTOLIC BLOOD PRESSURE: 130 MMHG

## 2022-11-15 DIAGNOSIS — F32.A ANXIETY AND DEPRESSION: Primary | ICD-10-CM

## 2022-11-15 DIAGNOSIS — F41.9 ANXIETY AND DEPRESSION: Primary | ICD-10-CM

## 2022-11-15 RX ORDER — BUSPIRONE HYDROCHLORIDE 5 MG/1
5 TABLET ORAL 2 TIMES DAILY
Qty: 60 TABLET | Refills: 5 | Status: SHIPPED | OUTPATIENT
Start: 2022-11-15 | End: 2022-11-15 | Stop reason: SDUPTHER

## 2022-11-15 RX ORDER — BUSPIRONE HYDROCHLORIDE 10 MG/1
10 TABLET ORAL 2 TIMES DAILY
Qty: 60 TABLET | Refills: 0 | Status: SHIPPED | OUTPATIENT
Start: 2022-11-15

## 2022-11-15 NOTE — PROGRESS NOTES
Jeniffer Naik 1965 female MRN: [id number]    [address] [place] [address]      ASSESSMENT/PLAN  Jeniffer Naik is a 59 y o  female presents to the office for    Diagnoses and all orders for this visit:    Anxiety and depression  -     Discontinue: busPIRone (BUSPAR) 5 mg tablet; Take 1 tablet (5 mg total) by mouth 2 (two) times a day  -     busPIRone (BUSPAR) 10 mg tablet; Take 1 tablet (10 mg total) by mouth 2 (two) times a day  Increase BuSpar to 10 mg b i d  No future appointments  SUBJECTIVE  CC: Follow-up (Anxiety)      HPI:  Jeniffer Naik is a 59 y o  female who presents for a follow-up on anxiety  Patient states that she has felt a significant change with starting the BuSpar  Patient states that she would like a little adjustment if possible  Unfortunately her mother will be moving to [place] and will be living with her brother  Patient is trying to work things out with her   She has a good social support system of her son  Review of Systems   Constitutional: Negative for activity change, appetite change, chills, fatigue and fever  HENT: Negative for congestion  Respiratory: Negative for cough, chest tightness and shortness of breath  Cardiovascular: Negative for chest pain and leg swelling  Gastrointestinal: Negative for abdominal distention, abdominal pain, constipation, diarrhea, nausea and vomiting  Psychiatric/Behavioral: Positive for sleep disturbance  The patient is nervous/anxious  All other systems reviewed and are negative        Historical Information   The patient history was reviewed as follows:  Past Medical History:   Diagnosis Date   • Abnormal immunology findings 06/09/2011   • Anemia 05/23/2011   • Arthritis    • Closed fracture of distal end of fibula     resolved 09/15/17   • Depression    • Distal radius fracture, left     last assessed 01/30/17   • GERD (gastroesophageal reflux disease)     last assessed 05/06/13   • Herpes labialis     last assessed 07/23/15   • Hypertension     essential ; last assessed 08/07/13   • Sinus bradycardia     last assessed 05/24/16         Medications:     Current Outpatient Medications:   •  amLODIPine (NORVASC) 5 mg tablet, Take 1 tablet (5 mg total) by mouth daily, Disp: 90 tablet, Rfl: 2  •  busPIRone (BUSPAR) 10 mg tablet, Take 1 tablet (10 mg total) by mouth 2 (two) times a day, Disp: 60 tablet, Rfl: 0  •  escitalopram (Lexapro) 20 mg tablet, Take 1 tablet (20 mg total) by mouth daily, Disp: 90 tablet, Rfl: 1  •  folic acid (FOLVITE) 1 mg tablet, Take 1 tablet (1 mg total) by mouth daily, Disp: 30 tablet, Rfl: 11  •  losartan (COZAAR) 50 mg tablet, Take 1 tablet (50 mg total) by mouth daily, Disp: 90 tablet, Rfl: 2  •  ondansetron (Zofran ODT) 4 mg disintegrating tablet, Take 1 tablet (4 mg total) by mouth every 6 (six) hours as needed for nausea or vomiting, Disp: 20 tablet, Rfl: 0  •  thiamine (VITAMIN B1) 100 mg tablet, Take 1 tablet (100 mg total) by mouth daily, Disp: 90 tablet, Rfl: 2  •  ALPRAZolam (XANAX) 0 25 mg tablet, Take 1 tablet (0 25 mg total) by mouth daily at bedtime as needed for anxiety (Patient not taking: No sig reported), Disp: 30 tablet, Rfl: 0    Allergies   Allergen Reactions   • Lisinopril      COUGH   • Bupropion Anxiety       OBJECTIVE  Vitals:   Vitals:    11/15/22 1145   BP: 130/90   BP Location: Left arm   Patient Position: Sitting   Cuff Size: Standard   Pulse: 84   Resp: 16   Temp: 98 2 °F (36 8 °C)   Weight: 78 kg (172 lb)   Height: 5' 5" (1 651 m)         Physical Exam  Constitutional:       Appearance: Normal appearance  Pulmonary:      Effort: Pulmonary effort is normal    Musculoskeletal:         General: Normal range of motion  Neurological:      General: No focal deficit present  Mental Status: She is alert and oriented to person, place, and time     Psychiatric:         Mood and Affect: Mood normal  Behavior: Behavior normal          Thought Content:  Thought content normal          Judgment: Judgment normal                     Guillermina Canseco MD,   [facility]  11/15/2022

## 2022-11-18 ENCOUNTER — TELEPHONE (OUTPATIENT)
Dept: FAMILY MEDICINE CLINIC | Facility: CLINIC | Age: 57
End: 2022-11-18

## 2022-11-18 NOTE — TELEPHONE ENCOUNTER
states Nataliia Montero has fallen into a deeper depression but he is unsure if she took her medication yesterday  He would like an apt on Monday to be sure her dosage is correct prior to the holidays  Please advise if same day can be used

## 2023-01-16 DIAGNOSIS — F41.9 ANXIETY AND DEPRESSION: ICD-10-CM

## 2023-01-16 DIAGNOSIS — I10 ESSENTIAL HYPERTENSION: ICD-10-CM

## 2023-01-16 DIAGNOSIS — F32.A ANXIETY AND DEPRESSION: ICD-10-CM

## 2023-01-16 RX ORDER — AMLODIPINE BESYLATE 5 MG/1
5 TABLET ORAL DAILY
Qty: 90 TABLET | Refills: 2 | Status: SHIPPED | OUTPATIENT
Start: 2023-01-16

## 2023-01-16 RX ORDER — BUSPIRONE HYDROCHLORIDE 10 MG/1
10 TABLET ORAL 2 TIMES DAILY
Qty: 60 TABLET | Refills: 0 | Status: SHIPPED | OUTPATIENT
Start: 2023-01-16

## 2023-01-24 ENCOUNTER — LAB (OUTPATIENT)
Dept: LAB | Facility: CLINIC | Age: 58
End: 2023-01-24

## 2023-01-24 DIAGNOSIS — E01.0 THYROMEGALY: ICD-10-CM

## 2023-01-24 DIAGNOSIS — I10 ESSENTIAL HYPERTENSION: ICD-10-CM

## 2023-01-24 DIAGNOSIS — E78.5 HYPERLIPIDEMIA, UNSPECIFIED HYPERLIPIDEMIA TYPE: Primary | ICD-10-CM

## 2023-01-24 LAB
ALBUMIN SERPL BCP-MCNC: 3.9 G/DL (ref 3.5–5)
ALP SERPL-CCNC: 102 U/L (ref 46–116)
ALT SERPL W P-5'-P-CCNC: 60 U/L (ref 12–78)
ANION GAP SERPL CALCULATED.3IONS-SCNC: 9 MMOL/L (ref 4–13)
AST SERPL W P-5'-P-CCNC: 92 U/L (ref 5–45)
BASOPHILS # BLD AUTO: 0.06 THOUSANDS/ÂΜL (ref 0–0.1)
BASOPHILS NFR BLD AUTO: 1 % (ref 0–1)
BILIRUB SERPL-MCNC: 1.4 MG/DL (ref 0.2–1)
BILIRUB UR QL STRIP: NEGATIVE
BUN SERPL-MCNC: 4 MG/DL (ref 5–25)
CALCIUM SERPL-MCNC: 9.5 MG/DL (ref 8.3–10.1)
CHLORIDE SERPL-SCNC: 100 MMOL/L (ref 96–108)
CHOLEST SERPL-MCNC: 231 MG/DL
CLARITY UR: CLEAR
CO2 SERPL-SCNC: 25 MMOL/L (ref 21–32)
COLOR UR: NORMAL
CREAT SERPL-MCNC: 0.81 MG/DL (ref 0.6–1.3)
EOSINOPHIL # BLD AUTO: 0.04 THOUSAND/ÂΜL (ref 0–0.61)
EOSINOPHIL NFR BLD AUTO: 1 % (ref 0–6)
ERYTHROCYTE [DISTWIDTH] IN BLOOD BY AUTOMATED COUNT: 12.1 % (ref 11.6–15.1)
GFR SERPL CREATININE-BSD FRML MDRD: 79 ML/MIN/1.73SQ M
GLUCOSE P FAST SERPL-MCNC: 87 MG/DL (ref 65–99)
GLUCOSE UR STRIP-MCNC: NEGATIVE MG/DL
HCT VFR BLD AUTO: 46.1 % (ref 34.8–46.1)
HDLC SERPL-MCNC: 143 MG/DL
HGB BLD-MCNC: 15 G/DL (ref 11.5–15.4)
HGB UR QL STRIP.AUTO: NEGATIVE
IMM GRANULOCYTES # BLD AUTO: 0.04 THOUSAND/UL (ref 0–0.2)
IMM GRANULOCYTES NFR BLD AUTO: 1 % (ref 0–2)
KETONES UR STRIP-MCNC: NEGATIVE MG/DL
LDLC SERPL CALC-MCNC: 76 MG/DL (ref 0–100)
LEUKOCYTE ESTERASE UR QL STRIP: NEGATIVE
LYMPHOCYTES # BLD AUTO: 1.87 THOUSANDS/ÂΜL (ref 0.6–4.47)
LYMPHOCYTES NFR BLD AUTO: 28 % (ref 14–44)
MCH RBC QN AUTO: 34.6 PG (ref 26.8–34.3)
MCHC RBC AUTO-ENTMCNC: 32.5 G/DL (ref 31.4–37.4)
MCV RBC AUTO: 107 FL (ref 82–98)
MONOCYTES # BLD AUTO: 0.93 THOUSAND/ÂΜL (ref 0.17–1.22)
MONOCYTES NFR BLD AUTO: 14 % (ref 4–12)
NEUTROPHILS # BLD AUTO: 3.66 THOUSANDS/ÂΜL (ref 1.85–7.62)
NEUTS SEG NFR BLD AUTO: 55 % (ref 43–75)
NITRITE UR QL STRIP: NEGATIVE
NONHDLC SERPL-MCNC: 88 MG/DL
NRBC BLD AUTO-RTO: 0 /100 WBCS
PH UR STRIP.AUTO: 6 [PH]
PLATELET # BLD AUTO: 276 THOUSANDS/UL (ref 149–390)
PMV BLD AUTO: 10.4 FL (ref 8.9–12.7)
POTASSIUM SERPL-SCNC: 4 MMOL/L (ref 3.5–5.3)
PROT SERPL-MCNC: 8.2 G/DL (ref 6.4–8.4)
PROT UR STRIP-MCNC: NEGATIVE MG/DL
RBC # BLD AUTO: 4.33 MILLION/UL (ref 3.81–5.12)
SODIUM SERPL-SCNC: 134 MMOL/L (ref 135–147)
SP GR UR STRIP.AUTO: 1 (ref 1–1.03)
TRIGL SERPL-MCNC: 61 MG/DL
TSH SERPL DL<=0.05 MIU/L-ACNC: 1.6 UIU/ML (ref 0.45–4.5)
UROBILINOGEN UR STRIP-ACNC: <2 MG/DL
WBC # BLD AUTO: 6.6 THOUSAND/UL (ref 4.31–10.16)

## 2023-01-25 ENCOUNTER — TELEPHONE (OUTPATIENT)
Dept: FAMILY MEDICINE CLINIC | Facility: CLINIC | Age: 58
End: 2023-01-25

## 2023-01-25 DIAGNOSIS — I10 ESSENTIAL HYPERTENSION: Primary | ICD-10-CM

## 2023-01-25 DIAGNOSIS — F10.10 ETOH ABUSE: ICD-10-CM

## 2023-01-25 RX ORDER — FOLIC ACID 1 MG/1
1 TABLET ORAL DAILY
Qty: 30 TABLET | Refills: 11 | Status: SHIPPED | OUTPATIENT
Start: 2023-01-25

## 2023-01-25 RX ORDER — THIAMINE MONONITRATE (VIT B1) 100 MG
100 TABLET ORAL DAILY
Qty: 90 TABLET | Refills: 2 | Status: SHIPPED | OUTPATIENT
Start: 2023-01-25

## 2023-01-25 NOTE — TELEPHONE ENCOUNTER
Dr Glenna Turner    Patient called for results of recent labs, she would also like to  copy which I have printed and left in folder for

## 2023-01-25 NOTE — TELEPHONE ENCOUNTER
Patient started drinking again  Therefore discussed ABNORMAL results  Recommend Stop ETOH  Recommend repeat in 1 week  Recommend Vit B and Folate given MCV  Patient agrees  Currently balancing stress at home but is working on it with good support of son

## 2023-01-26 LAB — BACTERIA UR CULT: ABNORMAL

## 2023-03-08 DIAGNOSIS — F41.9 ANXIETY AND DEPRESSION: ICD-10-CM

## 2023-03-08 DIAGNOSIS — F32.A ANXIETY AND DEPRESSION: ICD-10-CM

## 2023-03-08 RX ORDER — BUSPIRONE HYDROCHLORIDE 10 MG/1
10 TABLET ORAL 2 TIMES DAILY
Qty: 60 TABLET | Refills: 0 | Status: SHIPPED | OUTPATIENT
Start: 2023-03-08

## 2023-04-07 ENCOUNTER — ANESTHESIA EVENT (INPATIENT)
Dept: PERIOP | Facility: HOSPITAL | Age: 58
End: 2023-04-07

## 2023-04-07 PROBLEM — M25.531 RIGHT WRIST PAIN: Status: ACTIVE | Noted: 2023-04-07

## 2023-04-07 PROBLEM — S52.501A CLOSED FRACTURE OF DISTAL END OF RIGHT RADIUS: Status: ACTIVE | Noted: 2023-04-07
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
## 2023-04-07 PROBLEM — S72.001A CLOSED FRACTURE OF NECK OF RIGHT FEMUR (HCC): Status: ACTIVE | Noted: 2023-04-07

## 2023-04-08 ENCOUNTER — ANESTHESIA (INPATIENT)
Dept: PERIOP | Facility: HOSPITAL | Age: 58
End: 2023-04-08

## 2023-04-08 RX ORDER — KETAMINE HCL IN NACL, ISO-OSM 100MG/10ML
SYRINGE (ML) INJECTION AS NEEDED
Status: DISCONTINUED | OUTPATIENT
Start: 2023-04-08 | End: 2023-04-08

## 2023-04-08 RX ORDER — MIDAZOLAM HYDROCHLORIDE 2 MG/2ML
INJECTION, SOLUTION INTRAMUSCULAR; INTRAVENOUS AS NEEDED
Status: DISCONTINUED | OUTPATIENT
Start: 2023-04-08 | End: 2023-04-08

## 2023-04-08 RX ORDER — PROPOFOL 10 MG/ML
INJECTION, EMULSION INTRAVENOUS AS NEEDED
Status: DISCONTINUED | OUTPATIENT
Start: 2023-04-08 | End: 2023-04-08

## 2023-04-08 RX ORDER — GLYCOPYRROLATE 0.2 MG/ML
INJECTION INTRAMUSCULAR; INTRAVENOUS AS NEEDED
Status: DISCONTINUED | OUTPATIENT
Start: 2023-04-08 | End: 2023-04-08

## 2023-04-08 RX ORDER — DEXAMETHASONE SODIUM PHOSPHATE 10 MG/ML
INJECTION, SOLUTION INTRAMUSCULAR; INTRAVENOUS AS NEEDED
Status: DISCONTINUED | OUTPATIENT
Start: 2023-04-08 | End: 2023-04-08

## 2023-04-08 RX ORDER — TRANEXAMIC ACID 10 MG/ML
INJECTION, SOLUTION INTRAVENOUS AS NEEDED
Status: DISCONTINUED | OUTPATIENT
Start: 2023-04-08 | End: 2023-04-08

## 2023-04-08 RX ORDER — LIDOCAINE HYDROCHLORIDE 10 MG/ML
INJECTION, SOLUTION EPIDURAL; INFILTRATION; INTRACAUDAL; PERINEURAL AS NEEDED
Status: DISCONTINUED | OUTPATIENT
Start: 2023-04-08 | End: 2023-04-08

## 2023-04-08 RX ORDER — HYDROMORPHONE HCL/PF 1 MG/ML
SYRINGE (ML) INJECTION AS NEEDED
Status: DISCONTINUED | OUTPATIENT
Start: 2023-04-08 | End: 2023-04-08

## 2023-04-08 RX ORDER — SODIUM CHLORIDE 9 MG/ML
INJECTION, SOLUTION INTRAVENOUS CONTINUOUS PRN
Status: DISCONTINUED | OUTPATIENT
Start: 2023-04-08 | End: 2023-04-08

## 2023-04-08 RX ORDER — NEOSTIGMINE METHYLSULFATE 1 MG/ML
INJECTION INTRAVENOUS AS NEEDED
Status: DISCONTINUED | OUTPATIENT
Start: 2023-04-08 | End: 2023-04-08

## 2023-04-08 RX ORDER — ONDANSETRON 2 MG/ML
INJECTION INTRAMUSCULAR; INTRAVENOUS AS NEEDED
Status: DISCONTINUED | OUTPATIENT
Start: 2023-04-08 | End: 2023-04-08

## 2023-04-08 RX ORDER — FENTANYL CITRATE 50 UG/ML
INJECTION, SOLUTION INTRAMUSCULAR; INTRAVENOUS AS NEEDED
Status: DISCONTINUED | OUTPATIENT
Start: 2023-04-08 | End: 2023-04-08

## 2023-04-08 RX ORDER — ROCURONIUM BROMIDE 10 MG/ML
INJECTION, SOLUTION INTRAVENOUS AS NEEDED
Status: DISCONTINUED | OUTPATIENT
Start: 2023-04-08 | End: 2023-04-08

## 2023-04-08 RX ADMIN — SODIUM CHLORIDE: 0.9 INJECTION, SOLUTION INTRAVENOUS at 07:59

## 2023-04-08 RX ADMIN — POTASSIUM CHLORIDE: 14.9 INJECTION, SOLUTION INTRAVENOUS at 08:06

## 2023-04-08 RX ADMIN — TRANEXAMIC ACID 1000 MG: 10 INJECTION, SOLUTION INTRAVENOUS at 08:21

## 2023-04-08 RX ADMIN — DEXAMETHASONE SODIUM PHOSPHATE 10 MG: 10 INJECTION INTRAMUSCULAR; INTRAVENOUS at 08:42

## 2023-04-08 RX ADMIN — Medication 25 MG: at 08:34

## 2023-04-08 RX ADMIN — MIDAZOLAM 2 MG: 1 INJECTION INTRAMUSCULAR; INTRAVENOUS at 07:58

## 2023-04-08 RX ADMIN — FENTANYL CITRATE 50 MCG: 50 INJECTION, SOLUTION INTRAMUSCULAR; INTRAVENOUS at 08:19

## 2023-04-08 RX ADMIN — GLYCOPYRROLATE 0.4 MG: 0.2 INJECTION INTRAMUSCULAR; INTRAVENOUS at 09:58

## 2023-04-08 RX ADMIN — LIDOCAINE HYDROCHLORIDE 50 MG: 10 INJECTION, SOLUTION EPIDURAL; INFILTRATION; INTRACAUDAL; PERINEURAL at 08:08

## 2023-04-08 RX ADMIN — HYDROMORPHONE HYDROCHLORIDE 0.5 MG: 1 INJECTION, SOLUTION INTRAMUSCULAR; INTRAVENOUS; SUBCUTANEOUS at 10:10

## 2023-04-08 RX ADMIN — PROPOFOL 150 MG: 10 INJECTION, EMULSION INTRAVENOUS at 08:08

## 2023-04-08 RX ADMIN — FENTANYL CITRATE 50 MCG: 50 INJECTION, SOLUTION INTRAMUSCULAR; INTRAVENOUS at 08:05

## 2023-04-08 RX ADMIN — HYDROMORPHONE HYDROCHLORIDE 0.5 MG: 1 INJECTION, SOLUTION INTRAMUSCULAR; INTRAVENOUS; SUBCUTANEOUS at 09:49

## 2023-04-08 RX ADMIN — ONDANSETRON 4 MG: 2 INJECTION INTRAMUSCULAR; INTRAVENOUS at 08:31

## 2023-04-08 RX ADMIN — CEFAZOLIN SODIUM 2000 MG: 2 SOLUTION INTRAVENOUS at 08:02

## 2023-04-08 RX ADMIN — Medication 25 MG: at 09:04

## 2023-04-08 RX ADMIN — SODIUM CHLORIDE: 0.9 INJECTION, SOLUTION INTRAVENOUS at 10:00

## 2023-04-08 RX ADMIN — NEOSTIGMINE METHYLSULFATE 3 MG: 1 INJECTION INTRAVENOUS at 09:58

## 2023-04-08 RX ADMIN — ROCURONIUM BROMIDE 50 MG: 10 SOLUTION INTRAVENOUS at 08:08

## 2023-04-08 NOTE — ANESTHESIA PREPROCEDURE EVALUATION
Review of Systems/Medical History  Patient summary reviewed  Chart reviewed  No history of anesthetic complications     Cardiovascular  Exercise tolerance (METS): >4 METS  ,     Pulmonary       GI/Hepatic            Endo/Other     GYN       Hematology   Musculoskeletal    Arthritis     Neurology   Psychology   Depression ,              Physical Exam    Airway    Mallampati score: II  TM Distance: >3 FB  Neck ROM: full     Dental   No notable dental hx     Cardiovascular  Cardiovascular exam normal    Pulmonary  Pulmonary exam normal     Other Findings        Anesthesia Plan  ASA Score- 2     Anesthesia Type- general with ASA Monitors  Additional Monitors:   Airway Plan: ETT  Comment: Patient seen and examined  History reviewed  Patient to be done under general anesthesia with ETT and routine monitors  Risks discussed with the patient  Consent obtained          Plan Factors-Exercise tolerance (METS): >4 METS  Chart reviewed  Existing labs reviewed  Patient summary reviewed  Induction- intravenous  Postoperative Plan- Plan for postoperative opioid use  Planned trial extubation    Informed Consent- Anesthetic plan and risks discussed with patient  I personally reviewed this patient with the CRNA  Discussed and agreed on the Anesthesia Plan with the CRNA  Violet Collier

## 2023-04-08 NOTE — ANESTHESIA POSTPROCEDURE EVALUATION
Post-Op Assessment Note    CV Status:  Stable  Pain Score: 0    Pain management: adequate     Mental Status:  Alert and awake   Hydration Status:  Stable and euvolemic   PONV Controlled:  None   Airway Patency:  Patent      Post Op Vitals Reviewed: Yes      Staff: CRNA         No notable events documented      /80 (04/08/23 1020)    Temp 97 8 °F (36 6 °C) (04/08/23 1020)    Pulse (!) 106 (04/08/23 1020)   Resp 20 (04/08/23 1020)    SpO2 97 % (04/08/23 1020)

## 2023-04-19 ENCOUNTER — HOSPITAL ENCOUNTER (OUTPATIENT)
Dept: RADIOLOGY | Facility: HOSPITAL | Age: 58
Discharge: HOME | End: 2023-04-19
Attending: ORTHOPAEDIC SURGERY

## 2023-04-19 DIAGNOSIS — S72.001A CLOSED RIGHT HIP FRACTURE, INITIAL ENCOUNTER (HCC): ICD-10-CM

## 2023-04-19 DIAGNOSIS — S52.501A CLOSED FRACTURE OF DISTAL END OF RIGHT RADIUS, UNSPECIFIED FRACTURE MORPHOLOGY, INITIAL ENCOUNTER: ICD-10-CM

## 2023-04-19 DIAGNOSIS — I10 ESSENTIAL HYPERTENSION: Primary | ICD-10-CM

## 2023-04-24 DIAGNOSIS — S72.001A CLOSED FRACTURE OF NECK OF RIGHT FEMUR, INITIAL ENCOUNTER (HCC): Primary | ICD-10-CM

## 2023-04-26 ENCOUNTER — EVALUATION (OUTPATIENT)
Dept: PHYSICAL THERAPY | Facility: CLINIC | Age: 58
End: 2023-04-26

## 2023-04-26 DIAGNOSIS — Z96.641 STATUS POST HIP REPLACEMENT, RIGHT: Primary | ICD-10-CM

## 2023-04-26 NOTE — PROGRESS NOTES
PT Evaluation     Today's date: 2023  Patient name: Danny Limon  : 1965  MRN: [id number]  Referring provider: Naif Foster DO  Dx:   Encounter Diagnosis     ICD-10-CM    1  Status post hip replacement, right  Z96 641                      Assessment  Assessment details: Danny Limon is a 59 y o  female who presents post op posterior ARLETH performed on 2023 with pain, decreased strength, decreased ROM, joint effusion, ambulatory dysfunction and balance dysfunction  Due to these impairments, patient has difficulty performing ADL's, recreational activities, ambulation, stair negotiation, transfers  Patient's clinical presentation is consistent with their referring diagnosis of Status post hip replacement, right  (primary encounter diagnosis)  Patient has been educated in use of cane with longer distance ambulation or when fatigued to improve gait pattern as well as conscious effort for knee flexion in swing and TKE in stance, home exercise program and plan of care   Patient would benefit from skilled physical therapy services to address their aforementioned functional limitations and progress towards prior level of function and independence with home exercise program      Impairments: abnormal gait, abnormal or restricted ROM, activity intolerance, impaired physical strength, lacks appropriate home exercise program, pain with function, weight-bearing intolerance and poor body mechanics    Goals  Short Term Goals to be accomplished in 4 weeks:  STG1: Pt will be I with HEP to maximize progress between therapy sessions   STG2: Pt will demo 10 deg improvement in hip AROM to improve stair negotiation  STG3: Pt will demo 1/2 MMT strength inc in hip to demo improve strength needed for STS with even weightbearing   STG4: Pt will demo nil gait deviations due to pain to improve ambulation distance in community         Long Term Goals to be accomplished in 12 weeks:   LTG1: Pt will achieve full hip AROM to return to improve transfer ability in and out of car and reciprocal stair negotiation   LTG2: Pt will demo hip strength WNL as per PLOF to return to recreational activities pain free as well as improve STS with no UE push off   LTG3: Pt will return to work/household duties without pain per PLOF   LTG4: Pt will demo good body mech with >75% functional challenges to prevent reinjury        Plan  Plan details: HEP development, stretching, strengthening, A/AA/PROM, joint mobilizations, posture education, STM/MI as needed to reduce muscle tension, muscle reeducation, PLOC discussed and agreed upon with patient  Patient would benefit from: skilled physical therapy  Planned modality interventions: cryotherapy and thermotherapy: hydrocollator packs  Planned therapy interventions: manual therapy, neuromuscular re-education, self care, therapeutic activities, therapeutic exercise, home exercise program, balance/weight bearing training, joint mobilization, Romero taping, patient education and gait training  Frequency: 2-3x a week  Plan of Care beginning date: 2023  Plan of Care expiration date: 2023  Treatment plan discussed with: patient        Subjective Evaluation    History of Present Illness  Mechanism of injury: Patient reports she was on the 5th step on a ladder to hang a clock when the ladder started to closed and sent her backwards  Patient fell backwards onto tile floor and fractured her femoral neck and fractured her radius  Patient had posterior hemiarthroplasty of R hip performed on 2023 and has R wrist set in brace  Patient received 6 home health PT visits and is now attending OP PT  Patient demo understanding of posterior hip precautions     Pain  Current pain ratin  At best pain ratin  At worst pain ratin  Location: R hip   Quality: dull ache and throbbing  Relieving factors: change in position and relaxation  Aggravating factors: standing, walking and stair "climbing  Progression: no change    Social Support  Steps to enter house: yes  1  Stairs in house: no   Lives in: [place]  Lives with: spouse    Employment status: not working    Diagnostic Tests  X-ray: abnormal (femoral neck fracture)  Treatments  Previous treatment: home therapy  Patient Goals  Patient goals for therapy: decreased pain, improved balance, increased motion, increased strength and independence with ADLs/IADLs          Objective  Observation: Patient ambulation with severe gait deviations (antalgic gait pattern) upon exiting clinic while fatigued from evaluation testing  Patient and PT discussed use of cane with longer distance ambulation or when fatigued to improve gait pattern as well as conscious effort for knee flexion in swing and TKE in stance  Hip AROM: deg     R  L  ER (sitting):  20  50   Flexion:  90  105    Strength: MMT  Hip   R  L  ER:   4/5  4+/5  Flexion:  4/5  5/5  Abduction:  4-/5  5/5    Knee   R  L  Extension:  5/5  5/5  Flexion:  5/5  5/5      Balance:   Romberg EO: 30 sec erect  Romberg EC:  30 sec mod sway   Tandem R post: 20 sec mod sway   Tandem L post: 20 sec mod sway       Function  Gait: Patient ambulating with antalgic gait pattern with decreased step length and stance time on RLE with decreased gait speed  Transfers: patient able to roll R and L on plinth as well as perform supine to/from sit with min difficulty and no assistance needed  Stairs: Patient able to ascend and descend 8\" step w/ RLE with L rail support and decreased control with ascent and descent with \"hopping\" motion to ascend and quick uncontrolled descent  Noted heavy reliance on rail t/o                Precautions: Post hip precautions   Past Medical History:   Diagnosis Date   • Abnormal immunology findings 06/09/2011   • Anemia 05/23/2011   • Arthritis    • Closed fracture of distal end of fibula     resolved 09/15/17   • Depression    • Distal radius fracture, left     last assessed " 01/30/17   • GERD (gastroesophageal reflux disease)     last assessed 05/06/13   • Herpes labialis     last assessed 07/23/15   • Hypertension     essential ; last assessed 08/07/13   • Sinus bradycardia     last assessed 05/24/16     SOC: 4/26/2023  FOTO: 4/26/2023  POC Expiration: 7/19/2023  Daily Treatment Log:  Date 4/26/2023       Visit # 1       Auth         Manual                        There Exer        Supine piriformis stretch w/in precautions         SLR flex and abd        Clamshells         Bridges         Std hip abd         Std hip ext         Supine leg press         HR/TR                                 HEP        There Activ                                                        NMReed        Monster walks        Side stepping         Cone taps         WB AP/ML         FSU        LSU                                 Modalities                                  Access Code: [email or web address]  URL: [email or web address]  Date: 04/26/2023  Prepared by: Raimundo Mendez    Exercises  - Standing Hip Extension with Counter Support  - 1 x daily - 7 x weekly - 2 sets - 10 reps  - Standing Hip Abduction with Counter Support  - 1 x daily - 7 x weekly - 2 sets - 10 reps  - Supine Bridge  - 1 x daily - 7 x weekly - 2 sets - 10 reps  - Clamshell  - 1 x daily - 7 x weekly - 2 sets - 10 reps

## 2023-04-27 ENCOUNTER — APPOINTMENT (OUTPATIENT)
Dept: PHYSICAL THERAPY | Facility: CLINIC | Age: 58
End: 2023-04-27

## 2023-04-27 DIAGNOSIS — Z96.641 STATUS POST HIP REPLACEMENT, RIGHT: Primary | ICD-10-CM

## 2023-05-01 ENCOUNTER — APPOINTMENT (OUTPATIENT)
Dept: PHYSICAL THERAPY | Facility: CLINIC | Age: 58
End: 2023-05-01
Payer: COMMERCIAL

## 2023-05-01 ENCOUNTER — TELEPHONE (OUTPATIENT)
Dept: PHYSICAL THERAPY | Facility: CLINIC | Age: 58
End: 2023-05-01

## 2023-05-01 NOTE — TELEPHONE ENCOUNTER
Patient called to stated that she would not be able to attend todays appt as her sister is not doing well medically at this time  Patient stated she would be coming to her appts next week  PT instructed her if anything changes and she needs to reschedule those appts as well to give us a call       Shanice Trevino PT, DPT   License #  [id number]

## 2023-05-08 ENCOUNTER — OFFICE VISIT (OUTPATIENT)
Dept: PHYSICAL THERAPY | Facility: CLINIC | Age: 58
End: 2023-05-08

## 2023-05-08 DIAGNOSIS — Z96.641 STATUS POST HIP REPLACEMENT, RIGHT: Primary | ICD-10-CM

## 2023-05-08 NOTE — PROGRESS NOTES
Daily Note     Today's date: 2023  Patient name: Cristofer Gregg  : 1965  MRN: [id number]  Referring provider: Valeria Glass DO  Dx:   Encounter Diagnosis     ICD-10-CM    1  Status post hip replacement, right  Z96 641                      Subjective: Patient has noticed she is walking with increase WB into LLE without AD  Patient reports when she uses RW she has less gait deviations  PT instructed to use RW for as long as she needs for support to avoid getting into the habit of poor gait pattern  Objective: See treatment diary below      Assessment: Tolerated treatment well with slight increase in soreness following SLR  Overall patient is doing well with WB into RLE however antalgic gait pattern noted without AD  Patient HEP updated to further improve strength and ROM  Patient demo understanding of update and frequency of 1x a day  Patient would benefit from continued PT      Plan: Continue per plan of care        Precautions: Post hip precautions   Past Medical History:   Diagnosis Date   • Abnormal immunology findings 2011   • Anemia 2011   • Arthritis    • Closed fracture of distal end of fibula     resolved 09/15/17   • Depression    • Distal radius fracture, left     last assessed 17   • GERD (gastroesophageal reflux disease)     last assessed 13   • Herpes labialis     last assessed 07/23/15   • Hypertension     essential ; last assessed 13   • Sinus bradycardia     last assessed 16   DOS: 2023   SOC: 2023  FOTO: 2023  POC Expiration: 2023  Daily Treatment Log:  Date 2023      Visit # 1 2      Auth         Manual                        There Exer  25'      Supine piriformis stretch w/in precautions         SLR flex and abd  1x10 R flex       Clamshells   1x10       Bridges   2x10       Std hip abd   1x10 R/L       Std hip ext   1x10 R/L       Supine leg press         HR/TR   x20       LAQ   1x10                       HEP "   There Activ                                                        NMReed  15'      Monster walks  4 laps near elevated mat      Side stepping   4 laps near elevated mat      Cone taps         WB AP/ML   x20 A/P       FSU  6\" step 1x10 R      LSU   6\" step 1x10 R/L                               Modalities                                  Access Code: [email or web address]  URL: [email or web address]  Date: 05/08/2023  Prepared by: Taisha All    Exercises  - Standing Hip Extension with Counter Support  - 1 x daily - 7 x weekly - 2 sets - 10 reps  - Standing Hip Abduction with Counter Support  - 1 x daily - 7 x weekly - 2 sets - 10 reps  - Supine Bridge  - 1 x daily - 7 x weekly - 2 sets - 10 reps  - Clamshell  - 1 x daily - 7 x weekly - 2 sets - 10 reps  - Small Range Straight Leg Raise  - 1 x daily - 7 x weekly - 2 sets - 10 reps  - Standing March with Counter Support  - 1 x daily - 7 x weekly - 2 sets - 10 reps           "

## 2023-05-10 ENCOUNTER — OFFICE VISIT (OUTPATIENT)
Dept: PHYSICAL THERAPY | Facility: CLINIC | Age: 58
End: 2023-05-10

## 2023-05-10 DIAGNOSIS — Z96.641 STATUS POST HIP REPLACEMENT, RIGHT: Primary | ICD-10-CM

## 2023-05-10 NOTE — PROGRESS NOTES
"Daily Note     Today's date: 5/10/2023  Patient name: Juan C Degroot  : 1965  MRN: [id number]  Referring provider: Randi Sloan DO  Dx:   Encounter Diagnosis     ICD-10-CM    1  Status post hip replacement, right  Z96 641                      Subjective: Patient reports she feels she \"wobbles\" when she walks  Patient stated she uses RW at home but not in the community  PT recommended patient use cane at either location as it will offer more support than no AD but she may not need as much support as what the RW provides  Objective: See treatment diary below      Assessment: Tolerated treatment well with slight increase in soreness following SLR  Gait training with L SPC performed with improvement noted in gait pattern  HEP updated with patient given band for steamboats  Patient taught how to antonella and doff band without breaking precautions  Will progress strength and ROM as tolerated  Patient would benefit from continued PT      Plan: Continue per plan of care        Precautions: Post hip precautions   Past Medical History:   Diagnosis Date   • Abnormal immunology findings 2011   • Anemia 2011   • Arthritis    • Closed fracture of distal end of fibula     resolved 09/15/17   • Depression    • Distal radius fracture, left     last assessed 17   • GERD (gastroesophageal reflux disease)     last assessed 13   • Herpes labialis     last assessed 07/23/15   • Hypertension     essential ; last assessed 13   • Sinus bradycardia     last assessed 16   DOS: 2023   SOC: 2023  FOTO: 2023  POC Expiration: 2023  Daily Treatment Log:  Date 2023 2023 5/10/2023     Visit # 1 2 3     Auth         Manual                        There Exer  25' 25'     Upright bike for warm up and ROM    5 half moons to remain w/in precautions      Supine piriformis stretch w/in precautions         SLR flex and abd  1x10 R flex  1x10 R flex     Clamshells   1x10  2x10    " "  Bridges   2x10  2x10      Std hip abd   1x10 R/L  YTB 1x10      Std hip ext   1x10 R/L  YTB 1x10      Supine leg press    30# 2x10 B/L   10# 2x10 uni R        HR/TR   x20  x20      LAQ   1x10  2x10 R w/ add      Seated HS curl   YTB 2x10 R              HEP  Updated       There Activ                                                        NMReed  15' 15'     Monster walks  4 laps near elevated mat 15' x2 open space      Side stepping   4 laps near elevated mat 15' x2 open space      Cone taps         WB AP/ML   x20 A/P  x20 A/P      FSU  6\" step 1x10 R 6\" step 1x10 R     LSU   6\" step 1x10 R/L  6\" step 1x10 R/L      STS    2x10 low mat     Gait training w/ L SPC    15' x4 with focus on step pattern with SPC  Improved gait pattern noted with SPC vs with no AD  Modalities                                  HEP:   Access Code: [email or web address]  URL: [email or web address]  Date: 05/10/2023  Prepared by: Merly Ramos    Exercises  - Supine Bridge  - 1 x daily - 4 x weekly - 2 sets - 10 reps  - Clamshell  - 1 x daily - 4 x weekly - 2 sets - 10 reps  - Small Range Straight Leg Raise  - 1 x daily - 4 x weekly - 2 sets - 10 reps  - Step Up  - 1 x daily - 4 x weekly - 2 sets - 10 reps  - steamboats  - 1 x daily - 4 x weekly - 2 sets - 10 reps  - Standing March with Counter Support  - 1 x daily - 4 x weekly - 2 sets - 10 reps  - Seated Hamstring Stretch  - 1 x daily - 7 x weekly - 5 reps - 15 sec hold           "

## 2023-05-12 ENCOUNTER — OFFICE VISIT (OUTPATIENT)
Dept: PHYSICAL THERAPY | Facility: CLINIC | Age: 58
End: 2023-05-12

## 2023-05-12 DIAGNOSIS — Z96.641 STATUS POST HIP REPLACEMENT, RIGHT: Primary | ICD-10-CM

## 2023-05-12 NOTE — PROGRESS NOTES
Daily Note     Today's date: 2023  Patient name: Joyce Gomez  : 1965  MRN: [id number]  Referring provider: Mary Black DO  Dx:   Encounter Diagnosis     ICD-10-CM    1  Status post hip replacement, right  Z96 641                      Subjective: Pt reports she continues to feel wobbly with walking and is not totally sure about her ARLETH precautions  Objective: See treatment diary below; reviewed post ARLETH precautions to clarify the difference between crossing legs w/ adduction/knee straight (contra-indicated) vs EDUARDO position in ER (ok if hip flexion </= 90 deg)  Assessment: Tolerated treatment well  Patient demonstrates mild trendellenberg pattern w/ ambulation w/o AD  Plan: Continue per plan of care        Precautions: Post hip precautions   Past Medical History:   Diagnosis Date   • Abnormal immunology findings 2011   • Anemia 2011   • Arthritis    • Closed fracture of distal end of fibula     resolved 09/15/17   • Depression    • Distal radius fracture, left     last assessed 17   • GERD (gastroesophageal reflux disease)     last assessed 13   • Herpes labialis     last assessed 07/23/15   • Hypertension     essential ; last assessed 13   • Sinus bradycardia     last assessed 16   DOS: 2023   SOC: 2023  FOTO: 2023  POC Expiration: 2023  Daily Treatment Log:  Date 2023 2023 5/10/2023 2023    Visit #/auth 1 2 3     Auth         Manual                        There Exer  25' 25' 30'    Upright bike for warm up and ROM    5 half moons to remain w/in precautions      Supine piriformis stretch w/in precautions     Fig 4 with minimally flexed bottom leg 1 min    SLR flex and abd  1x10 R flex  1x10 R flex 2x10 abd    Clamshells   1x10  2x10  2x10    Bridges   2x10  2x10  W/ RTB abd 2x10    Std hip abd   1x10 R/L  YTB 1x10  YTB @ knees 2x10 R/L    Std hip ext   1x10 R/L  YTB 1x10  YTB @ knees 2x10 ea    Supine leg "press    30# 2x10 B/L   10# 2x10 uni R    40# 2x10  20# 2x10 R/L    HR/TR   x20  x20      LAQ   1x10  2x10 R w/ add      Seated HS curl   YTB 2x10 R      Sit hip ER vs TB ball btwn knees    Looped YTB 2x10    HEP  Updated   Pt educ re: posterior ARLETH precautions    There Activ                                                        NMReed  15' 15' 15'    Monster walks  4 laps near elevated mat 15' x2 open space      Side stepping   4 laps near elevated mat 15' x2 open space      Cone taps         WB AP/ML   x20 A/P  x20 A/P      FSU  6\" step 1x10 R 6\" step 1x10 R 6\" w/ alt knee hike 2x10    LSU   6\" step 1x10 R/L  6\" step 1x10 R/L      STS    2x10 low mat Low mat 2x10    Gait training w/ L SPC    15' x4 with focus on step pattern with SPC  Improved gait pattern noted with SPC vs with no AD  Tandem walk    10'x4 open space CS    Modalities                                  HEP:   Access Code: [email or web address]  URL: [email or web address]  Date: 05/10/2023  Prepared by: Jv Case    Exercises  - Supine Bridge  - 1 x daily - 4 x weekly - 2 sets - 10 reps  - Clamshell  - 1 x daily - 4 x weekly - 2 sets - 10 reps  - Small Range Straight Leg Raise  - 1 x daily - 4 x weekly - 2 sets - 10 reps  - Step Up  - 1 x daily - 4 x weekly - 2 sets - 10 reps  - steamboats  - 1 x daily - 4 x weekly - 2 sets - 10 reps  - Standing March with Counter Support  - 1 x daily - 4 x weekly - 2 sets - 10 reps  - Seated Hamstring Stretch  - 1 x daily - 7 x weekly - 5 reps - 15 sec hold             "

## 2023-05-15 ENCOUNTER — OFFICE VISIT (OUTPATIENT)
Dept: PHYSICAL THERAPY | Facility: CLINIC | Age: 58
End: 2023-05-15

## 2023-05-15 DIAGNOSIS — I10 ESSENTIAL HYPERTENSION: ICD-10-CM

## 2023-05-15 DIAGNOSIS — F32.A ANXIETY AND DEPRESSION: ICD-10-CM

## 2023-05-15 DIAGNOSIS — F41.9 ANXIETY AND DEPRESSION: ICD-10-CM

## 2023-05-15 DIAGNOSIS — Z96.641 STATUS POST HIP REPLACEMENT, RIGHT: Primary | ICD-10-CM

## 2023-05-15 RX ORDER — LOSARTAN POTASSIUM 50 MG/1
50 TABLET ORAL DAILY
Qty: 90 TABLET | Refills: 2 | Status: SHIPPED | OUTPATIENT
Start: 2023-05-15

## 2023-05-15 RX ORDER — BUSPIRONE HYDROCHLORIDE 10 MG/1
10 TABLET ORAL 2 TIMES DAILY
Qty: 60 TABLET | Refills: 3 | Status: SHIPPED | OUTPATIENT
Start: 2023-05-15

## 2023-05-15 NOTE — PROGRESS NOTES
Daily Note     Today's date: 5/15/2023  Patient name: Patrick Vernon  : 1965  MRN: [id number]  Referring provider: Nancy Richardson DO  Dx:   Encounter Diagnosis     ICD-10-CM    1  Status post hip replacement, right  Z96 641                      Subjective: Pt reports she thinks her walking is getting better  Patient reported she has been able to sleep on her side for short periods of time before it gets sore  Objective: See treatment diary below;       Assessment: Tolerated treatment well with no increases in pain reported t/o session  Patient ambulating with improved gait pattern in clinic compared to previous sessions  Patient to be progressed as tolerated  Patient required verbal cue for figure 4 stretch to position R hip at </=90 deg  Reiterated posterior hip precautions with patient verbalizing understanding  Patient demonstrates mild trendellenberg pattern w/ ambulation w/o AD  Plan: Continue per plan of care        Precautions: Post hip precautions   Past Medical History:   Diagnosis Date   • Abnormal immunology findings 2011   • Anemia 2011   • Arthritis    • Closed fracture of distal end of fibula     resolved 09/15/17   • Depression    • Distal radius fracture, left     last assessed 17   • GERD (gastroesophageal reflux disease)     last assessed 13   • Herpes labialis     last assessed 07/23/15   • Hypertension     essential ; last assessed 13   • Sinus bradycardia     last assessed 16   DOS: 2023   SOC: 2023  FOTO: 2023 (intake as foto site wasn't showing one given on IE)   POC Expiration: 2023  Daily Treatment Log:  Date 2023 2023 5/10/2023 2023 5/15/2023   Visit #/auth 1 2 3    Auth         Manual                        There Exer  25' 25' 30' 25'   Upright bike for warm up and ROM    5 half moons to remain w/in precautions      Supine piriformis stretch w/in precautions     Fig 4 with minimally flexed "bottom leg 1 min Fig 4 with minimally flexed bottom leg 1 min   SLR flex and abd  1x10 R flex  1x10 R flex 2x10 abd 2x10 abd   Clamshells   1x10  2x10  2x10 RTB 2x10    Bridges   2x10  2x10  W/ RTB abd 2x10 W/ RTB abd 2x10   Std hip abd   1x10 R/L  YTB 1x10  YTB @ knees 2x10 R/L YTB @ knees 2x10 R/L   Std hip ext   1x10 R/L  YTB 1x10  YTB @ knees 2x10 ea YTB @ knees 2x10 R/L   Supine leg press    30# 2x10 B/L   10# 2x10 uni R    40# 2x10  20# 2x10 R/L 40# 2x10  20# 2x10 R/L   HR/TR   x20  x20   HR off step 1x10    LAQ   1x10  2x10 R w/ add   2x10 R    Seated HS curl   YTB 2x10 R      Sit hip ER vs TB ball btwn knees    Looped YTB 2x10 Looped YTB 2x10   HEP  Updated   Pt educ re: posterior ARLETH precautions Reiterated previous edu on posterior ARLETH precautions during figure 4 stretch    There Activ                                                        NMReed  15' 15' 15' 15'   Monster walks  4 laps near elevated mat 15' x2 open space   15' x2 open space    Side stepping   4 laps near elevated mat 15' x2 open space   15' x2 open space    Cone taps         WB AP/ML   x20 A/P  x20 A/P      FSU  6\" step 1x10 R 6\" step 1x10 R 6\" w/ alt knee hike 2x10 6\" w/ alt knee hike 2x10   LSU   6\" step 1x10 R/L  6\" step 1x10 R/L   6\" step 1x10 R/L    STS    2x10 low mat Low mat 2x10 Chair w/ 2\" foam on seat 2x10    Gait training w/ L SPC    15' x4 with focus on step pattern with SPC  Improved gait pattern noted with SPC vs with no AD  Tandem walk    10'x4 open space CS 10'x4 open space CS   Modalities                                  HEP:   Access Code: [email or web address]  URL: [email or web address]  Date: 05/10/2023  Prepared by: Lemuel Arredondo    Exercises  - Supine Bridge  - 1 x daily - 4 x weekly - 2 sets - 10 reps  - Clamshell  - 1 x daily - 4 x weekly - 2 sets - 10 reps  - Small Range Straight Leg Raise  - 1 x daily - 4 x weekly - 2 sets - 10 reps  - Step Up  - 1 x daily - 4 x weekly - 2 sets - 10 reps  - steamboats  - 1 " x daily - 4 x weekly - 2 sets - 10 reps  - Standing March with Counter Support  - 1 x daily - 4 x weekly - 2 sets - 10 reps  - Seated Hamstring Stretch  - 1 x daily - 7 x weekly - 5 reps - 15 sec hold

## 2023-05-16 ENCOUNTER — OFFICE VISIT (OUTPATIENT)
Dept: PHYSICAL THERAPY | Facility: CLINIC | Age: 58
End: 2023-05-16

## 2023-05-16 DIAGNOSIS — Z96.641 STATUS POST HIP REPLACEMENT, RIGHT: Primary | ICD-10-CM

## 2023-05-16 DIAGNOSIS — F41.9 ANXIETY AND DEPRESSION: ICD-10-CM

## 2023-05-16 DIAGNOSIS — F32.A ANXIETY AND DEPRESSION: ICD-10-CM

## 2023-05-16 RX ORDER — ESCITALOPRAM OXALATE 20 MG/1
20 TABLET ORAL DAILY
Qty: 90 TABLET | Refills: 1 | Status: SHIPPED | OUTPATIENT
Start: 2023-05-16

## 2023-05-16 NOTE — PROGRESS NOTES
Hosey Councilman 1965 female MRN: [id number]    FAMILY PRACTICE OFFICE VISIT  [facility] - [address]      ASSESSMENT/PLAN  Hosey Councilman is a 60 y o  female presents to the office for     Diagnoses and all orders for this visit:    Essential hypertension    Screening for colon cancer  -     Cologuard; Future       Hypertension:  Very pleased with the patient  She has done a significant improvement with her blood pressure  She has stopped all alcohol as well  - Continue on  Lasix 20 mg daily  -Reviewed BP log  - Encourage exercise    BMI Counseling: Body mass index is 27 86 kg/m²  Discussed the patient's BMI with her  The BMI is above normal  Nutrition recommendations include consuming healthier snacks  Disposition: Return to the office in 6 months    Future Appointments   Date Time Provider Delores Bobby   7/15/2021  9:30 AM Harpreet Yuan MD [address] [place] [address] [facility]          SUBJECTIVE  CC: Blood Pressure Check (pt is here for bp check)      HPI:  Hosey Councilman is a 60 y o  female who presents for a blood pressure check  Hypertension: Patient currently taking medications as prescribed without any s/e or concerns  Does take BP at home  Currently denies any dizziness, headache, visual changes, weakness, dyspnea, chest pain, palpitations, confusion  She states that her log at home is showing amazing numbers  She is very proud that she has has not had any home call either  She states that her mother and her believe that it has always been stressed induced  Currently the stressor is her son's wedding  Never has had an addiction to alcohol    Being managed by her specialist for her lower back  [place] and is concerned she might need surgery  Denies any other complaints or concerns at this time    Review of Systems   Constitutional: Negative for activity change, appetite change, chills, fatigue and fever  HENT: Negative for congestion      Respiratory: Negative for How Severe Are Your Warts?: mild Is This A New Presentation, Or A Follow-Up?: Follow Up Rain cough, chest tightness and shortness of breath  Cardiovascular: Negative for chest pain and leg swelling  Gastrointestinal: Negative for abdominal distention, abdominal pain, constipation, diarrhea, nausea and vomiting  Musculoskeletal: Positive for back pain  All other systems reviewed and are negative        Historical Information   The patient history was reviewed as follows:  Past Medical History:   Diagnosis Date    Abnormal immunology findings 06/09/2011    Anemia 05/23/2011    Arthritis     Closed fracture of distal end of fibula     resolved 09/15/17    Depression     Distal radius fracture, left     last assessed 01/30/17    GERD (gastroesophageal reflux disease)     last assessed 05/06/13    Herpes labialis     last assessed 07/23/15    Hypertension     essential ; last assessed 08/07/13    Sinus bradycardia     last assessed 05/24/16         Medications:     Current Outpatient Medications:     DULoxetine (CYMBALTA) 20 mg capsule, Take 1 capsule (20 mg total) by mouth daily, Disp: 90 capsule, Rfl: 1    folic acid (FOLVITE) 1 mg tablet, Take 1 mg by mouth daily, Disp: , Rfl:     folic acid (FOLVITE) 1 mg tablet, Take 1 tablet (1 mg total) by mouth daily, Disp: 90 tablet, Rfl: 2    furosemide (LASIX) 20 mg tablet, Take 1 tablet (20 mg total) by mouth daily, Disp: 30 tablet, Rfl: 1    ketoconazole (NIZORAL) 2 % cream, Apply topically daily, Disp: 60 g, Rfl: 1    losartan (COZAAR) 25 mg tablet, Take 25 mg by mouth daily, Disp: , Rfl:     thiamine (VITAMIN B1) 100 mg tablet, Take 100 mg by mouth daily, Disp: , Rfl:     thiamine (VITAMIN B1) 100 mg tablet, Take 1 tablet (100 mg total) by mouth daily, Disp: 90 tablet, Rfl: 2    Allergies   Allergen Reactions    Lisinopril      COUGH       OBJECTIVE  Vitals:   Vitals:    01/15/21 0900   BP: 124/86   BP Location: Right arm   Patient Position: Sitting   Cuff Size: Standard   Pulse: 86   Resp: 16   Temp: 98 5 °F (36 9 °C)   TempSrc: Additional History: Follow up warts on right hand. Temporal   SpO2: 97%   Weight: 75 9 kg (167 lb 6 4 oz)   Height: 5' 5" (1 651 m)         Physical Exam  Vitals signs reviewed  Constitutional:       Appearance: She is well-developed  HENT:      Head: Normocephalic and atraumatic  Eyes:      Conjunctiva/sclera: Conjunctivae normal       Pupils: Pupils are equal, round, and reactive to light  Neck:      Musculoskeletal: Normal range of motion and neck supple  Cardiovascular:      Rate and Rhythm: Normal rate and regular rhythm  Heart sounds: Normal heart sounds  Pulmonary:      Effort: Pulmonary effort is normal  No respiratory distress  Breath sounds: Normal breath sounds  Musculoskeletal: Normal range of motion  Skin:     General: Skin is warm  Capillary Refill: Capillary refill takes less than 2 seconds  Neurological:      Mental Status: She is alert and oriented to person, place, and time                      Kevin Xiao MD,   [facility]  1/15/2021

## 2023-05-16 NOTE — TELEPHONE ENCOUNTER
Pt left message stating she will be out of town for the next 3 weeks but will call to schedule her pe then  Her sister recently passed and her mom is in hospice

## 2023-05-16 NOTE — TELEPHONE ENCOUNTER
Please schedule BP check, given low reading at Ortho office  Next week is ok   Just want her to keep a 7 day log morning and night prior to appt

## 2023-05-16 NOTE — PROGRESS NOTES
"Daily Note     Today's date: 2023  Patient name: Rodrigue Coats  : 1965  MRN: [id number]  Referring provider: Patience Gabriel DO  Dx:   Encounter Diagnosis     ICD-10-CM    1  Status post hip replacement, right  Z96 641                      Subjective: Pt reports she will be away for the rest of the week for her sister's   She reports some fatigue this morning w/ back to back appts (sched this way due to her upcoming trip)  Objective: See treatment diary below      Assessment: Tolerated treatment well and reports good challenge  Deliberately chose most activities pt did not do her last visit (yesterday)  Patient would benefit from continued PT      Plan: Continue per plan of care        Precautions: Post hip precautions   Past Medical History:   Diagnosis Date   • Abnormal immunology findings 2011   • Anemia 2011   • Arthritis    • Closed fracture of distal end of fibula     resolved 09/15/17   • Depression    • Distal radius fracture, left     last assessed 17   • GERD (gastroesophageal reflux disease)     last assessed 13   • Herpes labialis     last assessed 07/23/15   • Hypertension     essential ; last assessed 13   • Sinus bradycardia     last assessed 16   DOS: 2023   SOC: 2023  FOTO: 2023 (intake as foto site wasn't showing one given on IE)   POC Expiration: 2023  Daily Treatment Log:  Date 2023   2023 5/15/2023   Visit #/auth    Auth         Manual                        There Exer 30'   30' 25'   Sup ham stretch w/ SOS w/in precuations 20\"x3 R/L       Supine piriformis stretch w/in precautions  Fig 4 w/ min flexed bottom leg 30\"x2   Fig 4 with minimally flexed bottom leg 1 min Fig 4 with minimally flexed bottom leg 1 min   SLR flex and abd 2x10   2x10 abd 2x10 abd   Clamshells  RTB 2x10   2x10 RTB 2x10    Bridges  W/ abd RTB 2x10   W/ RTB abd 2x10 W/ RTB abd 2x10   Std hip abd  YTB @ knees 2x10 R/L   YTB " "@ knees 2x10 R/L YTB @ knees 2x10 R/L   Std hip ext  YTB @ knees 2x10 R/L   YTB @ knees 2x10 ea YTB @ knees 2x10 R/L   Supine leg press     40# 2x10  20# 2x10 R/L 40# 2x10  20# 2x10 R/L   HR/TR  HR off step 1x15    HR off step 1x10    LAQ  W/add 1# 3\" 2x10    2x10 R    Seated HS curl grn 2x10 looped       Sit hip ER vs TB ball btwn knees Y looped 2x10   Looped YTB 2x10 Looped YTB 2x10   Hip flexor stretch foot on step 10\"x5 R/L       HEP    Pt educ re: posterior ARLETH precautions Reiterated previous edu on posterior ARLETH precautions during figure 4 stretch    There Activ                                                        NMReed 10'   15' 15'   Monster walks     15' x2 open space    Side stepping  YTB @ knees 10'x3 R/L    15' x2 open space    Alt tandem walk Cone taps  6 cones; 4x w/ CGA       WB AP/ML         FSU    6\" w/ alt knee hike 2x10 6\" w/ alt knee hike 2x10   LSU      6\" step 1x10 R/L    STS     Low mat 2x10 Chair w/ 2\" foam on seat 2x10    Straddle step ups 6\" step 1x10 R/L       Gait training w/ L SPC         Tandem walk    10'x4 open space CS 10'x4 open space CS   Modalities                                  HEP:   Access Code: [email or web address]  URL: [email or web address]/  Date: 05/10/2023  Prepared by: Osmani Fry    Exercises  - Supine Bridge  - 1 x daily - 4 x weekly - 2 sets - 10 reps  - Clamshell  - 1 x daily - 4 x weekly - 2 sets - 10 reps  - Small Range Straight Leg Raise  - 1 x daily - 4 x weekly - 2 sets - 10 reps  - Step Up  - 1 x daily - 4 x weekly - 2 sets - 10 reps  - steamboats  - 1 x daily - 4 x weekly - 2 sets - 10 reps  - Standing March with Counter Support  - 1 x daily - 4 x weekly - 2 sets - 10 reps  - Seated Hamstring Stretch  - 1 x daily - 7 x weekly - 5 reps - 15 sec hold               "

## 2023-05-17 ENCOUNTER — APPOINTMENT (OUTPATIENT)
Dept: PHYSICAL THERAPY | Facility: CLINIC | Age: 58
End: 2023-05-17
Payer: COMMERCIAL

## 2023-05-19 ENCOUNTER — APPOINTMENT (OUTPATIENT)
Dept: PHYSICAL THERAPY | Facility: CLINIC | Age: 58
End: 2023-05-19
Payer: COMMERCIAL

## 2023-05-22 ENCOUNTER — OFFICE VISIT (OUTPATIENT)
Dept: PHYSICAL THERAPY | Facility: CLINIC | Age: 58
End: 2023-05-22

## 2023-05-22 DIAGNOSIS — Z96.641 STATUS POST HIP REPLACEMENT, RIGHT: Primary | ICD-10-CM

## 2023-05-22 NOTE — PROGRESS NOTES
"Daily Note     Today's date: 2023  Patient name: Viktoria Rao  : 1965  MRN: [id number]  Referring provider: Billy Hoyt DO  Dx:   Encounter Diagnosis     ICD-10-CM    1  Status post hip replacement, right  Z96 641                      Subjective: Pt reports she was in the car for 10 hours driving and the hip is very irritated today secondary to this  Objective: See treatment diary below      Assessment: Tolerated treatment well with mild increase in soreness reported at end of session  Session limited secondary to soreness will progress back to usual program at next visit as tolerated  Patient would benefit from continued PT      Plan: Continue per plan of care        Precautions: Post hip precautions   Past Medical History:   Diagnosis Date   • Abnormal immunology findings 2011   • Anemia 2011   • Arthritis    • Closed fracture of distal end of fibula     resolved 09/15/17   • Depression    • Distal radius fracture, left     last assessed 17   • GERD (gastroesophageal reflux disease)     last assessed 13   • Herpes labialis     last assessed 07/23/15   • Hypertension     essential ; last assessed 13   • Sinus bradycardia     last assessed 16   DOS: 2023   SOC: 2023  FOTO: 2023 (intake as foto site wasn't showing one given on IE)   POC Expiration: 2023  Daily Treatment Log:  Date 2023 2023  2023 5/15/2023   Visit #/auth    Auth exp  2023      Manual                        There Exer 30' 35'  30' 25'   Sup ham stretch w/ SOS w/in precuations 20\"x3 R/L 20\"x3 R/L      Supine piriformis stretch w/in precautions  Fig 4 w/ min flexed bottom leg 30\"x2 Fig 4 w/ min flexed bottom leg 30\"x2  Fig 4 with minimally flexed bottom leg 1 min Fig 4 with minimally flexed bottom leg 1 min   SLR flex and abd 2x10   2x10 abd 2x10 abd   Clamshells  RTB 2x10 RTB 2x10  2x10 RTB 2x10    Bridges  W/ abd RTB 2x10 RTB 2x10  W/ " "RTB abd 2x10 W/ RTB abd 2x10   Std hip abd  YTB @ knees 2x10 R/L YTB @ knees 2x10 R/L  YTB @ knees 2x10 R/L YTB @ knees 2x10 R/L   Std hip ext  YTB @ knees 2x10 R/L YTB @ knees 2x10 R/L  YTB @ knees 2x10 ea YTB @ knees 2x10 R/L   Supine leg press     40# 2x10  20# 2x10 R/L 40# 2x10  20# 2x10 R/L   HR/TR  HR off step 1x15 HR off step 2x10    HR off step 1x10    LAQ  W/add 1# 3\" 2x10 W/add 1# 3\" 2x10   2x10 R    Seated HS curl grn 2x10 looped       Sit hip ER vs TB ball btwn knees Y looped 2x10 YTB looped 2x10  Looped YTB 2x10 Looped YTB 2x10   Hip flexor stretch foot on step 10\"x5 R/L 10\"x10 R/L      HEP    Pt educ re: posterior ARLETH precautions Reiterated previous edu on posterior ARLETH precautions during figure 4 stretch    There Activ                                                        NMReed 10'   15' 15'   Monster walks     15' x2 open space    Side stepping  YTB @ knees 10'x3 R/L    15' x2 open space    Alt tandem walk Cone taps  6 cones; 4x w/ CGA       WB AP/ML         FSU    6\" w/ alt knee hike 2x10 6\" w/ alt knee hike 2x10   LSU      6\" step 1x10 R/L    STS     Low mat 2x10 Chair w/ 2\" foam on seat 2x10    Straddle step ups 6\" step 1x10 R/L 6\" step 1x10 R/L      Gait training w/ L SPC         Tandem walk    10'x4 open space CS 10'x4 open space CS   Modalities                                  HEP:   Access Code: [email or web address]  URL: [email or web address]  Date: 05/10/2023  Prepared by: Gillian Conner    Exercises  - Supine Bridge  - 1 x daily - 4 x weekly - 2 sets - 10 reps  - Clamshell  - 1 x daily - 4 x weekly - 2 sets - 10 reps  - Small Range Straight Leg Raise  - 1 x daily - 4 x weekly - 2 sets - 10 reps  - Step Up  - 1 x daily - 4 x weekly - 2 sets - 10 reps  - steamboats  - 1 x daily - 4 x weekly - 2 sets - 10 reps  - Standing March with Counter Support  - 1 x daily - 4 x weekly - 2 sets - 10 reps  - Seated Hamstring Stretch  - 1 x daily - 7 x weekly - 5 reps - 15 sec hold               "

## 2023-05-24 ENCOUNTER — TELEPHONE (OUTPATIENT)
Dept: PHYSICAL THERAPY | Facility: CLINIC | Age: 58
End: 2023-05-24

## 2023-05-24 ENCOUNTER — APPOINTMENT (OUTPATIENT)
Dept: PHYSICAL THERAPY | Facility: CLINIC | Age: 58
End: 2023-05-24
Payer: COMMERCIAL

## 2023-05-24 NOTE — TELEPHONE ENCOUNTER
Patient left message this morning stating she was up all night with a stomach bug and would not be able to come in      Jaycee Allen PT, DPT   License #  [id number]

## 2023-05-26 ENCOUNTER — OFFICE VISIT (OUTPATIENT)
Dept: PHYSICAL THERAPY | Facility: CLINIC | Age: 58
End: 2023-05-26

## 2023-05-26 DIAGNOSIS — Z96.641 STATUS POST HIP REPLACEMENT, RIGHT: Primary | ICD-10-CM

## 2023-05-26 NOTE — PROGRESS NOTES
"Daily Note     Today's date: 2023  Patient name: Brigido Johns  : 1965  MRN: [id number]  Referring provider: Ruby Gould DO  Dx:   Encounter Diagnosis     ICD-10-CM    1  Status post hip replacement, right  Z96 641                      Subjective: Pt reports her limited balance is more a result of her knee, not her hip  Objective: See treatment diary below      Assessment: Tolerated treatment well and is able to progress functional and resistance activities today    Patient would benefit from continued PT and remains challenged by balance activities  Plan: Continue per plan of care        Precautions: Post hip precautions   Past Medical History:   Diagnosis Date   • Abnormal immunology findings 2011   • Anemia 2011   • Arthritis    • Closed fracture of distal end of fibula     resolved 09/15/17   • Depression    • Distal radius fracture, left     last assessed 17   • GERD (gastroesophageal reflux disease)     last assessed 13   • Herpes labialis     last assessed 07/23/15   • Hypertension     essential ; last assessed 13   • Sinus bradycardia     last assessed 16   DOS: 2023   SOC: 2023  FOTO: 2023 (intake as foto site wasn't showing one given on IE)   POC Expiration: 2023  Daily Treatment Log:  Date 2023 2023 2023  5/15/2023   Visit #/auth    Auth exp  2023      Manual                        There Exer 30' 35' 25'  25'   Sup ham stretch w/ SOS w/in precuations 20\"x3 R/L 20\"x3 R/L      Supine piriformis stretch w/in precautions  Fig 4 w/ min flexed bottom leg 30\"x2 Fig 4 w/ min flexed bottom leg 30\"x2 Fig 4 30\"x3  Fig 4 with minimally flexed bottom leg 1 min   SLR flex and abd 2x10    2x10 abd   Rev clamshells towel roll   1 5# 2x10     Clamshells  RTB 2x10 RTB 2x10 grn 2x10  RTB 2x10    Bridges  W/ abd RTB 2x10 RTB 2x10 grn 2x10  W/ RTB abd 2x10   Std hip abd  YTB @ knees 2x10 R/L YTB @ knees " "2x10 R/L RTB @ knees 1x15 R/L  YTB @ knees 2x10 R/L   Std hip ext  YTB @ knees 2x10 R/L YTB @ knees 2x10 R/L RTB @ knees 1x15 R/L  YTB @ knees 2x10 R/L   Supine leg press    50# 2x10  25# 2x10 R/L  40# 2x10  20# 2x10 R/L   HR/TR  HR off step 1x15 HR off step 2x10  HR off step 2x10  HR off step 1x10    LAQ  W/add 1# 3\" 2x10 W/add 1# 3\" 2x10   2x10 R    Seated HS curl grn 2x10 looped  Looped blue 2x10     Sit hip ER vs TB ball btwn knees Y looped 2x10 YTB looped 2x10   Looped YTB 2x10   Hip flexor stretch foot on step 10\"x5 R/L 10\"x10 R/L 10\"x5 R LE     HEP     Reiterated previous edu on posterior ARLETH precautions during figure 4 stretch    There Activ                        NMReed 10'  20'  15'   Monster walks     15' x2 open space    Side stepping  YTB @ knees 10'x3 R/L    15' x2 open space    Alt tandem walk Cone taps  6 cones; 4x w/ CGA  7 cones; 4x CS     Side step over cones   7 cones; 2x R/L CS     FSU   8\" w/ alt knee hike 1x10 R/L  6\" w/ alt knee hike 2x10   LSU      6\" step 1x10 R/L    STS    Chair +2\" pad 2x10  Chair w/ 2\" foam on seat 2x10    Straddle step ups 6\" step 1x10 R/L 6\" step 1x10 R/L      Gait training w/ L SPC         Tandem walk   15'x2 ea fwd/bkwd  10'x4 open space CS   Modalities                                  HEP:   Access Code: [email or web address]  URL: [email or web address]  Date: 05/10/2023  Prepared by: Guadalupe Barrientos    Exercises  - Supine Bridge  - 1 x daily - 4 x weekly - 2 sets - 10 reps  - Clamshell  - 1 x daily - 4 x weekly - 2 sets - 10 reps  - Small Range Straight Leg Raise  - 1 x daily - 4 x weekly - 2 sets - 10 reps  - Step Up  - 1 x daily - 4 x weekly - 2 sets - 10 reps  - steamboats  - 1 x daily - 4 x weekly - 2 sets - 10 reps  - Standing March with Counter Support  - 1 x daily - 4 x weekly - 2 sets - 10 reps  - Seated Hamstring Stretch  - 1 x daily - 7 x weekly - 5 reps - 15 sec hold                 "

## 2023-05-30 ENCOUNTER — TELEPHONE (OUTPATIENT)
Dept: OBGYN CLINIC | Facility: HOSPITAL | Age: 58
End: 2023-05-30

## 2023-05-30 ENCOUNTER — APPOINTMENT (OUTPATIENT)
Dept: RADIOLOGY | Facility: MEDICAL CENTER | Age: 58
End: 2023-05-30

## 2023-05-30 ENCOUNTER — OFFICE VISIT (OUTPATIENT)
Dept: OBGYN CLINIC | Facility: MEDICAL CENTER | Age: 58
End: 2023-05-30

## 2023-05-30 ENCOUNTER — OFFICE VISIT (OUTPATIENT)
Dept: PHYSICAL THERAPY | Facility: CLINIC | Age: 58
End: 2023-05-30

## 2023-05-30 VITALS
DIASTOLIC BLOOD PRESSURE: 88 MMHG | HEART RATE: 85 BPM | BODY MASS INDEX: 27.32 KG/M2 | WEIGHT: 164 LBS | HEIGHT: 65 IN | SYSTOLIC BLOOD PRESSURE: 133 MMHG

## 2023-05-30 DIAGNOSIS — S52.501A CLOSED FRACTURE OF DISTAL END OF RIGHT RADIUS, UNSPECIFIED FRACTURE MORPHOLOGY, INITIAL ENCOUNTER: ICD-10-CM

## 2023-05-30 DIAGNOSIS — S72.001A CLOSED RIGHT HIP FRACTURE, INITIAL ENCOUNTER (HCC): ICD-10-CM

## 2023-05-30 DIAGNOSIS — S52.501A CLOSED FRACTURE OF DISTAL END OF RIGHT RADIUS, UNSPECIFIED FRACTURE MORPHOLOGY, INITIAL ENCOUNTER: Primary | ICD-10-CM

## 2023-05-30 DIAGNOSIS — Z96.641 STATUS POST HIP REPLACEMENT, RIGHT: Primary | ICD-10-CM

## 2023-05-30 NOTE — TELEPHONE ENCOUNTER
Caller: patient    Doctor: Monik Glass    Reason for call: patient is already going to PT for hip  PT office asked patient to reach out to us to see if the script for OT could be changed to PT and they will work with the patient for hip and wrist injury      Call back#: [phone]

## 2023-05-30 NOTE — PROGRESS NOTES
"Daily Note     Today's date: 2023  Patient name: Jun Moreno   : 1965  MRN: [id number]  Referring provider: Danielle Hammer DO  Dx:   Encounter Diagnosis     ICD-10-CM    1  Status post hip replacement, right  Z96 [id number]                      Subjective: Pt reports that she saw her MD this morning and he is going to monitor her groin pain at her next follow up  He would like her to do therapy for wrist strengthening, fx is healed at this point  Pt arrived 20 min late to session due to being at another Dr appt in [place] that ran longer than expected  Objective: See treatment diary below      Assessment: Tolerated treatment well  Pt fatigued with posterolateral hip strengthening progressions, cont to progress as tolerated  Cont with dynamic balance and strength to help with gait  Patient would benefit from continued PT      Plan: Continue per plan of care    addition of wrist into POC next visit at RE      Precautions: Post hip precautions   Past Medical History:   Diagnosis Date   • Abnormal immunology findings 2011   • Anemia 2011   • Arthritis    • Closed fracture of distal end of fibula     resolved 09/15/17   • Depression    • Distal radius fracture, left     last assessed 17   • GERD (gastroesophageal reflux disease)     last assessed 13   • Herpes labialis     last assessed 07/23/15   • Hypertension     essential ; last assessed 13   • Sinus bradycardia     last assessed 16   DOS: 2023   SOC: 2023  FOTO: 2023 (intake as foto site wasn't showing one given on IE)   POC Expiration: 2023  Daily Treatment Log:  Date 2023    Visit #/auth     Auth exp  2023      Manual                        There Exer 30' 35' 25'     Sup ham stretch w/ SOS w/in precuations 20\"x3 R/L 20\"x3 R/L      Supine piriformis stretch w/in precautions  Fig 4 w/ min flexed bottom leg 30\"x2 Fig 4 w/ min flexed bottom " "leg 30\"x2 Fig 4 30\"x3 Fig 4 30\"x3     SLR flex and abd 2x10       Rev clamshells towel roll   1 5# 2x10     Clamshells  RTB 2x10 RTB 2x10 grn 2x10 grn 2x10     Bridges  W/ abd RTB 2x10 RTB 2x10 grn 2x10 Grn 2x10    Std hip abd  YTB @ knees 2x10 R/L YTB @ knees 2x10 R/L RTB @ knees 1x15 R/L     Std hip ext  YTB @ knees 2x10 R/L YTB @ knees 2x10 R/L RTB @ knees 1x15 R/L RTB @ knees 2x10 R/L     Supine leg press    50# 2x10  25# 2x10 R/L 50# 2x10  25# 2x10 R/L     HR/TR  HR off step 1x15 HR off step 2x10  HR off step 2x10 HR off step 2x10     LAQ  W/add 1# 3\" 2x10 W/add 1# 3\" 2x10      Seated HS curl grn 2x10 looped  Looped blue 2x10     Sit hip ER vs TB ball btwn knees Y looped 2x10 YTB looped 2x10  B/L RTB 2x10     Hip flexor stretch foot on step 10\"x5 R/L 10\"x10 R/L 10\"x5 R LE 10\"x5 R LE     HEP        There Activ                        NMReed 10'  20'     Monster walks    RTB @ knees 20'x2    Side stepping  YTB @ knees 10'x3 R/L   RTB @ knees 20'x2     Alt tandem walk Cone taps  6 cones; 4x w/ CGA  7 cones; 4x CS     Side step over cones   7 cones; 2x R/L CS     FSU   8\" w/ alt knee hike 1x10 R/L 8\" w/ alt knee hike 2x10 R/L    LSU     8\" 2x10 R    STS    Chair +2\" pad 2x10     Straddle step ups 6\" step 1x10 R/L 6\" step 1x10 R/L      Gait training w/ L SPC         Tandem walk   15'x2 ea fwd/bkwd     Modalities                                  HEP:   Access Code: [email or web address]  URL: [email or web address]  Date: 05/10/2023  Prepared by: Campos Mclean  - Supine Bridge  - 1 x daily - 4 x weekly - 2 sets - 10 reps  - Clamshell  - 1 x daily - 4 x weekly - 2 sets - 10 reps  - Small Range Straight Leg Raise  - 1 x daily - 4 x weekly - 2 sets - 10 reps  - Step Up  - 1 x daily - 4 x weekly - 2 sets - 10 reps  - steamboats  - 1 x daily - 4 x weekly - 2 sets - 10 reps  - Standing March with Counter Support  - 1 x daily - 4 x weekly - 2 sets - 10 reps  - Seated Hamstring Stretch  - 1 x daily - 7 x weekly " - 5 reps - 15 sec hold

## 2023-05-30 NOTE — PROGRESS NOTES
[facility] - MENA L KRAKAU [facility] CARE SPECIALISTS [facility] KITA Chun [address] [place] [id number]       Carrol Browne  [id number]  1965    ORTHOPAEDIC SURGERY OUTPATIENT NOTE  5/30/2023      HISTORY:  59 y o  female presents for postoperative visit status post right hip hemiarthroplasty on 4/8/2023  She also sustained a closed right distal radius fracture being treated conservatively  We last saw her recommended physical therapy  Today she states she has some stiffness and pain of her right wrist   She has been compliant with the wrist splint  She also complains that she has groin pain in her right lower extremity and also has a minor limp with her gait  She is still undergoing formal therapy for her right lower extremity  She has not done under therapy for her right wrist at this time  Past Medical History:   Diagnosis Date   • Abnormal immunology findings 06/09/2011   • Anemia 05/23/2011   • Arthritis    • Closed fracture of distal end of fibula     resolved 09/15/17   • Depression    • Distal radius fracture, left     last assessed 01/30/17   • GERD (gastroesophageal reflux disease)     last assessed 05/06/13   • Herpes labialis     last assessed 07/23/15   • Hypertension     essential ; last assessed 08/07/13   • Sinus bradycardia     last assessed 05/24/16       Past Surgical History:   Procedure Laterality Date   • ANKLE SURGERY      last assessed 09/15/17   • BACK SURGERY      lumbar laminectomy L4-L5   • BLADDER SUSPENSION      7/2017   • COLONOSCOPY     • JOINT REPLACEMENT      TKR  on right   • ORIF TIBIA & FIBULA FRACTURES Right 12/15/2016    Procedure: SURGICAL FIXATION OF RIGHT DISTAL FIBULA FRACTURE;  Surgeon: Adis Saldana MD;  Location: [facility] MAIN OR;  Service:    • MD HEMIARTHROPLASTY HIP PARTIAL Right 4/8/2023    Procedure: HEMIARTHROPLASTY HIP (BIPOLAR);   Surgeon: Joe Shannon;  Location: [facility] Main OR;  Service: Orthopedics   • MD REVJ TOT KNEE ARTHRP [id number] [address] Right 10/23/2017    Procedure: REVISION TOTAL KNEE REPLACEMENT WITH FROZEN SECTIONS;  Surgeon: Alex Cage DO;  Location: [facility];  Service: Orthopedics   • TOTAL KNEE ARTHROPLASTY      last assessed; 11/21/17   • WISDOM TOOTH EXTRACTION         Social History     Socioeconomic History   • Marital status: Legally      Spouse name: Not on file   • Number of children: Not on file   • Years of education: Not on file   • Highest education level: Not on file   Occupational History   • Not on file   Tobacco Use   • Smoking status: Never   • Smokeless tobacco: Never   Vaping Use   • Vaping Use: Never used   Substance and Sexual Activity   • Alcohol use: Yes     Comment: moderately   • Drug use: No   • Sexual activity: Yes     Partners: Male   Other Topics Concern   • Not on file   Social History Narrative    Recovering alcoholic     Social Determinants of Health     Financial Resource Strain: Not on file   Food Insecurity: Not on file   Transportation Needs: Not on file   Physical Activity: Not on file   Stress: Not on file   Social Connections: Not on file   Intimate Partner Violence: Not on file   Housing Stability: Not on file       Family History   Problem Relation Age of Onset   • Arthritis Mother    • Osteoporosis Mother    • Dementia Father    • Other Father         spinal stenosis   • Other Family         CREST        Patient's Medications   New Prescriptions    No medications on file   Previous Medications    ACETAMINOPHEN (TYLENOL) 325 MG TABLET    Take 2 tablets (650 mg total) by mouth every 4 (four) hours as needed for mild pain    ALPRAZOLAM (XANAX) 0 25 MG TABLET    Take 1 tablet (0 25 mg total) by mouth daily at bedtime as needed for anxiety    AMLODIPINE (NORVASC) 5 MG TABLET    Take 1 tablet (5 mg total) by mouth daily    BUSPIRONE (BUSPAR) 10 MG TABLET    Take 1 tablet (10 mg total) by mouth 2 (two) times a day    DOCUSATE SODIUM (COLACE) 100 MG CAPSULE    Take 1 "capsule (100 mg total) by mouth 2 (two) times a day    ENOXAPARIN (LOVENOX) 40 MG/0 4 ML    Inject 0 4 mL (40 mg total) under the skin in the morning    ESCITALOPRAM (LEXAPRO) 20 MG TABLET    Take 1 tablet (20 mg total) by mouth daily    FOLIC ACID (FOLVITE) 1 MG TABLET    Take 1 tablet (1 mg total) by mouth daily    LOSARTAN (COZAAR) 50 MG TABLET    Take 1 tablet (50 mg total) by mouth daily    METHOCARBAMOL (ROBAXIN) 500 MG TABLET    Take 1 tablet (500 mg total) by mouth every 6 (six) hours as needed for muscle spasms    MILK THISTLE SEED POWD    Take 250 mg by mouth daily    ONDANSETRON (ZOFRAN ODT) 4 MG DISINTEGRATING TABLET    Take 1 tablet (4 mg total) by mouth every 6 (six) hours as needed for nausea or vomiting    OXYCODONE (ROXICODONE) 5 IMMEDIATE RELEASE TABLET    You may take 2 5 mg (0 5 tab) for moderate pain or 5 mg (1 tab) for severe pain, every 4 hours, as needed  THIAMINE (VITAMIN B1) 100 MG TABLET    Take 1 tablet (100 mg total) by mouth daily   Modified Medications    No medications on file   Discontinued Medications    No medications on file       Allergies   Allergen Reactions   • Lisinopril      COUGH   • Bupropion Anxiety        /88   Pulse 85   Ht 5' 5\" (1 651 m)   Wt 74 4 kg (164 lb)   BMI 27 29 kg/m²      REVIEW OF SYSTEMS:  Constitutional: Negative  HEENT: Negative  Respiratory: Negative  Skin: Negative  Neurological: Negative  Psychiatric/Behavioral: Negative  Musculoskeletal: Negative except for that mentioned in the HPI      /88   Pulse 85   Ht 5' 5\" (1 651 m)   Wt 74 4 kg (164 lb)   BMI 27 29 kg/m²   Gen: No acute distress, resting comfortably in bed  HEENT: Eyes clear, moist mucus membranes, hearing intact  Respiratory: No audible wheezing or stridor  Cardiovascular: Well Perfused peripherally, 2+ distal pulse  Abdomen: nondistended, no peritoneal signs     PHYSICAL EXAM: Right wrist: Patient with near full range of motion of the wrist with " moderate pain with motion  There is tenderness palpation of the dorsal aspect of the distal radius  She is neurovascular intact  There is pain with resisted wrist extension  4-5 strength with resisted wrist extension and flexion  Right lower extremity: Patient is able to ambulate in the office with minimal gait dysfunction  Leg lengths appear to be symmetric  IMAGING: X-ray right hip and pelvis: Status post hemiarthroplasty with implant good position no signs of alfie-implant failure  X-ray right wrist: Healed distal radius fracture with no significant dorsal angulation  There is neutral alignment  ASSESSMENT AND PLAN:  59 y o  female 6 weeks status post right hip hemiarthroplasty  Right distal radius fracture treated nonoperatively healed  I did state to the patient that this can take time to help with the patient's gait as she strengthens her abductor muscles with therapy  As for the groin pain this could be her still healing from her surgery versus pain from hemiarthroplasty  We will continue to monitor her and see how she responds with more time  If she continues to have groin pain that is significant to her we may refer her to our joint specialist for discussion of possible conversion to total hip arthroplasty  As for her right wrist we will get her into hand therapy to regain strength and range of motion of her wrist   I like to see the patient back in 6 weeks for repeat clinical evaluation

## 2023-05-31 ENCOUNTER — EVALUATION (OUTPATIENT)
Dept: PHYSICAL THERAPY | Facility: CLINIC | Age: 58
End: 2023-05-31

## 2023-05-31 DIAGNOSIS — S52.501A CLOSED FRACTURE OF DISTAL END OF RIGHT RADIUS, UNSPECIFIED FRACTURE MORPHOLOGY, INITIAL ENCOUNTER: ICD-10-CM

## 2023-05-31 DIAGNOSIS — Z96.641 STATUS POST HIP REPLACEMENT, RIGHT: Primary | ICD-10-CM

## 2023-05-31 NOTE — PROGRESS NOTES
PT Evaluation     Today's date: 2023  Patient name: Anh Harris  : 1965  MRN: [id number]  Referring provider: Jv Medellin DO  Dx:   Encounter Diagnosis     ICD-10-CM    1  Status post hip replacement, right  Z96 641       2  Closed fracture of distal end of right radius, unspecified fracture morphology, initial encounter  S52 501A                      Assessment  Assessment details: Anh Harris is a 59 y o  female who presents for re-evaluation of  posterior ARLETH performed on 2023 with improvements in pain, strength,  ROM, joint effusion, ambulation, and balance, however deficits are still present  Due to these impairments, patient has difficulty performing ADL's, recreational activities, ambulation, stair negotiation, transfers  Patient evaluated to add in treatment for wrist fx sustained on 2023, with patient recently cleared from brace with no restrictions  POC to be sent back to orthopedic per patient request  Patient presents with limitation sin wrist strength,  strength, and ROM  Due to these limitations patient is limited in grasping, lifting with RUE while supporting objects in her hand, and ADLs  Patient has been education in home exercise program and plan of care   Patient would benefit from skilled physical therapy services to address their aforementioned functional limitations and progress towards prior level of function and independence with home exercise program      Impairments: abnormal gait, abnormal or restricted ROM, activity intolerance, impaired physical strength and pain with function    Goals  HIP GOALS:   Short Term Goals to be accomplished in 4 weeks:  STG1: Pt will be I with HEP to maximize progress between therapy sessions   STG2: Pt will demo 10 deg improvement in hip AROM to improve stair negotiation  STG3: Pt will demo 1/2 MMT strength inc in hip to demo improve strength needed for STS with even weightbearing   STG4: Pt will demo nil gait deviations due to pain to improve ambulation distance in community     Long Term Goals to be accomplished in 12 weeks:   LTG1: Pt will achieve full hip AROM to return to improve transfer ability in and out of car and reciprocal stair negotiation   LTG2: Pt will demo hip strength WNL as per PLOF to return to recreational activities pain free as well as improve STS with no UE push off   LTG3: Pt will return to work/household duties without pain per PLOF   LTG4: Pt will demo good body mech with >75% functional challenges to prevent reinjury        WRIST GOALS   Short Term Goals to be accomplished in 4 weeks:  STG1: Pt will be I with HEP to maximize progress between therapy sessions   STG2: Pt will demo 5 deg improvement in wrist AROM to improve grasping and holding objects   STG3: Pt will demo 1/2 MMT strength inc in R wrist to demo improve strength needed lifting objects and self care      Long Term Goals to be accomplished in 12 weeks:   LTG1: Pt will achieve full wrist AROM to return to ADLs and self care without limitations   LTG2: Pt will demo wrist and  strength WNL as per PLOF to return to ADLs without limitations   LTG3: Pt will return to work/household duties without pain per PLOF       Plan  Plan details: HEP development, stretching, strengthening, A/AA/PROM, joint mobilizations, posture education, STM/MI as needed to reduce muscle tension, muscle reeducation, PLOC discussed and agreed upon with patient  Patient would benefit from: skilled physical therapy  Planned modality interventions: cryotherapy and thermotherapy: hydrocollator packs  Planned therapy interventions: manual therapy, neuromuscular re-education, self care, therapeutic activities, therapeutic exercise, home exercise program, balance/weight bearing training, joint mobilization, Romero taping, patient education, gait training, stretching and strengthening  Frequency: 2-3x a week    Plan of Care beginning date: 4/26/2023  Plan of Care expiration date: 2023  Treatment plan discussed with: patient        Subjective Evaluation    History of Present Illness  Mechanism of injury: trauma  Mechanism of injury: Patient reports to PT for Re-eval of ARLETH performed on 2023 Patient would like to add in wrist fx (occurred on 2023) to [address] (has prescription from MD)  Patient has been cleared from brace on wrist with no limitations  Patient reports her hip is feeling great  She has noticed some improvements in stair negotiation and ambulation  Patient continues to demo deficits with ambulation and decreased strength in posterolateral hip  Patient would benfit from continuation of hip strengthening to improve gait as well as strength and ROM for wrist    Pain  Current pain ratin  At best pain ratin  At worst pain rating: 3  Location: R wrist   Quality: dull ache and sharp  Relieving factors: change in position and relaxation  Aggravating factors: lifting and overhead activity  Progression: no change    Social Support  Steps to enter house: yes  1  Stairs in house: no   [facility] in: [facility]  Lives with: spouse    Employment status: not working    Diagnostic Tests  X-ray: abnormal (femoral neck fracture)  Treatments  Previous treatment: home therapy  Patient Goals  Patient goals for therapy: decreased pain, improved balance, increased motion, increased strength and independence with ADLs/IADLs          Objective    Hip AROM: deg     R  L  ER (sitting):  50  50   Flexion:  100  105    Strength: MMT  Hip   R  L  ER:   4+/5  4+/5  IR:    4+/5  4+/5  Flexion:  4+/5  5/5  Abduction:  4/5  5/5    Knee   R  L  Extension:  5/5  5/5  Flexion:  5/5  5/5      Balance:   Romberg EO: 60 sec erect  Romberg EC:  30 sec mod sway   Tandem R post: 45 sec min sway   Tandem L post: 45 sec min sway       Function  Gait: Patient ambulating with mild trendelenburg pattern secondary to posterolateral weakness in R hip     Transfers: patient able to roll R and L "on plinth as well as perform supine to/from sit with no difficulty   Stairs: Patient able to ascend and descend 8\" step w/ RLE with L rail support and improved control with ascent and descent          Observation:     STRENGTH:      Shoulder   R   L   Date    Shoulder flexion  5/5   5/5  Shoulder abduction  5/5   5/5      Elbow:    R   L  Flexion:    5/5   5/5   Extension:    5/5   5/5     Wrist:     R   L  Flexion:    4/5   5/5   Extension:    4/5   5/5   Radial dev:    4-/5   5/5   Ulnar dev:    4-/5   5/5    Wrist ROM:       R   L   flexion    45   70  extension   50   70  supination   WNL   WNL  Pronation   WNL   WNL                   R   L   Trial 1:    35   45  Trial 2:    35   45  Trial 3:    37   40               Precautions: Post hip precautions   Past Medical History:   Diagnosis Date   • Abnormal immunology findings 06/09/2011   • Anemia 05/23/2011   • Arthritis    • Closed fracture of distal end of fibula     resolved 09/15/17   • Depression    • Distal radius fracture, left     last assessed 01/30/17   • GERD (gastroesophageal reflux disease)     last assessed 05/06/13   • Herpes labialis     last assessed 07/23/15   • Hypertension     essential ; last assessed 08/07/13   • Sinus bradycardia     last assessed 05/24/16   DOS: 4/8/2023   SOC: 4/26/2023 (ARLETH) 5/31/2023 (wrist)   FOTO: 5/12/2023 (hip)   POC Expiration: 7/19/2023 (both)   Daily Treatment Log:  Date 5/16/2023 5/22/2023 5/26/2023 5/30/2023 5/31/2023   Visit #/auth 6/12 7/12 8/12 9/12 10/12   Auth exp  7/26/2023      Manual                        There Exer 30' 35' 25'  40'   Sup ham stretch w/ SOS w/in precuations 20\"x3 R/L 20\"x3 R/L      Supine piriformis stretch w/in precautions  Fig 4 w/ min flexed bottom leg 30\"x2 Fig 4 w/ min flexed bottom leg 30\"x2 Fig 4 30\"x3 Fig 4 30\"x3     SLR flex and abd 2x10       Rev clamshells towel roll   1 5# 2x10     Clamshells  RTB 2x10 RTB 2x10 grn 2x10 grn 2x10     Bridges  W/ abd RTB 2x10 RTB 2x10 grn " "2x10 Grn 2x10    Std hip abd  YTB @ knees 2x10 R/L YTB @ knees 2x10 R/L RTB @ knees 1x15 R/L     Std hip ext  YTB @ knees 2x10 R/L YTB @ knees 2x10 R/L RTB @ knees 1x15 R/L RTB @ knees 2x10 R/L     Supine leg press    50# 2x10  25# 2x10 R/L 50# 2x10  25# 2x10 R/L     HR/TR  HR off step 1x15 HR off step 2x10  HR off step 2x10 HR off step 2x10     LAQ  W/add 1# 3\" 2x10 W/add 1# 3\" 2x10      Seated HS curl grn 2x10 looped  Looped blue 2x10     Sit hip ER vs TB ball btwn knees Y looped 2x10 YTB looped 2x10  B/L RTB 2x10     Hip flexor stretch foot on step 10\"x5 R/L 10\"x10 R/L 10\"x5 R LE 10\"x5 R LE     Objective measures     Performed and eval of wrist    HEP                Gripper         Digi flex         Sup/pro therabar  bends        Cone supination         Rad/ulnar dev w/ therabar         Wrist flex/ext w/ DB                                There Activ                        NMReed 10'  20'     Monster walks    RTB @ knees 20'x2    Side stepping  YTB @ knees 10'x3 R/L   RTB @ knees 20'x2     Alt tandem walk Cone taps  6 cones; 4x w/ CGA  7 cones; 4x CS     Side step over cones   7 cones; 2x R/L CS     FSU   8\" w/ alt knee hike 1x10 R/L 8\" w/ alt knee hike 2x10 R/L    LSU     8\" 2x10 R    STS    Chair +2\" pad 2x10     Straddle step ups 6\" step 1x10 R/L 6\" step 1x10 R/L      Gait training w/ L SPC         Tandem walk   15'x2 ea fwd/bkwd     Modalities                                  HEP:   Access Code: [email or web address]  URL: [email or web address]  Date: 05/10/2023  Prepared by: Wolfgang Hernandez    Exercises  - Supine Bridge  - 1 x daily - 4 x weekly - 2 sets - 10 reps  - Clamshell  - 1 x daily - 4 x weekly - 2 sets - 10 reps  - Small Range Straight Leg Raise  - 1 x daily - 4 x weekly - 2 sets - 10 reps  - Step Up  - 1 x daily - 4 x weekly - 2 sets - 10 reps  - steamboats  - 1 x daily - 4 x weekly - 2 sets - 10 reps  - Standing March with Counter Support  - 1 x daily - 4 x weekly - 2 sets - 10 reps  - Seated " Hamstring Stretch  - 1 x daily - 7 x weekly - 5 reps - 15 sec hold

## 2023-06-01 DIAGNOSIS — S72.001A CLOSED RIGHT HIP FRACTURE, INITIAL ENCOUNTER (HCC): Primary | ICD-10-CM

## 2023-06-01 DIAGNOSIS — S52.501A CLOSED FRACTURE OF DISTAL END OF RIGHT RADIUS, UNSPECIFIED FRACTURE MORPHOLOGY, INITIAL ENCOUNTER: ICD-10-CM

## 2023-06-02 ENCOUNTER — OFFICE VISIT (OUTPATIENT)
Dept: PHYSICAL THERAPY | Facility: CLINIC | Age: 58
End: 2023-06-02

## 2023-06-02 DIAGNOSIS — S52.501A CLOSED FRACTURE OF DISTAL END OF RIGHT RADIUS, UNSPECIFIED FRACTURE MORPHOLOGY, INITIAL ENCOUNTER: ICD-10-CM

## 2023-06-02 DIAGNOSIS — Z96.641 STATUS POST HIP REPLACEMENT, RIGHT: Primary | ICD-10-CM

## 2023-06-02 NOTE — PROGRESS NOTES
"Daily Note     Today's date: 2023  Patient name: Trupti Benjamin  : 1965  MRN: [id number]  Referring provider: Tabatha Nicole DO  Dx:   Encounter Diagnosis     ICD-10-CM    1  Status post hip replacement, right  Z96 641       2  Closed fracture of distal end of right radius, unspecified fracture morphology, initial encounter  S52 823Y                      Subjective: Patient stated her wrist has been feeling a bit better since getting out of the brace  Patient stated hip is also feeling well  Objective: See treatment diary below      Assessment: Tolerated treatment well with mild LOB during monster walks with patient able to recover indp  Patient tolerated hand and wrist strengthening well and will report back on any post session soreness next visit  Patient would benefit from continued PT      Plan: Continue per plan of care        Precautions: Post hip precautions   Past Medical History:   Diagnosis Date   • Abnormal immunology findings 2011   • Anemia 2011   • Arthritis    • Closed fracture of distal end of fibula     resolved 09/15/17   • Depression    • Distal radius fracture, left     last assessed 17   • GERD (gastroesophageal reflux disease)     last assessed 13   • Herpes labialis     last assessed 07/23/15   • Hypertension     essential ; last assessed 13   • Sinus bradycardia     last assessed 16   DOS: 2023   SOC: 2023 (ARLETH) 2023 (wrist)   FOTO: 2023 (hip)   POC Expiration: 2023 (both)   Daily Treatment Log:  Date 2023   Visit #/auth 11/12   9/12 10/12 (re/added wrist)    Auth exp        Manual                        There Exer 23'    40'   Sup ham stretch w/ SOS w/in precuations        Supine piriformis stretch w/in precautions     Fig 4 30\"x3     SLR flex and abd        Rev clamshells towel roll        Clamshells     grn 2x10     Bridges  W/ abd    Grn 2x10    Std hip abd         Std hip ext     RTB @ " "knees 2x10 R/L     Supine leg press  50# 2x15  25# 2x15 R/L   50# 2x10  25# 2x10 R/L     HR/TR  HR off step 2x10    HR off step 2x10     LAQ         Seated HS curl        Sit hip ER vs TB ball btwn knees    B/L RTB 2x10     Hip flexor stretch foot on step    10\"x5 R LE     Objective measures     Performed and eval of wrist    HEP                Gripper  Yellow 25# 2x10        Digi flex  indvi fingers x20        Sup/pro therabar  bends Red bar x20 ea  Cone supination         Rad/ulnar dev w/ DB  2# 2x10 ea  Increase weight       Wrist flex/ext w/ DB 3# 2x10 ea  Yellow finger ext 2x10        Therabar twists  Red bar 2x10        Sup/pro with hammer  2x10 ea  There Activ                        NMReed 15'       Monster walks RTB @ calf 20'x2   RTB @ knees 20'x2    Side stepping  RTB @ calf 20'x2   RTB @ knees 20'x2     Alt tandem walk Cone taps         Side step over cones        FSU 8\" w/ alt knee hike 1x10 R/L   8\" w/ alt knee hike 2x10 R/L    LSU     8\" 2x10 R    STS         Straddle step ups 8\" step 1x10 R/L        Modalities                                  HEP:   Access Code: [email or web address]  URL: [email or web address]  Date: 05/10/2023  Prepared by: Leia Mcdowell    Exercises  - Supine Bridge  - 1 x daily - 4 x weekly - 2 sets - 10 reps  - Clamshell  - 1 x daily - 4 x weekly - 2 sets - 10 reps  - Small Range Straight Leg Raise  - 1 x daily - 4 x weekly - 2 sets - 10 reps  - Step Up  - 1 x daily - 4 x weekly - 2 sets - 10 reps  - steamboats  - 1 x daily - 4 x weekly - 2 sets - 10 reps  - Standing March with Counter Support  - 1 x daily - 4 x weekly - 2 sets - 10 reps  - Seated Hamstring Stretch  - 1 x daily - 7 x weekly - 5 reps - 15 sec hold         "

## 2023-06-05 ENCOUNTER — APPOINTMENT (OUTPATIENT)
Dept: PHYSICAL THERAPY | Facility: CLINIC | Age: 58
End: 2023-06-05
Payer: COMMERCIAL

## 2023-06-06 ENCOUNTER — APPOINTMENT (OUTPATIENT)
Dept: PHYSICAL THERAPY | Facility: CLINIC | Age: 58
End: 2023-06-06
Payer: COMMERCIAL

## 2023-06-06 ENCOUNTER — TELEPHONE (OUTPATIENT)
Dept: PHYSICAL THERAPY | Facility: CLINIC | Age: 58
End: 2023-06-06

## 2023-06-08 ENCOUNTER — OFFICE VISIT (OUTPATIENT)
Dept: PHYSICAL THERAPY | Facility: CLINIC | Age: 58
End: 2023-06-08
Payer: COMMERCIAL

## 2023-06-08 DIAGNOSIS — S52.501A CLOSED FRACTURE OF DISTAL END OF RIGHT RADIUS, UNSPECIFIED FRACTURE MORPHOLOGY, INITIAL ENCOUNTER: ICD-10-CM

## 2023-06-08 DIAGNOSIS — Z96.641 STATUS POST HIP REPLACEMENT, RIGHT: Primary | ICD-10-CM

## 2023-06-08 PROCEDURE — 97112 NEUROMUSCULAR REEDUCATION: CPT

## 2023-06-08 PROCEDURE — 97110 THERAPEUTIC EXERCISES: CPT

## 2023-06-08 NOTE — PROGRESS NOTES
Daily Note     Today's date: 2023  Patient name: Maykel Holloway  : 1965  MRN: [id number]  Referring provider: Leandro Collier DO  Dx:   Encounter Diagnosis     ICD-10-CM    1  Status post hip replacement, right  Z96 641       2  Closed fracture of distal end of right radius, unspecified fracture morphology, initial encounter  S52 774T                      Subjective: pt reports that her wrist is feeling stronger, she has been able to use her R hand for more functional activities to hold her water bottle and during driving  Reports that her hip is also feeling better, she feels less wobbly       Objective: See treatment diary below      Assessment: Tolerated treatment well  Pt challenged with proximal hip progressions today during steamboats, challenged strength and balance wise  Cont to progress as tolerated  Pt wrist strength progressing well, cont to progress ROM and strength as tolerated  Challenged with ball on wall wrist ext today and cone supination  Patient would benefit from continued PT      Plan: Continue per plan of care        Precautions: Post hip precautions   Past Medical History:   Diagnosis Date   • Abnormal immunology findings 2011   • Anemia 2011   • Arthritis    • Closed fracture of distal end of fibula     resolved 09/15/17   • Depression    • Distal radius fracture, left     last assessed 17   • GERD (gastroesophageal reflux disease)     last assessed 13   • Herpes labialis     last assessed 07/23/15   • Hypertension     essential ; last assessed 13   • Sinus bradycardia     last assessed 16   DOS: 2023   SOC: 2023 (ARLETH) 2023 (wrist)   FOTO: 2023 (hip)   POC Expiration: 2023 (both)   Daily Treatment Log:  Date 2023   Visit #/auth 11/12 12/12  9/12 10/12 (re/added wrist)    Auth exp        Manual                        There Exer 23' 30'    40'   Sup ham stretch w/ SOS w/in precuations "  Supine piriformis stretch w/in precautions     Fig 4 30\"x3     SLR flex and abd        Rev clamshells towel roll        Clamshells     grn 2x10     Bridges  W/ abd    Grn 2x10    Std hip abd   Steamboats RTB 1x10 R/L       Std hip ext     RTB @ knees 2x10 R/L     Supine leg press  50# 2x15  25# 2x15 R/L 50# 2x15  25# 2x15 R/L   50# 2x10  25# 2x10 R/L     HR/TR  HR off step 2x10    HR off step 2x10     LAQ         Seated HS curl        Sit hip ER vs TB ball btwn knees    B/L RTB 2x10     Hip flexor stretch foot on step  10\"x5 R  10\"x5 R LE     Objective measures     Performed and eval of wrist    HEP                Gripper  Yellow 25# 2x10  Yellow 25# 1x10   35# 1x10       Digi flex  indvi fingers x20  20x       Sup/pro therabar  bends Red bar x20 ea  Red x20 e a      Cone supination   6 cones 3x       Rad/ulnar dev w/ DB  2# 2x10 ea  3# 2x10 ea       Wrist flex/ext w/ DB 3# 2x10 ea  3# 2x10 ea      Yellow finger ext 2x10  2x10       Therabar twists  Red bar 2x10  Red 2x10 ea       Sup/pro with hammer  2x10 ea  RS ball on wall for wrist ext   2x10 cw/ccw                              There Activ                        NMReed 15' 10'       Monster walks RTB @ calf 20'x2   RTB @ knees 20'x2    Side stepping  RTB @ calf 20'x2   RTB @ knees 20'x2     Alt tandem walk Cone taps         Side step over cones        FSU 8\" w/ alt knee hike 1x10 R/L 8\" w/ high knee 2x10 R/L   8\" w/ alt knee hike 2x10 R/L    LSU     8\" 2x10 R    STS   W/ OH press 3# 2x10       Straddle step ups 8\" step 1x10 R/L  8\" 1x10 R/L       Modalities                                  HEP:   Access Code: [email or web address]  URL: [email or web address]  Date: 05/10/2023  Prepared by: Bar Pollock    Exercises  - Supine Bridge  - 1 x daily - 4 x weekly - 2 sets - 10 reps  - Clamshell  - 1 x daily - 4 x weekly - 2 sets - 10 reps  - Small Range Straight Leg Raise  - 1 x daily - 4 x weekly - 2 sets - 10 reps  - Step Up  - 1 x daily - 4 x weekly " - 2 sets - 10 reps  - steamboats  - 1 x daily - 4 x weekly - 2 sets - 10 reps  - Standing March with Counter Support  - 1 x daily - 4 x weekly - 2 sets - 10 reps  - Seated Hamstring Stretch  - 1 x daily - 7 x weekly - 5 reps - 15 sec hold

## 2023-06-14 ENCOUNTER — APPOINTMENT (OUTPATIENT)
Dept: PHYSICAL THERAPY | Facility: CLINIC | Age: 58
End: 2023-06-14
Payer: COMMERCIAL

## 2023-06-15 ENCOUNTER — APPOINTMENT (OUTPATIENT)
Dept: PHYSICAL THERAPY | Facility: CLINIC | Age: 58
End: 2023-06-15
Payer: COMMERCIAL

## 2023-06-15 ENCOUNTER — TELEPHONE (OUTPATIENT)
Dept: PHYSICAL THERAPY | Facility: CLINIC | Age: 58
End: 2023-06-15

## 2023-06-16 ENCOUNTER — APPOINTMENT (OUTPATIENT)
Dept: PHYSICAL THERAPY | Facility: CLINIC | Age: 58
End: 2023-06-16
Payer: COMMERCIAL

## 2023-06-16 ENCOUNTER — TELEPHONE (OUTPATIENT)
Dept: PHYSICAL THERAPY | Facility: CLINIC | Age: 58
End: 2023-06-16

## 2023-06-19 ENCOUNTER — OFFICE VISIT (OUTPATIENT)
Dept: PHYSICAL THERAPY | Facility: CLINIC | Age: 58
End: 2023-06-19
Payer: COMMERCIAL

## 2023-06-19 DIAGNOSIS — S52.501A CLOSED FRACTURE OF DISTAL END OF RIGHT RADIUS, UNSPECIFIED FRACTURE MORPHOLOGY, INITIAL ENCOUNTER: ICD-10-CM

## 2023-06-19 DIAGNOSIS — Z96.641 STATUS POST HIP REPLACEMENT, RIGHT: Primary | ICD-10-CM

## 2023-06-19 PROCEDURE — 97112 NEUROMUSCULAR REEDUCATION: CPT

## 2023-06-19 PROCEDURE — 97110 THERAPEUTIC EXERCISES: CPT

## 2023-06-19 NOTE — PROGRESS NOTES
Daily Note     Today's date: 2023  Patient name: Deana Howard  : 1965  MRN: [id number]  Referring provider: Bria Aguiar DO  Dx:   Encounter Diagnosis     ICD-10-CM    1  Status post hip replacement, right  Z96 641       2  Closed fracture of distal end of right radius, unspecified fracture morphology, initial encounter  S52 832N                      Subjective: pt reports that her wrist and hip are feeling great and she would like to discharge at last scheduled appt  PT in agreement with plan  Objective: See treatment diary below      Assessment: Tolerated treatment well and is appropriately challenged with current program, will progress as tolerated and update HEP in preparation for D/C next week  Patient would benefit from continued PT unitl D/C on 23      Plan: Continue per plan of care        Precautions: Post hip precautions   Past Medical History:   Diagnosis Date   • Abnormal immunology findings 2011   • Anemia 2011   • Arthritis    • Closed fracture of distal end of fibula     resolved 09/15/17   • Depression    • Distal radius fracture, left     last assessed 17   • GERD (gastroesophageal reflux disease)     last assessed 13   • Herpes labialis     last assessed 07/23/15   • Hypertension     essential ; last assessed 13   • Sinus bradycardia     last assessed 16   DOS: 2023   SOC: 2023 (ARLETH) 2023 (wrist)   FOTO: 2023 (hip)   POC Expiration: 2023 (both)   Daily Treatment Log:  Date 2023     Visit #/auth      Auth exp  2023     Manual                        There Exer 23' 30'  30'     Sup ham stretch w/ SOS w/in precuations        Supine piriformis stretch w/in precautions         SLR flex and abd        Rev clamshells towel roll        Clamshells         Bridges  W/ abd        Std hip abd   Steamboats RTB 1x10 R/L  Steamboats RTB 1x10 R/L     Std hip ext         Supine "leg press  50# 2x15  25# 2x15 R/L 50# 2x15  25# 2x15 R/L  50# 2x15  25# 2x15 R/L     HR/TR  HR off step 2x10        LAQ         Seated HS curl        Sit hip ER vs TB ball btwn knees        Hip flexor stretch foot on step  10\"x5 R 10\"x10 R     Objective measures        HEP                Gripper  Yellow 25# 2x10  Yellow 25# 1x10   35# 1x10  Yellow 35# 2x10      Digi flex  indvi fingers x20  20x  20x      Sup/pro therabar  bends Red bar x20 ea  Red x20 e a Red bar x20 ea  Cone supination   6 cones 3x       Rad/ulnar dev w/ DB  2# 2x10 ea  3# 2x10 ea  3# 2x10 ea      Wrist flex/ext w/ DB 3# 2x10 ea  3# 2x10 ea 3# 2x10 ea     Yellow finger ext 2x10  2x10  2x10      Therabar twists  Red bar 2x10  Red 2x10 ea  Red 2x10 ea      Sup/pro with hammer  2x10 ea  3# weight 1x10      RS ball on wall for wrist ext   2x10 cw/ccw                              There Activ                        NMReed 15' 10'  10'     Monster walks RTB @ calf 20'x2       Side stepping  RTB @ calf 20'x2       Alt tandem walk Cone taps         Side step over cones        FSU 8\" w/ alt knee hike 1x10 R/L 8\" w/ high knee 2x10 R/L  8\" w/ high knee 2x10 R/L      LSU         STS   W/ OH press 3# 2x10       Straddle step ups 8\" step 1x10 R/L  8\" 1x10 R/L  8\" 1x10 R/L      Modalities                                  HEP:   Access Code: [email or web address]  URL: [email or web address]  Date: 05/10/2023  Prepared by: Maikel Redman    Exercises  - Supine Bridge  - 1 x daily - 4 x weekly - 2 sets - 10 reps  - Clamshell  - 1 x daily - 4 x weekly - 2 sets - 10 reps  - Small Range Straight Leg Raise  - 1 x daily - 4 x weekly - 2 sets - 10 reps  - Step Up  - 1 x daily - 4 x weekly - 2 sets - 10 reps  - steamboats  - 1 x daily - 4 x weekly - 2 sets - 10 reps  - Standing March with Counter Support  - 1 x daily - 4 x weekly - 2 sets - 10 reps  - Seated Hamstring Stretch  - 1 x daily - 7 x weekly - 5 reps - 15 sec hold           "

## 2023-06-21 ENCOUNTER — OFFICE VISIT (OUTPATIENT)
Dept: PHYSICAL THERAPY | Facility: CLINIC | Age: 58
End: 2023-06-21
Payer: COMMERCIAL

## 2023-06-21 DIAGNOSIS — Z96.641 STATUS POST HIP REPLACEMENT, RIGHT: Primary | ICD-10-CM

## 2023-06-21 DIAGNOSIS — S52.501A CLOSED FRACTURE OF DISTAL END OF RIGHT RADIUS, UNSPECIFIED FRACTURE MORPHOLOGY, INITIAL ENCOUNTER: ICD-10-CM

## 2023-06-21 PROCEDURE — 97110 THERAPEUTIC EXERCISES: CPT

## 2023-06-21 NOTE — PROGRESS NOTES
Daily Note     Today's date: 2023  Patient name: Denise Govea  : 1965  MRN: [id number]  Referring provider: Wendie Mcmillan DO  Dx:   Encounter Diagnosis     ICD-10-CM    1  Status post hip replacement, right  Z96 641       2  Closed fracture of distal end of right radius, unspecified fracture morphology, initial encounter  S52 501A                      Subjective: pt has no new complaints on arrival, hip and wrist feeling good, ready for DC next visit    Pt arrived 15 min late to session, was accommodated for  Objective: See treatment diary below      Assessment: Tolerated treatment well  Pt dem some lateral hip weakness that will cont to be address with HEP upon DC next visit, VC to prevent posterior hip rolling during SL abd  Pt tolerated additional TE for R wrist initiating WB through UE w/ no complaints  Patient exhibited good technique with therapeutic exercises      Plan: Continue per plan of care        Precautions: Post hip precautions   Past Medical History:   Diagnosis Date   • Abnormal immunology findings 2011   • Anemia 2011   • Arthritis    • Closed fracture of distal end of fibula     resolved 09/15/17   • Depression    • Distal radius fracture, left     last assessed 17   • GERD (gastroesophageal reflux disease)     last assessed 13   • Herpes labialis     last assessed 07/23/15   • Hypertension     essential ; last assessed 13   • Sinus bradycardia     last assessed 16   DOS: 2023   SOC: 2023 (ARLETH) 2023 (wrist)   FOTO: 2023 (hip)   POC Expiration: 2023 (both)   Daily Treatment Log:  Date 2023    Visit #/auth     Auth exp  2023     Manual                        There Exer 23' 30'  30' 30'    Sup ham stretch w/ SOS w/in precuations        Supine piriformis stretch w/in precautions         SLR flex and abd    SL hip abd 2x10     Rev clamshells towel roll "    Clamshells         Bridges  W/ abd    On pball 3\" 2x10     Std hip abd   Steamboats RTB 1x10 R/L  Steamboats RTB 1x10 R/L Steamboats RTB 2x10 R/L    Std hip ext         Supine leg press  50# 2x15  25# 2x15 R/L 50# 2x15  25# 2x15 R/L  50# 2x15  25# 2x15 R/L 60# 2x10  30# 2x10 R/L     HR/TR  HR off step 2x10        LAQ         Seated HS curl        Sit hip ER vs TB ball btwn knees        Hip flexor stretch foot on step  10\"x5 R 10\"x10 R 10\"x10     Objective measures        HEP                Gripper  Yellow 25# 2x10  Yellow 25# 1x10   35# 1x10  Yellow 35# 2x10      Digi flex  indvi fingers x20  20x  20x      Sup/pro therabar  bends Red bar x20 ea  Red x20 e a Red bar x20 ea  Cone supination   6 cones 3x       Rad/ulnar dev w/ DB  2# 2x10 ea  3# 2x10 ea  3# 2x10 ea      Wrist flex/ext w/ DB 3# 2x10 ea  3# 2x10 ea 3# 2x10 ea     Yellow finger ext 2x10  2x10  2x10      Therabar twists  Red bar 2x10  Red 2x10 ea  Red 2x10 ea      Sup/pro with hammer  2x10 ea  3# weight 1x10  3# weight 2x10    RS ball on wall for wrist ext   2x10 cw/ccw      Full plank shoulder taps at [employer]     2x10 R/L     Uni row w/ rotation     job 10# 2x10  R    Fwd punch job    6# 2x10 R    There Activ                        NMReed 15' 10'  10'     Monster walks RTB @ calf 20'x2   RTB 15'x2     Side stepping  RTB @ calf 20'x2       Alt tandem walk Cone taps         Side step over cones        FSU 8\" w/ alt knee hike 1x10 R/L 8\" w/ high knee 2x10 R/L  8\" w/ high knee 2x10 R/L  8\" w/ high knee 2x10 R/L     LSU         STS   W/ OH press 3# 2x10       Straddle step ups 8\" step 1x10 R/L  8\" 1x10 R/L  8\" 1x10 R/L  8\" 1x10 R/L     Modalities                                  HEP:   Access Code: [email or web address]  URL: [email or web address]  Date: 05/10/2023  Prepared by: Scott Cantrell    Exercises  - Supine Bridge  - 1 x daily - 4 x weekly - 2 sets - 10 reps  - Clamshell  - 1 x daily - 4 x weekly - 2 sets - 10 reps  - Small " Range Straight Leg Raise  - 1 x daily - 4 x weekly - 2 sets - 10 reps  - Step Up  - 1 x daily - 4 x weekly - 2 sets - 10 reps  - steamboats  - 1 x daily - 4 x weekly - 2 sets - 10 reps  - Standing March with Counter Support  - 1 x daily - 4 x weekly - 2 sets - 10 reps  - Seated Hamstring Stretch  - 1 x daily - 7 x weekly - 5 reps - 15 sec hold

## 2023-06-30 ENCOUNTER — OFFICE VISIT (OUTPATIENT)
Dept: PHYSICAL THERAPY | Facility: CLINIC | Age: 58
End: 2023-06-30
Payer: COMMERCIAL

## 2023-06-30 DIAGNOSIS — Z96.641 STATUS POST HIP REPLACEMENT, RIGHT: Primary | ICD-10-CM

## 2023-06-30 DIAGNOSIS — S52.501A CLOSED FRACTURE OF DISTAL END OF RIGHT RADIUS, UNSPECIFIED FRACTURE MORPHOLOGY, INITIAL ENCOUNTER: ICD-10-CM

## 2023-06-30 NOTE — PROGRESS NOTES
Patient reported to clinic feeling sick and dizzy  Patient stated she would be unable to stay based on how she felt  Patient completed FOTO for discharge but a formal re-evaluation could not be performed  Patient has demo in past sessions good functional strength in the hip with stair negotiation and TE  Patient reports no limitations at this time in the wrist or hip and demo wrist ROM WNL compared to L wrist in clinic today  Overall patient has been progressing well and is still appropriate for D/C       Salina Boone PT, DPT   License #  [id number]      HIP GOALS:   Short Term Goals to be accomplished in 4 weeks:  STG1: Pt will be I with HEP to maximize progress between therapy sessions ---MET  STG2: Pt will demo 10 deg improvement in hip AROM to improve stair negotiation ---MET  STG3: Pt will demo 1/2 MMT strength inc in hip to demo improve strength needed for STS with even weightbearing ---MET  STG4: Pt will demo nil gait deviations due to pain to improve ambulation distance in community ---MET     Long Term Goals to be accomplished in 12 weeks:   LTG1: Pt will achieve full hip AROM to return to improve transfer ability in and out of car and reciprocal stair negotiation ---MET  LTG2: Pt will demo hip strength WNL as per PLOF to return to recreational activities pain free as well as improve STS with no UE push off ---unable to assess but functional strength WNL w/ stair negotiation and with TE in clinici   LTG3: Pt will return to work/household duties without pain per PLOF ----MET  LTG4: Pt will demo good body mech with >75% functional challenges to prevent reinjury ----MET        WRIST GOALS   Short Term Goals to be accomplished in 4 weeks:  STG1: Pt will be I with HEP to maximize progress between therapy sessions ---MET  STG2: Pt will demo 5 deg improvement in wrist AROM to improve grasping and holding objects ---MET  STG3: Pt will demo 1/2 MMT strength inc in R wrist to demo improve strength needed lifting objects and self care ---unable to assess today however patient reports no difficulty lifting or holding objects       Long Term Goals to be accomplished in 12 weeks:   LTG1: Pt will achieve full wrist AROM to return to ADLs and self care without limitations ---MET  LTG2: Pt will demo wrist and  strength WNL as per PLOF to return to ADLs without limitations ---unable to assess today however patient reports no difficulty lifting or holding objects    LTG3: Pt will return to work/household duties without pain per PLOF ---MET (patient reported)

## 2023-07-19 ENCOUNTER — HOSPITAL ENCOUNTER (OUTPATIENT)
Dept: RADIOLOGY | Facility: HOSPITAL | Age: 58
Discharge: HOME | End: 2023-07-19
Attending: ORTHOPAEDIC SURGERY
Payer: COMMERCIAL

## 2023-07-19 ENCOUNTER — OFFICE VISIT (OUTPATIENT)
Dept: OBGYN CLINIC | Facility: CLINIC | Age: 58
End: 2023-07-19
Payer: COMMERCIAL

## 2023-07-19 VITALS — BODY MASS INDEX: 27.29 KG/M2 | WEIGHT: 164 LBS

## 2023-07-19 DIAGNOSIS — S52.501D CLOSED FRACTURE OF DISTAL END OF RIGHT RADIUS WITH ROUTINE HEALING, UNSPECIFIED FRACTURE MORPHOLOGY, SUBSEQUENT ENCOUNTER: Primary | ICD-10-CM

## 2023-07-19 DIAGNOSIS — S72.001A CLOSED RIGHT HIP FRACTURE, INITIAL ENCOUNTER (HCC): ICD-10-CM

## 2023-07-19 DIAGNOSIS — Z96.649 S/P HIP HEMIARTHROPLASTY: ICD-10-CM

## 2023-07-19 DIAGNOSIS — S52.501A CLOSED FRACTURE OF DISTAL END OF RIGHT RADIUS, UNSPECIFIED FRACTURE MORPHOLOGY, INITIAL ENCOUNTER: ICD-10-CM

## 2023-07-19 DIAGNOSIS — M19.131 POST-TRAUMATIC ARTHRITIS OF RIGHT WRIST: ICD-10-CM

## 2023-07-19 PROCEDURE — 99213 OFFICE O/P EST LOW 20 MIN: CPT | Performed by: ORTHOPAEDIC SURGERY

## 2023-07-19 PROCEDURE — 20605 DRAIN/INJ JOINT/BURSA W/O US: CPT | Performed by: ORTHOPAEDIC SURGERY

## 2023-07-19 PROCEDURE — 73110 X-RAY EXAM OF WRIST: CPT

## 2023-07-19 PROCEDURE — 73502 X-RAY EXAM HIP UNI 2-3 VIEWS: CPT

## 2023-07-19 RX ORDER — LIDOCAINE HYDROCHLORIDE 5 MG/ML
1 INJECTION, SOLUTION INFILTRATION; PERINEURAL
Status: COMPLETED | OUTPATIENT
Start: 2023-07-19 | End: 2023-07-19

## 2023-07-19 RX ORDER — TRIAMCINOLONE ACETONIDE 40 MG/ML
40 INJECTION, SUSPENSION INTRA-ARTICULAR; INTRAMUSCULAR
Status: COMPLETED | OUTPATIENT
Start: 2023-07-19 | End: 2023-07-19

## 2023-07-19 RX ADMIN — LIDOCAINE HYDROCHLORIDE 1 ML: 5 INJECTION, SOLUTION INFILTRATION; PERINEURAL at 09:00

## 2023-07-19 RX ADMIN — TRIAMCINOLONE ACETONIDE 40 MG: 40 INJECTION, SUSPENSION INTRA-ARTICULAR; INTRAMUSCULAR at 09:00

## 2023-07-19 NOTE — PROGRESS NOTES
[address]  [address]  [facility] [address]  [phone]       Leopold Blackbird  [id number]  1965    ORTHOPAEDIC SURGERY OUTPATIENT NOTE  7/19/2023      HISTORY:  59 y.o. female  3 months status post right hip hemiarthroplasty, DOS 4/8/23 and right distal radius fracture treated non operatively. The patient states she is doing well. She states her hip pain has been improving. She notes occ groin pain which is worse at night. She states her pain is a 2 out of 10 on the pain scale. In regards to her right wrist, she states this is more bothersome for her. She notes increased pain with range of motion. She has completed therapy for her wrist and her hip. She states her wrist pain is a 4 out of 10 on the pain scale for her wrist. She has not been taking anything OTC for pain. The patient states she slipped on a rug appx 1 week ago.      Past Medical History:   Diagnosis Date   • Abnormal immunology findings 06/09/2011   • Anemia 05/23/2011   • Arthritis    • Closed fracture of distal end of fibula     resolved 09/15/17   • Depression    • Distal radius fracture, left     last assessed 01/30/17   • GERD (gastroesophageal reflux disease)     last assessed 05/06/13   • Herpes labialis     last assessed 07/23/15   • Hypertension     essential ; last assessed 08/07/13   • Sinus bradycardia     last assessed 05/24/16       Past Surgical History:   Procedure Laterality Date   • ANKLE SURGERY      last assessed 09/15/17   • BACK SURGERY      lumbar laminectomy L4-L5   • BLADDER SUSPENSION      7/2017   • COLONOSCOPY     • JOINT REPLACEMENT      TKR  on right   • ORIF TIBIA & FIBULA FRACTURES Right 12/15/2016    Procedure: SURGICAL FIXATION OF RIGHT DISTAL FIBULA FRACTURE;  Surgeon: Campos Ortiz MD;  Location: [address] [place] [address] MAIN OR;  Service:    • NC HEMIARTHROPLASTY HIP PARTIAL Right 4/8/2023    Procedure: HEMIARTHROPLASTY HIP (BIPOLAR); Surgeon: Jose Morales;  Location: [facility] Main [facility];  Service: Orthopedics   • OH REVJ TOT KNEE ARTHRP Shola Sue Right 10/23/2017    Procedure: REVISION TOTAL KNEE REPLACEMENT WITH FROZEN SECTIONS;  Surgeon: Danley Barthel, DO;  Location: [facility];  Service: Orthopedics   • TOTAL KNEE ARTHROPLASTY      last assessed; 11/21/17   • WISDOM TOOTH EXTRACTION         Social History     Socioeconomic History   • Marital status: Legally      Spouse name: Not on file   • Number of children: Not on file   • Years of education: Not on file   • Highest education level: Not on file   Occupational History   • Not on file   Tobacco Use   • Smoking status: Never   • Smokeless tobacco: Never   Vaping Use   • Vaping Use: Never used   Substance and Sexual Activity   • Alcohol use: Yes     Comment: moderately   • Drug use: No   • Sexual activity: Yes     Partners: Male   Other Topics Concern   • Not on file   Social History Narrative    Recovering alcoholic     Social Determinants of Health     Financial Resource Strain: Not on file   Food Insecurity: Not on file   Transportation Needs: Not on file   Physical Activity: Not on file   Stress: Not on file   Social Connections: Not on file   Intimate Partner Violence: Not on file   Housing Stability: Not on file       Family History   Problem Relation Age of Onset   • Arthritis Mother    • Osteoporosis Mother    • Dementia Father    • Other Father         spinal stenosis   • Other Family         CREST        Patient's Medications   New Prescriptions    No medications on file   Previous Medications    ACETAMINOPHEN (TYLENOL) 325 MG TABLET    Take 2 tablets (650 mg total) by mouth every 4 (four) hours as needed for mild pain    ALPRAZOLAM (XANAX) 0.25 MG TABLET    Take 1 tablet (0.25 mg total) by mouth daily at bedtime as needed for anxiety    AMLODIPINE (NORVASC) 5 MG TABLET    Take 1 tablet (5 mg total) by mouth daily    BUSPIRONE (BUSPAR) 10 MG TABLET    Take 1 tablet (10 mg total) by mouth 2 (two) times a day    DOCUSATE SODIUM (COLACE) 100 MG CAPSULE    Take 1 capsule (100 mg total) by mouth 2 (two) times a day    ENOXAPARIN (LOVENOX) 40 MG/0.4 ML    Inject 0.4 mL (40 mg total) under the skin in the morning    ESCITALOPRAM (LEXAPRO) 20 MG TABLET    Take 1 tablet (20 mg total) by mouth daily    FOLIC ACID (FOLVITE) 1 MG TABLET    Take 1 tablet (1 mg total) by mouth daily    LOSARTAN (COZAAR) 50 MG TABLET    Take 1 tablet (50 mg total) by mouth daily    METHOCARBAMOL (ROBAXIN) 500 MG TABLET    Take 1 tablet (500 mg total) by mouth every 6 (six) hours as needed for muscle spasms    MILK THISTLE SEED POWD    Take 250 mg by mouth daily    ONDANSETRON (ZOFRAN ODT) 4 MG DISINTEGRATING TABLET    Take 1 tablet (4 mg total) by mouth every 6 (six) hours as needed for nausea or vomiting    OXYCODONE (ROXICODONE) 5 IMMEDIATE RELEASE TABLET    You may take 2.5 mg (0.5 tab) for moderate pain or 5 mg (1 tab) for severe pain, every 4 hours, as needed. THIAMINE (VITAMIN B1) 100 MG TABLET    Take 1 tablet (100 mg total) by mouth daily   Modified Medications    No medications on file   Discontinued Medications    No medications on file       Allergies   Allergen Reactions   • Lisinopril      COUGH   • Bupropion Anxiety        Wt 74.4 kg (164 lb)   BMI 27.29 kg/m²      REVIEW OF SYSTEMS:  Constitutional: Negative. HEENT: Negative. Respiratory: Negative. Skin: Negative. Neurological: Negative. Psychiatric/Behavioral: Negative. Musculoskeletal: Negative except for that mentioned in the HPI.    [unfilled]     PHYSICAL EXAM:  Right wrist  Pain with resisted wrist extension   Full ROM    Right hip  Scare well healed  Good ROM    IMAGING:X-rays right hip performed in the office today demonstrate s/p right hip hemiarthroplasty with stable hardware. X-rays right wrist performed in the office today demonstrate healed distal radius fracture.      ASSESSMENT AND PLAN:  59 y.o. female  1 months status post right hip hemiarthroplasty and right distal radius fracture treated non operatively. The patient is doing well. X-rays were reviewed in the office today. Discussed with the patient that her hip can take appx 1 year to fully mature. In regards to her right wrist, discussed her signs and symptoms are consistent with post-traumatic osteoarthritis. Treatment options were discussed in the form of a right wrist radiocarpal injection which she was agreeable to. This was performed in the office today without any complications. She may follow up with me as needed. Medium joint arthrocentesis: R radiocarpal  Universal Protocol:  Consent: Verbal consent obtained.   Consent given by: patient  Patient identity confirmed: verbally with patient    Supporting Documentation  Indications: pain   Procedure Details  Location: wrist - R radiocarpal  Preparation: Patient was prepped and draped in the usual sterile fashion  Needle size: 25 G  Ultrasound guidance: no  Medications administered: 1 mL lidocaine 0.5 %; 40 mg triamcinolone acetonide 40 mg/mL    Patient tolerance: patient tolerated the procedure well with no immediate complications  Dressing:  Sterile dressing applied            Scribe Attestation    I,:  Eula Harmon MA am acting as a scribe while in the presence of the attending physician.:       I,:  Isabelle Love personally performed the services described in this documentation    as scribed in my presence.:

## 2023-08-16 ENCOUNTER — TELEPHONE (OUTPATIENT)
Dept: FAMILY MEDICINE CLINIC | Facility: CLINIC | Age: 58
End: 2023-08-16

## 2023-08-16 NOTE — TELEPHONE ENCOUNTER
Dr Wilmar Longoria, dermatologist is treating patient for jailene genetic alopecia. She wants to start patient on oral minoxidil 2.5 mg tab  1/4 tab every day. Needs verbal ok to start patient on this med.

## 2023-08-17 NOTE — TELEPHONE ENCOUNTER
Called and spoke to patient regarding medication from derm. Patient stated she will call back tomorrow to schedule a bp and cpx  appt. Patient was driving.

## 2023-09-07 DIAGNOSIS — E87.1 HYPONATREMIA: Primary | ICD-10-CM

## 2023-09-29 ENCOUNTER — APPOINTMENT (OUTPATIENT)
Dept: LAB | Facility: CLINIC | Age: 58
End: 2023-09-29
Payer: COMMERCIAL

## 2023-09-29 ENCOUNTER — TELEPHONE (OUTPATIENT)
Dept: NEPHROLOGY | Facility: CLINIC | Age: 58
End: 2023-09-29

## 2023-09-29 DIAGNOSIS — E87.1 HYPONATREMIA: ICD-10-CM

## 2023-09-29 LAB
ALBUMIN SERPL BCP-MCNC: 4 G/DL (ref 3.5–5)
ALP SERPL-CCNC: 105 U/L (ref 34–104)
ALT SERPL W P-5'-P-CCNC: 45 U/L (ref 7–52)
ANION GAP SERPL CALCULATED.3IONS-SCNC: 9 MMOL/L
AST SERPL W P-5'-P-CCNC: 57 U/L (ref 13–39)
BASOPHILS # BLD AUTO: 0.07 THOUSANDS/ÂΜL (ref 0–0.1)
BASOPHILS NFR BLD AUTO: 1 % (ref 0–1)
BILIRUB SERPL-MCNC: 0.92 MG/DL (ref 0.2–1)
BUN SERPL-MCNC: 6 MG/DL (ref 5–25)
CALCIUM SERPL-MCNC: 9 MG/DL (ref 8.4–10.2)
CHLORIDE SERPL-SCNC: 95 MMOL/L (ref 96–108)
CO2 SERPL-SCNC: 27 MMOL/L (ref 21–32)
CREAT SERPL-MCNC: 0.67 MG/DL (ref 0.6–1.3)
EOSINOPHIL # BLD AUTO: 0.04 THOUSAND/ÂΜL (ref 0–0.61)
EOSINOPHIL NFR BLD AUTO: 1 % (ref 0–6)
ERYTHROCYTE [DISTWIDTH] IN BLOOD BY AUTOMATED COUNT: 12.2 % (ref 11.6–15.1)
GFR SERPL CREATININE-BSD FRML MDRD: 95 ML/MIN/1.73SQ M
GLUCOSE SERPL-MCNC: 98 MG/DL (ref 65–140)
HCT VFR BLD AUTO: 40 % (ref 34.8–46.1)
HGB BLD-MCNC: 13.3 G/DL (ref 11.5–15.4)
IMM GRANULOCYTES # BLD AUTO: 0.05 THOUSAND/UL (ref 0–0.2)
IMM GRANULOCYTES NFR BLD AUTO: 1 % (ref 0–2)
LYMPHOCYTES # BLD AUTO: 2.13 THOUSANDS/ÂΜL (ref 0.6–4.47)
LYMPHOCYTES NFR BLD AUTO: 25 % (ref 14–44)
MCH RBC QN AUTO: 35.2 PG (ref 26.8–34.3)
MCHC RBC AUTO-ENTMCNC: 33.3 G/DL (ref 31.4–37.4)
MCV RBC AUTO: 106 FL (ref 82–98)
MONOCYTES # BLD AUTO: 1.15 THOUSAND/ÂΜL (ref 0.17–1.22)
MONOCYTES NFR BLD AUTO: 14 % (ref 4–12)
NEUTROPHILS # BLD AUTO: 5.06 THOUSANDS/ÂΜL (ref 1.85–7.62)
NEUTS SEG NFR BLD AUTO: 58 % (ref 43–75)
NRBC BLD AUTO-RTO: 0 /100 WBCS
PLATELET # BLD AUTO: 320 THOUSANDS/UL (ref 149–390)
PMV BLD AUTO: 9.9 FL (ref 8.9–12.7)
POTASSIUM SERPL-SCNC: 4.2 MMOL/L (ref 3.5–5.3)
PROT SERPL-MCNC: 7.3 G/DL (ref 6.4–8.4)
RBC # BLD AUTO: 3.78 MILLION/UL (ref 3.81–5.12)
SODIUM SERPL-SCNC: 131 MMOL/L (ref 135–147)
WBC # BLD AUTO: 8.5 THOUSAND/UL (ref 4.31–10.16)

## 2023-09-29 PROCEDURE — 36415 COLL VENOUS BLD VENIPUNCTURE: CPT

## 2023-09-29 PROCEDURE — 85025 COMPLETE CBC W/AUTO DIFF WBC: CPT

## 2023-09-29 PROCEDURE — 80053 COMPREHEN METABOLIC PANEL: CPT

## 2023-10-02 ENCOUNTER — OFFICE VISIT (OUTPATIENT)
Dept: NEPHROLOGY | Facility: CLINIC | Age: 58
End: 2023-10-02
Payer: COMMERCIAL

## 2023-10-02 VITALS
SYSTOLIC BLOOD PRESSURE: 132 MMHG | HEART RATE: 82 BPM | WEIGHT: 172 LBS | HEIGHT: 65 IN | DIASTOLIC BLOOD PRESSURE: 78 MMHG | OXYGEN SATURATION: 98 % | BODY MASS INDEX: 28.66 KG/M2

## 2023-10-02 DIAGNOSIS — I10 ESSENTIAL HYPERTENSION: Primary | ICD-10-CM

## 2023-10-02 DIAGNOSIS — E87.1 CHRONIC HYPONATREMIA: ICD-10-CM

## 2023-10-02 PROCEDURE — 99213 OFFICE O/P EST LOW 20 MIN: CPT | Performed by: INTERNAL MEDICINE

## 2023-10-02 RX ORDER — MULTIVITAMIN
1 TABLET ORAL DAILY
COMMUNITY

## 2023-10-02 NOTE — PROGRESS NOTES
NEPHROLOGY OFFICE VISIT   Holley Martínez 59 y.o. female MRN: [id number]  10/2/2023    Reason for Visit: Hyponatremia    History of Present Illness (HPI):    I had the pleasure of seeing Hank Silva today in the renal clinic for the continued management of hyponatremia. Since our last visit, there has been no ER visits or hospitilizations. He currently has no complaints at this time and is feeling well. Patient denies any chest pain, shortness of breath and swelling. The last blood work was done on September which included a BMP, which we have reviewed together. Remains on Lexapro. Has had a lot of stress in her life recently with the dissolving of her marriage, having to move from her house, loss of vehicle. Loss of some family members. She does drink alcohol 2-4 times a week. Reports no smoking. Discussed about a fluid restriction and cutting back on the alcohol intake. ASSESSMENT and PLAN:    Hyponatremia  --chronic, baseline sodium in the low 130s  --volume status:  Euvolemic  --presumed to be secondary to combination high free water intake, along alcoholic intak, and possibly a component of SIADH from her Lexapro  --her sodium level has been chronic since 2018  --AM cortisol level is normal at 22  --TSH is normal  --High urine osmolality and urine sodium consistent with SIADH  --Last serum osmolality was 293  --x-ray noted which showed no acute cardiopulmonary disease  --educated on a fluid restriction, reduction of alcohol intake, more balanced diet  --Less than 1.8 L/day fluid restriction. --To keep the serum sodium level greater than 130     Hypertension  --at goal  --continue amlodipine and losartan  --like to avoid salt tablets    Overweight  -- BMI 28.6  --Recommend decreasing portion sizes, encouraging healthy choices of fruits and vegetables, consuming healthier snacks and moderation in carbohydrate intake.  Exercise recommendations; 3-5 times a week, the American Heart Association recommends 150 minutes a week moderate exercise  --Strongly recommend evaluation by nutritionist  --Overweight and obesity have been associated with increased mortality and morbidity. Associated with a reduction in life expectancy, associated with increased all cause and cardiovascular mortality  --Metabolic risks, including increased risk of diabetes type 2, insulin resistance with hyperinsulinemia (weight loss of 5 to 7% associated with a decreased risk of type 2 diabetes among individuals with prediabetes and weight loss of 15% can lead to dramatic improvement of diabetes in nearly half of people). Dyslipidemia, hypertension and heart disease are all increased in patients with obesity. Along with increased risk of stroke increased risk of multiple cancer types. Can also be associated with increased renal dysfunction, strongest risk factor for new onset chronic kidney disease. There is also a degree of compensatory hyperfiltration to meet high in the metabolic demands of increased body weight. This can also result in increased increased risk for nephrolithiasis. PATIENT INSTRUCTIONS:    Patient Instructions   Blood work in 6 months and 12 months    See me in 12 months    Fluid restriction, less then 1.8 liter per day        Orders Placed This Encounter   Procedures   • Basic metabolic panel     This is a patient instruction: Patient fasting for 8 hours or longer recommended. Standing Status:   Standing     Number of Occurrences:   2     Standing Expiration Date:   10/2/2024       OBJECTIVE:  Current Weight: Weight - Scale: 78 kg (172 lb)  Vitals:    10/02/23 1419   BP: 132/78   BP Location: Left arm   Patient Position: Sitting   Cuff Size: Standard   Pulse: 82   SpO2: 98%   Weight: 78 kg (172 lb)   Height: 5' 5" (1.651 m)    Body mass index is 28.62 kg/m². REVIEW OF SYSTEMS:    Review of Systems   Constitutional: Negative for activity change and fever.    Respiratory: Negative for cough, chest tightness, shortness of breath and wheezing. Cardiovascular: Negative for chest pain and leg swelling. Gastrointestinal: Negative for abdominal pain, diarrhea, nausea and vomiting. Endocrine: Negative for polyuria. Genitourinary: Negative for difficulty urinating, dysuria, flank pain, frequency and urgency. Skin: Negative for rash. Neurological: Negative for dizziness, syncope, light-headedness and headaches. PHYSICAL EXAM:      Physical Exam  Vitals and nursing note reviewed. Constitutional:       General: She is not in acute distress. Appearance: She is well-developed. HENT:      Head: Normocephalic and atraumatic. Eyes:      General: No scleral icterus. Conjunctiva/sclera: Conjunctivae normal.      Pupils: Pupils are equal, round, and reactive to light. Cardiovascular:      Rate and Rhythm: Normal rate and regular rhythm. Heart sounds: S1 normal and S2 normal. No murmur heard. No friction rub. No gallop. Pulmonary:      Effort: Pulmonary effort is normal. No respiratory distress. Breath sounds: Normal breath sounds. No wheezing or rales. Abdominal:      General: Bowel sounds are normal.      Palpations: Abdomen is soft. Tenderness: There is no abdominal tenderness. There is no rebound. Musculoskeletal:         General: Normal range of motion. Cervical back: Normal range of motion and neck supple. Skin:     Findings: No rash. Neurological:      Mental Status: She is alert and oriented to person, place, and time.    Psychiatric:         Behavior: Behavior normal.         Medications:    Current Outpatient Medications:   •  amLODIPine (NORVASC) 5 mg tablet, Take 1 tablet (5 mg total) by mouth daily, Disp: 90 tablet, Rfl: 2  •  busPIRone (BUSPAR) 10 mg tablet, Take 1 tablet (10 mg total) by mouth 2 (two) times a day, Disp: 60 tablet, Rfl: 3  •  escitalopram (Lexapro) 20 mg tablet, Take 1 tablet (20 mg total) by mouth daily, Disp: 90 tablet, Rfl: 1  •  folic acid (FOLVITE) 1 mg tablet, Take 1 tablet (1 mg total) by mouth daily, Disp: 30 tablet, Rfl: 11  •  losartan (COZAAR) 50 mg tablet, Take 1 tablet (50 mg total) by mouth daily, Disp: 90 tablet, Rfl: 2  •  Milk Thistle Seed POWD, Take 250 mg by mouth daily, Disp: , Rfl:   •  Multiple Vitamin (multivitamin) tablet, Take 1 tablet by mouth daily, Disp: , Rfl:   •  thiamine (VITAMIN B1) 100 mg tablet, Take 1 tablet (100 mg total) by mouth daily, Disp: 90 tablet, Rfl: 2  •  acetaminophen (TYLENOL) 325 mg tablet, Take 2 tablets (650 mg total) by mouth every 4 (four) hours as needed for mild pain, Disp: , Rfl: 0  •  ALPRAZolam (XANAX) 0.25 mg tablet, Take 1 tablet (0.25 mg total) by mouth daily at bedtime as needed for anxiety (Patient not taking: Reported on 4/18/2023), Disp: 30 tablet, Rfl: 0  •  docusate sodium (COLACE) 100 mg capsule, Take 1 capsule (100 mg total) by mouth 2 (two) times a day (Patient not taking: Reported on 4/18/2023), Disp: 60 capsule, Rfl: 0  •  enoxaparin (LOVENOX) 40 mg/0.4 mL, Inject 0.4 mL (40 mg total) under the skin in the morning, Disp: 12 mL, Rfl: 0  •  methocarbamol (ROBAXIN) 500 mg tablet, Take 1 tablet (500 mg total) by mouth every 6 (six) hours as needed for muscle spasms (Patient not taking: Reported on 10/2/2023), Disp: 45 tablet, Rfl: 0  •  ondansetron (Zofran ODT) 4 mg disintegrating tablet, Take 1 tablet (4 mg total) by mouth every 6 (six) hours as needed for nausea or vomiting (Patient not taking: Reported on 4/18/2023), Disp: 20 tablet, Rfl: 0  •  oxyCODONE (ROXICODONE) 5 immediate release tablet, You may take 2.5 mg (0.5 tab) for moderate pain or 5 mg (1 tab) for severe pain, every 4 hours, as needed.  (Patient not taking: Reported on 4/18/2023), Disp: 30 tablet, Rfl: 0    Laboratory Results:  Results from last 7 days   Lab Units 09/29/23  1246   WBC Thousand/uL 8.50   HEMOGLOBIN g/dL 13.3   HEMATOCRIT % 40.0   PLATELETS Thousands/uL 320   POTASSIUM mmol/L 4.2   CHLORIDE mmol/L 95* CO2 mmol/L 27   BUN mg/dL 6   CREATININE mg/dL 0.67   CALCIUM mg/dL 9.0       Results for orders placed or performed in visit on 09/29/23   Comprehensive metabolic panel   Result Value Ref Range    Sodium 131 (L) 135 - 147 mmol/L    Potassium 4.2 3.5 - 5.3 mmol/L    Chloride 95 (L) 96 - 108 mmol/L    CO2 27 21 - 32 mmol/L    ANION GAP 9 mmol/L    BUN 6 5 - 25 mg/dL    Creatinine 0.67 0.60 - 1.30 mg/dL    Glucose 98 65 - 140 mg/dL    Calcium 9.0 8.4 - 10.2 mg/dL    AST 57 (H) 13 - 39 U/L    ALT 45 7 - 52 U/L    Alkaline Phosphatase 105 (H) 34 - 104 U/L    Total Protein 7.3 6.4 - 8.4 g/dL    Albumin 4.0 3.5 - 5.0 g/dL    Total Bilirubin 0.92 0.20 - 1.00 mg/dL    eGFR 95 ml/min/1.73sq m   CBC and differential   Result Value Ref Range    WBC 8.50 4.31 - 10.16 Thousand/uL    RBC 3.78 (L) 3.81 - 5.12 Million/uL    Hemoglobin 13.3 11.5 - 15.4 g/dL    Hematocrit 40.0 34.8 - 46.1 %     (H) 82 - 98 fL    MCH 35.2 (H) 26.8 - 34.3 pg    MCHC 33.3 31.4 - 37.4 g/dL    RDW 12.2 11.6 - 15.1 %    MPV 9.9 8.9 - 12.7 fL    Platelets 075 032 - 723 Thousands/uL    nRBC 0 /100 WBCs    Neutrophils Relative 58 43 - 75 %    Immat GRANS % 1 0 - 2 %    Lymphocytes Relative 25 14 - 44 %    Monocytes Relative 14 (H) 4 - 12 %    Eosinophils Relative 1 0 - 6 %    Basophils Relative 1 0 - 1 %    Neutrophils Absolute 5.06 1.85 - 7.62 Thousands/µL    Immature Grans Absolute 0.05 0.00 - 0.20 Thousand/uL    Lymphocytes Absolute 2.13 0.60 - 4.47 Thousands/µL    Monocytes Absolute 1.15 0.17 - 1.22 Thousand/µL    Eosinophils Absolute 0.04 0.00 - 0.61 Thousand/µL    Basophils Absolute 0.07 0.00 - 0.10 Thousands/µL

## 2023-10-02 NOTE — PATIENT INSTRUCTIONS
Blood work in 6 months and 12 months    See me in 12 months    Fluid restriction, less then 1.8 liter per day

## 2023-10-27 ENCOUNTER — OFFICE VISIT (OUTPATIENT)
Dept: OBGYN CLINIC | Facility: CLINIC | Age: 58
End: 2023-10-27
Payer: COMMERCIAL

## 2023-10-27 ENCOUNTER — APPOINTMENT (OUTPATIENT)
Dept: RADIOLOGY | Facility: CLINIC | Age: 58
End: 2023-10-27
Payer: COMMERCIAL

## 2023-10-27 VITALS
HEART RATE: 102 BPM | BODY MASS INDEX: 28.82 KG/M2 | WEIGHT: 173 LBS | DIASTOLIC BLOOD PRESSURE: 85 MMHG | HEIGHT: 65 IN | SYSTOLIC BLOOD PRESSURE: 131 MMHG

## 2023-10-27 DIAGNOSIS — M25.562 PAIN IN BOTH KNEES, UNSPECIFIED CHRONICITY: ICD-10-CM

## 2023-10-27 DIAGNOSIS — M25.562 CHRONIC PAIN OF LEFT KNEE: Primary | Chronic | ICD-10-CM

## 2023-10-27 DIAGNOSIS — M25.561 PAIN IN BOTH KNEES, UNSPECIFIED CHRONICITY: ICD-10-CM

## 2023-10-27 DIAGNOSIS — S83.412A SPRAIN OF MEDIAL COLLATERAL LIGAMENT OF LEFT KNEE, INITIAL ENCOUNTER: ICD-10-CM

## 2023-10-27 DIAGNOSIS — M25.462 EFFUSION OF LEFT KNEE: ICD-10-CM

## 2023-10-27 DIAGNOSIS — M17.12 PRIMARY OSTEOARTHRITIS OF LEFT KNEE: ICD-10-CM

## 2023-10-27 DIAGNOSIS — G89.29 CHRONIC PAIN OF LEFT KNEE: Primary | Chronic | ICD-10-CM

## 2023-10-27 PROCEDURE — 99214 OFFICE O/P EST MOD 30 MIN: CPT | Performed by: ORTHOPAEDIC SURGERY

## 2023-10-27 PROCEDURE — 20610 DRAIN/INJ JOINT/BURSA W/O US: CPT | Performed by: ORTHOPAEDIC SURGERY

## 2023-10-27 PROCEDURE — 73560 X-RAY EXAM OF KNEE 1 OR 2: CPT

## 2023-10-27 PROCEDURE — 73562 X-RAY EXAM OF KNEE 3: CPT

## 2023-10-27 RX ORDER — BUPIVACAINE HYDROCHLORIDE 5 MG/ML
2 INJECTION, SOLUTION EPIDURAL; INTRACAUDAL
Status: COMPLETED | OUTPATIENT
Start: 2023-10-27 | End: 2023-10-27

## 2023-10-27 RX ORDER — TRIAMCINOLONE ACETONIDE 40 MG/ML
80 INJECTION, SUSPENSION INTRA-ARTICULAR; INTRAMUSCULAR
Status: COMPLETED | OUTPATIENT
Start: 2023-10-27 | End: 2023-10-27

## 2023-10-27 RX ADMIN — BUPIVACAINE HYDROCHLORIDE 2 ML: 5 INJECTION, SOLUTION EPIDURAL; INTRACAUDAL at 09:00

## 2023-10-27 RX ADMIN — TRIAMCINOLONE ACETONIDE 80 MG: 40 INJECTION, SUSPENSION INTRA-ARTICULAR; INTRAMUSCULAR at 09:00

## 2023-10-27 NOTE — PROGRESS NOTES
Assessment/Plan:  1. Chronic pain of left knee  XR knee 3 vw left non injury    XR knee 1 or 2 vw right    Large joint arthrocentesis: L knee      2. Primary osteoarthritis of left knee  Large joint arthrocentesis: L knee      3. Sprain of medial collateral ligament of left knee, initial encounter  Brace      4. Effusion of left knee          Michelle Kaiser is a pleasant 24-year-old female presenting today for evaluation of her left knee pain. Her imaging today shows moderate arthritis without any fracture. She has increased pain after a fall 2 weeks ago. She has an aggravation of her osteoarthritis and MCL sprain. She has previously attempted cortisone. After discussing risks and benefits, she consented to and underwent a left knee aspiration and cortisone injection as detailed below, which was tolerated well without difficulty or complication. Post injection instructions were provided. She was fitted for a left knee hinged knee brace. She should wear the brace while ambulating. We will plan to see her back in 6 weeks. They expressed understanding all of her questions were addressed today. Large joint arthrocentesis: L knee  Universal Protocol:  Consent: Verbal consent obtained.   Risks and benefits: risks, benefits and alternatives were discussed  Consent given by: patient  Timeout called at: 10/27/2023 9:43 AM.  Site marked: the operative site was marked  Patient identity confirmed: verbally with patient  Supporting Documentation  Indications: pain and joint swelling   Procedure Details  Location: knee - L knee  Needle size: 18 G  Ultrasound guidance: no  Approach: superolateral.  Medications administered: 2 mL bupivacaine (PF) 0.5 %; 80 mg triamcinolone acetonide 40 mg/mL    Aspirate amount: 10 mL  Aspirate: clear and yellow  Patient tolerance: patient tolerated the procedure well with no immediate complications  Dressing:  Sterile dressing applied          Subjective: Left knee pain    Patient ID: Nahed James is a 59 y.o. female to the office today for evaluation of left knee pain. She was previously seen a year ago where she received a corticosteroid injection to her left knee. The injection gave her moderate relief for several months. She did not do any formal physical therapy and does not take any over-the-counter NSAIDs. She sustained a right hip fracture which was treated with a hemiarthroplasty by Dr. Aleja Martin in April. She fell again 2 weeks ago after tripping over her cats and is now having increasing left knee pain. She has been able to ambulate but is having medial sided knee pain that is worse with activity and relieved with rest.  She denies any radiating symptoms or paresthesias. She states her knee has been swollen but denies fever or chills. Review of Systems   Constitutional:  Positive for activity change. Negative for chills, fever and unexpected weight change. HENT:  Negative for hearing loss, nosebleeds and sore throat. Eyes:  Negative for pain, redness and visual disturbance. Respiratory:  Negative for cough, shortness of breath and wheezing. Cardiovascular:  Negative for chest pain, palpitations and leg swelling. Gastrointestinal:  Negative for abdominal pain, nausea and vomiting. Endocrine: Negative for polyphagia and polyuria. Genitourinary:  Negative for dysuria and hematuria. Musculoskeletal:  Positive for arthralgias and myalgias. Skin:  Negative for rash and wound. Neurological:  Negative for dizziness, numbness and headaches. Psychiatric/Behavioral:  Negative for confusion and suicidal ideas. The patient is not nervous/anxious.           Past Medical History:   Diagnosis Date    Abnormal immunology findings 06/09/2011    Anemia 05/23/2011    Arthritis     Closed fracture of distal end of fibula     resolved 09/15/17    Depression     Distal radius fracture, left     last assessed 01/30/17    GERD (gastroesophageal reflux disease)     last assessed 05/06/13 Herpes labialis     last assessed 07/23/15    Hypertension     essential ; last assessed 08/07/13    Sinus bradycardia     last assessed 05/24/16       Past Surgical History:   Procedure Laterality Date    ANKLE SURGERY      last assessed 09/15/17    BACK SURGERY      lumbar laminectomy L4-L5    BLADDER SUSPENSION      7/2017    COLONOSCOPY      JOINT REPLACEMENT      TKR  on right    ORIF TIBIA & FIBULA FRACTURES Right 12/15/2016    Procedure: SURGICAL FIXATION OF RIGHT DISTAL FIBULA FRACTURE;  Surgeon: Nita Oliver MD;  Location: [address] [place] [address] MAIN OR;  Service:     IA HEMIARTHROPLASTY HIP PARTIAL Right 4/8/2023    Procedure: HEMIARTHROPLASTY HIP (BIPOLAR);   Surgeon: Dallas Gomez;  [facility];  Service: Orthopedics    IA REVJ TOT KNEE ARTHRP [facility] Right 10/23/2017    Procedure: REVISION TOTAL KNEE REPLACEMENT WITH FROZEN SECTIONS;  Surgeon: Sheree Lombardi DO;  Location: [facility];  Service: Orthopedics    TOTAL KNEE ARTHROPLASTY      last assessed; 11/21/17    WISDOM TOOTH EXTRACTION         Family History   Problem Relation Age of Onset    Arthritis Mother     Osteoporosis Mother     Dementia Father     Other Father         spinal stenosis    Other Family         CREST       Social History     Occupational History    Not on file   Tobacco Use    Smoking status: Never    Smokeless tobacco: Never   Vaping Use    Vaping Use: Never used   Substance and Sexual Activity    Alcohol use: Yes     Comment: moderately    Drug use: No    Sexual activity: Yes     Partners: Male         Current Outpatient Medications:     acetaminophen (TYLENOL) 325 mg tablet, Take 2 tablets (650 mg total) by mouth every 4 (four) hours as needed for mild pain, Disp: , Rfl: 0    amLODIPine (NORVASC) 5 mg tablet, take 1 tablet by mouth once daily, Disp: 90 tablet, Rfl: 1    busPIRone (BUSPAR) 10 mg tablet, Take 1 tablet (10 mg total) by mouth 2 (two) times a day, Disp: 60 tablet, Rfl: 3    escitalopram (Lexapro) 20 mg tablet, Take 1 tablet (20 mg total) by mouth daily, Disp: 90 tablet, Rfl: 1    folic acid (FOLVITE) 1 mg tablet, Take 1 tablet (1 mg total) by mouth daily, Disp: 30 tablet, Rfl: 11    losartan (COZAAR) 50 mg tablet, Take 1 tablet (50 mg total) by mouth daily, Disp: 90 tablet, Rfl: 2    Milk Thistle Seed POWD, Take 250 mg by mouth daily, Disp: , Rfl:     Multiple Vitamin (multivitamin) tablet, Take 1 tablet by mouth daily, Disp: , Rfl:     thiamine (VITAMIN B1) 100 mg tablet, Take 1 tablet (100 mg total) by mouth daily, Disp: 90 tablet, Rfl: 2    ALPRAZolam (XANAX) 0.25 mg tablet, Take 1 tablet (0.25 mg total) by mouth daily at bedtime as needed for anxiety (Patient not taking: Reported on 4/18/2023), Disp: 30 tablet, Rfl: 0    docusate sodium (COLACE) 100 mg capsule, Take 1 capsule (100 mg total) by mouth 2 (two) times a day (Patient not taking: Reported on 4/18/2023), Disp: 60 capsule, Rfl: 0    enoxaparin (LOVENOX) 40 mg/0.4 mL, Inject 0.4 mL (40 mg total) under the skin in the morning, Disp: 12 mL, Rfl: 0    methocarbamol (ROBAXIN) 500 mg tablet, Take 1 tablet (500 mg total) by mouth every 6 (six) hours as needed for muscle spasms (Patient not taking: Reported on 10/2/2023), Disp: 45 tablet, Rfl: 0    ondansetron (Zofran ODT) 4 mg disintegrating tablet, Take 1 tablet (4 mg total) by mouth every 6 (six) hours as needed for nausea or vomiting (Patient not taking: Reported on 4/18/2023), Disp: 20 tablet, Rfl: 0    oxyCODONE (ROXICODONE) 5 immediate release tablet, You may take 2.5 mg (0.5 tab) for moderate pain or 5 mg (1 tab) for severe pain, every 4 hours, as needed. (Patient not taking: Reported on 4/18/2023), Disp: 30 tablet, Rfl: 0    Allergies   Allergen Reactions    Lisinopril      COUGH    Bupropion Anxiety       Objective:  Vitals:    10/27/23 0917   BP: 131/85   Pulse: 102       Body mass index is 28.79 kg/m².     Left Knee Exam     Tenderness   The patient is experiencing tenderness in the medial joint line and medial retinaculum. Range of Motion   Extension:  0   Flexion:  120     Tests   Varus: negative Valgus: positive  Drawer:  Anterior - negative     Posterior - negative  Patellar apprehension: negative    Other   Erythema: absent  Scars: absent  Sensation: normal  Pulse: present  Swelling: none  Effusion: effusion present    Comments:  No erythema, warmth or signs of infections  Significant parapatellar crepitus appreciated  positive patellar femoral grind test  She has pain to valgus stress at 0,30, 90 degrees  Neurovascularly intact           Observations   Left Knee   Positive for effusion. Physical Exam  Vitals and nursing note reviewed. Constitutional:       Appearance: Normal appearance. She is well-developed. HENT:      Head: Normocephalic and atraumatic. Right Ear: External ear normal.      Left Ear: External ear normal.   Eyes:      General:         Right eye: No discharge. Left eye: No discharge. Extraocular Movements: Extraocular movements intact. Conjunctiva/sclera: Conjunctivae normal.   Cardiovascular:      Rate and Rhythm: Normal rate. Pulmonary:      Effort: Pulmonary effort is normal. No respiratory distress. Musculoskeletal:      Cervical back: Normal range of motion and neck supple. Left knee: Effusion present. Skin:     General: Skin is warm and dry. Neurological:      General: No focal deficit present. Mental Status: She is alert and oriented to person, place, and time. Psychiatric:         Mood and Affect: Mood normal.         Behavior: Behavior normal.         I have personally reviewed pertinent films in PACS. The left knee shows moderate medial compartment degenerative changes with periarticular osteophytes and subchondral sclerosis. There is no fracture, dislocation, or lytic lesions. Partially visualized right revision total knee arthroplasty in good position without any evidence of loosening or periprosthetic fracture.

## 2023-10-29 DIAGNOSIS — F32.A ANXIETY AND DEPRESSION: ICD-10-CM

## 2023-10-29 DIAGNOSIS — F41.9 ANXIETY AND DEPRESSION: ICD-10-CM

## 2023-10-30 RX ORDER — ESCITALOPRAM OXALATE 20 MG/1
20 TABLET ORAL DAILY
Qty: 90 TABLET | Refills: 1 | Status: SHIPPED | OUTPATIENT
Start: 2023-10-30

## 2023-11-08 ENCOUNTER — APPOINTMENT (OUTPATIENT)
Dept: RADIOLOGY | Facility: CLINIC | Age: 58
End: 2023-11-08
Payer: COMMERCIAL

## 2023-11-08 ENCOUNTER — TELEPHONE (OUTPATIENT)
Age: 58
End: 2023-11-08

## 2023-11-08 ENCOUNTER — OFFICE VISIT (OUTPATIENT)
Dept: URGENT CARE | Facility: CLINIC | Age: 58
End: 2023-11-08
Payer: COMMERCIAL

## 2023-11-08 VITALS
BODY MASS INDEX: 28.82 KG/M2 | DIASTOLIC BLOOD PRESSURE: 70 MMHG | OXYGEN SATURATION: 96 % | SYSTOLIC BLOOD PRESSURE: 138 MMHG | RESPIRATION RATE: 18 BRPM | TEMPERATURE: 95.8 F | HEART RATE: 88 BPM | WEIGHT: 173 LBS | HEIGHT: 65 IN

## 2023-11-08 DIAGNOSIS — S89.91XA KNEE INJURY, RIGHT, INITIAL ENCOUNTER: ICD-10-CM

## 2023-11-08 DIAGNOSIS — S42.464A: Primary | ICD-10-CM

## 2023-11-08 PROCEDURE — 99213 OFFICE O/P EST LOW 20 MIN: CPT | Performed by: FAMILY MEDICINE

## 2023-11-08 PROCEDURE — 73564 X-RAY EXAM KNEE 4 OR MORE: CPT

## 2023-11-08 RX ORDER — NAPROXEN 500 MG/1
500 TABLET ORAL 2 TIMES DAILY WITH MEALS
Qty: 14 TABLET | Refills: 0 | Status: SHIPPED | OUTPATIENT
Start: 2023-11-08 | End: 2023-11-15

## 2023-11-08 NOTE — PROGRESS NOTES
[facility]        NAME: Daphney Lindsay is a 59 y.o. female  : 1965    MRN: [id number]  DATE: 2023  TIME: 3:19 PM    Assessment and Plan   Nondisplaced fracture of medial condyle of right humerus, initial encounter for closed fracture [S42.464A]  1. Nondisplaced fracture of medial condyle of right humerus, initial encounter for closed fracture  XR knee 4+ vw right injury    naproxen (Naprosyn) 500 mg tablet        Hardware appears to be intact on x-ray; official read pending. Ace wrap applied. We will use a rollator walker for ambulation. Naproxen for pain relief. Plans to see her orthopedist next week.  ________________________    3pm official xray read - Nondisplaced medial condyle Fx with extension to hardware. In light of osteopenia, will contact pt to have a knee immobilizer placed and make her non-weight bearing to avoid displacement. Discussed with Ortho. Patient Instructions     Follow up with PCP in 3-5 days. Proceed to  ER if symptoms worsen. Chief Complaint     Chief Complaint   Patient presents with    Knee Injury     Yesterday - heavy garbage can hit her in R side and knocked her down. Fell onto R knee onto medial side. Has sl. Swelling and bruising. Has PMH R TKR x 2  - last was 5 years ago. Using crutches. No ice used. History of Present Illness       49-year-old female with a right TKA x2 presents today with right knee injury sustained from a fall yesterday morning. Was handling her large trash can on wheels which gained momentum down her driveway and pushed her down causing her right knee to hit the ground. Reports significant pain and stiffness. Rested yesterday. Pain is somewhat improved today, but stiffness persists. Review of Systems   Review of Systems   Constitutional:  Negative for chills and fever. Respiratory:  Negative for shortness of breath. Cardiovascular:  Negative for chest pain.    Gastrointestinal:  Negative for abdominal pain and nausea. Musculoskeletal:  Positive for arthralgias, gait problem and joint swelling. Skin:  Negative for rash. Neurological:  Negative for headaches.      Current Medications       Current Outpatient Medications:     acetaminophen (TYLENOL) 325 mg tablet, Take 2 tablets (650 mg total) by mouth every 4 (four) hours as needed for mild pain, Disp: , Rfl: 0    amLODIPine (NORVASC) 5 mg tablet, take 1 tablet by mouth once daily, Disp: 90 tablet, Rfl: 1    busPIRone (BUSPAR) 10 mg tablet, Take 1 tablet (10 mg total) by mouth 2 (two) times a day, Disp: 60 tablet, Rfl: 3    escitalopram (LEXAPRO) 20 mg tablet, take 1 tablet by mouth once daily, Disp: 90 tablet, Rfl: 1    folic acid (FOLVITE) 1 mg tablet, Take 1 tablet (1 mg total) by mouth daily, Disp: 30 tablet, Rfl: 11    losartan (COZAAR) 50 mg tablet, Take 1 tablet (50 mg total) by mouth daily, Disp: 90 tablet, Rfl: 2    Milk Thistle Seed POWD, Take 250 mg by mouth daily, Disp: , Rfl:     Multiple Vitamin (multivitamin) tablet, Take 1 tablet by mouth daily, Disp: , Rfl:     naproxen (Naprosyn) 500 mg tablet, Take 1 tablet (500 mg total) by mouth 2 (two) times a day with meals for 7 days, Disp: 14 tablet, Rfl: 0    thiamine (VITAMIN B1) 100 mg tablet, Take 1 tablet (100 mg total) by mouth daily, Disp: 90 tablet, Rfl: 2    ALPRAZolam (XANAX) 0.25 mg tablet, Take 1 tablet (0.25 mg total) by mouth daily at bedtime as needed for anxiety (Patient not taking: Reported on 4/18/2023), Disp: 30 tablet, Rfl: 0    Current Allergies     Allergies as of 11/08/2023 - Reviewed 11/08/2023   Allergen Reaction Noted    Lisinopril  04/15/2019    Bupropion Anxiety 07/13/2022            The following portions of the patient's history were reviewed and updated as appropriate: allergies, current medications, past family history, past medical history, past social history, past surgical history and problem list.     Past Medical History:   Diagnosis Date    Abnormal immunology findings 06/09/2011    Anemia 05/23/2011    Arthritis     Closed fracture of distal end of fibula     resolved 09/15/17    Depression     Distal radius fracture, left     last assessed 01/30/17    GERD (gastroesophageal reflux disease)     last assessed 05/06/13    Herpes labialis     last assessed 07/23/15    Hypertension     essential ; last assessed 08/07/13    Sinus bradycardia     last assessed 05/24/16       Past Surgical History:   Procedure Laterality Date    ANKLE SURGERY      last assessed 09/15/17    BACK SURGERY      lumbar laminectomy L4-L5    BLADDER SUSPENSION      7/2017    COLONOSCOPY      JOINT REPLACEMENT      TKR  on right    ORIF TIBIA & FIBULA FRACTURES Right 12/15/2016    Procedure: SURGICAL FIXATION OF RIGHT DISTAL FIBULA FRACTURE;  Surgeon: Jyoti Castanon MD;  Location: [address] [place] [address] MAIN OR;  Service:     DE HEMIARTHROPLASTY HIP PARTIAL Right 4/8/2023    Procedure: HEMIARTHROPLASTY HIP (BIPOLAR); Surgeon: Luz Stahl;  [facility];  Service: Orthopedics    DE REVJ TOT KNEE ARTHRP [facility] Right 10/23/2017    Procedure: REVISION TOTAL KNEE REPLACEMENT WITH FROZEN SECTIONS;  Surgeon: Allyson Ortiz DO;  Location: [facility];  Service: Orthopedics    TOTAL KNEE ARTHROPLASTY      last assessed; 11/21/17    WISDOM TOOTH EXTRACTION         Family History   Problem Relation Age of Onset    Arthritis Mother     Osteoporosis Mother     Dementia Father     Other Father         spinal stenosis    Other Family         CREST         Medications have been verified. Objective   /70   Pulse 88   Temp (!) 95.8 °F (35.4 °C)   Resp 18   Ht 5' 5" (1.651 m)   Wt 78.5 kg (173 lb)   SpO2 96%   BMI 28.79 kg/m²   No LMP recorded. Patient is postmenopausal.       Physical Exam     Physical Exam  Vitals and nursing note reviewed. Constitutional:       General: She is in acute distress. Appearance: Normal appearance.  She is not ill-appearing, toxic-appearing or diaphoretic. HENT:      Head: Normocephalic and atraumatic. Eyes:      General:         Right eye: No discharge. Left eye: No discharge. Conjunctiva/sclera: Conjunctivae normal.   Pulmonary:      Effort: Pulmonary effort is normal.   Musculoskeletal:         General: Swelling, tenderness and signs of injury present. Comments: Limited active and passive ROM of the right knee due to pain and thigh weakness. Skin:     General: Skin is warm. Findings: No erythema. Neurological:      General: No focal deficit present. Mental Status: She is alert and oriented to person, place, and time. Psychiatric:         Mood and Affect: Mood normal.         Behavior: Behavior normal.         Thought Content:  Thought content normal.         Judgment: Judgment normal.

## 2023-11-08 NOTE — TELEPHONE ENCOUNTER
Caller: Effie Diaz @ [facility]     Doctor: Bernard Lr     Reason for call: Effie Diaz will like for  to give him call regarding patient visit/ xray results  Please advise       Call back#: [phone]

## 2023-11-08 NOTE — TELEPHONE ENCOUNTER
Caller: Patient    Doctor: Simi Haque    Reason for call: Rt knee replacement 10/2018. Patient stated she fell on rt knee in driveway 21/5 and knee is swollen and painful. Appt scheduled 11/15. Please advise.      Call back#: [phone]

## 2023-11-15 ENCOUNTER — OFFICE VISIT (OUTPATIENT)
Dept: OBGYN CLINIC | Facility: CLINIC | Age: 58
End: 2023-11-15
Payer: COMMERCIAL

## 2023-11-15 ENCOUNTER — APPOINTMENT (OUTPATIENT)
Dept: RADIOLOGY | Facility: CLINIC | Age: 58
End: 2023-11-15
Payer: COMMERCIAL

## 2023-11-15 VITALS
HEART RATE: 77 BPM | SYSTOLIC BLOOD PRESSURE: 121 MMHG | WEIGHT: 173 LBS | HEIGHT: 65 IN | BODY MASS INDEX: 28.82 KG/M2 | DIASTOLIC BLOOD PRESSURE: 79 MMHG

## 2023-11-15 DIAGNOSIS — M97.9XXA PERIPROSTHETIC FRACTURE OF KNEE: Primary | ICD-10-CM

## 2023-11-15 DIAGNOSIS — M25.561 RIGHT KNEE PAIN, UNSPECIFIED CHRONICITY: ICD-10-CM

## 2023-11-15 DIAGNOSIS — M25.561 ACUTE PAIN OF RIGHT KNEE: ICD-10-CM

## 2023-11-15 DIAGNOSIS — Z96.651 HISTORY OF REVISION OF TOTAL REPLACEMENT OF RIGHT KNEE JOINT: ICD-10-CM

## 2023-11-15 PROCEDURE — 99214 OFFICE O/P EST MOD 30 MIN: CPT | Performed by: ORTHOPAEDIC SURGERY

## 2023-11-15 PROCEDURE — 27508 TREATMENT OF THIGH FRACTURE: CPT | Performed by: ORTHOPAEDIC SURGERY

## 2023-11-15 PROCEDURE — 73562 X-RAY EXAM OF KNEE 3: CPT

## 2023-11-15 NOTE — PROGRESS NOTES
Assessment/Plan:  1. Periprosthetic fracture of knee  Durable Medical Equipment      2. 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 Acute pain of right knee  XR knee 3 vw right non injury        Scribe Attestation      I,:  Eula Harmon MA am acting as a scribe while in the presence of the attending physician.:       I,:  Kisha Jha DO personally performed the services described in this documentation    as scribed in my presence.:           67-year-old female who presents to the office today for evaluation of her right knee after a fall. The patient has a history of revision right total knee performed by me in 2017. I discussed with the patient that x-rays demonstrate a periprosthetic fracture right medial femoral condyle. The fracture is nondisplaced and remains stable on x-rays despite her noncompliance of non-weightbearing instructions at the time of injury. I expressed the importance of remain non weightbearing with her RLE. She was fitted and provided with a right hinged knee brace she will wear full time. The patient has a walker and crutches she will use to remain non weightbearing. She will follow up in 3 weeks for repeat evaluation and repeat right knee x-rays. We will also reevaluate her left knee at that time as needed. All of her questions were addressed in the office today. Fracture / Dislocation Treatment    Date/Time: 11/15/2023 10:30 AM    Performed by: Kisha Jha DO  Authorized by: Kisha Jha DO    Patient Location:  [facility] Protocol:  Consent: Verbal consent obtained. Consent given by: patient  Patient understanding: patient states understanding of the procedure being performed  Radiology Images displayed and confirmed.  If images not available, report reviewed: imaging studies available  Patient identity confirmed: verbally with patient    Injury location:  Upper leg  Location details:  Right upper leg  Injury type:  Fracture  Fracture type: medial condylar    Neurovascular status: Neurovascularly intact    Distal perfusion: normal    Neurological function: normal    Range of motion: normal    Manipulation performed?: No    Immobilization:  Brace  Neurovascular status: Neurovascularly intact    Distal perfusion: normal    Neurological function: normal    Range of motion: normal    Patient tolerance:  Patient tolerated the procedure well with no immediate complications      Subjective: right knee pain     Patient ID: Lee Tomlinson is a 59 y.o. femalewho presents to the office for evaluation of right knee pain. The patient has a history of revision right total knee arthroplasty performed by me on 10/23/17. The patient states on 11/7/23 she was taking her trash out when the garbage can started to roll down her driveway so she put her foot out to catch it which caused her to fall landing onto her knee. The patient has immediate pain and difficulty weightbearing. She presented to urgent care the following day where x-rays were taken. She was diagnosed with a medial condyle fracture. The patient was advised to remain nonweightbearing with the use of a walker and provided with a knee immobilizer. The patient denies any pain at rest. She notes pain with ambulation. She states she has been both weightbearing and non weightbearing. She has been using the brace full time but does take it off for sleep. She has not been taking anything OTC for pain. Review of Systems   Constitutional:  Positive for activity change. Negative for chills and fever. HENT:  Negative for drooling and sneezing. Eyes:  Negative for redness. Respiratory:  Negative for cough and wheezing. Gastrointestinal:  Negative for nausea and vomiting. Musculoskeletal:  Positive for arthralgias. Negative for joint swelling and myalgias. Neurological:  Negative for weakness and numbness. Psychiatric/Behavioral:  Negative for behavioral problems.  The patient is not nervous/anxious. Past Medical History:   Diagnosis Date    Abnormal immunology findings 06/09/2011    Anemia 05/23/2011    Arthritis     Closed fracture of distal end of fibula     resolved 09/15/17    Depression     Distal radius fracture, left     last assessed 01/30/17    GERD (gastroesophageal reflux disease)     last assessed 05/06/13    Herpes labialis     last assessed 07/23/15    Hypertension     essential ; last assessed 08/07/13    Sinus bradycardia     last assessed 05/24/16       Past Surgical History:   Procedure Laterality Date    ANKLE SURGERY      last assessed 09/15/17    BACK SURGERY      lumbar laminectomy L4-L5    BLADDER SUSPENSION      7/2017    COLONOSCOPY      JOINT REPLACEMENT      TKR  on right    ORIF TIBIA & FIBULA FRACTURES Right 12/15/2016    Procedure: SURGICAL FIXATION OF RIGHT DISTAL FIBULA FRACTURE;  Surgeon: Jenna Duarte MD;  Location: [address] [place] [address] [address] MAIN OR;  Service:     CO HEMIARTHROPLASTY HIP PARTIAL Right 4/8/2023    Procedure: HEMIARTHROPLASTY HIP (BIPOLAR);   Surgeon: Krystian Choi;  [facility];  Service: Orthopedics    CO REVJ TOT KNEE ARTHRP [facility] Right 10/23/2017    Procedure: REVISION TOTAL KNEE REPLACEMENT WITH FROZEN SECTIONS;  Surgeon: Ruba Mccord DO;  Location: [facility];  Service: Orthopedics    TOTAL KNEE ARTHROPLASTY      last assessed; 11/21/17    WISDOM TOOTH EXTRACTION         Family History   Problem Relation Age of Onset    Arthritis Mother     Osteoporosis Mother     Dementia Father     Other Father         spinal stenosis    Other Family         CREST       Social History     Occupational History    Not on file   Tobacco Use    Smoking status: Never    Smokeless tobacco: Never   Vaping Use    Vaping Use: Never used   Substance and Sexual Activity    Alcohol use: Yes     Comment: moderately    Drug use: No    Sexual activity: Yes     Partners: Male         Current Outpatient Medications:     acetaminophen (TYLENOL) 325 mg tablet, Take 2 tablets (650 mg total) by mouth every 4 (four) hours as needed for mild pain, Disp: , Rfl: 0    amLODIPine (NORVASC) 5 mg tablet, take 1 tablet by mouth once daily, Disp: 90 tablet, Rfl: 1    busPIRone (BUSPAR) 10 mg tablet, Take 1 tablet (10 mg total) by mouth 2 (two) times a day, Disp: 60 tablet, Rfl: 3    escitalopram (LEXAPRO) 20 mg tablet, take 1 tablet by mouth once daily, Disp: 90 tablet, Rfl: 1    folic acid (FOLVITE) 1 mg tablet, Take 1 tablet (1 mg total) by mouth daily, Disp: 30 tablet, Rfl: 11    losartan (COZAAR) 50 mg tablet, Take 1 tablet (50 mg total) by mouth daily, Disp: 90 tablet, Rfl: 2    Milk Thistle Seed POWD, Take 250 mg by mouth daily, Disp: , Rfl:     Multiple Vitamin (multivitamin) tablet, Take 1 tablet by mouth daily, Disp: , Rfl:     naproxen (Naprosyn) 500 mg tablet, Take 1 tablet (500 mg total) by mouth 2 (two) times a day with meals for 7 days, Disp: 14 tablet, Rfl: 0    thiamine (VITAMIN B1) 100 mg tablet, Take 1 tablet (100 mg total) by mouth daily, Disp: 90 tablet, Rfl: 2    ALPRAZolam (XANAX) 0.25 mg tablet, Take 1 tablet (0.25 mg total) by mouth daily at bedtime as needed for anxiety (Patient not taking: Reported on 4/18/2023), Disp: 30 tablet, Rfl: 0    Allergies   Allergen Reactions    Lisinopril      COUGH    Bupropion Anxiety       Objective:  Vitals:    11/15/23 1056   BP: 121/79   Pulse: 77       Body mass index is 28.79 kg/m². Right Knee Exam     Tenderness   Right knee tenderness location: medial femoral condyle. Other   Erythema: absent  Sensation: normal  Pulse: present    Comments:  TTP medial femoral condyle  Scant effusion  Anterior knee incision well healed  No erythema  No warmth             Physical Exam  Vitals and nursing note reviewed. Constitutional:       Appearance: Normal appearance. She is well-developed. HENT:      Head: Normocephalic and atraumatic. Nose: Nose normal.   Eyes:      General:         Right eye: No discharge. Left eye: No discharge. Extraocular Movements: Extraocular movements intact. Conjunctiva/sclera: Conjunctivae normal.   Cardiovascular:      Rate and Rhythm: Normal rate. Pulmonary:      Effort: Pulmonary effort is normal. No respiratory distress. Musculoskeletal:      Cervical back: Normal range of motion and neck supple. Comments: As noted in HPI   Skin:     General: Skin is warm and dry. Neurological:      Mental Status: She is alert and oriented to person, place, and time. Psychiatric:         Behavior: Behavior normal.         Thought Content: Thought content normal.         Judgment: Judgment normal.         I have personally reviewed pertinent films in PACS. X-rays right knee performed in the office today demonstrate a periprosthetic fracture right medial femoral condyle. Fracture remains nondisplaced. This document was created using speech voice recognition software. Grammatical errors, random word insertions, pronoun errors, and incomplete sentences are an occasional consequence of this system due to software limitations, ambient noise, and hardware issues. Any formal questions or concerns about content, text, or information contained within the body of this dictation should be directly addressed to the provider for clarification.

## 2023-11-30 DIAGNOSIS — F32.A ANXIETY AND DEPRESSION: ICD-10-CM

## 2023-11-30 DIAGNOSIS — F41.9 ANXIETY AND DEPRESSION: ICD-10-CM

## 2023-11-30 RX ORDER — BUSPIRONE HYDROCHLORIDE 10 MG/1
10 TABLET ORAL 2 TIMES DAILY
Qty: 60 TABLET | Refills: 3 | Status: SHIPPED | OUTPATIENT
Start: 2023-11-30

## 2023-12-08 ENCOUNTER — OFFICE VISIT (OUTPATIENT)
Dept: OBGYN CLINIC | Facility: CLINIC | Age: 58
End: 2023-12-08

## 2023-12-08 ENCOUNTER — APPOINTMENT (OUTPATIENT)
Dept: RADIOLOGY | Facility: CLINIC | Age: 58
End: 2023-12-08
Payer: COMMERCIAL

## 2023-12-08 VITALS
HEART RATE: 90 BPM | BODY MASS INDEX: 29.32 KG/M2 | HEIGHT: 65 IN | DIASTOLIC BLOOD PRESSURE: 83 MMHG | WEIGHT: 176 LBS | SYSTOLIC BLOOD PRESSURE: 128 MMHG

## 2023-12-08 DIAGNOSIS — M25.462 EFFUSION OF LEFT KNEE: ICD-10-CM

## 2023-12-08 DIAGNOSIS — M97.11XD PERIPROSTHETIC FRACTURE AROUND INTERNAL PROSTHETIC RIGHT KNEE JOINT, SUBSEQUENT ENCOUNTER: Primary | ICD-10-CM

## 2023-12-08 DIAGNOSIS — M17.12 PRIMARY OSTEOARTHRITIS OF LEFT KNEE: ICD-10-CM

## 2023-12-08 DIAGNOSIS — G89.29 CHRONIC PAIN OF LEFT KNEE: ICD-10-CM

## 2023-12-08 DIAGNOSIS — M25.562 CHRONIC PAIN OF LEFT KNEE: ICD-10-CM

## 2023-12-08 DIAGNOSIS — M25.561 ACUTE PAIN OF RIGHT KNEE: ICD-10-CM

## 2023-12-08 DIAGNOSIS — M23.92 INTERNAL DERANGEMENT OF LEFT KNEE: ICD-10-CM

## 2023-12-08 PROCEDURE — 73562 X-RAY EXAM OF KNEE 3: CPT

## 2023-12-08 PROCEDURE — 99024 POSTOP FOLLOW-UP VISIT: CPT | Performed by: ORTHOPAEDIC SURGERY

## 2023-12-08 NOTE — PROGRESS NOTES
Assessment/Plan:  1. Periprosthetic fracture around internal prosthetic right knee joint, subsequent encounter  XR knee 3 vw right non injury      2. Primary osteoarthritis of left knee  MRI knee left  wo contrast      3. Internal derangement of left knee  MRI knee left  wo contrast      4. Chronic pain of left knee  XR knee 3 vw left non injury    MRI knee left  wo contrast      5. Effusion of left knee  MRI knee left  wo contrast        Scribe Attestation      I,:  Chase Stanton am acting as a scribe while in the presence of the attending physician.:       I,:  Luna Benites DO personally performed the services described in this documentation    as scribed in my presence.:           Lilli Kim is a pleasant 49-year-old female who returns today for follow-up evaluation 4-week status post closed treatment for right knee periprosthetic fracture. I am pleased with her imaging and her clinical presentation today in the office. She is progressing well with her recovery. She will continue with altered weightbearing for an additional 4 weeks. With respect to her left knee, I would have expected a more robust response to injection therapy as her underlying degenerative change is quite mild on imaging. Due to her recent trauma, her persistent pain, left knee effusion, and clinical findings today I recommended an MRI of the left knee to evaluate for meniscal pathology or occult fracture. We will help to facilitate an appointment for the study for her and will follow-up after its completion to review the results and further delineate the plan of care. All of her questions and concerns were addressed today. We will plan to see her back in 6 weeks for her right knee and sooner to review her MRI for her left knee. Subjective:  Follow-up evaluation 4 weeks status post closed treatment for right knee periprosthetic fracture    Patient ID: Carolina Collazo is a 59 y.o. female who returns today for follow-up evaluation 4 weeks status post closed treatment for right knee periprosthetic fracture. At her last visit, she was given a knee brace for constant use and was reeducated on remaining nonweightbearing. At today's visit, she reports that she has been compliant with using her brace and using a rolling walker. Her right knee pain is improving and is nearly resolved. She does complain of persistent left knee pain today which occurs about the medial and anteromedial aspect of her knee and can be severe. She received very little benefit from the injection administered at the end of October. She denies any new injury or trauma. Review of Systems   Constitutional:  Positive for activity change. Negative for chills, fever and unexpected weight change. HENT:  Negative for hearing loss, nosebleeds and sore throat. Eyes:  Negative for pain, redness and visual disturbance. Respiratory:  Negative for cough, shortness of breath and wheezing. Cardiovascular:  Negative for chest pain, palpitations and leg swelling. Gastrointestinal:  Negative for abdominal pain, nausea and vomiting. Endocrine: Negative for polydipsia and polyuria. Genitourinary:  Negative for dysuria and hematuria. Musculoskeletal:  Positive for arthralgias and myalgias. Negative for joint swelling. See HPI   Skin:  Negative for rash and wound. Neurological:  Negative for dizziness, numbness and headaches. Psychiatric/Behavioral:  Negative for decreased concentration and suicidal ideas. The patient is not nervous/anxious.           Past Medical History:   Diagnosis Date    Abnormal immunology findings 06/09/2011    Anemia 05/23/2011    Arthritis     Closed fracture of distal end of fibula     resolved 09/15/17    Depression     Distal radius fracture, left     last assessed 01/30/17    GERD (gastroesophageal reflux disease)     last assessed 05/06/13    Herpes labialis     last assessed 07/23/15    Hypertension     essential ; last assessed 08/07/13    Sinus bradycardia     last assessed 05/24/16       Past Surgical History:   Procedure Laterality Date    ANKLE SURGERY      last assessed 09/15/17    BACK SURGERY      lumbar laminectomy L4-L5    BLADDER SUSPENSION      7/2017    COLONOSCOPY      JOINT REPLACEMENT      TKR  on right    ORIF TIBIA & FIBULA FRACTURES Right 12/15/2016    Procedure: SURGICAL FIXATION OF RIGHT DISTAL FIBULA FRACTURE;  Surgeon: Jan Leon MD;  Location: [address] [place] [address] MAIN OR;  Service:     SC HEMIARTHROPLASTY HIP PARTIAL Right 4/8/2023    Procedure: HEMIARTHROPLASTY HIP (BIPOLAR);   Surgeon: Roland Fajardo;  [facility];  Service: Orthopedics    SC REVJ TOT KNEE ARTHRP [facility] Right 10/23/2017    Procedure: REVISION TOTAL KNEE REPLACEMENT WITH FROZEN SECTIONS;  Surgeon: Oz Gonzales DO;  Location: [facility];  Service: Orthopedics    TOTAL KNEE ARTHROPLASTY      last assessed; 11/21/17    WISDOM TOOTH EXTRACTION         Family History   Problem Relation Age of Onset    Arthritis Mother     Osteoporosis Mother     Dementia Father     Other Father         spinal stenosis    Other Family         CREST       Social History     Occupational History    Not on file   Tobacco Use    Smoking status: Never    Smokeless tobacco: Never   Vaping Use    Vaping Use: Never used   Substance and Sexual Activity    Alcohol use: Yes     Comment: moderately    Drug use: No    Sexual activity: Yes     Partners: Male         Current Outpatient Medications:     acetaminophen (TYLENOL) 325 mg tablet, Take 2 tablets (650 mg total) by mouth every 4 (four) hours as needed for mild pain, Disp: , Rfl: 0    amLODIPine (NORVASC) 5 mg tablet, take 1 tablet by mouth once daily, Disp: 90 tablet, Rfl: 1    busPIRone (BUSPAR) 10 mg tablet, take 1 tablet by mouth twice a day, Disp: 60 tablet, Rfl: 3    escitalopram (LEXAPRO) 20 mg tablet, take 1 tablet by mouth once daily, Disp: 90 tablet, Rfl: 1    folic acid (FOLVITE) 1 mg tablet, Take 1 tablet (1 mg total) by mouth daily, Disp: 30 tablet, Rfl: 11    losartan (COZAAR) 50 mg tablet, Take 1 tablet (50 mg total) by mouth daily, Disp: 90 tablet, Rfl: 2    Milk Thistle Seed POWD, Take 250 mg by mouth daily, Disp: , Rfl:     Multiple Vitamin (multivitamin) tablet, Take 1 tablet by mouth daily, Disp: , Rfl:     thiamine (VITAMIN B1) 100 mg tablet, Take 1 tablet (100 mg total) by mouth daily, Disp: 90 tablet, Rfl: 2    ALPRAZolam (XANAX) 0.25 mg tablet, Take 1 tablet (0.25 mg total) by mouth daily at bedtime as needed for anxiety (Patient not taking: Reported on 4/18/2023), Disp: 30 tablet, Rfl: 0    naproxen (Naprosyn) 500 mg tablet, Take 1 tablet (500 mg total) by mouth 2 (two) times a day with meals for 7 days, Disp: 14 tablet, Rfl: 0    Allergies   Allergen Reactions    Lisinopril      COUGH    Bupropion Anxiety       Objective:  Vitals:    12/08/23 0946   BP: 128/83   Pulse: 90       Body mass index is 29.29 kg/m². Right Knee Exam     Tenderness   Right knee tenderness location: medial femoral condyle. Range of Motion   Extension:  normal   Flexion:  normal     Other   Erythema: absent  Scars: present  Sensation: normal  Pulse: present    Comments:  TTP medial femoral condyle  Scant effusion  Anterior knee incision well healed  No erythema  No warmth       Left Knee Exam     Tenderness   The patient is experiencing tenderness in the medial joint line and patella.     Range of Motion   Extension:  0   Flexion:  120     Tests   Varus: negative Valgus: negative  Drawer:  Anterior - negative     Posterior - negative  Patellar apprehension: negative    Other   Erythema: absent  Scars: absent  Sensation: normal  Pulse: present  Swelling: none  Effusion: effusion present    Comments:  No erythema, warmth or signs of infections  Significant parapatellar crepitus appreciated  positive patellar femoral grind test  Stable at 0,30, 90 degrees  Neurovascularly intact           Observations   Left Knee Positive for effusion. Physical Exam  Vitals and nursing note reviewed. Constitutional:       Appearance: Normal appearance. She is well-developed. HENT:      Head: Normocephalic and atraumatic. Right Ear: External ear normal.      Left Ear: External ear normal.   Eyes:      General: No scleral icterus. Extraocular Movements: Extraocular movements intact. Conjunctiva/sclera: Conjunctivae normal.   Cardiovascular:      Rate and Rhythm: Normal rate. Pulmonary:      Effort: Pulmonary effort is normal. No respiratory distress. Musculoskeletal:      Cervical back: Normal range of motion and neck supple. Left knee: Effusion present. Comments: See Ortho exam   Skin:     General: Skin is warm and dry. Neurological:      General: No focal deficit present. Mental Status: She is alert and oriented to person, place, and time. Psychiatric:         Behavior: Behavior normal.       I have personally reviewed pertinent films in PACS. X-ray of the right knee obtained on 12/8/2023 reviewed demonstrating a well-positioned and aligned revision total knee arthroplasty without evidence of failure. There is a healing fracture about the medial femoral condyle with increased interval callus formation. There is no new fracture, dislocation, lytic or blastic lesion. X-ray of the left knee obtained on 12/8/2023 reviewed demonstrating mild to moderate degenerative change with tricompartmental sclerosis and osteophytosis. There is no acute fracture, dislocation, lytic or blastic lesion. This document was created using speech voice recognition software. Grammatical errors, random word insertions, pronoun errors, and incomplete sentences are an occasional consequence of this system due to software limitations, ambient noise, and hardware issues.    Any formal questions or concerns about content, text, or information contained within the body of this dictation should be directly addressed to the provider for clarification.

## 2023-12-19 ENCOUNTER — HOSPITAL ENCOUNTER (OUTPATIENT)
Dept: MRI IMAGING | Facility: HOSPITAL | Age: 58
Discharge: HOME | End: 2023-12-19
Attending: ORTHOPAEDIC SURGERY
Payer: COMMERCIAL

## 2023-12-19 DIAGNOSIS — G89.29 CHRONIC PAIN OF LEFT KNEE: ICD-10-CM

## 2023-12-19 DIAGNOSIS — M17.12 PRIMARY OSTEOARTHRITIS OF LEFT KNEE: ICD-10-CM

## 2023-12-19 DIAGNOSIS — M25.562 CHRONIC PAIN OF LEFT KNEE: ICD-10-CM

## 2023-12-19 DIAGNOSIS — M25.462 EFFUSION OF LEFT KNEE: ICD-10-CM

## 2023-12-19 DIAGNOSIS — M23.92 INTERNAL DERANGEMENT OF LEFT KNEE: ICD-10-CM

## 2023-12-19 PROCEDURE — G1004 CDSM NDSC: HCPCS

## 2023-12-19 PROCEDURE — 73721 MRI JNT OF LWR EXTRE W/O DYE: CPT

## 2023-12-21 ENCOUNTER — TELEPHONE (OUTPATIENT)
Age: 58
End: 2023-12-21

## 2023-12-21 NOTE — TELEPHONE ENCOUNTER
Caller: Yue     Doctor: [place]    Reason for call: Patient received her MRI results in [facility] and would like to speak with you about what her next step will be    Call back#: [phone]

## 2024-01-02 ENCOUNTER — OFFICE VISIT (OUTPATIENT)
Dept: OBGYN CLINIC | Facility: CLINIC | Age: 59
End: 2024-01-02
Payer: COMMERCIAL

## 2024-01-02 VITALS
SYSTOLIC BLOOD PRESSURE: 123 MMHG | WEIGHT: 176 LBS | BODY MASS INDEX: 29.32 KG/M2 | DIASTOLIC BLOOD PRESSURE: 81 MMHG | HEART RATE: 106 BPM | HEIGHT: 65 IN

## 2024-01-02 DIAGNOSIS — S83.282A TEAR OF LATERAL MENISCUS OF LEFT KNEE, UNSPECIFIED TEAR TYPE, UNSPECIFIED WHETHER OLD OR CURRENT TEAR, INITIAL ENCOUNTER: ICD-10-CM

## 2024-01-02 DIAGNOSIS — M17.12 PRIMARY OSTEOARTHRITIS OF LEFT KNEE: Primary | ICD-10-CM

## 2024-01-02 PROCEDURE — 99214 OFFICE O/P EST MOD 30 MIN: CPT | Performed by: ORTHOPAEDIC SURGERY

## 2024-01-02 RX ORDER — MELOXICAM 15 MG/1
15 TABLET ORAL DAILY
Qty: 28 TABLET | Refills: 0 | Status: SHIPPED | OUTPATIENT
Start: 2024-01-02 | End: 2024-01-30

## 2024-01-02 NOTE — PATIENT INSTRUCTIONS
Hip Bursitis   AMBULATORY CARE:   Hip bursitis  is inflammation of the bursa in your hip. The bursa is a fluid-filled sac that acts as a cushion between a bone and a tendon. A tendon is a cord of strong tissue that connects muscles to bones.       Common signs and symptoms of hip bursitis:   Pain on the side of your hip or at the base of your hips when you sit down    Decreased movement or stiffness of your hip    Crunching or popping when you move your hip    Redness or swelling of the skin on your hip    Call your doctor if:   Your pain and swelling increase.    Your symptoms do not improve with treatment.    You have a fever.    You have questions or concerns about your condition or care.    Treatment  may include any of the following:  Medicines:      NSAIDs , such as ibuprofen, help decrease swelling, pain, and fever. NSAIDs can cause stomach bleeding or kidney problems in certain people. If you take blood thinner medicine, always ask your healthcare provider if NSAIDs are safe for you. Always read the medicine label and follow directions.    Aspirin  helps relieve pain and swelling. Take aspirin exactly as directed by your healthcare provider.    Antibiotics  help fight an infection caused by bacteria.    Steroids  help relieve pain and swelling. Steroid injections are given directly into the painful area. Steroid pills may be given for a short time.    Surgery  may be needed to remove your bursa or part of your elbow bone. Surgery is only done when other treatments do not work.    Manage hip bursitis:   Rest your hip as much as possible to decrease pain and swelling.  Slowly start to do more each day. Return to your daily activities as directed.    Apply ice to help decrease swelling and pain.  Use an ice pack, or put crushed ice in a plastic bag. Cover the bag with a towel before you place it on your elbow. Apply ice for 15 to 20 minutes, 3 to 4 times each day, as directed.    Do not lie on your injured hip.   You may be more comfortable if you sleep on your stomach or back.    Go to physical therapy, if directed.  A physical therapist teaches you exercises to help improve movement and strength, and to decrease pain. If you play sports, the therapist can show you ways to run or jump that will help prevent hip bursitis.    Prevent hip bursitis:   Do not overuse your hips.  Shorten the time you spend running, climbing stairs, or riding a bike. Take breaks as you do these activities. Try not to do the same activities each day. For example, run every other day or every 3 days instead of daily.    Stretch, warm up, and cool down.  Always stretch and do warm-up and cool-down exercises before and after you exercise. This will help loosen your muscles and decrease stress on your hip. Rest between workouts.         Wear proper shoes.  Wear shoes that fit properly and support your feet. You may need to wear shoe inserts called orthotics. Orthotics help position your foot correctly as you walk or exercise.    Maintain a healthy weight.  Ask your healthcare provider what a healthy weight is for you. Ask him or her to help you create a weight loss plan if you are overweight.    Keep pressure off your hips.  Do not stand or sit for long periods of time. Sit on padded surfaces, such as a cushion or pad, whenever possible. Do not sit with your legs crossed. Bend your knees when you  objects from the ground.       Follow up with your doctor as directed:  Write down your questions so you remember to ask them during your visits.  © Copyright [employer] 2023 Information is for End User's use only and may not be sold, redistributed or otherwise used for commercial purposes.  The above information is an  only. It is not intended as medical advice for individual conditions or treatments. Talk to your doctor, nurse or pharmacist before following any medical regimen to see if it is safe and effective for you.  Knee Exercises    AMBULATORY CARE:   What you need to know about knee exercises:  Knee exercises help strengthen the muscles around your knee. Strong muscles can help reduce pain and decrease your risk of future injury. Knee exercises also help you heal after an injury or surgery. These are beginning exercises. Ask your healthcare provider if you need to see a physical therapist for more advanced exercises.  General guidelines for knee exercises:   Start slowly.  As you get stronger, you may be able to do more sets of each exercise or add weights.    Stop if you feel pain.  It is normal to feel some discomfort at first, but you should not feel pain. Tell your provider or physical therapist if you have pain while you exercise. Regular exercise will help decrease your discomfort over time.    Do the exercises on both legs.  Do this so both knees remain strong.    Warm up before you do knee exercises.  Walk or ride a stationary bike for 5 or 10 minutes to warm your muscles.    How to perform knee stretches safely:  Always stretch before you do strengthening exercises. Do these stretching exercises again after you do the strengthening exercises. Do these stretches 4 or 5 days a week, or as directed.  Standing calf stretch:  Face a wall and place both palms flat on the wall, or hold the back of a chair for balance. Keep a slight bend in your knees. Take a big step backward with one leg. Keep your other leg directly under you. Keep both heels flat and press your hips forward. Hold the stretch for 30 seconds, and then relax for 30 seconds. Switch legs. Repeat 2 or 3 times on each leg.         Standing quadriceps stretch:  Stand and place one hand against a wall or hold the back of a chair for balance. With your weight on one leg, bend your other leg and grab your ankle. Bring your heel toward your buttocks. Hold the stretch for 30 to 60 seconds. Switch legs. Repeat 2 or 3 times on each leg.         Sitting hamstring stretch:  Sit with  both legs straight in front of you. Do not point or flex your toes. Place your palms on the floor and slide your hands forward until you feel the stretch. Do not round your back. Hold the stretch for 30 seconds. Repeat 2 or 3 times.       How to perform knee strengthening exercises safely:  Do these exercises 4 or 5 days a week, or as directed.  Standing half squats:  Stand with your feet shoulder-width apart. Lean your back against a wall or hold the back of a chair for balance, if needed. Slowly sit down about 10 inches, as if you are going to sit in a chair. Your body weight should be mostly over your heels. Hold the squat for 5 seconds, then rise to a standing position. Do 3 sets of 10 squats to strengthen your buttocks and thighs.         Standing hamstring curls:  Face a wall and place both palms flat on the wall, or hold the back of a chair for balance. With your weight on one leg, lift your other foot as close to your buttocks as you can. Hold for 5 seconds and then lower your leg. Do 2 sets of 10 curls on each leg. This exercise strengthens the muscles in the back of your thigh.         Standing calf raises:  Face a wall and place both palms flat on the wall, or hold the back of a chair for balance. Stand up straight, and do not lean. Place all your weight on one leg by lifting the other foot off the floor. Raise the heel of the foot that is on the floor as high as you can and then lower it. Do 2 sets of 10 calf raises on each leg to strengthen your calf muscles.         Straight leg lifts:  Lie on your stomach with straight legs. Fold your arms in front of you and rest your head in your arms. Tighten your leg muscles and raise one leg as high as you can. Hold for 5 seconds, then lower your leg. Do 2 sets of 10 lifts on each leg to strengthen your buttocks.         Sitting leg lifts:  Sit in a chair. Slowly straighten and raise one leg. Squeeze your thigh muscles and hold for 5 seconds. Relax and return  your foot to the floor. Do 2 sets of 10 lifts on each leg. This helps strengthen the muscles in the front of your thigh.       Call your doctor or physical therapist if:   You have new pain or your pain becomes worse.     You have questions or concerns about your condition or care.    © Copyright [employer] 2023 Information is for End User's use only and may not be sold, redistributed or otherwise used for commercial purposes.  The above information is an  only. It is not intended as medical advice for individual conditions or treatments. Talk to your doctor, nurse or pharmacist before following any medical regimen to see if it is safe and effective for you.

## 2024-01-02 NOTE — PROGRESS NOTES
Assessment/Plan:  1. Primary osteoarthritis of left knee  meloxicam (Mobic) 15 mg tablet    Injection Procedure Prior Authorization      2. Tear of lateral meniscus of left knee, unspecified tear type, unspecified whether old or current tear, initial encounter  meloxicam (Mobic) 15 mg tablet          58 year old female who present with left knee pain in the setting of OA and lateral meniscal tear.  Although the patient has a lateral meniscus tear, she has no pain about the lateral knee and in the setting of her knee arthritis, I do not advise an arthroscopy for the knee. We had a lengthy discussion regarding conservative treatment options. I did prescribe the patient meloxicam and also provided her with a physician directed home exercise program, as she was not interested in formal PT. We also discussed visco supplementation injections , which she was amendable to. These were ordered today and we will start this series once approved by insurance.     Subjective:   Yue Fitzgerald is a 58 y.o. female who presents today for evaluation of her left knee. She injured this a couple months ago and has ongoing medial knee pain since that time. Her pain is worse with really any activity, even simple walking. She denies any mechanical symptoms but complains of swelling. She had seen Dr. Chawla for this and had a steroid back on 10/27/23, which she notes helped for a very short period of time. She also had recent periprosthetic fracture of her right knee, and thus she has been putting more stress on her left knee.  Dr. Chawla had ordered an MRI of the left knee which showed a grade 1 MCL sprain, posterior horn lateral meniscus tear and a moderate tricompartmental arthritis, most significant about the lateral compartment. I am able to view these images today.       Review of Systems   Constitutional: Negative.  Negative for chills and fever.   HENT: Negative.  Negative for ear pain and sore throat.    Eyes: Negative.  Negative  for pain and redness.   Respiratory: Negative.  Negative for shortness of breath and wheezing.    Cardiovascular:  Negative for chest pain and palpitations.   Gastrointestinal: Negative.  Negative for abdominal pain and blood in stool.   Endocrine: Negative.  Negative for polydipsia and polyuria.   Genitourinary: Negative.  Negative for difficulty urinating and dysuria.   Musculoskeletal:         As noted in HPI   Skin: Negative.  Negative for pallor and rash.   Neurological: Negative.  Negative for dizziness and numbness.   Hematological: Negative.  Negative for adenopathy. Does not bruise/bleed easily.   Psychiatric/Behavioral: Negative.  Negative for confusion and suicidal ideas.          Past Medical History:   Diagnosis Date   • Abnormal immunology findings 06/09/2011   • Anemia 05/23/2011   • Arthritis    • Closed fracture of distal end of fibula     resolved 09/15/17   • Depression    • Distal radius fracture, left     last assessed 01/30/17   • GERD (gastroesophageal reflux disease)     last assessed 05/06/13   • Herpes labialis     last assessed 07/23/15   • Hypertension     essential ; last assessed 08/07/13   • Sinus bradycardia     last assessed 05/24/16       Past Surgical History:   Procedure Laterality Date   • ANKLE SURGERY      last assessed 09/15/17   • BACK SURGERY      lumbar laminectomy L4-L5   • BLADDER SUSPENSION      7/2017   • COLONOSCOPY     • JOINT REPLACEMENT      TKR  on right   • ORIF TIBIA & FIBULA FRACTURES Right 12/15/2016    Procedure: SURGICAL FIXATION OF RIGHT DISTAL FIBULA FRACTURE;  Surgeon: Adis Madrigal MD;  Location: [facility] MAIN OR;  Service:    • SD HEMIARTHROPLASTY HIP PARTIAL Right 4/8/2023    Procedure: HEMIARTHROPLASTY HIP (BIPOLAR);  Surgeon: Dale Bradley;  Location: [facility];  Service: Orthopedics   • SD REVJ TOT KNEE ARTHRP FEM&ENTIRE TIBIAL COMPONE Right 10/23/2017    Procedure: REVISION TOTAL KNEE REPLACEMENT WITH FROZEN SECTIONS;  Surgeon: Darren Chawla DO;   Location: [facility] MAIN OR;  Service: Orthopedics   • TOTAL KNEE ARTHROPLASTY      last assessed; 11/21/17   • WISDOM TOOTH EXTRACTION         Family History   Problem Relation Age of Onset   • Arthritis Mother    • Osteoporosis Mother    • Dementia Father    • Other Father         spinal stenosis   • Other Family         CREST       Social History     Occupational History   • Not on file   Tobacco Use   • Smoking status: Never   • Smokeless tobacco: Never   Vaping Use   • Vaping status: Never Used   Substance and Sexual Activity   • Alcohol use: Yes     Comment: moderately   • Drug use: No   • Sexual activity: Yes     Partners: Male         Current Outpatient Medications:   •  meloxicam (Mobic) 15 mg tablet, Take 1 tablet (15 mg total) by mouth daily for 28 days, Disp: 28 tablet, Rfl: 0  •  acetaminophen (TYLENOL) 325 mg tablet, Take 2 tablets (650 mg total) by mouth every 4 (four) hours as needed for mild pain, Disp: , Rfl: 0  •  ALPRAZolam (XANAX) 0.25 mg tablet, Take 1 tablet (0.25 mg total) by mouth daily at bedtime as needed for anxiety (Patient not taking: Reported on 4/18/2023), Disp: 30 tablet, Rfl: 0  •  amLODIPine (NORVASC) 5 mg tablet, take 1 tablet by mouth once daily, Disp: 90 tablet, Rfl: 1  •  busPIRone (BUSPAR) 10 mg tablet, take 1 tablet by mouth twice a day, Disp: 60 tablet, Rfl: 3  •  escitalopram (LEXAPRO) 20 mg tablet, take 1 tablet by mouth once daily, Disp: 90 tablet, Rfl: 1  •  folic acid (FOLVITE) 1 mg tablet, Take 1 tablet (1 mg total) by mouth daily, Disp: 30 tablet, Rfl: 11  •  losartan (COZAAR) 50 mg tablet, Take 1 tablet (50 mg total) by mouth daily, Disp: 90 tablet, Rfl: 2  •  Milk Thistle Seed POWD, Take 250 mg by mouth daily, Disp: , Rfl:   •  Multiple Vitamin (multivitamin) tablet, Take 1 tablet by mouth daily, Disp: , Rfl:   •  thiamine (VITAMIN B1) 100 mg tablet, Take 1 tablet (100 mg total) by mouth daily, Disp: 90 tablet, Rfl: 2    Allergies   Allergen Reactions   • Lisinopril       COUGH   • Bupropion Anxiety       Objective:  Vitals:    01/02/24 1004   BP: 123/81   Pulse: (!) 106     Pain Score:   8      Left Knee Exam     Tenderness   The patient is experiencing tenderness in the medial joint line.    Range of Motion   Extension:  0   Flexion:  130     Tests   Jasiel:  Medial - negative Lateral - negative  Varus: negative Valgus: negative  Lachman:  Anterior - negative    Posterior - negative  Drawer:  Anterior - negative     Posterior - negative  Patellar apprehension: negative    Other   Sensation: none  Pulse: present  Swelling: none  Effusion: no effusion present    Comments:  Patellar crepitus          Observations   Left Knee   Negative for effusion.       Physical Exam  Constitutional:       General: She is not in acute distress.     Appearance: She is well-developed.   HENT:      Head: Normocephalic and atraumatic.   Eyes:      General: No scleral icterus.     Conjunctiva/sclera: Conjunctivae normal.   Neck:      Vascular: No JVD.   Cardiovascular:      Rate and Rhythm: Normal rate.   Pulmonary:      Effort: Pulmonary effort is normal. No respiratory distress.   Musculoskeletal:      Left knee: No effusion.      Instability Tests: Medial Jasiel test negative and lateral Jasiel test negative.      Comments: As per HPI   Skin:     General: Skin is warm.   Neurological:      Mental Status: She is alert and oriented to person, place, and time.      Coordination: Coordination normal.         I have personally reviewed pertinent films in PACS and my interpretation is as follows:   MRI left knee: Grade 1 MCL sprain, posterior horn lateral meniscus tear and a moderate tricompartmental arthritis, most significant about the lateral compartment    This document was created using speech voice recognition software.   Grammatical errors, random word insertions, pronoun errors, and incomplete sentences are an occasional consequence of this system due to software limitations, ambient noise,  and hardware issues.   Any formal questions or concerns about content, text, or information contained within the body of this dictation should be directly addressed to the provider for clarification.

## 2024-01-22 DIAGNOSIS — I10 ESSENTIAL HYPERTENSION: ICD-10-CM

## 2024-01-23 RX ORDER — LOSARTAN POTASSIUM 50 MG/1
50 TABLET ORAL DAILY
Qty: 90 TABLET | Refills: 1 | Status: SHIPPED | OUTPATIENT
Start: 2024-01-23

## 2024-01-24 DIAGNOSIS — F10.10 ETOH ABUSE: ICD-10-CM

## 2024-01-24 RX ORDER — FOLIC ACID 1 MG/1
1000 TABLET ORAL DAILY
Qty: 30 TABLET | Refills: 5 | Status: SHIPPED | OUTPATIENT
Start: 2024-01-24

## 2024-01-25 ENCOUNTER — OFFICE VISIT (OUTPATIENT)
Dept: OBGYN CLINIC | Facility: CLINIC | Age: 59
End: 2024-01-25

## 2024-01-25 VITALS
SYSTOLIC BLOOD PRESSURE: 139 MMHG | DIASTOLIC BLOOD PRESSURE: 86 MMHG | HEIGHT: 65 IN | WEIGHT: 173.2 LBS | HEART RATE: 64 BPM | BODY MASS INDEX: 28.86 KG/M2

## 2024-01-25 DIAGNOSIS — M97.11XD PERIPROSTHETIC FRACTURE AROUND INTERNAL PROSTHETIC RIGHT KNEE JOINT, SUBSEQUENT ENCOUNTER: Primary | ICD-10-CM

## 2024-01-25 PROCEDURE — 99024 POSTOP FOLLOW-UP VISIT: CPT | Performed by: ORTHOPAEDIC SURGERY

## 2024-01-25 NOTE — PROGRESS NOTES
Assessment/Plan:  1. Periprosthetic fracture around internal prosthetic right knee joint, subsequent encounter          Scribe Attestation      I,:  Rimma Marcus am acting as a scribe while in the presence of the attending physician.:       I,:  Darren Chawla, DO personally performed the services described in this documentation    as scribed in my presence.:           Yue is a pleasant 58-year-old female who presents for follow-up evaluation 11 weeks status post right knee periprosthetic fracture.  I am pleased she has progressed so greatly.  X-rays were not obtained in the office today due to her being asymptomatic of the right knee completely.  She should continue her plan of care with Dr. Oropeza for the left knee.  She will follow-up as needed.    Subjective: Follow-up evaluation 11 weeks status post right knee periprosthetic fracture    Patient ID: Yue Fitzgerald is a 58 y.o. female who presents to the office today for follow-up evaluation 11 weeks status post right knee periprosthetic fracture.She reports her pain has resolved about the right knee. She denies any soreness of the right knee today. She did follow-up with Dr. Oropeza for the left knee. She is going to be going through visco supplementation injections for the left knee.    Review of Systems   Constitutional:  Negative for activity change, chills and fever.   HENT:  Negative for ear pain and sore throat.    Eyes:  Negative for pain and visual disturbance.   Respiratory:  Negative for cough and shortness of breath.    Cardiovascular:  Negative for chest pain and palpitations.   Gastrointestinal:  Negative for abdominal pain and vomiting.   Genitourinary:  Negative for dysuria and hematuria.   Musculoskeletal:  Negative for arthralgias and back pain.   Skin:  Negative for color change and rash.   Neurological:  Negative for seizures and syncope.   All other systems reviewed and are negative.        Past Medical History:   Diagnosis Date     Abnormal immunology findings 06/09/2011    Anemia 05/23/2011    Arthritis     Closed fracture of distal end of fibula     resolved 09/15/17    Depression     Distal radius fracture, left     last assessed 01/30/17    GERD (gastroesophageal reflux disease)     last assessed 05/06/13    Herpes labialis     last assessed 07/23/15    Hypertension     essential ; last assessed 08/07/13    Sinus bradycardia     last assessed 05/24/16       Past Surgical History:   Procedure Laterality Date    ANKLE SURGERY      last assessed 09/15/17    BACK SURGERY      lumbar laminectomy L4-L5    BLADDER SUSPENSION      7/2017    COLONOSCOPY      JOINT REPLACEMENT      TKR  on right    ORIF TIBIA & FIBULA FRACTURES Right 12/15/2016    Procedure: SURGICAL FIXATION OF RIGHT DISTAL FIBULA FRACTURE;  Surgeon: Adis Madrigal MD;  Location: [facility] MAIN OR;  Service:     OR HEMIARTHROPLASTY HIP PARTIAL Right 4/8/2023    Procedure: HEMIARTHROPLASTY HIP (BIPOLAR);  Surgeon: Dale Bradley;  Location:  Main OR;  Service: Orthopedics    OR REVJ TOT KNEE ARTHRP FEM&ENTIRE TIBIAL COMPONE Right 10/23/2017    Procedure: REVISION TOTAL KNEE REPLACEMENT WITH FROZEN SECTIONS;  Surgeon: Darren Chalwa DO;  Location: [facility] MAIN OR;  Service: Orthopedics    TOTAL KNEE ARTHROPLASTY      last assessed; 11/21/17    WISDOM TOOTH EXTRACTION         Family History   Problem Relation Age of Onset    Arthritis Mother     Osteoporosis Mother     Dementia Father     Other Father         spinal stenosis    Other Family         CREST       Social History     Occupational History    Not on file   Tobacco Use    Smoking status: Never    Smokeless tobacco: Never   Vaping Use    Vaping status: Never Used   Substance and Sexual Activity    Alcohol use: Yes     Comment: moderately    Drug use: No    Sexual activity: Yes     Partners: Male         Current Outpatient Medications:     acetaminophen (TYLENOL) 325 mg tablet, Take 2 tablets (650 mg total) by mouth every 4 (four)  hours as needed for mild pain, Disp: , Rfl: 0    ALPRAZolam (XANAX) 0.25 mg tablet, Take 1 tablet (0.25 mg total) by mouth daily at bedtime as needed for anxiety (Patient not taking: Reported on 4/18/2023), Disp: 30 tablet, Rfl: 0    amLODIPine (NORVASC) 5 mg tablet, take 1 tablet by mouth once daily, Disp: 90 tablet, Rfl: 1    busPIRone (BUSPAR) 10 mg tablet, take 1 tablet by mouth twice a day, Disp: 60 tablet, Rfl: 3    escitalopram (LEXAPRO) 20 mg tablet, take 1 tablet by mouth once daily, Disp: 90 tablet, Rfl: 1    folic acid (FOLVITE) 1 mg tablet, take 1 tablet by mouth once daily, Disp: 30 tablet, Rfl: 5    losartan (COZAAR) 50 mg tablet, TAKE 1 TABLET BY MOUTH ONCE DAILY, Disp: 90 tablet, Rfl: 1    meloxicam (Mobic) 15 mg tablet, Take 1 tablet (15 mg total) by mouth daily for 28 days, Disp: 28 tablet, Rfl: 0    Milk Thistle Seed POWD, Take 250 mg by mouth daily, Disp: , Rfl:     Multiple Vitamin (multivitamin) tablet, Take 1 tablet by mouth daily, Disp: , Rfl:     thiamine (VITAMIN B1) 100 mg tablet, Take 1 tablet (100 mg total) by mouth daily, Disp: 90 tablet, Rfl: 2    Allergies   Allergen Reactions    Lisinopril      COUGH    Bupropion Anxiety       Objective:  Vitals:    01/25/24 1312   BP: 139/86   Pulse: 64       Body mass index is 28.82 kg/m².    Right Knee Exam     Tenderness   The patient is experiencing no tenderness.     Range of Motion   The patient has normal right knee ROM.  Extension:  normal   Flexion:  normal     Tests   Varus: negative Valgus: negative    Other   Erythema: absent  Scars: present  Sensation: normal  Pulse: present    Comments:  Anterior knee incision well healed  No erythema  No warmth             Physical Exam  Vitals and nursing note reviewed.   Constitutional:       Appearance: Normal appearance.   HENT:      Head: Normocephalic and atraumatic.      Right Ear: External ear normal.      Left Ear: External ear normal.      Nose: Nose normal.   Eyes:      General: No scleral  icterus.     Extraocular Movements: Extraocular movements intact.      Conjunctiva/sclera: Conjunctivae normal.   Cardiovascular:      Rate and Rhythm: Normal rate.   Pulmonary:      Effort: Pulmonary effort is normal. No respiratory distress.   Musculoskeletal:      Cervical back: Normal range of motion and neck supple.      Comments: See ortho exam   Skin:     General: Skin is warm and dry.   Neurological:      Mental Status: She is alert and oriented to person, place, and time.   Psychiatric:         Mood and Affect: Mood normal.         Behavior: Behavior normal.         This document was created using speech voice recognition software.   Grammatical errors, random word insertions, pronoun errors, and incomplete sentences are an occasional consequence of this system due to software limitations, ambient noise, and hardware issues.   Any formal questions or concerns about content, text, or information contained within the body of this dictation should be directly addressed to the provider for clarification.

## 2024-02-02 ENCOUNTER — OFFICE VISIT (OUTPATIENT)
Dept: FAMILY MEDICINE CLINIC | Facility: CLINIC | Age: 59
End: 2024-02-02
Payer: COMMERCIAL

## 2024-02-02 VITALS
DIASTOLIC BLOOD PRESSURE: 70 MMHG | HEART RATE: 86 BPM | TEMPERATURE: 98.4 F | RESPIRATION RATE: 16 BRPM | SYSTOLIC BLOOD PRESSURE: 124 MMHG | BODY MASS INDEX: 29.45 KG/M2 | WEIGHT: 177 LBS

## 2024-02-02 DIAGNOSIS — L08.9 INFECTED SEBACEOUS CYST OF SKIN: Primary | ICD-10-CM

## 2024-02-02 DIAGNOSIS — F32.A ANXIETY AND DEPRESSION: ICD-10-CM

## 2024-02-02 DIAGNOSIS — L72.3 INFECTED SEBACEOUS CYST OF SKIN: Primary | ICD-10-CM

## 2024-02-02 DIAGNOSIS — F43.21 GRIEF: ICD-10-CM

## 2024-02-02 DIAGNOSIS — F41.9 ANXIETY AND DEPRESSION: ICD-10-CM

## 2024-02-02 DIAGNOSIS — I10 ESSENTIAL HYPERTENSION: ICD-10-CM

## 2024-02-02 PROCEDURE — 99214 OFFICE O/P EST MOD 30 MIN: CPT | Performed by: FAMILY MEDICINE

## 2024-02-02 RX ORDER — CEPHALEXIN 500 MG/1
500 CAPSULE ORAL EVERY 8 HOURS SCHEDULED
Qty: 21 CAPSULE | Refills: 0 | Status: SHIPPED | OUTPATIENT
Start: 2024-02-02 | End: 2024-02-09

## 2024-02-02 RX ORDER — HYDROXYZINE PAMOATE 25 MG/1
25 CAPSULE ORAL
Qty: 30 CAPSULE | Refills: 0 | Status: SHIPPED | OUTPATIENT
Start: 2024-02-02

## 2024-02-02 RX ORDER — BUSPIRONE HYDROCHLORIDE 15 MG/1
15 TABLET ORAL 2 TIMES DAILY
Qty: 60 TABLET | Refills: 0 | Status: SHIPPED | OUTPATIENT
Start: 2024-02-02 | End: 2024-03-03

## 2024-02-03 NOTE — PROGRESS NOTES
Assessment/Plan:    1. Infected sebaceous cyst of skin  Assessment & Plan:  Warm compresses BID to area    Orders:  -     cephalexin (KEFLEX) 500 mg capsule; Take 1 capsule (500 mg total) by mouth every 8 (eight) hours for 7 days    2. Anxiety and depression  Assessment & Plan:  Continue Lexapro, increase Buspar, add Vistaril  T/c addtl counseling    Orders:  -     hydrOXYzine pamoate (VISTARIL) 25 mg capsule; Take 1 capsule (25 mg total) by mouth daily at bedtime as needed for anxiety (insomnia)  -     busPIRone (BUSPAR) 15 mg tablet; Take 1 tablet (15 mg total) by mouth 2 (two) times a day    3. Grief  Assessment & Plan:  Loss of mother      4. Essential hypertension  Assessment & Plan:  BP in range--cont. same      5. BMI 29.0-29.9,adult        Depression Screening and Follow-up Plan: Patient's depression screening was positive with a PHQ-9 score of 21.   Cont Lexapro  Increase Buspar  Add vistaril hs       Subjective:      Patient ID: Yue Fitzgerald is a 58 y.o. female.    Chief Complaint   Patient presents with   • Wound     On the back of right shoulder that had pus in it this morning , and patient said her gland is swollen at base of neck        Anxiety  Presents for follow-up visit. Symptoms include decreased concentration, depressed mood, excessive worry, insomnia, irritability, muscle tension, nervous/anxious behavior, panic and restlessness. Patient reports no suicidal ideas. The severity of symptoms is causing significant distress. The quality of sleep is non-restorative.       BP in range  Also c/o skin cyst back of right shoulder --+pus draining from lesion, no fever, no trauma    The following portions of the patient's history were reviewed and updated as appropriate: allergies, current medications, past family history, past medical history, past social history, past surgical history and problem list.    Review of Systems   Constitutional:  Positive for fatigue and irritability.   Respiratory:  Negative.     Cardiovascular: Negative.    Gastrointestinal: Negative.    Skin:         Infected sebaceous cyst   Psychiatric/Behavioral:  Positive for decreased concentration and sleep disturbance. Negative for self-injury and suicidal ideas. The patient is nervous/anxious and has insomnia.          Current Outpatient Medications   Medication Sig Dispense Refill   • acetaminophen (TYLENOL) 325 mg tablet Take 2 tablets (650 mg total) by mouth every 4 (four) hours as needed for mild pain  0   • amLODIPine (NORVASC) 5 mg tablet take 1 tablet by mouth once daily 90 tablet 1   • busPIRone (BUSPAR) 15 mg tablet Take 1 tablet (15 mg total) by mouth 2 (two) times a day 60 tablet 0   • cephalexin (KEFLEX) 500 mg capsule Take 1 capsule (500 mg total) by mouth every 8 (eight) hours for 7 days 21 capsule 0   • escitalopram (LEXAPRO) 20 mg tablet take 1 tablet by mouth once daily 90 tablet 1   • folic acid (FOLVITE) 1 mg tablet take 1 tablet by mouth once daily 30 tablet 5   • hydrOXYzine pamoate (VISTARIL) 25 mg capsule Take 1 capsule (25 mg total) by mouth daily at bedtime as needed for anxiety (insomnia) 30 capsule 0   • losartan (COZAAR) 50 mg tablet TAKE 1 TABLET BY MOUTH ONCE DAILY 90 tablet 1   • Milk Thistle Seed POWD Take 250 mg by mouth daily     • Multiple Vitamin (multivitamin) tablet Take 1 tablet by mouth daily     • thiamine (VITAMIN B1) 100 mg tablet Take 1 tablet (100 mg total) by mouth daily 90 tablet 2   • meloxicam (Mobic) 15 mg tablet Take 1 tablet (15 mg total) by mouth daily for 28 days 28 tablet 0     No current facility-administered medications for this visit.       Objective:    /70 (BP Location: Left arm, Patient Position: Sitting, Cuff Size: Large)   Pulse 86   Temp 98.4 °F (36.9 °C)   Resp 16   Wt 80.3 kg (177 lb)   BMI 29.45 kg/m²        Physical Exam  Vitals and nursing note reviewed.   Constitutional:       Comments: Crying at times   Cardiovascular:      Rate and Rhythm: Normal rate  and regular rhythm.   Pulmonary:      Effort: Pulmonary effort is normal. No respiratory distress.   Skin:     General: Skin is warm and dry.      Findings: Lesion (slighty rasied, red, warm actively draining cyst right posterior shoulder) present.   Neurological:      General: No focal deficit present.      Mental Status: She is alert.      Cranial Nerves: No cranial nerve deficit.   Psychiatric:      Comments: Anxious, depressed, tearful         Aubree Manzo MD

## 2024-02-05 ENCOUNTER — TELEPHONE (OUTPATIENT)
Dept: ADMINISTRATIVE | Facility: OTHER | Age: 59
End: 2024-02-05

## 2024-02-05 NOTE — TELEPHONE ENCOUNTER
----- Message from Ann Maxwell MA sent at 2/2/2024  3:26 PM EST -----  Regarding: care gap request  02/02/24 3:26 PM    Hello, our patient attached above has had Mammogram completed/performed. Please assist in updating the patient chart by making an External outreach to [facility] imaging center  facility located in [place] . The date of service is 2023.    Thank you,  Ann FAY

## 2024-02-06 ENCOUNTER — PROCEDURE VISIT (OUTPATIENT)
Dept: OBGYN CLINIC | Facility: CLINIC | Age: 59
End: 2024-02-06
Payer: COMMERCIAL

## 2024-02-06 DIAGNOSIS — M17.12 PRIMARY OSTEOARTHRITIS OF LEFT KNEE: Primary | ICD-10-CM

## 2024-02-06 PROCEDURE — 20610 DRAIN/INJ JOINT/BURSA W/O US: CPT | Performed by: PHYSICIAN ASSISTANT

## 2024-02-06 RX ADMIN — Medication 20 MG: at 14:15

## 2024-02-06 NOTE — PROGRESS NOTES
Assessment/Plan:  1. Primary osteoarthritis of left knee          Patient tolerated injection well. FU 1 week for euflexxa #2 right knee.     Subjective:   Yue Fitzgerald is a 58 y.o. female who presents today for euflexxa #1 right knee.       Review of Systems      Past Medical History:   Diagnosis Date    Abnormal immunology findings 06/09/2011    Anemia 05/23/2011    Arthritis     Closed fracture of distal end of fibula     resolved 09/15/17    Depression     Distal radius fracture, left     last assessed 01/30/17    GERD (gastroesophageal reflux disease)     last assessed 05/06/13    Herpes labialis     last assessed 07/23/15    Hypertension     essential ; last assessed 08/07/13    Sinus bradycardia     last assessed 05/24/16       Past Surgical History:   Procedure Laterality Date    ANKLE SURGERY      last assessed 09/15/17    BACK SURGERY      lumbar laminectomy L4-L5    BLADDER SUSPENSION      7/2017    COLONOSCOPY      JOINT REPLACEMENT      TKR  on right    ORIF TIBIA & FIBULA FRACTURES Right 12/15/2016    Procedure: SURGICAL FIXATION OF RIGHT DISTAL FIBULA FRACTURE;  Surgeon: Adis Madrigal MD;  Location: [facility] MAIN OR;  Service:     TN HEMIARTHROPLASTY HIP PARTIAL Right 4/8/2023    Procedure: HEMIARTHROPLASTY HIP (BIPOLAR);  Surgeon: Dale Bradley;  Location:  Main OR;  Service: Orthopedics    TN REVJ TOT KNEE ARTHRP FEM&ENTIRE TIBIAL COMPONE Right 10/23/2017    Procedure: REVISION TOTAL KNEE REPLACEMENT WITH FROZEN SECTIONS;  Surgeon: Darren Chawla DO;  Location: [facility] MAIN OR;  Service: Orthopedics    TOTAL KNEE ARTHROPLASTY      last assessed; 11/21/17    WISDOM TOOTH EXTRACTION         Family History   Problem Relation Age of Onset    Arthritis Mother     Osteoporosis Mother     Dementia Father     Other Father         spinal stenosis    Other Family         CREST       Social History     Occupational History    Not on file   Tobacco Use    Smoking status: Never    Smokeless tobacco: Never    Vaping Use    Vaping status: Never Used   Substance and Sexual Activity    Alcohol use: Yes     Comment: moderately    Drug use: No    Sexual activity: Yes     Partners: Male         Current Outpatient Medications:     acetaminophen (TYLENOL) 325 mg tablet, Take 2 tablets (650 mg total) by mouth every 4 (four) hours as needed for mild pain, Disp: , Rfl: 0    amLODIPine (NORVASC) 5 mg tablet, take 1 tablet by mouth once daily, Disp: 90 tablet, Rfl: 1    busPIRone (BUSPAR) 15 mg tablet, Take 1 tablet (15 mg total) by mouth 2 (two) times a day, Disp: 60 tablet, Rfl: 0    cephalexin (KEFLEX) 500 mg capsule, Take 1 capsule (500 mg total) by mouth every 8 (eight) hours for 7 days, Disp: 21 capsule, Rfl: 0    escitalopram (LEXAPRO) 20 mg tablet, take 1 tablet by mouth once daily, Disp: 90 tablet, Rfl: 1    folic acid (FOLVITE) 1 mg tablet, take 1 tablet by mouth once daily, Disp: 30 tablet, Rfl: 5    hydrOXYzine pamoate (VISTARIL) 25 mg capsule, Take 1 capsule (25 mg total) by mouth daily at bedtime as needed for anxiety (insomnia), Disp: 30 capsule, Rfl: 0    losartan (COZAAR) 50 mg tablet, TAKE 1 TABLET BY MOUTH ONCE DAILY, Disp: 90 tablet, Rfl: 1    meloxicam (Mobic) 15 mg tablet, Take 1 tablet (15 mg total) by mouth daily for 28 days, Disp: 28 tablet, Rfl: 0    Milk Thistle Seed POWD, Take 250 mg by mouth daily, Disp: , Rfl:     Multiple Vitamin (multivitamin) tablet, Take 1 tablet by mouth daily, Disp: , Rfl:     thiamine (VITAMIN B1) 100 mg tablet, Take 1 tablet (100 mg total) by mouth daily, Disp: 90 tablet, Rfl: 2    Allergies   Allergen Reactions    Lisinopril      COUGH    Bupropion Anxiety       Objective:  There were no vitals filed for this visit.  Pain Score:   5      Ortho Exam    Physical Exam    Large joint arthrocentesis: L knee  Universal Protocol:  Risks and benefits: risks, benefits and alternatives were discussed  Consent given by: patient  Timeout called at: 2/6/2024 2:39 PM.  Site marked: the  operative site was marked  Supporting Documentation  Indications: pain   Procedure Details  Location: knee - L knee  Preparation: Patient was prepped and draped in the usual sterile fashion (chlorhexidine)  Needle size: 22 G  Ultrasound guidance: no  Approach: anterolateral  Medications administered: 20 mg Sodium Hyaluronate (Viscosup) 20 MG/2ML  Specialty Pharmacy Supplied: received medications from pharmacy  Patient tolerance: patient tolerated the procedure well with no immediate complications  Dressing:  Sterile dressing applied              This document was created using speech voice recognition software.   Grammatical errors, random word insertions, pronoun errors, and incomplete sentences are an occasional consequence of this system due to software limitations, ambient noise, and hardware issues.   Any formal questions or concerns about content, text, or information contained within the body of this dictation should be directly addressed to the provider for clarification.

## 2024-02-07 RX ORDER — HYALURONATE SODIUM 10 MG/ML
20 SYRINGE (ML) INTRAARTICULAR
Status: COMPLETED | OUTPATIENT
Start: 2024-02-06 | End: 2024-02-06

## 2024-02-12 NOTE — TELEPHONE ENCOUNTER
Upon review of the In Basket request and the patient's chart, initial outreach has been made via fax to facility. Please see Contacts section for details.     Thank you  Nils Currie

## 2024-02-14 ENCOUNTER — PROCEDURE VISIT (OUTPATIENT)
Dept: OBGYN CLINIC | Facility: CLINIC | Age: 59
End: 2024-02-14
Payer: COMMERCIAL

## 2024-02-14 VITALS — WEIGHT: 177 LBS | BODY MASS INDEX: 29.49 KG/M2 | HEIGHT: 65 IN

## 2024-02-14 DIAGNOSIS — M17.12 PRIMARY OSTEOARTHRITIS OF LEFT KNEE: Primary | ICD-10-CM

## 2024-02-14 PROCEDURE — 20610 DRAIN/INJ JOINT/BURSA W/O US: CPT | Performed by: PHYSICIAN ASSISTANT

## 2024-02-14 RX ORDER — HYALURONATE SODIUM 10 MG/ML
20 SYRINGE (ML) INTRAARTICULAR
Status: COMPLETED | OUTPATIENT
Start: 2024-02-14 | End: 2024-02-14

## 2024-02-14 RX ADMIN — Medication 20 MG: at 09:45

## 2024-02-14 NOTE — PROGRESS NOTES
Assessment/Plan:  1. Primary osteoarthritis of left knee          Follow-up 1 week.     Subjective:   Yue Fitzgerald is a 58 y.o. female who presents today for euflexxa #2 left knee.      Review of Systems      Past Medical History:   Diagnosis Date    Abnormal immunology findings 06/09/2011    Anemia 05/23/2011    Arthritis     Closed fracture of distal end of fibula     resolved 09/15/17    Depression     Distal radius fracture, left     last assessed 01/30/17    GERD (gastroesophageal reflux disease)     last assessed 05/06/13    Herpes labialis     last assessed 07/23/15    Hypertension     essential ; last assessed 08/07/13    Sinus bradycardia     last assessed 05/24/16       Past Surgical History:   Procedure Laterality Date    ANKLE SURGERY      last assessed 09/15/17    BACK SURGERY      lumbar laminectomy L4-L5    BLADDER SUSPENSION      7/2017    COLONOSCOPY      JOINT REPLACEMENT      TKR  on right    ORIF TIBIA & FIBULA FRACTURES Right 12/15/2016    Procedure: SURGICAL FIXATION OF RIGHT DISTAL FIBULA FRACTURE;  Surgeon: Adis Madrigal MD;  Location: [facility] MAIN OR;  Service:     [place] HEMIARTHROPLASTY HIP PARTIAL Right 4/8/2023    Procedure: HEMIARTHROPLASTY HIP (BIPOLAR);  Surgeon: Dale Bradley;  Location:  Main OR;  Service: Orthopedics    OK REVJ TOT KNEE ARTHRP FEM&ENTIRE TIBIAL COMPONE Right 10/23/2017    Procedure: REVISION TOTAL KNEE REPLACEMENT WITH FROZEN SECTIONS;  Surgeon: Darren Chawla DO;  Location: [facility] MAIN OR;  Service: Orthopedics    TOTAL KNEE ARTHROPLASTY      last assessed; 11/21/17    WISDOM TOOTH EXTRACTION         Family History   Problem Relation Age of Onset    Arthritis Mother     Osteoporosis Mother     Dementia Father     Other Father         spinal stenosis    Other Family         CREST       Social History     Occupational History    Not on file   Tobacco Use    Smoking status: Never    Smokeless tobacco: Never   Vaping Use    Vaping status: Never Used   Substance and  Sexual Activity    Alcohol use: Yes     Comment: moderately    Drug use: No    Sexual activity: Yes     Partners: Male         Current Outpatient Medications:     acetaminophen (TYLENOL) 325 mg tablet, Take 2 tablets (650 mg total) by mouth every 4 (four) hours as needed for mild pain, Disp: , Rfl: 0    amLODIPine (NORVASC) 5 mg tablet, take 1 tablet by mouth once daily, Disp: 90 tablet, Rfl: 1    busPIRone (BUSPAR) 15 mg tablet, Take 1 tablet (15 mg total) by mouth 2 (two) times a day, Disp: 60 tablet, Rfl: 0    escitalopram (LEXAPRO) 20 mg tablet, take 1 tablet by mouth once daily, Disp: 90 tablet, Rfl: 1    folic acid (FOLVITE) 1 mg tablet, take 1 tablet by mouth once daily, Disp: 30 tablet, Rfl: 5    hydrOXYzine pamoate (VISTARIL) 25 mg capsule, Take 1 capsule (25 mg total) by mouth daily at bedtime as needed for anxiety (insomnia), Disp: 30 capsule, Rfl: 0    losartan (COZAAR) 50 mg tablet, TAKE 1 TABLET BY MOUTH ONCE DAILY, Disp: 90 tablet, Rfl: 1    meloxicam (Mobic) 15 mg tablet, Take 1 tablet (15 mg total) by mouth daily for 28 days, Disp: 28 tablet, Rfl: 0    Milk Thistle Seed POWD, Take 250 mg by mouth daily, Disp: , Rfl:     Multiple Vitamin (multivitamin) tablet, Take 1 tablet by mouth daily, Disp: , Rfl:     thiamine (VITAMIN B1) 100 mg tablet, Take 1 tablet (100 mg total) by mouth daily, Disp: 90 tablet, Rfl: 2    Allergies   Allergen Reactions    Lisinopril      COUGH    Bupropion Anxiety       Objective:  There were no vitals filed for this visit.  Pain Score:   6      Ortho Exam    Physical Exam    Large joint arthrocentesis: L knee  Universal Protocol:  Risks and benefits: risks, benefits and alternatives were discussed  Consent given by: patient  Timeout called at: 2/14/2024 9:57 AM.  Site marked: the operative site was marked  Supporting Documentation  Indications: pain   Procedure Details  Location: knee - L knee  Preparation: Patient was prepped and draped in the usual sterile fashion  (Chlorhexidine)  Needle size: 22 G  Ultrasound guidance: no  Approach: anterolateral  Medications administered: 20 mg Sodium Hyaluronate (Viscosup) 20 MG/2ML    Patient tolerance: patient tolerated the procedure well with no immediate complications  Dressing:  Sterile dressing applied              This document was created using speech voice recognition software.   Grammatical errors, random word insertions, pronoun errors, and incomplete sentences are an occasional consequence of this system due to software limitations, ambient noise, and hardware issues.   Any formal questions or concerns about content, text, or information contained within the body of this dictation should be directly addressed to the provider for clarification.

## 2024-02-21 ENCOUNTER — PROCEDURE VISIT (OUTPATIENT)
Dept: OBGYN CLINIC | Facility: CLINIC | Age: 59
End: 2024-02-21
Payer: COMMERCIAL

## 2024-02-21 DIAGNOSIS — M17.12 PRIMARY OSTEOARTHRITIS OF LEFT KNEE: ICD-10-CM

## 2024-02-21 DIAGNOSIS — E66.09 CLASS 1 OBESITY DUE TO EXCESS CALORIES IN ADULT, UNSPECIFIED BMI, UNSPECIFIED WHETHER SERIOUS COMORBIDITY PRESENT: Primary | ICD-10-CM

## 2024-02-21 PROCEDURE — 99024 POSTOP FOLLOW-UP VISIT: CPT | Performed by: ORTHOPAEDIC SURGERY

## 2024-02-21 PROCEDURE — 20610 DRAIN/INJ JOINT/BURSA W/O US: CPT | Performed by: PHYSICIAN ASSISTANT

## 2024-02-21 RX ADMIN — Medication 20 MG: at 14:45

## 2024-02-21 NOTE — TELEPHONE ENCOUNTER
Upon review of the In Basket request we were able to locate, review, and update the patient chart as requested for Mammogram.    Any additional questions or concerns should be emailed to the Practice Liaisons via the appropriate education email address, please do not reply via In Basket.    Thank you  Nils Currie

## 2024-02-21 NOTE — PROGRESS NOTES
Assessment/Plan:  1. Class 1 obesity due to excess calories in adult, unspecified BMI, unspecified whether serious comorbidity present  Ambulatory Referral to Weight Management      2. Primary osteoarthritis of left knee          Follow-up prn. Referral given to weight management as per patient's request.     Subjective:   Yue Fitzgerald is a 58 y.o. female who presents today for euflexxa #3 left knee.       Review of Systems      Past Medical History:   Diagnosis Date    Abnormal immunology findings 06/09/2011    Anemia 05/23/2011    Arthritis     Closed fracture of distal end of fibula     resolved 09/15/17    Depression     Distal radius fracture, left     last assessed 01/30/17    GERD (gastroesophageal reflux disease)     last assessed 05/06/13    Herpes labialis     last assessed 07/23/15    Hypertension     essential ; last assessed 08/07/13    Sinus bradycardia     last assessed 05/24/16       Past Surgical History:   Procedure Laterality Date    ANKLE SURGERY      last assessed 09/15/17    BACK SURGERY      lumbar laminectomy L4-L5    BLADDER SUSPENSION      7/2017    COLONOSCOPY      JOINT REPLACEMENT      TKR  on right    ORIF TIBIA & FIBULA FRACTURES Right 12/15/2016    Procedure: SURGICAL FIXATION OF RIGHT DISTAL FIBULA FRACTURE;  Surgeon: Adis Madrigal MD;  Location: [facility] MAIN OR;  Service:     IN HEMIARTHROPLASTY HIP PARTIAL Right 4/8/2023    Procedure: HEMIARTHROPLASTY HIP (BIPOLAR);  Surgeon: Dale Bradley;  Location:  Main OR;  Service: Orthopedics    IN REVJ TOT KNEE ARTHRP FEM&ENTIRE TIBIAL COMPONE Right 10/23/2017    Procedure: REVISION TOTAL KNEE REPLACEMENT WITH FROZEN SECTIONS;  Surgeon: Darren Chawla DO;  Location: [facility] MAIN OR;  Service: Orthopedics    TOTAL KNEE ARTHROPLASTY      last assessed; 11/21/17    WISDOM TOOTH EXTRACTION         Family History   Problem Relation Age of Onset    Arthritis Mother     Osteoporosis Mother     Dementia Father     Other Father         spinal  stenosis    Other Family         CREST       Social History     Occupational History    Not on file   Tobacco Use    Smoking status: Never    Smokeless tobacco: Never   Vaping Use    Vaping status: Never Used   Substance and Sexual Activity    Alcohol use: Yes     Comment: moderately    Drug use: No    Sexual activity: Yes     Partners: Male         Current Outpatient Medications:     acetaminophen (TYLENOL) 325 mg tablet, Take 2 tablets (650 mg total) by mouth every 4 (four) hours as needed for mild pain, Disp: , Rfl: 0    amLODIPine (NORVASC) 5 mg tablet, take 1 tablet by mouth once daily, Disp: 90 tablet, Rfl: 1    busPIRone (BUSPAR) 15 mg tablet, Take 1 tablet (15 mg total) by mouth 2 (two) times a day, Disp: 60 tablet, Rfl: 0    escitalopram (LEXAPRO) 20 mg tablet, take 1 tablet by mouth once daily, Disp: 90 tablet, Rfl: 1    folic acid (FOLVITE) 1 mg tablet, take 1 tablet by mouth once daily, Disp: 30 tablet, Rfl: 5    hydrOXYzine pamoate (VISTARIL) 25 mg capsule, Take 1 capsule (25 mg total) by mouth daily at bedtime as needed for anxiety (insomnia), Disp: 30 capsule, Rfl: 0    losartan (COZAAR) 50 mg tablet, TAKE 1 TABLET BY MOUTH ONCE DAILY, Disp: 90 tablet, Rfl: 1    meloxicam (Mobic) 15 mg tablet, Take 1 tablet (15 mg total) by mouth daily for 28 days, Disp: 28 tablet, Rfl: 0    Milk Thistle Seed POWD, Take 250 mg by mouth daily, Disp: , Rfl:     Multiple Vitamin (multivitamin) tablet, Take 1 tablet by mouth daily, Disp: , Rfl:     thiamine (VITAMIN B1) 100 mg tablet, Take 1 tablet (100 mg total) by mouth daily, Disp: 90 tablet, Rfl: 2    Allergies   Allergen Reactions    Lisinopril      COUGH    Bupropion Anxiety       Objective:  There were no vitals filed for this visit.  Pain Score:   6      Ortho Exam    Physical Exam    Large joint arthrocentesis: L knee  Universal Protocol:  Risks and benefits: risks, benefits and alternatives were discussed  Consent given by: patient  Timeout called at: 2/21/2024  12:24 PM.  Site marked: the operative site was marked  Supporting Documentation  Indications: pain   Procedure Details  Location: knee - L knee  Preparation: Patient was prepped and draped in the usual sterile fashion (Alcohol prep)  Needle size: 22 G  Ultrasound guidance: no  Approach: anterolateral  Medications administered: 20 mg Sodium Hyaluronate (Viscosup) 20 MG/2ML    Patient tolerance: patient tolerated the procedure well with no immediate complications  Dressing:  Sterile dressing applied              This document was created using speech voice recognition software.   Grammatical errors, random word insertions, pronoun errors, and incomplete sentences are an occasional consequence of this system due to software limitations, ambient noise, and hardware issues.   Any formal questions or concerns about content, text, or information contained within the body of this dictation should be directly addressed to the provider for clarification.

## 2024-02-22 RX ORDER — HYALURONATE SODIUM 10 MG/ML
20 SYRINGE (ML) INTRAARTICULAR
Status: COMPLETED | OUTPATIENT
Start: 2024-02-21 | End: 2024-02-21

## 2024-02-24 DIAGNOSIS — F41.9 ANXIETY AND DEPRESSION: ICD-10-CM

## 2024-02-24 DIAGNOSIS — F32.A ANXIETY AND DEPRESSION: ICD-10-CM

## 2024-02-26 RX ORDER — BUSPIRONE HYDROCHLORIDE 15 MG/1
15 TABLET ORAL 2 TIMES DAILY
Qty: 60 TABLET | Refills: 0 | Status: SHIPPED | OUTPATIENT
Start: 2024-02-26

## 2024-02-27 ENCOUNTER — TELEPHONE (OUTPATIENT)
Dept: FAMILY MEDICINE CLINIC | Facility: CLINIC | Age: 59
End: 2024-02-27

## 2024-03-24 DIAGNOSIS — F32.A ANXIETY AND DEPRESSION: ICD-10-CM

## 2024-03-24 DIAGNOSIS — F41.9 ANXIETY AND DEPRESSION: ICD-10-CM

## 2024-03-25 RX ORDER — BUSPIRONE HYDROCHLORIDE 15 MG/1
15 TABLET ORAL 2 TIMES DAILY
Qty: 60 TABLET | Refills: 0 | Status: SHIPPED | OUTPATIENT
Start: 2024-03-25

## 2024-04-05 ENCOUNTER — OFFICE VISIT (OUTPATIENT)
Dept: FAMILY MEDICINE CLINIC | Facility: CLINIC | Age: 59
End: 2024-04-05
Payer: COMMERCIAL

## 2024-04-05 VITALS
DIASTOLIC BLOOD PRESSURE: 72 MMHG | HEART RATE: 76 BPM | TEMPERATURE: 97.9 F | BODY MASS INDEX: 28.29 KG/M2 | RESPIRATION RATE: 18 BRPM | SYSTOLIC BLOOD PRESSURE: 114 MMHG | WEIGHT: 169.8 LBS | HEIGHT: 65 IN

## 2024-04-05 DIAGNOSIS — N39.0 URINARY TRACT INFECTION WITHOUT HEMATURIA, SITE UNSPECIFIED: Primary | ICD-10-CM

## 2024-04-05 DIAGNOSIS — R35.0 URINARY FREQUENCY: ICD-10-CM

## 2024-04-05 LAB
SL AMB  POCT GLUCOSE, UA: NORMAL
SL AMB LEUKOCYTE ESTERASE,UA: 500
SL AMB POCT BILIRUBIN,UA: NORMAL
SL AMB POCT BLOOD,UA: 250
SL AMB POCT CLARITY,UA: NORMAL
SL AMB POCT COLOR,UA: YELLOW
SL AMB POCT KETONES,UA: 15
SL AMB POCT NITRITE,UA: NORMAL
SL AMB POCT PH,UA: 6.5
SL AMB POCT SPECIFIC GRAVITY,UA: 1.01
SL AMB POCT URINE PROTEIN: 30
SL AMB POCT UROBILINOGEN: NORMAL

## 2024-04-05 PROCEDURE — 81002 URINALYSIS NONAUTO W/O SCOPE: CPT | Performed by: FAMILY MEDICINE

## 2024-04-05 PROCEDURE — 99213 OFFICE O/P EST LOW 20 MIN: CPT | Performed by: FAMILY MEDICINE

## 2024-04-05 RX ORDER — CIPROFLOXACIN 250 MG/1
250 TABLET, FILM COATED ORAL EVERY 12 HOURS SCHEDULED
Qty: 14 TABLET | Refills: 0 | Status: SHIPPED | OUTPATIENT
Start: 2024-04-05 | End: 2024-04-12

## 2024-04-05 NOTE — PROGRESS NOTES
Chief Complaint   Patient presents with   • UTI sxs        Patient ID: Yue Fitzgerald is a 58 y.o. female.    Urinary Tract Infection   This is a new problem. Episode onset: 10 days ago. The problem occurs intermittently. The problem has been waxing and waning. The quality of the pain is described as burning. The pain is at a severity of 3/10. The pain is mild. There has been no fever. There is No history of pyelonephritis. Associated symptoms include frequency, hesitancy and urgency. Pertinent negatives include no chills, discharge, flank pain, hematuria, nausea, sweats or vomiting. She has tried nothing for the symptoms. The treatment provided no relief. Her past medical history is significant for recurrent UTIs (remote h/o). There is no history of kidney stones, a single kidney, urinary stasis or a urological procedure.         The following portions of the patient's history were reviewed and updated as appropriate: allergies, current medications, past family history, past medical history, past social history, past surgical history and problem list.    Review of Systems   Constitutional:  Negative for chills.   HENT:  Positive for sore throat (x 1 wk).    Gastrointestinal:  Negative for nausea and vomiting.   Genitourinary:  Positive for frequency, hesitancy and urgency. Negative for flank pain and hematuria.       Current Outpatient Medications   Medication Sig Dispense Refill   • amLODIPine (NORVASC) 5 mg tablet take 1 tablet by mouth once daily 90 tablet 1   • busPIRone (BUSPAR) 15 mg tablet take 1 tablet by mouth twice a day 60 tablet 0   • escitalopram (LEXAPRO) 20 mg tablet take 1 tablet by mouth once daily 90 tablet 1   • folic acid (FOLVITE) 1 mg tablet take 1 tablet by mouth once daily 30 tablet 5   • hydrOXYzine pamoate (VISTARIL) 25 mg capsule Take 1 capsule (25 mg total) by mouth daily at bedtime as needed for anxiety (insomnia) 30 capsule 0   • losartan (COZAAR) 50 mg tablet TAKE 1 TABLET BY MOUTH  "ONCE DAILY 90 tablet 1   • Milk Thistle Seed POWD Take 250 mg by mouth daily     • Multiple Vitamin (multivitamin) tablet Take 1 tablet by mouth daily     • acetaminophen (TYLENOL) 325 mg tablet Take 2 tablets (650 mg total) by mouth every 4 (four) hours as needed for mild pain  0   • meloxicam (Mobic) 15 mg tablet Take 1 tablet (15 mg total) by mouth daily for 28 days (Patient not taking: Reported on 3/26/2024) 28 tablet 0   • thiamine (VITAMIN B1) 100 mg tablet Take 1 tablet (100 mg total) by mouth daily (Patient not taking: Reported on 4/5/2024) 90 tablet 2     No current facility-administered medications for this visit.       Objective:    /72 (BP Location: Right arm, Patient Position: Sitting, Cuff Size: Large)   Pulse 76   Temp 97.9 °F (36.6 °C) (Temporal)   Resp 18   Ht 5' 5\" (1.651 m)   Wt 77 kg (169 lb 12.8 oz)   BMI 28.26 kg/m²        Physical Exam  Constitutional:       General: She is not in acute distress.     Appearance: She is not ill-appearing.   HENT:      Mouth/Throat:      Pharynx: No oropharyngeal exudate or posterior oropharyngeal erythema.   Cardiovascular:      Rate and Rhythm: Normal rate and regular rhythm.   Pulmonary:      Effort: No respiratory distress.   Abdominal:      Palpations: Abdomen is soft.      Tenderness: There is no abdominal tenderness. There is no right CVA tenderness or left CVA tenderness.   Neurological:      Mental Status: She is alert.           Recent Results (from the past 672 hour(s))   POCT urine dip    Collection Time: 04/05/24  3:03 PM   Result Value Ref Range    LEUKOCYTE ESTERASE,     NITRITE,UA pos     SL AMB POCT UROBILINOGEN norm     POCT URINE PROTEIN 30      PH,UA 6.5     BLOOD,     SPECIFIC GRAVITY,UA 1.010     KETONES,UA 15     BILIRUBIN,UA neg     GLUCOSE, UA norm      COLOR,UA yellow     CLARITY,UA cloudy           Assessment/Plan:         Diagnoses and all orders for this visit:    Urinary tract infection without hematuria, " site unspecified  -     Urine culture  -     ciprofloxacin (CIPRO) 250 mg tablet; Take 1 tablet (250 mg total) by mouth every 12 (twelve) hours for 7 days    Urinary frequency  -     POCT urine dip      Urine Cx was sent       Was advised to increase water intake     Rto przafar Bernabe MD

## 2024-04-08 DIAGNOSIS — F41.9 ANXIETY AND DEPRESSION: ICD-10-CM

## 2024-04-08 DIAGNOSIS — F32.A ANXIETY AND DEPRESSION: ICD-10-CM

## 2024-04-08 DIAGNOSIS — I10 ESSENTIAL HYPERTENSION: ICD-10-CM

## 2024-04-08 DIAGNOSIS — N39.0 URINARY TRACT INFECTION WITHOUT HEMATURIA, SITE UNSPECIFIED: Primary | ICD-10-CM

## 2024-04-22 DIAGNOSIS — F32.A ANXIETY AND DEPRESSION: ICD-10-CM

## 2024-04-22 DIAGNOSIS — F41.9 ANXIETY AND DEPRESSION: ICD-10-CM

## 2024-04-22 RX ORDER — ESCITALOPRAM OXALATE 20 MG/1
20 TABLET ORAL DAILY
Qty: 90 TABLET | Refills: 1 | Status: SHIPPED | OUTPATIENT
Start: 2024-04-22

## 2024-04-25 DIAGNOSIS — F41.9 ANXIETY AND DEPRESSION: ICD-10-CM

## 2024-04-25 DIAGNOSIS — F32.A ANXIETY AND DEPRESSION: ICD-10-CM

## 2024-04-26 RX ORDER — BUSPIRONE HYDROCHLORIDE 15 MG/1
15 TABLET ORAL 2 TIMES DAILY
Qty: 60 TABLET | Refills: 0 | Status: SHIPPED | OUTPATIENT
Start: 2024-04-26

## 2024-04-28 ENCOUNTER — HOSPITAL ENCOUNTER (EMERGENCY)
Facility: HOSPITAL | Age: 59
Discharge: HOME | End: 2024-04-28
Attending: EMERGENCY MEDICINE
Payer: COMMERCIAL

## 2024-04-28 ENCOUNTER — APPOINTMENT (EMERGENCY)
Dept: RADIOLOGY | Facility: HOSPITAL | Age: 59
End: 2024-04-28
Payer: COMMERCIAL

## 2024-04-28 VITALS
DIASTOLIC BLOOD PRESSURE: 89 MMHG | TEMPERATURE: 97.7 F | RESPIRATION RATE: 20 BRPM | SYSTOLIC BLOOD PRESSURE: 153 MMHG | OXYGEN SATURATION: 98 % | HEART RATE: 82 BPM

## 2024-04-28 DIAGNOSIS — S82.402A CLOSED LEFT FIBULAR FRACTURE: Primary | ICD-10-CM

## 2024-04-28 PROCEDURE — 99284 EMERGENCY DEPT VISIT MOD MDM: CPT | Performed by: EMERGENCY MEDICINE

## 2024-04-28 PROCEDURE — 99283 EMERGENCY DEPT VISIT LOW MDM: CPT

## 2024-04-28 PROCEDURE — 73610 X-RAY EXAM OF ANKLE: CPT

## 2024-04-28 PROCEDURE — 29515 APPLICATION SHORT LEG SPLINT: CPT | Performed by: EMERGENCY MEDICINE

## 2024-04-28 RX ORDER — HYDROCODONE BITARTRATE AND ACETAMINOPHEN 5; 325 MG/1; MG/1
1 TABLET ORAL EVERY 6 HOURS PRN
Qty: 12 TABLET | Refills: 0 | Status: SHIPPED | OUTPATIENT
Start: 2024-04-28 | End: 2024-05-01

## 2024-04-28 RX ORDER — HYDROCODONE BITARTRATE AND ACETAMINOPHEN 5; 325 MG/1; MG/1
1 TABLET ORAL ONCE
Status: COMPLETED | OUTPATIENT
Start: 2024-04-28 | End: 2024-04-28

## 2024-04-28 RX ADMIN — HYDROCODONE BITARTRATE AND ACETAMINOPHEN 1 TABLET: 5; 325 TABLET ORAL at 14:15

## 2024-04-28 NOTE — ED PROVIDER NOTES
History  Chief Complaint   Patient presents with    Ankle Injury     Injury to L ankle two nights ago going down 1 step. States by ankle buckled     59-year-old female presents with left ankle pain and swelling after she fell twisted it while climbing down a step 2 days ago denies any other injuries or complaints.      History provided by:  Patient   used: No        Prior to Admission Medications   Prescriptions Last Dose Informant Patient Reported? Taking?   Milk Thistle Seed POWD  Self Yes No   Sig: Take 250 mg by mouth daily   Multiple Vitamin (multivitamin) tablet  Self Yes No   Sig: Take 1 tablet by mouth daily   acetaminophen (TYLENOL) 325 mg tablet  Self No No   Sig: Take 2 tablets (650 mg total) by mouth every 4 (four) hours as needed for mild pain   amLODIPine (NORVASC) 5 mg tablet   No No   Sig: take 1 tablet by mouth once daily   busPIRone (BUSPAR) 15 mg tablet   No No   Sig: take 1 tablet by mouth twice a day   escitalopram (LEXAPRO) 20 mg tablet   No No   Sig: take 1 tablet by mouth once daily   folic acid (FOLVITE) 1 mg tablet   No No   Sig: take 1 tablet by mouth once daily   hydrOXYzine pamoate (VISTARIL) 25 mg capsule   No No   Sig: Take 1 capsule (25 mg total) by mouth daily at bedtime as needed for anxiety (insomnia)   losartan (COZAAR) 50 mg tablet   No No   Sig: TAKE 1 TABLET BY MOUTH ONCE DAILY   meloxicam (Mobic) 15 mg tablet   No No   Sig: Take 1 tablet (15 mg total) by mouth daily for 28 days   Patient not taking: Reported on 3/26/2024   thiamine (VITAMIN B1) 100 mg tablet  Self No No   Sig: Take 1 tablet (100 mg total) by mouth daily   Patient not taking: Reported on 4/5/2024      Facility-Administered Medications: None       Past Medical History:   Diagnosis Date    Abnormal immunology findings 06/09/2011    Anemia 05/23/2011    Arthritis     Closed fracture of distal end of fibula     resolved 09/15/17    Depression     Distal radius fracture, left     last assessed  01/30/17    GERD (gastroesophageal reflux disease)     last assessed 05/06/13    Herpes labialis     last assessed 07/23/15    Hypertension     essential ; last assessed 08/07/13    Sinus bradycardia     last assessed 05/24/16       Past Surgical History:   Procedure Laterality Date    ANKLE SURGERY      last assessed 09/15/17    BACK SURGERY      lumbar laminectomy L4-L5    BLADDER SUSPENSION      7/2017    COLONOSCOPY      JOINT REPLACEMENT      TKR  on right    ORIF TIBIA & FIBULA FRACTURES Right 12/15/2016    Procedure: SURGICAL FIXATION OF RIGHT DISTAL FIBULA FRACTURE;  Surgeon: Adis Madrigal MD;  Location: [facility] MAIN OR;  Service:     MN HEMIARTHROPLASTY HIP PARTIAL Right 4/8/2023    Procedure: HEMIARTHROPLASTY HIP (BIPOLAR);  Surgeon: Dale Bradley;  Location:  Main OR;  Service: Orthopedics    MN REVJ TOT KNEE ARTHRP FEM&ENTIRE TIBIAL COMPONE Right 10/23/2017    Procedure: REVISION TOTAL KNEE REPLACEMENT WITH FROZEN SECTIONS;  Surgeon: Darren Chawla DO;  Location: [facility] MAIN OR;  Service: Orthopedics    TOTAL KNEE ARTHROPLASTY      last assessed; 11/21/17    WISDOM TOOTH EXTRACTION         Family History   Problem Relation Age of Onset    Arthritis Mother     Osteoporosis Mother     Dementia Father     Other Father         spinal stenosis    Other Family         CREST     I have reviewed and agree with the history as documented.    E-Cigarette/Vaping    E-Cigarette Use Never User      E-Cigarette/Vaping Substances    Nicotine No     THC No     CBD No      Social History     Tobacco Use    Smoking status: Never    Smokeless tobacco: Never   Vaping Use    Vaping status: Never Used   Substance Use Topics    Alcohol use: Yes     Comment: moderately    Drug use: No       Review of Systems   Constitutional: Negative.    HENT: Negative.     Eyes: Negative.    Respiratory: Negative.     Cardiovascular:  Negative for chest pain.   Gastrointestinal: Negative.    Endocrine: Negative.    Genitourinary: Negative.     Musculoskeletal:  Positive for arthralgias, joint swelling and myalgias.   Skin: Negative.    Allergic/Immunologic: Negative.    Neurological: Negative.    Hematological: Negative.    Psychiatric/Behavioral: Negative.     All other systems reviewed and are negative.      Physical Exam  Physical Exam  Vitals and nursing note reviewed.   Constitutional:       Appearance: Normal appearance.   HENT:      Head: Normocephalic and atraumatic.      Nose: Nose normal.      Mouth/Throat:      Mouth: Mucous membranes are moist.   Eyes:      Extraocular Movements: Extraocular movements intact.      Pupils: Pupils are equal, round, and reactive to light.   Cardiovascular:      Rate and Rhythm: Normal rate and regular rhythm.   Pulmonary:      Effort: Pulmonary effort is normal.      Breath sounds: Normal breath sounds.   Abdominal:      General: Abdomen is flat. Bowel sounds are normal.      Palpations: Abdomen is soft.   Musculoskeletal:         General: Normal range of motion.      Cervical back: Normal range of motion and neck supple.      Comments: Left lower extremity lateral malleolus edematous with tenderness noted no deformity positive DP and PT pulses intact no open wounds noted.   Skin:     General: Skin is warm.      Capillary Refill: Capillary refill takes less than 2 seconds.   Neurological:      General: No focal deficit present.      Mental Status: She is alert and oriented to person, place, and time. Mental status is at baseline.   Psychiatric:         Mood and Affect: Mood normal.         Thought Content: Thought content normal.         Vital Signs  ED Triage Vitals [04/28/24 1303]   Temperature Pulse Respirations Blood Pressure SpO2   97.7 °F (36.5 °C) 82 20 153/89 98 %      Temp Source Heart Rate Source Patient Position - Orthostatic VS BP Location FiO2 (%)   Tympanic Monitor Sitting Left arm --      Pain Score       5           Vitals:    04/28/24 1303   BP: 153/89   Pulse: 82   Patient Position -  Orthostatic VS: Sitting         Visual Acuity      ED Medications  Medications   HYDROcodone-acetaminophen (NORCO) 5-325 mg per tablet 1 tablet (1 tablet Oral Given 4/28/24 1415)       Diagnostic Studies  Results Reviewed       None                   XR ankle 3+ views LEFT   Final Result by Sin Rankin MD (04/28 5426)   Transverse nondisplaced distal fibular fracture with associated soft tissue swelling.      The study was marked in EPIC for immediate notification.      Workstation performed: [id number]                    Procedures  Splint application    Date/Time: 4/28/2024 9:58 PM    Performed by: Tania Culver DO  Authorized by: Tania Culver DO  Universal Protocol:  Procedure performed by:  Consent: Verbal consent obtained.  Consent given by: patient  Patient identity confirmed: verbally with patient    Pre-procedure details:     Sensation:  Normal  Procedure details:     Laterality:  Left    Location:  Ankle    Ankle:  L ankle    Strapping: no  Cast type:  Short leg        Splint type:  Sugar tong    Supplies:  Ortho-Glass           ED Course                                             Medical Decision Making  Patient wanted to follow-up with her own orthopedist so I gave her CD of her x-ray splinted her he has crutches at home discharge.    Problems Addressed:  Closed left fibular fracture: acute illness or injury    Amount and/or Complexity of Data Reviewed  External Data Reviewed: notes.  Radiology: ordered and independent interpretation performed. Decision-making details documented in ED Course.     Details: Left fibular fracture non displaced      Risk  Prescription drug management.             Disposition  Final diagnoses:   Closed left fibular fracture     Time reflects when diagnosis was documented in both MDM as applicable and the Disposition within this note       Time User Action Codes Description Comment    4/28/2024  1:59 PM Tania Culver Add [S82.402A] Closed left fibular fracture           ED  Disposition       ED Disposition   Discharge    Condition   Stable    Date/Time   Sun Apr 28, 2024  1:59 PM    Comment   Yue Fitzgerald discharge to home/self care.                   Follow-up Information       Follow up With Specialties Details Why Contact Info Additional Information    Debby Balderas MD Family Medicine Schedule an appointment as soon as possible for a visit   [address] [facility] [address]  [phone]       [facility] Emergency Department Emergency Medicine  If symptoms worsen [address] [facility] [id number] [phone] [facility], [address], [place], [address]    Darren Chawla DO Orthopedic Surgery   [address], [address]  [facility] [address] [phone]               Discharge Medication List as of 4/28/2024  2:00 PM        START taking these medications    Details   HYDROcodone-acetaminophen (Norco) 5-325 mg per tablet Take 1 tablet by mouth every 6 (six) hours as needed for pain for up to 3 days Max Daily Amount: 4 tablets, Starting Sun 4/28/2024, Until Wed 5/1/2024 at 2359, Normal           CONTINUE these medications which have NOT CHANGED    Details   acetaminophen (TYLENOL) 325 mg tablet Take 2 tablets (650 mg total) by mouth every 4 (four) hours as needed for mild pain, Starting Sun 4/9/2023, No Print      amLODIPine (NORVASC) 5 mg tablet take 1 tablet by mouth once daily, Starting Mon 10/9/2023, Normal      busPIRone (BUSPAR) 15 mg tablet take 1 tablet by mouth twice a day, Starting Fri 4/26/2024, Normal      escitalopram (LEXAPRO) 20 mg tablet take 1 tablet by mouth once daily, Starting Mon 4/22/2024, Normal      folic acid (FOLVITE) 1 mg tablet take 1 tablet by mouth once daily, Starting Wed 1/24/2024, Normal      hydrOXYzine pamoate (VISTARIL) 25 mg capsule Take 1 capsule (25 mg total) by mouth daily at bedtime as needed for anxiety  (insomnia), Starting Fri 2/2/2024, Normal      losartan (COZAAR) 50 mg tablet TAKE 1 TABLET BY MOUTH ONCE DAILY, Starting Tue 1/23/2024, Normal      meloxicam (Mobic) 15 mg tablet Take 1 tablet (15 mg total) by mouth daily for 28 days, Starting Tue 1/2/2024, Until Tue 1/30/2024, Normal      Milk Thistle Seed POWD Take 250 mg by mouth daily, Starting Wed 4/12/2023, Historical Med      Multiple Vitamin (multivitamin) tablet Take 1 tablet by mouth daily, Historical Med      thiamine (VITAMIN B1) 100 mg tablet Take 1 tablet (100 mg total) by mouth daily, Starting Wed 1/25/2023, Normal             No discharge procedures on file.    PDMP Review       None            ED Provider  Electronically Signed by             Tania Culver DO  04/28/24 7332

## 2024-04-30 ENCOUNTER — OFFICE VISIT (OUTPATIENT)
Dept: OBGYN CLINIC | Facility: CLINIC | Age: 59
End: 2024-04-30
Payer: COMMERCIAL

## 2024-04-30 VITALS
SYSTOLIC BLOOD PRESSURE: 122 MMHG | BODY MASS INDEX: 28.16 KG/M2 | WEIGHT: 169 LBS | HEIGHT: 65 IN | HEART RATE: 75 BPM | DIASTOLIC BLOOD PRESSURE: 77 MMHG

## 2024-04-30 DIAGNOSIS — S82.832A CLOSED FRACTURE OF DISTAL END OF LEFT FIBULA, UNSPECIFIED FRACTURE MORPHOLOGY, INITIAL ENCOUNTER: Primary | ICD-10-CM

## 2024-04-30 PROCEDURE — 3078F DIAST BP <80 MM HG: CPT | Performed by: ORTHOPAEDIC SURGERY

## 2024-04-30 PROCEDURE — 99214 OFFICE O/P EST MOD 30 MIN: CPT | Performed by: ORTHOPAEDIC SURGERY

## 2024-04-30 PROCEDURE — 3074F SYST BP LT 130 MM HG: CPT | Performed by: ORTHOPAEDIC SURGERY

## 2024-04-30 NOTE — PROGRESS NOTES
Assessment/Plan:  1. Closed fracture of distal end of left fibula, unspecified fracture morphology, initial encounter  Cam Boot          The patient has a nondisplaced distal fibula fracture that should heal well with non-operative treatment. We fit her with a tall CAM boot today, which she only needs to wear when weightbearing. She can ice and take OTC medications as needed for discomfort. She will FU in 2 weeks with repeat xrays. If doing well, I may transition her to a lace up ankle brace at that time.     Subjective:   Yue Fitzgerald is a 59 y.o. female who presents today for evaluation of her left ankle. She  twisted this on a step on 4/26/24 and had xrays 2 days later which showed a transverse nondisplaced fracture of the distal fibula. I am able to view these images today. She was ambulating on this the 2 days prior to these xrays. She notes pain and swelling localized to the lateral ankle. She notes good sensation of the left lower extremity. She denies any calf pain/cramping.       Review of Systems   Constitutional: Negative.  Negative for chills and fever.   HENT: Negative.  Negative for ear pain and sore throat.    Eyes: Negative.  Negative for pain and redness.   Respiratory: Negative.  Negative for shortness of breath and wheezing.    Cardiovascular:  Negative for chest pain and palpitations.   Gastrointestinal: Negative.  Negative for abdominal pain and blood in stool.   Endocrine: Negative.  Negative for polydipsia and polyuria.   Genitourinary: Negative.  Negative for difficulty urinating and dysuria.   Musculoskeletal:         As noted in HPI   Skin: Negative.  Negative for pallor and rash.   Neurological: Negative.  Negative for dizziness and numbness.   Hematological: Negative.  Negative for adenopathy. Does not bruise/bleed easily.   Psychiatric/Behavioral: Negative.  Negative for confusion and suicidal ideas.          Past Medical History:   Diagnosis Date   • Abnormal immunology findings  06/09/2011   • Anemia 05/23/2011   • Arthritis    • Closed fracture of distal end of fibula     resolved 09/15/17   • Depression    • Distal radius fracture, left     last assessed 01/30/17   • GERD (gastroesophageal reflux disease)     last assessed 05/06/13   • Herpes labialis     last assessed 07/23/15   • Hypertension     essential ; last assessed 08/07/13   • Sinus bradycardia     last assessed 05/24/16       Past Surgical History:   Procedure Laterality Date   • ANKLE SURGERY      last assessed 09/15/17   • BACK SURGERY      lumbar laminectomy L4-L5   • BLADDER SUSPENSION      7/2017   • COLONOSCOPY     • JOINT REPLACEMENT      TKR  on right   • ORIF TIBIA & FIBULA FRACTURES Right 12/15/2016    Procedure: SURGICAL FIXATION OF RIGHT DISTAL FIBULA FRACTURE;  Surgeon: Adis Madrigal MD;  Location: [facility] MAIN OR;  Service:    • WI HEMIARTHROPLASTY HIP PARTIAL Right 4/8/2023    Procedure: HEMIARTHROPLASTY HIP (BIPOLAR);  Surgeon: Dale Bradley;  Location: [facility];  Service: Orthopedics   • WI REVJ TOT KNEE ARTHRP FEM&ENTIRE TIBIAL COMPONE Right 10/23/2017    Procedure: REVISION TOTAL KNEE REPLACEMENT WITH FROZEN SECTIONS;  Surgeon: Darren Chawla DO;  Location: [facility] MAIN [facility];  Service: Orthopedics   • TOTAL KNEE ARTHROPLASTY      last assessed; 11/21/17   • WISDOM TOOTH EXTRACTION         Family History   Problem Relation Age of Onset   • Arthritis Mother    • Osteoporosis Mother    • Dementia Father    • Other Father         spinal stenosis   • Other Family         CREST       Social History     Occupational History   • Not on file   Tobacco Use   • Smoking status: Never   • Smokeless tobacco: Never   Vaping Use   • Vaping status: Never Used   Substance and Sexual Activity   • Alcohol use: Yes     Comment: moderately   • Drug use: No   • Sexual activity: Yes     Partners: Male         Current Outpatient Medications:   •  acetaminophen (TYLENOL) 325 mg tablet, Take 2 tablets (650 mg total) by mouth every 4  (four) hours as needed for mild pain, Disp: , Rfl: 0  •  amLODIPine (NORVASC) 5 mg tablet, take 1 tablet by mouth once daily, Disp: 90 tablet, Rfl: 1  •  busPIRone (BUSPAR) 15 mg tablet, take 1 tablet by mouth twice a day, Disp: 60 tablet, Rfl: 0  •  escitalopram (LEXAPRO) 20 mg tablet, take 1 tablet by mouth once daily, Disp: 90 tablet, Rfl: 1  •  folic acid (FOLVITE) 1 mg tablet, take 1 tablet by mouth once daily, Disp: 30 tablet, Rfl: 5  •  hydrOXYzine pamoate (VISTARIL) 25 mg capsule, Take 1 capsule (25 mg total) by mouth daily at bedtime as needed for anxiety (insomnia), Disp: 30 capsule, Rfl: 0  •  losartan (COZAAR) 50 mg tablet, TAKE 1 TABLET BY MOUTH ONCE DAILY, Disp: 90 tablet, Rfl: 1  •  Milk Thistle Seed POWD, Take 250 mg by mouth daily, Disp: , Rfl:   •  Multiple Vitamin (multivitamin) tablet, Take 1 tablet by mouth daily, Disp: , Rfl:   •  HYDROcodone-acetaminophen (Norco) 5-325 mg per tablet, Take 1 tablet by mouth every 6 (six) hours as needed for pain for up to 3 days Max Daily Amount: 4 tablets (Patient not taking: Reported on 4/30/2024), Disp: 12 tablet, Rfl: 0  •  meloxicam (Mobic) 15 mg tablet, Take 1 tablet (15 mg total) by mouth daily for 28 days (Patient not taking: Reported on 3/26/2024), Disp: 28 tablet, Rfl: 0  •  thiamine (VITAMIN B1) 100 mg tablet, Take 1 tablet (100 mg total) by mouth daily (Patient not taking: Reported on 4/5/2024), Disp: 90 tablet, Rfl: 2    Allergies   Allergen Reactions   • Lisinopril      COUGH   • Bupropion Anxiety       Objective:  Vitals:    04/30/24 1006   BP: 122/77   Pulse: 75     Pain Score:   3      Left Ankle Exam     Tenderness   The patient is experiencing tenderness in the lateral malleolus.   Swelling: moderate    Range of Motion   Dorsiflexion:  5   Plantar flexion:  30     Other   Erythema: absent  Sensation: normal  Pulse: present            Physical Exam  Constitutional:       General: She is not in acute distress.     Appearance: She is  well-developed.   HENT:      Head: Normocephalic and atraumatic.   Eyes:      General: No scleral icterus.     Conjunctiva/sclera: Conjunctivae normal.   Neck:      Vascular: No JVD.   Cardiovascular:      Rate and Rhythm: Normal rate.   Pulmonary:      Effort: Pulmonary effort is normal. No respiratory distress.   Musculoskeletal:      Comments: As per HPI   Skin:     General: Skin is warm.   Neurological:      Mental Status: She is alert and oriented to person, place, and time.      Coordination: Coordination normal.         I have personally reviewed pertinent films in PACS and my interpretation is as follows:  Xrays left ankle from 4/28/24: Nondisplaced transverse fracture of the distal fibula, umm GUERRERO.       This document was created using speech voice recognition software.   Grammatical errors, random word insertions, pronoun errors, and incomplete sentences are an occasional consequence of this system due to software limitations, ambient noise, and hardware issues.   Any formal questions or concerns about content, text, or information contained within the body of this dictation should be directly addressed to the provider for clarification.

## (undated) DEVICE — SUT ETHIBOND 2 V-37 30 IN MX69G

## (undated) DEVICE — NEEDLE SPINAL 22G X 3.5 IN PLST HUB

## (undated) DEVICE — BANDAGE, ESMARK LF STR 6"X9' (20/CS): Brand: CYPRESS

## (undated) DEVICE — DRESSING MEPILEX AG BORDER 4 X 12 IN

## (undated) DEVICE — SPINNING CEMENT MIXING BOWL

## (undated) DEVICE — GLOVE INDICATOR PI UNDERGLOVE SZ 8.5 BLUE

## (undated) DEVICE — ADHESIVE SKN CLSR HISTOACRYL FLEX 0.5ML LF

## (undated) DEVICE — ASTOUND SURGICAL GOWN, XXX LARGE, X-LONG: Brand: CONVERTORS

## (undated) DEVICE — SUT STRATAFIX SPIRAL 3-0 PGA/PCL 30 X 30 CM SXMD2B410

## (undated) DEVICE — Device

## (undated) DEVICE — NEEDLE 18 G X 1 1/2

## (undated) DEVICE — ORTHOPEDIC PACK: Brand: CARDINAL HEALTH

## (undated) DEVICE — GLOVE SRG BIOGEL 8.5

## (undated) DEVICE — DUAL CUT SAGITTAL BLADE

## (undated) DEVICE — SKIN MARKER DUAL TIP WITH RULER CAP, FLEXIBLE RULER AND LABELS: Brand: DEVON

## (undated) DEVICE — TRAY FOLEY 14FR URIMETER SURESTEP

## (undated) DEVICE — TEDS THIGH LG REG

## (undated) DEVICE — ANTIBACTERIAL UNDYED BRAIDED (POLYGLACTIN 910), SYNTHETIC ABSORBABLE SUTURE: Brand: COATED VICRYL

## (undated) DEVICE — HOOD: Brand: FLYTE, SURGICOOL

## (undated) DEVICE — SUT VICRYL 0 CP-1 27 IN J267H

## (undated) DEVICE — HANDPIECE SET WITH HIGH FLOW TIP AND SUCTION TUBE: Brand: INTERPULSE

## (undated) DEVICE — BASIC DOUBLE BASIN 2-LF: Brand: MEDLINE INDUSTRIES, INC.

## (undated) DEVICE — DRAPE SHEET THREE QUARTER

## (undated) DEVICE — LINER FOOT PAD STERILE DISPOSABLE

## (undated) DEVICE — 2108 SERIES SAGITTAL BLADE FLARED, GROUND  (25.0 X 1.2 X 95.0MM)

## (undated) DEVICE — TIBURON EXTREMITY SHEET: Brand: CONVERTORS

## (undated) DEVICE — SUT STRATAFIX SPIRAL 1-0  CT-1 30 X 30CM SXPD2B403

## (undated) DEVICE — 3M™ STERI-DRAPE™ U-DRAPE 1015: Brand: STERI-DRAPE™

## (undated) DEVICE — SYRINGE 10ML LL CONTROL TOP

## (undated) DEVICE — PREP SURGICAL PURPREP 26ML

## (undated) DEVICE — CHLORAPREP HI-LITE 26ML ORANGE